# Patient Record
Sex: FEMALE | Race: WHITE | NOT HISPANIC OR LATINO | Employment: OTHER | URBAN - METROPOLITAN AREA
[De-identification: names, ages, dates, MRNs, and addresses within clinical notes are randomized per-mention and may not be internally consistent; named-entity substitution may affect disease eponyms.]

---

## 2017-02-16 ENCOUNTER — OFFICE VISIT (OUTPATIENT)
Dept: FAMILY MEDICINE CLINIC | Facility: CLINIC | Age: 82
End: 2017-02-16

## 2017-02-16 VITALS
DIASTOLIC BLOOD PRESSURE: 60 MMHG | OXYGEN SATURATION: 97 % | HEIGHT: 61 IN | HEART RATE: 108 BPM | WEIGHT: 152 LBS | BODY MASS INDEX: 28.7 KG/M2 | SYSTOLIC BLOOD PRESSURE: 132 MMHG | RESPIRATION RATE: 17 BRPM

## 2017-02-16 DIAGNOSIS — F41.9 ANXIETY: ICD-10-CM

## 2017-02-16 DIAGNOSIS — R30.0 DYSURIA: ICD-10-CM

## 2017-02-16 DIAGNOSIS — I10 BENIGN ESSENTIAL HYPERTENSION: ICD-10-CM

## 2017-02-16 DIAGNOSIS — M25.512 ACUTE PAIN OF LEFT SHOULDER: Primary | ICD-10-CM

## 2017-02-16 PROCEDURE — 99213 OFFICE O/P EST LOW 20 MIN: CPT | Performed by: FAMILY MEDICINE

## 2017-02-16 RX ORDER — MELOXICAM 15 MG/1
TABLET ORAL
Qty: 30 TABLET | Refills: 2 | Status: SHIPPED | OUTPATIENT
Start: 2017-02-16 | End: 2017-05-09 | Stop reason: SDUPTHER

## 2017-02-16 RX ORDER — SERTRALINE HYDROCHLORIDE 25 MG/1
25 TABLET, FILM COATED ORAL DAILY
Qty: 90 TABLET | Refills: 3 | Status: SHIPPED | OUTPATIENT
Start: 2017-02-16 | End: 2018-02-09 | Stop reason: SDUPTHER

## 2017-02-16 RX ORDER — NEBIVOLOL 10 MG/1
10 TABLET ORAL DAILY
Qty: 90 TABLET | Refills: 3 | Status: SHIPPED | OUTPATIENT
Start: 2017-02-16 | End: 2018-02-20 | Stop reason: SDUPTHER

## 2017-02-16 RX ORDER — LOSARTAN POTASSIUM 100 MG/1
100 TABLET ORAL DAILY
Qty: 90 TABLET | Refills: 3 | Status: SHIPPED | OUTPATIENT
Start: 2017-02-16 | End: 2018-01-08 | Stop reason: SDUPTHER

## 2017-02-16 NOTE — PROGRESS NOTES
"Subjective   Lanie Ochoa is a 81 y.o. female.     History of Present Illness L shoulder pain very sore 4 days ago. No new activities or falls.  Pain with heavy lifting or reaching up.  Does swim crawl or breast stroke .5 miles 5x a week. Earlier this week it was too painful, but today she was able to do it. The knot in L shoulder is new.    The following portions of the patient's history were reviewed and updated as appropriate: allergies, current medications, past social history and problem list.    Review of Systems   Constitutional: Negative for activity change, appetite change and unexpected weight change.   HENT: Negative for nosebleeds and trouble swallowing.    Eyes: Negative for pain and visual disturbance.   Respiratory: Negative for chest tightness, shortness of breath and wheezing.    Cardiovascular: Negative for chest pain and palpitations.   Gastrointestinal: Negative for abdominal pain and blood in stool.   Endocrine: Negative.    Genitourinary: Negative for difficulty urinating and hematuria.   Musculoskeletal: Positive for arthralgias and myalgias. Joint swelling: new bump in shoulder.   Skin: Negative for color change and rash.   Allergic/Immunologic: Negative.    Neurological: Negative for syncope and speech difficulty.   Hematological: Negative for adenopathy.   Psychiatric/Behavioral: Negative for agitation and confusion.   All other systems reviewed and are negative.      Objective   Visit Vitals   • /60   • Pulse 108   • Resp 17   • Ht 61\" (154.9 cm)   • Wt 152 lb (68.9 kg)   • SpO2 97%   • BMI 28.72 kg/m2     Physical Exam   Constitutional: She appears well-developed and well-nourished.   Cardiovascular: Normal rate and regular rhythm.    Pulmonary/Chest: Effort normal.   Musculoskeletal: Normal range of motion.   L shoulder AC joint tender firm 1 cm rounded protrusion. Poss a ganglion? Or bony cyst? This is new she states.   Nursing note and vitals reviewed.      Assessment/Plan "   Problem List Items Addressed This Visit     None      Visit Diagnoses     Acute pain of left shoulder    -  Primary    Relevant Medications    meloxicam (MOBIC) 15 MG tablet    Other Relevant Orders    Ambulatory Referral to Orthopedic Surgery           She certainly needs an xray. I am not sure if the lulmp is a ganglion or bony cyst, or other bony growth. Injection could help. Re her activbities, aat this time if any exercise is painful she should avoid it.

## 2017-03-30 DIAGNOSIS — I10 BENIGN ESSENTIAL HYPERTENSION: ICD-10-CM

## 2017-03-30 RX ORDER — DILTIAZEM HYDROCHLORIDE 240 MG/1
240 CAPSULE, EXTENDED RELEASE ORAL DAILY
Qty: 90 CAPSULE | Refills: 1 | Status: SHIPPED | OUTPATIENT
Start: 2017-03-30 | End: 2017-09-18 | Stop reason: SDUPTHER

## 2017-04-13 ENCOUNTER — OFFICE VISIT (OUTPATIENT)
Dept: ORTHOPEDIC SURGERY | Facility: CLINIC | Age: 82
End: 2017-04-13

## 2017-04-13 VITALS — HEIGHT: 61 IN | BODY MASS INDEX: 28.7 KG/M2 | TEMPERATURE: 97.5 F | WEIGHT: 152 LBS

## 2017-04-13 DIAGNOSIS — M25.512 ACUTE PAIN OF LEFT SHOULDER: ICD-10-CM

## 2017-04-13 DIAGNOSIS — M25.561 ACUTE PAIN OF RIGHT KNEE: Primary | ICD-10-CM

## 2017-04-13 PROCEDURE — 73030 X-RAY EXAM OF SHOULDER: CPT | Performed by: ORTHOPAEDIC SURGERY

## 2017-04-13 PROCEDURE — 20610 DRAIN/INJ JOINT/BURSA W/O US: CPT | Performed by: ORTHOPAEDIC SURGERY

## 2017-04-13 PROCEDURE — 99204 OFFICE O/P NEW MOD 45 MIN: CPT | Performed by: ORTHOPAEDIC SURGERY

## 2017-04-13 PROCEDURE — 73562 X-RAY EXAM OF KNEE 3: CPT | Performed by: ORTHOPAEDIC SURGERY

## 2017-04-13 RX ORDER — METHYLPREDNISOLONE ACETATE 80 MG/ML
80 INJECTION, SUSPENSION INTRA-ARTICULAR; INTRALESIONAL; INTRAMUSCULAR; SOFT TISSUE
Status: COMPLETED | OUTPATIENT
Start: 2017-04-13 | End: 2017-04-13

## 2017-04-13 RX ORDER — BUPIVACAINE HYDROCHLORIDE 5 MG/ML
4 INJECTION, SOLUTION EPIDURAL; INTRACAUDAL
Status: COMPLETED | OUTPATIENT
Start: 2017-04-13 | End: 2017-04-13

## 2017-04-13 RX ADMIN — METHYLPREDNISOLONE ACETATE 80 MG: 80 INJECTION, SUSPENSION INTRA-ARTICULAR; INTRALESIONAL; INTRAMUSCULAR; SOFT TISSUE at 15:04

## 2017-04-13 RX ADMIN — BUPIVACAINE HYDROCHLORIDE 4 ML: 5 INJECTION, SOLUTION EPIDURAL; INTRACAUDAL at 15:04

## 2017-04-13 NOTE — PROGRESS NOTES
"   New left Shoulder      Patient: Lanie Ocoha        YOB: 1935    Medical Record Number: [id number]        Chief Complaints: Left shoulder and right knee pain complaints of left shoulder pain really was more of a small mass that was noted on the superior aspect of her shoulder that she states is gone now she has iced it area no history of injury change in activity that she can recall her symptoms are gone his far as the shoulder goes.  She states her knee is really was bothering her now she had a left knee done by Dr. Castle this was a total knee she states she really doesn't want to have another one is that knee is \"never been right.\"  Her symptoms on her knee are intermittent and moderate past medical history marked for breast cancer and arthritis      History of Present Illness: This is a  81 y.o. female who presents with           Allergies:   Allergies   Allergen Reactions   • Sulfa Antibiotics Rash       Medications:   Home Medications:  Current Outpatient Prescriptions on File Prior to Visit   Medication Sig   • CALCIUM PO Take by mouth.   • Cholecalciferol (VITAMIN D3) 400 UNITS capsule Take by mouth.   • diltiazem XR (DILT-XR) 240 MG 24 hr capsule Take 1 capsule by mouth Daily.   • losartan (COZAAR) 100 MG tablet Take 1 tablet by mouth Daily.   • meloxicam (MOBIC) 15 MG tablet 1 a day with food   • nebivolol (BYSTOLIC) 10 MG tablet Take 1 tablet by mouth Daily.   • sertraline (ZOLOFT) 25 MG tablet Take 1 tablet by mouth Daily.     No current facility-administered medications on file prior to visit.      Current Medications:  Scheduled Meds:  Continuous Infusions:  No current facility-administered medications for this visit.   PRN Meds:.    Past Medical History:   Diagnosis Date   • Anxiety    • BMS (burning mouth syndrome) 11/5/2015   • Breast cancer    • Cancer     STAGE 1 INVASIVE DUCTAL CARCINOMA ER AND AR POSITIVE, HER-2/LOYDA NEGATIVE   • Cancer     SKIN LEFT ARM; SQUAMOUS CELL   • " "Constipation    • Degenerative disc disease    • Functional murmur     RESOLVED 03/05/2014   • Gastritis     RESOLVED 03/05/2014   • GERD (gastroesophageal reflux disease)    • Glossalgia 11/5/2015   • Headache 08/28/2013 03/05/2014   • Internal hemorrhoids    • Osteoarthritis 04/08/2014    HAND   • PNA (pneumonia) 11/5/2015   • Trigger ring finger 03/05/2014    RESOLVED 08/22/2014        Past Surgical History:   Procedure Laterality Date   • BREAST BIOPSY     • BREAST LUMPECTOMY     • BREAST SURGERY  07/15/2009    LUMPECTOMY   • DILATATION AND CURETTAGE      RESOLVED 06/15/2010   • EYE SURGERY  10/15/2012    LEFT CATARACT EXTRACTION   • HEMORRHOIDECTOMY          Social History     Occupational History   • Not on file.     Social History Main Topics   • Smoking status: Never Smoker   • Smokeless tobacco: Not on file   • Alcohol use Not on file   • Drug use: Not on file   • Sexual activity: Defer    Social History     Social History Narrative      History reviewed. No pertinent family history.          Review of Systems: 14 point review of systems are remarkable for the pertinent positives listed in chart by the patient the remainder are negative    Review of Systems      Physical Exam: 81 y.o. female  General Appearance:    Alert, cooperative, in no acute distress                 Vitals:    04/13/17 1407   Temp: 97.5 °F (36.4 °C)   Weight: 152 lb (68.9 kg)   Height: 61\" (154.9 cm)      Patient is alert and read ×3 no acute distress appears her above-listed at height weight and age.  Affect is normal respiratory rate is normal unlabored. Heart rate regular rate rhythm, sclera, dentition and hearing are normal for the purpose of this exam.    Ortho Exam Physical exam of the right  knee reveals no effusion no redness.  The patient does have tenderness about the medial l joint line.  No tenderness about the lateral l joint line.  A negative bounce home and a positive l medial Mary.    Patient has a stable " ligamentous exam.  The patient has a negative Lachman and negative anterior drawer and a negative pivot shift.  Quads are reasonable and symmetric bilaterally.  Calf is soft and nontender.  There is no overlying skin changes no lymphedema lymphadenopathy.  Patient has good hip range of motion full symmetric and asymptomatic and a normal ankle exam.  She has good distal pulses and sensation distally is intact    Physical exam of the left shoulder reveals no overlying skin changes no lymphedema no lymphadenopathy.  The patient can actively flex to 180, abduction is similar external rotation is to 50, internal rotation to the upper lumbar spine.  Rotator cuff strength is 4+ to 5 over 5 with isometric strength testing without symptoms.  The patient has good cervical range of motion full and asymptomatic no radicular symptoms and a normal elbow exam.  Patient has good distal pulses she does have a prominence in the area the acromioclavicular joint but no redness no masses felt     Large Joint Arthrocentesis  Date/Time: 4/13/2017 3:04 PM  Consent given by: patient  Site marked: site marked  Timeout: Immediately prior to procedure a time out was called to verify the correct patient, procedure, equipment, support staff and site/side marked as required   Supporting Documentation  Indications: pain   Procedure Details  Location: knee - R knee  Needle size: 25 G  Approach: anteromedial  Medications administered: 4 mL bupivacaine (PF) 0.5 %; 80 mg methylPREDNISolone acetate 80 MG/ML                  Radiology:   AP, Scapular Y and Axillary Lateral of the left shoulder were ordered/reviewed to evauate shoulder pain.  I have no comp films,  She has degenerative changes seen in her acromioclavicular joint otherwise no acute bony pathology seen.  AP lateral merchant view the right knee were taken to evaluate her knee complaints she has evidence of left total knee arthroplasty she has chondrocalcinosis seen medially and laterally  and some degenerative changes seen patellofemoral joint  Assessment/Plan:  Left shoulder massive suspect this was assisted her acromioclavicular joint it has resolved at this time.  I'm going to give her some Voltaren gel the mass should return I want her to ice and use the gel.  As far as her knees go I think it is more degenerative in origin I think she benefit from an injection which she was quite agreeable to do she understands her options of injection Synvisc thyroid platelets and ultimately a knee replacement future if she wants

## 2017-05-02 ENCOUNTER — TRANSCRIBE ORDERS (OUTPATIENT)
Dept: ADMINISTRATIVE | Facility: HOSPITAL | Age: 82
End: 2017-05-02

## 2017-05-02 DIAGNOSIS — Z12.31 VISIT FOR SCREENING MAMMOGRAM: Primary | ICD-10-CM

## 2017-05-09 DIAGNOSIS — M25.512 ACUTE PAIN OF LEFT SHOULDER: ICD-10-CM

## 2017-05-09 RX ORDER — MELOXICAM 15 MG/1
TABLET ORAL
Qty: 30 TABLET | Refills: 1 | Status: SHIPPED | OUTPATIENT
Start: 2017-05-09 | End: 2017-06-07 | Stop reason: SINTOL

## 2017-05-19 ENCOUNTER — OFFICE VISIT (OUTPATIENT)
Dept: ORTHOPEDIC SURGERY | Facility: CLINIC | Age: 82
End: 2017-05-19

## 2017-05-19 VITALS — TEMPERATURE: 97.2 F | WEIGHT: 145 LBS | BODY MASS INDEX: 26.68 KG/M2 | HEIGHT: 62 IN

## 2017-05-19 DIAGNOSIS — S83.241D TEAR OF MEDIAL MENISCUS OF RIGHT KNEE, CURRENT, UNSPECIFIED TEAR TYPE, SUBSEQUENT ENCOUNTER: Primary | ICD-10-CM

## 2017-05-19 PROCEDURE — 99213 OFFICE O/P EST LOW 20 MIN: CPT | Performed by: ORTHOPAEDIC SURGERY

## 2017-05-23 ENCOUNTER — OFFICE VISIT (OUTPATIENT)
Dept: FAMILY MEDICINE CLINIC | Facility: CLINIC | Age: 82
End: 2017-05-23

## 2017-05-23 VITALS
OXYGEN SATURATION: 97 % | BODY MASS INDEX: 27.6 KG/M2 | HEIGHT: 62 IN | DIASTOLIC BLOOD PRESSURE: 70 MMHG | RESPIRATION RATE: 18 BRPM | WEIGHT: 150 LBS | HEART RATE: 71 BPM | SYSTOLIC BLOOD PRESSURE: 130 MMHG

## 2017-05-23 DIAGNOSIS — Z23 ENCOUNTER FOR IMMUNIZATION: ICD-10-CM

## 2017-05-23 DIAGNOSIS — G89.29 CHRONIC PAIN OF RIGHT KNEE: Primary | ICD-10-CM

## 2017-05-23 DIAGNOSIS — I10 BENIGN ESSENTIAL HYPERTENSION: ICD-10-CM

## 2017-05-23 DIAGNOSIS — M25.561 CHRONIC PAIN OF RIGHT KNEE: Primary | ICD-10-CM

## 2017-05-23 PROBLEM — I34.0 NON-RHEUMATIC MITRAL REGURGITATION: Status: ACTIVE | Noted: 2017-05-23

## 2017-05-23 PROBLEM — I36.1 NON-RHEUMATIC TRICUSPID VALVE INSUFFICIENCY: Status: ACTIVE | Noted: 2017-05-23

## 2017-05-23 PROCEDURE — 99213 OFFICE O/P EST LOW 20 MIN: CPT | Performed by: FAMILY MEDICINE

## 2017-05-24 ENCOUNTER — OFFICE VISIT (OUTPATIENT)
Dept: ORTHOPEDIC SURGERY | Facility: CLINIC | Age: 82
End: 2017-05-24

## 2017-05-24 DIAGNOSIS — S83.241D TEAR OF MEDIAL MENISCUS OF RIGHT KNEE, CURRENT, UNSPECIFIED TEAR TYPE, SUBSEQUENT ENCOUNTER: ICD-10-CM

## 2017-05-24 PROCEDURE — 73721 MRI JNT OF LWR EXTRE W/O DYE: CPT | Performed by: ORTHOPAEDIC SURGERY

## 2017-05-30 ENCOUNTER — OFFICE VISIT (OUTPATIENT)
Dept: ORTHOPEDIC SURGERY | Facility: CLINIC | Age: 82
End: 2017-05-30

## 2017-05-30 VITALS — TEMPERATURE: 97.7 F | HEIGHT: 61 IN | BODY MASS INDEX: 28.32 KG/M2 | WEIGHT: 150 LBS

## 2017-05-30 DIAGNOSIS — S83.241D OTHER TEAR OF MEDIAL MENISCUS OF RIGHT KNEE AS CURRENT INJURY, SUBSEQUENT ENCOUNTER: Primary | ICD-10-CM

## 2017-05-30 PROCEDURE — 99213 OFFICE O/P EST LOW 20 MIN: CPT | Performed by: ORTHOPAEDIC SURGERY

## 2017-06-05 ENCOUNTER — HOSPITAL ENCOUNTER (OUTPATIENT)
Dept: MAMMOGRAPHY | Facility: HOSPITAL | Age: 82
Discharge: HOME | End: 2017-06-05
Admitting: FAMILY MEDICINE

## 2017-06-05 DIAGNOSIS — Z12.31 VISIT FOR SCREENING MAMMOGRAM: ICD-10-CM

## 2017-06-05 PROCEDURE — G0202 SCR MAMMO BI INCL CAD: HCPCS

## 2017-06-05 PROCEDURE — 77063 BREAST TOMOSYNTHESIS BI: CPT

## 2017-06-07 ENCOUNTER — APPOINTMENT (OUTPATIENT)
Dept: PREADMISSION TESTING | Facility: HOSPITAL | Age: 82
End: 2017-06-07

## 2017-06-07 VITALS
TEMPERATURE: 96.9 F | HEIGHT: 61 IN | WEIGHT: 149 LBS | HEART RATE: 56 BPM | SYSTOLIC BLOOD PRESSURE: 128 MMHG | OXYGEN SATURATION: 97 % | DIASTOLIC BLOOD PRESSURE: 67 MMHG | RESPIRATION RATE: 18 BRPM | BODY MASS INDEX: 28.13 KG/M2

## 2017-06-07 LAB
ANION GAP SERPL CALCULATED.3IONS-SCNC: 10.5 MMOL/L
BUN BLD-MCNC: 9 MG/DL (ref 8–23)
BUN/CREAT SERPL: 10.6 (ref 7–25)
CALCIUM SPEC-SCNC: 9.2 MG/DL (ref 8.6–10.5)
CHLORIDE SERPL-SCNC: 99 MMOL/L (ref 98–107)
CO2 SERPL-SCNC: 28.5 MMOL/L (ref 22–29)
CREAT BLD-MCNC: 0.85 MG/DL (ref 0.57–1)
DEPRECATED RDW RBC AUTO: 49.1 FL (ref 37–54)
ERYTHROCYTE [DISTWIDTH] IN BLOOD BY AUTOMATED COUNT: 14.3 % (ref 11.7–13)
GFR SERPL CREATININE-BSD FRML MDRD: 64 ML/MIN/1.73
GLUCOSE BLD-MCNC: 116 MG/DL (ref 65–99)
HCT VFR BLD AUTO: 39.8 % (ref 35.6–45.5)
HGB BLD-MCNC: 13.2 G/DL (ref 11.9–15.5)
MCH RBC QN AUTO: 31.4 PG (ref 26.9–32)
MCHC RBC AUTO-ENTMCNC: 33.2 G/DL (ref 32.4–36.3)
MCV RBC AUTO: 94.8 FL (ref 80.5–98.2)
PLATELET # BLD AUTO: 282 10*3/MM3 (ref 140–500)
PMV BLD AUTO: 10.4 FL (ref 6–12)
POTASSIUM BLD-SCNC: 4.2 MMOL/L (ref 3.5–5.2)
RBC # BLD AUTO: 4.2 10*6/MM3 (ref 3.9–5.2)
SODIUM BLD-SCNC: 138 MMOL/L (ref 136–145)
WBC NRBC COR # BLD: 5.69 10*3/MM3 (ref 4.5–10.7)

## 2017-06-07 PROCEDURE — 80048 BASIC METABOLIC PNL TOTAL CA: CPT | Performed by: ORTHOPAEDIC SURGERY

## 2017-06-07 PROCEDURE — 85027 COMPLETE CBC AUTOMATED: CPT | Performed by: ORTHOPAEDIC SURGERY

## 2017-06-07 PROCEDURE — 93010 ELECTROCARDIOGRAM REPORT: CPT | Performed by: INTERNAL MEDICINE

## 2017-06-07 PROCEDURE — 36415 COLL VENOUS BLD VENIPUNCTURE: CPT

## 2017-06-07 PROCEDURE — 93005 ELECTROCARDIOGRAM TRACING: CPT

## 2017-06-07 NOTE — DISCHARGE INSTRUCTIONS
Take the following medications the morning of surgery with a small sip of water:        General Instructions:  • Do not eat solid food after midnight the night before surgery.  • You may drink clear liquids day of surgery but must stop at least one hour before your hospital arrival time.  • It is beneficial for you to have a clear drink that contains carbohydrates the day of surgery.  We suggest a 20 ounce bottle of Gatorade or Powerade for non-diabetic patients or a 20 ounce bottle of G2 or Powerade Zero for diabetic patients. (Pediatric patients, are not advised to drink a 20 ounce carbohydrate drink)    Clear liquids are liquids you can see through.  Nothing red in color.     Plain water                               Sports drinks  Sodas                                   Gelatin (Jell-O)  Fruit juices without pulp such as white grape juice and apple juice  Popsicles that contain no fruit or yogurt  Tea or coffee (no cream or milk added)  Gatorade / Powerade  G2 / Powerade Zero    • Infants may have breast milk up to four hours before surgery.  • Infants drinking formula may drink formula up to six hours before surgery.   • Patients who avoid smoking, chewing tobacco and alcohol for 4 weeks prior to surgery have a reduced risk of post-operative complications.  Quit smoking as many days before surgery as you can.  • Do not smoke, use chewing tobacco or drink alcohol the day of surgery.   • If applicable bring your C-PAP/ BI-PAP machine.  • Bring any papers given to you in the doctor’s office.  • Wear clean comfortable clothes and socks.  • Do not wear contact lenses or make-up.  Bring a case for your glasses.   • Bring crutches or walker if applicable.  • Leave all other valuables and jewelry at home.  • The Pre-Admission Testing nurse will instruct you to bring medications if unable to obtain an accurate list in Pre-Admission Testing.        If you were given a blood bank ID arm band remember to bring it with you  the day of surgery.    Preventing a Surgical Site Infection:  • For 2 to 3 days before surgery, avoid shaving with a razor because the razor can irritate skin and make it easier to develop an infection.  • The night prior to surgery sleep in a clean bed with clean clothing.  Do not allow pets to sleep with you.  • Shower on the morning of surgery using a fresh bar of anti-bacterial soap (such as Dial) and clean washcloth.  Dry with a clean towel and dress in clean clothing.  • Ask your surgeon if you will be receiving antibiotics prior to surgery.  • Make sure you, your family, and all healthcare providers clean their hands with soap and water or an alcohol based hand  before caring for you or your wound.    Day of surgery:  Upon arrival, a Pre-op nurse and Anesthesiologist will review your health history, obtain vital signs, and answer questions you may have.  The only belongings needed at this time will be your home medications and if applicable your C-PAP/BI-PAP machine.  If you are staying overnight your family can leave the rest of your belongings in the car and bring them to your room later.  A Pre-op nurse will start an IV and you may receive medication in preparation for surgery, including something to help you relax.  Your family will be able to see you in the Pre-op area.  While you are in surgery your family should notify the waiting room  if they leave the waiting room area and provide a contact phone number.    Please be aware that surgery does come with discomfort.  We want to make every effort to control your discomfort so please discuss any uncontrolled symptoms with your nurse.   Your doctor will most likely have prescribed pain medications.      If you are going home after surgery you will receive individualized written care instructions before being discharged.  A responsible adult must drive you to and from the hospital on the day of your surgery and stay with you for 24  hours.    If you are staying overnight following surgery, you will be transported to your hospital room following the recovery period.  [facility] has all private rooms.    If you have any questions please call Pre-Admission Testing at [phone].  Deductibles and co-payments are collected on the day of service. Please be prepared to pay the required co-pay, deductible or deposit on the day of service as defined by your plan.

## 2017-06-13 ENCOUNTER — ANESTHESIA (OUTPATIENT)
Dept: PERIOP | Facility: HOSPITAL | Age: 82
End: 2017-06-13

## 2017-06-13 ENCOUNTER — HOSPITAL ENCOUNTER (OUTPATIENT)
Facility: HOSPITAL | Age: 82
Setting detail: HOSPITAL OUTPATIENT SURGERY
Discharge: HOME | End: 2017-06-13
Attending: ORTHOPAEDIC SURGERY | Admitting: ORTHOPAEDIC SURGERY

## 2017-06-13 ENCOUNTER — ANESTHESIA EVENT (OUTPATIENT)
Dept: PERIOP | Facility: HOSPITAL | Age: 82
End: 2017-06-13

## 2017-06-13 VITALS
RESPIRATION RATE: 16 BRPM | HEART RATE: 53 BPM | SYSTOLIC BLOOD PRESSURE: 148 MMHG | WEIGHT: 149 LBS | TEMPERATURE: 98 F | OXYGEN SATURATION: 97 % | DIASTOLIC BLOOD PRESSURE: 69 MMHG | BODY MASS INDEX: 28.15 KG/M2

## 2017-06-13 DIAGNOSIS — M25.561 CHRONIC PAIN OF RIGHT KNEE: Primary | ICD-10-CM

## 2017-06-13 DIAGNOSIS — G89.29 CHRONIC PAIN OF RIGHT KNEE: Primary | ICD-10-CM

## 2017-06-13 PROCEDURE — 25010000002 METHYLPREDNISOLONE PER 80 MG: Performed by: ORTHOPAEDIC SURGERY

## 2017-06-13 PROCEDURE — 25010000002 MIDAZOLAM PER 1 MG: Performed by: ANESTHESIOLOGY

## 2017-06-13 PROCEDURE — 29880 ARTHRS KNE SRG MNISECTMY M&L: CPT | Performed by: ORTHOPAEDIC SURGERY

## 2017-06-13 PROCEDURE — 25010000003 CEFAZOLIN IN DEXTROSE 2-4 GM/100ML-% SOLUTION: Performed by: ORTHOPAEDIC SURGERY

## 2017-06-13 PROCEDURE — 25010000002 FENTANYL CITRATE (PF) 100 MCG/2ML SOLUTION: Performed by: NURSE ANESTHETIST, CERTIFIED REGISTERED

## 2017-06-13 PROCEDURE — 25010000002 ONDANSETRON PER 1 MG: Performed by: NURSE ANESTHETIST, CERTIFIED REGISTERED

## 2017-06-13 PROCEDURE — 25010000002 PROPOFOL 10 MG/ML EMULSION: Performed by: NURSE ANESTHETIST, CERTIFIED REGISTERED

## 2017-06-13 PROCEDURE — 25010000002 KETOROLAC TROMETHAMINE PER 15 MG: Performed by: NURSE ANESTHETIST, CERTIFIED REGISTERED

## 2017-06-13 RX ORDER — KETOROLAC TROMETHAMINE 30 MG/ML
INJECTION, SOLUTION INTRAMUSCULAR; INTRAVENOUS AS NEEDED
Status: DISCONTINUED | OUTPATIENT
Start: 2017-06-13 | End: 2017-06-13 | Stop reason: SURG

## 2017-06-13 RX ORDER — DIPHENHYDRAMINE HYDROCHLORIDE 50 MG/ML
12.5 INJECTION INTRAMUSCULAR; INTRAVENOUS
Status: DISCONTINUED | OUTPATIENT
Start: 2017-06-13 | End: 2017-06-13 | Stop reason: HOSPADM

## 2017-06-13 RX ORDER — PROMETHAZINE HYDROCHLORIDE 25 MG/1
25 TABLET ORAL ONCE AS NEEDED
Status: DISCONTINUED | OUTPATIENT
Start: 2017-06-13 | End: 2017-06-13 | Stop reason: HOSPADM

## 2017-06-13 RX ORDER — MORPHINE SULFATE 1 MG/ML
INJECTION, SOLUTION EPIDURAL; INTRATHECAL; INTRAVENOUS
Status: DISCONTINUED
Start: 2017-06-13 | End: 2017-06-13 | Stop reason: WASHOUT

## 2017-06-13 RX ORDER — CEFAZOLIN SODIUM 2 G/100ML
2 INJECTION, SOLUTION INTRAVENOUS ONCE
Status: COMPLETED | OUTPATIENT
Start: 2017-06-13 | End: 2017-06-13

## 2017-06-13 RX ORDER — OXYCODONE HYDROCHLORIDE AND ACETAMINOPHEN 5; 325 MG/1; MG/1
1 TABLET ORAL ONCE AS NEEDED
Status: DISCONTINUED | OUTPATIENT
Start: 2017-06-13 | End: 2017-06-13 | Stop reason: HOSPADM

## 2017-06-13 RX ORDER — NALBUPHINE HCL 10 MG/ML
10 AMPUL (ML) INJECTION EVERY 4 HOURS PRN
Status: DISCONTINUED | OUTPATIENT
Start: 2017-06-13 | End: 2017-06-13 | Stop reason: HOSPADM

## 2017-06-13 RX ORDER — HYDROMORPHONE HYDROCHLORIDE 1 MG/ML
0.5 INJECTION, SOLUTION INTRAMUSCULAR; INTRAVENOUS; SUBCUTANEOUS
Status: DISCONTINUED | OUTPATIENT
Start: 2017-06-13 | End: 2017-06-13 | Stop reason: HOSPADM

## 2017-06-13 RX ORDER — PROPOFOL 10 MG/ML
VIAL (ML) INTRAVENOUS AS NEEDED
Status: DISCONTINUED | OUTPATIENT
Start: 2017-06-13 | End: 2017-06-13 | Stop reason: SURG

## 2017-06-13 RX ORDER — METHYLPREDNISOLONE ACETATE 80 MG/ML
INJECTION, SUSPENSION INTRA-ARTICULAR; INTRALESIONAL; INTRAMUSCULAR; SOFT TISSUE AS NEEDED
Status: DISCONTINUED | OUTPATIENT
Start: 2017-06-13 | End: 2017-06-13 | Stop reason: HOSPADM

## 2017-06-13 RX ORDER — PROMETHAZINE HYDROCHLORIDE 25 MG/ML
6.25 INJECTION, SOLUTION INTRAMUSCULAR; INTRAVENOUS ONCE AS NEEDED
Status: DISCONTINUED | OUTPATIENT
Start: 2017-06-13 | End: 2017-06-13 | Stop reason: HOSPADM

## 2017-06-13 RX ORDER — SODIUM CHLORIDE 0.9 % (FLUSH) 0.9 %
1-10 SYRINGE (ML) INJECTION AS NEEDED
Status: DISCONTINUED | OUTPATIENT
Start: 2017-06-13 | End: 2017-06-13 | Stop reason: HOSPADM

## 2017-06-13 RX ORDER — ACETAMINOPHEN 325 MG/1
650 TABLET ORAL ONCE
Status: COMPLETED | OUTPATIENT
Start: 2017-06-13 | End: 2017-06-13

## 2017-06-13 RX ORDER — HYDROCODONE BITARTRATE AND ACETAMINOPHEN 5; 325 MG/1; MG/1
1 TABLET ORAL EVERY 4 HOURS PRN
Qty: 50 TABLET | Refills: 0 | Status: SHIPPED | OUTPATIENT
Start: 2017-06-13 | End: 2017-07-21

## 2017-06-13 RX ORDER — FENTANYL CITRATE 50 UG/ML
50 INJECTION, SOLUTION INTRAMUSCULAR; INTRAVENOUS
Status: DISCONTINUED | OUTPATIENT
Start: 2017-06-13 | End: 2017-06-13 | Stop reason: HOSPADM

## 2017-06-13 RX ORDER — MIDAZOLAM HYDROCHLORIDE 1 MG/ML
2 INJECTION INTRAMUSCULAR; INTRAVENOUS
Status: DISCONTINUED | OUTPATIENT
Start: 2017-06-13 | End: 2017-06-13 | Stop reason: HOSPADM

## 2017-06-13 RX ORDER — FENTANYL CITRATE 50 UG/ML
INJECTION, SOLUTION INTRAMUSCULAR; INTRAVENOUS AS NEEDED
Status: DISCONTINUED | OUTPATIENT
Start: 2017-06-13 | End: 2017-06-13 | Stop reason: SURG

## 2017-06-13 RX ORDER — ONDANSETRON 2 MG/ML
INJECTION INTRAMUSCULAR; INTRAVENOUS AS NEEDED
Status: DISCONTINUED | OUTPATIENT
Start: 2017-06-13 | End: 2017-06-13 | Stop reason: SURG

## 2017-06-13 RX ORDER — NALOXONE HCL 0.4 MG/ML
0.4 VIAL (ML) INJECTION AS NEEDED
Status: DISCONTINUED | OUTPATIENT
Start: 2017-06-13 | End: 2017-06-13 | Stop reason: HOSPADM

## 2017-06-13 RX ORDER — NALBUPHINE HCL 10 MG/ML
2 AMPUL (ML) INJECTION EVERY 4 HOURS PRN
Status: DISCONTINUED | OUTPATIENT
Start: 2017-06-13 | End: 2017-06-13 | Stop reason: HOSPADM

## 2017-06-13 RX ORDER — FAMOTIDINE 10 MG/ML
20 INJECTION, SOLUTION INTRAVENOUS ONCE
Status: COMPLETED | OUTPATIENT
Start: 2017-06-13 | End: 2017-06-13

## 2017-06-13 RX ORDER — HYDRALAZINE HYDROCHLORIDE 20 MG/ML
5 INJECTION INTRAMUSCULAR; INTRAVENOUS
Status: DISCONTINUED | OUTPATIENT
Start: 2017-06-13 | End: 2017-06-13 | Stop reason: HOSPADM

## 2017-06-13 RX ORDER — SODIUM CHLORIDE, SODIUM LACTATE, POTASSIUM CHLORIDE, AND CALCIUM CHLORIDE .6; .31; .03; .02 G/100ML; G/100ML; G/100ML; G/100ML
IRRIGANT IRRIGATION AS NEEDED
Status: DISCONTINUED | OUTPATIENT
Start: 2017-06-13 | End: 2017-06-13 | Stop reason: HOSPADM

## 2017-06-13 RX ORDER — ACETAMINOPHEN 325 MG/1
650 TABLET ORAL ONCE AS NEEDED
Status: DISCONTINUED | OUTPATIENT
Start: 2017-06-13 | End: 2017-06-13 | Stop reason: HOSPADM

## 2017-06-13 RX ORDER — MIDAZOLAM HYDROCHLORIDE 1 MG/ML
1 INJECTION INTRAMUSCULAR; INTRAVENOUS
Status: DISCONTINUED | OUTPATIENT
Start: 2017-06-13 | End: 2017-06-13 | Stop reason: HOSPADM

## 2017-06-13 RX ORDER — LIDOCAINE HYDROCHLORIDE 20 MG/ML
INJECTION, SOLUTION INFILTRATION; PERINEURAL AS NEEDED
Status: DISCONTINUED | OUTPATIENT
Start: 2017-06-13 | End: 2017-06-13 | Stop reason: SURG

## 2017-06-13 RX ORDER — PROMETHAZINE HYDROCHLORIDE 25 MG/1
25 SUPPOSITORY RECTAL ONCE AS NEEDED
Status: DISCONTINUED | OUTPATIENT
Start: 2017-06-13 | End: 2017-06-13 | Stop reason: HOSPADM

## 2017-06-13 RX ORDER — ACETAMINOPHEN 650 MG/1
650 SUPPOSITORY RECTAL ONCE AS NEEDED
Status: DISCONTINUED | OUTPATIENT
Start: 2017-06-13 | End: 2017-06-13 | Stop reason: HOSPADM

## 2017-06-13 RX ORDER — SODIUM CHLORIDE, SODIUM LACTATE, POTASSIUM CHLORIDE, CALCIUM CHLORIDE 600; 310; 30; 20 MG/100ML; MG/100ML; MG/100ML; MG/100ML
9 INJECTION, SOLUTION INTRAVENOUS CONTINUOUS
Status: DISCONTINUED | OUTPATIENT
Start: 2017-06-13 | End: 2017-06-13 | Stop reason: HOSPADM

## 2017-06-13 RX ORDER — BUPIVACAINE HYDROCHLORIDE AND EPINEPHRINE 2.5; 5 MG/ML; UG/ML
INJECTION, SOLUTION INFILTRATION; PERINEURAL AS NEEDED
Status: DISCONTINUED | OUTPATIENT
Start: 2017-06-13 | End: 2017-06-13 | Stop reason: HOSPADM

## 2017-06-13 RX ORDER — EPHEDRINE SULFATE 50 MG/ML
INJECTION, SOLUTION INTRAVENOUS AS NEEDED
Status: DISCONTINUED | OUTPATIENT
Start: 2017-06-13 | End: 2017-06-13 | Stop reason: SURG

## 2017-06-13 RX ADMIN — PROPOFOL 130 MG: 10 INJECTION, EMULSION INTRAVENOUS at 07:14

## 2017-06-13 RX ADMIN — KETOROLAC TROMETHAMINE 30 MG: 30 INJECTION, SOLUTION INTRAMUSCULAR; INTRAVENOUS at 07:55

## 2017-06-13 RX ADMIN — ONDANSETRON 4 MG: 2 INJECTION INTRAMUSCULAR; INTRAVENOUS at 07:47

## 2017-06-13 RX ADMIN — SODIUM CHLORIDE, POTASSIUM CHLORIDE, SODIUM LACTATE AND CALCIUM CHLORIDE 9 ML/HR: 600; 310; 30; 20 INJECTION, SOLUTION INTRAVENOUS at 06:03

## 2017-06-13 RX ADMIN — FAMOTIDINE 20 MG: 10 INJECTION, SOLUTION INTRAVENOUS at 06:43

## 2017-06-13 RX ADMIN — CEFAZOLIN SODIUM 2 G: 2 INJECTION, SOLUTION INTRAVENOUS at 07:20

## 2017-06-13 RX ADMIN — ACETAMINOPHEN 650 MG: 325 TABLET ORAL at 06:43

## 2017-06-13 RX ADMIN — EPHEDRINE SULFATE 10 MG: 50 INJECTION INTRAMUSCULAR; INTRAVENOUS; SUBCUTANEOUS at 07:30

## 2017-06-13 RX ADMIN — FENTANYL CITRATE 50 MCG: 50 INJECTION INTRAMUSCULAR; INTRAVENOUS at 07:14

## 2017-06-13 RX ADMIN — EPHEDRINE SULFATE 10 MG: 50 INJECTION INTRAMUSCULAR; INTRAVENOUS; SUBCUTANEOUS at 07:42

## 2017-06-13 RX ADMIN — OXYCODONE HYDROCHLORIDE AND ACETAMINOPHEN 1 TABLET: 5; 325 TABLET ORAL at 08:48

## 2017-06-13 RX ADMIN — EPHEDRINE SULFATE 10 MG: 50 INJECTION INTRAMUSCULAR; INTRAVENOUS; SUBCUTANEOUS at 07:33

## 2017-06-13 RX ADMIN — FENTANYL CITRATE 50 MCG: 50 INJECTION INTRAMUSCULAR; INTRAVENOUS at 07:46

## 2017-06-13 RX ADMIN — MIDAZOLAM 2 MG: 1 INJECTION INTRAMUSCULAR; INTRAVENOUS at 06:43

## 2017-06-13 RX ADMIN — LIDOCAINE HYDROCHLORIDE 60 MG: 20 INJECTION, SOLUTION INFILTRATION; PERINEURAL at 07:14

## 2017-06-13 NOTE — ANESTHESIA PROCEDURE NOTES
Airway  Urgency: elective    Airway not difficult    General Information and Staff    Patient location during procedure: OR  CRNA: FIDELINA TSAI    Indications and Patient Condition  Indications for airway management: airway protection    Preoxygenated: yes  Mask difficulty assessment: 1 - vent by mask    Final Airway Details  Final airway type: supraglottic airway      Successful airway: classic  Size 4    Number of attempts at approach: 1    Additional Comments  LMA placed easily.  Cuff MOP.

## 2017-06-13 NOTE — H&P
History & Physical       Patient: Lanie Ochoa    Date of Admission: 6/13/2017  5:31 AM    YOB: 1935    Medical Record Number: [id number]    Attending Physician: Lauren Krishna MD        Chief Complaints: Other tear of medial meniscus of right knee as current injury, subsequent encounter [I43.700S]      History of Present Illness:This is a very active 83 yo with several month history of right knee pain.  She has an MRI that shows a medial meniscus tear and some arthritis.  She states she can't live with what she has and she presents for scope     Allergies:   Allergies   Allergen Reactions   • Sulfa Antibiotics Rash       Medications:   Home Medications:  No current facility-administered medications on file prior to encounter.      Current Outpatient Prescriptions on File Prior to Encounter   Medication Sig   • CALCIUM PO Take 1 tablet by mouth Daily.   • Cholecalciferol (VITAMIN D3) 400 UNITS capsule Take 1 tablet by mouth Daily.   • diltiazem XR (DILT-XR) 240 MG 24 hr capsule Take 1 capsule by mouth Daily. (Patient taking differently: Take 240 mg by mouth Every Evening.)   • losartan (COZAAR) 100 MG tablet Take 1 tablet by mouth Daily.   • nebivolol (BYSTOLIC) 10 MG tablet Take 1 tablet by mouth Daily. (Patient taking differently: Take 10 mg by mouth Every Morning.)   • sertraline (ZOLOFT) 25 MG tablet Take 1 tablet by mouth Daily.     Current Medications:  Scheduled Meds:  Continuous Infusions:  No current facility-administered medications for this encounter.   PRN Meds:.    Past Medical History:   Diagnosis Date   • Anxiety    • BMS (burning mouth syndrome) 11/5/2015   • Breast cancer    • Cancer     STAGE 1 INVASIVE DUCTAL CARCINOMA ER AND LA POSITIVE, HER-2/LOYDA NEGATIVE   • Cancer     SKIN LEFT ARM; SQUAMOUS CELL   • Constipation    • Degenerative disc disease    • Functional murmur     RESOLVED 03/05/2014   • Gastritis     RESOLVED 03/05/2014   • GERD (gastroesophageal reflux  disease)    • Glossalgia 11/5/2015   • Headache 08/28/2013 03/05/2014   • Hypertension    • Internal hemorrhoids    • Osteoarthritis 04/08/2014    HAND   • PNA (pneumonia) 11/5/2015   • Trigger ring finger 03/05/2014    RESOLVED 08/22/2014        Past Surgical History:   Procedure Laterality Date   • BREAST BIOPSY     • BREAST LUMPECTOMY     • BREAST SURGERY  07/15/2009    LUMPECTOMY   • DILATATION AND CURETTAGE      RESOLVED 06/15/2010   • EYE SURGERY  10/15/2012    LEFT CATARACT EXTRACTION   • HEMORRHOIDECTOMY     • JOINT REPLACEMENT Left 2010    KNEE        Social History     Occupational History   • Not on file.     Social History Main Topics   • Smoking status: Never Smoker   • Smokeless tobacco: Not on file   • Alcohol use No   • Drug use: No   • Sexual activity: Defer    Social History     Social History Narrative        Family History   Problem Relation Age of Onset   • Malig Hyperthermia Neg Hx        Review of Systems      Physical Exam: 82 y.o. female  General Appearance:    Alert, cooperative, in no acute distress                    Vitals:    06/13/17 0605   BP: 147/73   BP Location: Right arm   Patient Position: Lying   Pulse: 65   Resp: 16   Temp: 97.9 °F (36.6 °C)   TempSrc: Oral   SpO2: 96%   Weight: 149 lb (67.6 kg)        Head:    Normocephalic, without obvious abnormality, atraumatic   Eyes:            conjunctivae and sclerae normal, no pallor, corneas clear,    Ears:    Ears appear intact with no abnormalities noted   Throat:   No oral lesions, no thrush, oral mucosa moist   Neck:   No adenopathy, supple, trachea midline, no thyromegaly,    Back:     No kyphosis present, no scoliosis present, no skin lesions,      erythema or scars, no tenderness to percussion or                   palpation,   range of motion normal   Lungs:     Clear to auscultation,respirations regular, even and                  unlabored    Heart:    Regular rhythm and normal rate               Chest Wall:    No  abnormalities observed   Abdomen:     Normal bowel sounds, no masses, no organomegaly, soft        non-tender, non-distended, no guarding, no rebound                tenderness   Rectal:     Deferred   Extremities:    Moves all extremities well, no edema,   no cyanosis, no redness   Pulses:   Pulses palpable and equal bilaterally   Skin:   No bleeding, bruising or rash   Lymph nodes:   No palpable adenopathy   Neurologic:   Appears neurologic intact             Assessment:  Patient Active Problem List   Diagnosis   • Anxiety   • HLD (hyperlipidemia)   • Benign essential hypertension   • Abnormal fasting glucose   • Osteopenia   • Avitaminosis D   • Malignant neoplasm of left breast   • History of left knee replacement   • Titubation   • Health problem in family   • Death of    • Non-rheumatic mitral regurgitation   • Non-rheumatic tricuspid valve insufficiency   • Chronic pain of right knee   medial meniscus tear   arthritis        Plan: All risks, benefits and alternatives were discussed.  Risks including to but not exclusive to anesthetic complications, including death, MI, CVA, infection, bleeding DVT, PE,  fracture, residual pain and need for future surgery.  Patient understood all and agrees to proceed.

## 2017-06-13 NOTE — ANESTHESIA PREPROCEDURE EVALUATION
Anesthesia Evaluation       NPO Liquid Status: > 4 hours     Airway   Mallampati: III  TM distance: <3 FB  Neck ROM: full  possible difficult intubation  Dental - normal exam     Pulmonary - negative pulmonary ROS and normal exam   Cardiovascular   Exercise tolerance: good (4-7 METS)    ECG reviewed  Rhythm: regular    (+) valvular problems/murmurs, hyperlipidemia  (-) murmur    ROS comment: Mild MR/TR no AS by echo    Neuro/Psych  (+) headaches,    GI/Hepatic/Renal/Endo    (+)  GERD,     Musculoskeletal     Abdominal  - normal exam   Substance History      OB/GYN          Other   (+) arthritis                                     Anesthesia Plan    ASA 3     general   (  D/W R&B of GA including but not limited to: heart, lung, liver, kidney, neurologic problems, positioning injuries, dental damage, corneal abrasion and TMJ.  .)  intravenous induction   Anesthetic plan and risks discussed with patient.

## 2017-06-13 NOTE — OP NOTE
Operative Note      Facility: [facility]  Patient Name: Lanie Ochoa  YOB: 1935  Date: 6/13/2017  Medical Record Number: [id number]      Pre-op Diagnosis: right knee medial meniscus and arthritis      Post-op Diagnosis:   Same  w Gd III   MFC, lateral meniscus tear and grade 3 and 4 changes patellofemoral joint        Procedure(s):  KNEE ARTHROSCOPY WITH PARTIAL MEDIAL AND LATERAL MENISECTOMY AND DEBRIDEMENT OF ARTHRITIS/ chondroplasty of the mediofemoral condyle and patellofemoral joint.    Surgeon(s):  Lauren Krishna MD    Anesthesia: General  Anesthesiologist: Chris Myers MD; Anita Funk MD  CRNA: Emily Garces CRNA    Staff:   Circulator: Maxine Neely RN  Scrub Person: Feng Griggs  Assistants :       Estimated Blood Loss: Less than 200 cc     Drains: None    Findings: See Dictation    Complications: None              Indication for procedure: This patient has had a several month history of knee pain and has an exam and an MRI which are consistent with meniscal pathology. They understand all options and wished to proceed with arthroscopy.  She is 82 but extremely active and ask him looks much younger than her stated age      Description of procedure: The patient was taken to the operating room. They were placed supine on the operating room table. After induction of adequate LMA anesthesia, IV antibiotics the underwent exam under anesthesia was symmetric full range of motion. Nonsterile tourniquet was applied patient was placed in the thigh hand all prominent areas well padded and into the table dropped. The leg was prepped and draped in usual sterile fashion. Standard lateral incision was made with 11 blade. Blunt trocar penetrated into the joint scope followed in the evaluation began the patella appeared to sit centrally within the trochlear groove showed marked degenerative changes seen on both sides of the joint and a lot of synovitis throughout the  knee.  I then entered the medial compartment under spinal needle localization direct visualization a medial portal was established.  She had a complex tear of the medial meniscus that was resected with various angled biters baskets motorized steve and ultimately the Apollo device.  Also debrided grade 3 changes on mediofemoral condyle.  The notch was evaluated ACL PCL were intact.  Then entered the lateral arm and shows central tear of the lateral meniscus which was resected with a motorized shaver and the Apollo device.  Term attention back patellofemoral joint gently debrided the patella.             At this point everything was thoroughly irrigated it was suctioned all 3 compartments, the gutters the suprapatellar pouch were all evaluated there was no further acute pathology seen.  Everything was thoroughly irrigated it was injected with Marcaine and Depo-Medrol.  The portals were closed with 3-0 nylon interrupted fashion.  Sterile dressings and Ace wraps were applied.  The patient tolerated the procedure well and was taken to recovery room in good condition.  All sponge and needle count were correct                    Date: 6/13/2017  Time: 8:23 AM

## 2017-06-13 NOTE — PLAN OF CARE
Problem: Patient Care Overview (Adult)  Goal: Plan of Care Review  Outcome: Outcome(s) achieved Date Met:  06/13/17  Goal: Discharge Needs Assessment  Outcome: Outcome(s) achieved Date Met:  06/13/17    Problem: Perioperative Period (Adult)  Goal: Signs and Symptoms of Listed Potential Problems Will be Absent or Manageable (Perioperative Period)  Outcome: Outcome(s) achieved Date Met:  06/13/17

## 2017-06-13 NOTE — ANESTHESIA POSTPROCEDURE EVALUATION
Patient: Lanie Ochoa    Procedure Summary     Date Anesthesia Start Anesthesia Stop Room / Location    06/13/17 0708 0816  [facility]  /  [facility]       Procedure Diagnosis Surgeon Provider    KNEE ARTHROSCOPY WITH PARTIAL MEDIAL AND LATERAL MENISECTOMY AND DEBRIDEMENT OF ARTHRITIS. (Right Knee) Other tear of medial meniscus of right knee as current injury, subsequent encounter  (Other tear of medial meniscus of right knee as current injury, subsequent encounter [T81.869I]) MD Anita Muñoz MD          Anesthesia Type: general  Last vitals  /69 (06/13/17 1038)    Temp      Pulse 53 (06/13/17 1038)   Resp 16 (06/13/17 1038)    SpO2 97 % (06/13/17 1038)      Post Anesthesia Care and Evaluation    Patient location during evaluation: bedside  Patient participation: complete - patient participated  Level of consciousness: awake and alert  Pain management: adequate  Airway patency: patent  Anesthetic complications: No anesthetic complications    Cardiovascular status: acceptable  Respiratory status: acceptable  Hydration status: acceptable

## 2017-06-23 ENCOUNTER — OFFICE VISIT (OUTPATIENT)
Dept: ORTHOPEDIC SURGERY | Facility: CLINIC | Age: 82
End: 2017-06-23

## 2017-06-23 VITALS — WEIGHT: 146.6 LBS | HEIGHT: 61 IN | TEMPERATURE: 97.9 F | BODY MASS INDEX: 27.68 KG/M2

## 2017-06-23 DIAGNOSIS — Z98.890 S/P ARTHROSCOPY OF KNEE: Primary | ICD-10-CM

## 2017-06-23 PROCEDURE — 99024 POSTOP FOLLOW-UP VISIT: CPT | Performed by: ORTHOPAEDIC SURGERY

## 2017-06-23 NOTE — PROGRESS NOTES
"Knee Scope follow Up 1st Visit      Patient: Lanie Ochoa        YOB: 1935      Chief Complaints: Right knee pain  Chief Complaint   Patient presents with   • Right Knee - Post-op   • Suture / Staple Removal           History of Present Illness: Pt is here f/u knee arthroscopyShe's doing quite well still having a little tenderness about her medial portal but overall good        Allergies:   Allergies   Allergen Reactions   • Sulfa Antibiotics Rash       Medications:   Home Medications:  Current Outpatient Prescriptions on File Prior to Visit   Medication Sig   • CALCIUM PO Take 1 tablet by mouth Daily.   • Cholecalciferol (VITAMIN D3) 400 UNITS capsule Take 1 tablet by mouth Daily.   • diltiazem XR (DILT-XR) 240 MG 24 hr capsule Take 1 capsule by mouth Daily. (Patient taking differently: Take 240 mg by mouth Every Evening.)   • losartan (COZAAR) 100 MG tablet Take 1 tablet by mouth Daily.   • nebivolol (BYSTOLIC) 10 MG tablet Take 1 tablet by mouth Daily. (Patient taking differently: Take 10 mg by mouth Every Morning.)   • sertraline (ZOLOFT) 25 MG tablet Take 1 tablet by mouth Daily.   • HYDROcodone-acetaminophen (NORCO) 5-325 MG per tablet Take 1 tablet by mouth Every 4 (Four) Hours As Needed for Severe Pain (7-10). 1-2 oral Q4H PRN severe pain     No current facility-administered medications on file prior to visit.      Current Medications:  Scheduled Meds:  Continuous Infusions:  No current facility-administered medications for this visit.   PRN Meds:.          Physical Exam: 82 y.o. female  General Appearance:    Alert, cooperative, in no acute distress                 Vitals:    06/23/17 1009   Temp: 97.9 °F (36.6 °C)   TempSrc: Temporal Artery    Weight: 146 lb 9.6 oz (66.5 kg)   Height: 61\" (154.9 cm)      Patient is alert and oriented ×3 no acute distress normal mood physical exam.  Physical exam of the knee, incisions looked good there is no erythema, calf is soft and non-tender.  No " sign or sx of DVT      Assessment  S/P knee scope.  I did review intraoperative findings and arthroscopic pictures with the patient.          Plan: To remove sutures today place Steri-Strips and start into  physical therapy and I will have thrm follow up in 4 weeks.

## 2017-06-27 ENCOUNTER — TREATMENT (OUTPATIENT)
Dept: PHYSICAL THERAPY | Facility: CLINIC | Age: 82
End: 2017-06-27

## 2017-06-27 DIAGNOSIS — M25.561 ACUTE PAIN OF RIGHT KNEE: ICD-10-CM

## 2017-06-27 DIAGNOSIS — Z98.890 S/P RIGHT KNEE ARTHROSCOPY: Primary | ICD-10-CM

## 2017-06-27 PROCEDURE — 97032 APPL MODALITY 1+ESTIM EA 15: CPT | Performed by: PHYSICAL THERAPIST

## 2017-06-27 PROCEDURE — G8978 MOBILITY CURRENT STATUS: HCPCS | Performed by: PHYSICAL THERAPIST

## 2017-06-27 PROCEDURE — 97161 PT EVAL LOW COMPLEX 20 MIN: CPT | Performed by: PHYSICAL THERAPIST

## 2017-06-27 PROCEDURE — 97110 THERAPEUTIC EXERCISES: CPT | Performed by: PHYSICAL THERAPIST

## 2017-06-27 PROCEDURE — G8979 MOBILITY GOAL STATUS: HCPCS | Performed by: PHYSICAL THERAPIST

## 2017-06-27 NOTE — PROGRESS NOTES
Physical Therapy Initial Evaluation and Plan of Care    Patient: Lanie Ochoa   : 1935  Diagnosis/ICD-10 Code:  S/P right knee arthroscopy [Z98.890]  Referring practitioner: Lauren Krishna MD    Subjective Evaluation    History of Present Illness  Date of surgery: 2017  Mechanism of injury: S/p (R) knee arthroscopy due to advanced OA and meniscus tear.   I still am taking Tylenol and icing for pain relief. I am doing fine getting out of bed and transferring and am walking without a cane. I still am having some trouble with stair climbing.   I am having trouble with walking long distances.   Likes to swim and ride bike and would like to get back to both of those activities.   I am sleeping good.    (L) TKA in .     Pain  Current pain ratin  At worst pain ratin  Location: (R) knee  Relieving factors: ice and medications  Progression: improved    Social Support  Lives in: multiple-level home (does not have to climb stairs if she doesn't want to)  Lives with: alone    Patient Goals  Patient goal:   SHORT TERM GOALS:  2 weeks  1. Pt will be compliant with HEP.  2. Pt will improve AROM of R knee 0-130 degrees or greater in order to improve stair climbing and gait mechanics.  3. Pt will be able perform excellent quad set and SLR on R with no extensor lag.  4. Pt will be able to ambulate for 10 min or greater with good heel strike, push off and equal WBing.       LONG TERM GOALS:   4 weeks  1. Pt will score 15% or less on WOMAC knee index.  2. Pt will be able to ambulate in community for 30 minutes or greater without use of AD in order to return to PLOF.  3. Pt will be able to climb steps reciprocally with use of 1 rail in order to safely climb steps in community and home environment.  4. Pt will report 3/10 or less pain in R knee to provide comfort with mobility.             Objective     Tenderness     Right Knee   Tenderness in the medial joint line.     Active Range of Motion   Left Knee    Flexion: 121 degrees   Extension: 0 degrees     Right Knee   Flexion: 124 degrees   Extension: 3 degrees     Strength/Myotome Testing     Left Hip   Planes of Motion   Flexion: 5  Abduction: 4+    Right Hip   Planes of Motion   Flexion: 5  Abduction: 4-    Left Knee   Flexion: 5  Extension: 5  Quadriceps contraction: good    Right Knee   Quadriceps contraction: fair    Left Ankle/Foot   Dorsiflexion: 5    Right Ankle/Foot   Dorsiflexion: 5    Additional Strength Details  Noted extensor lag with SLR on (R)    Ambulation     Ambulation: Stairs     Additional Stairs Ambulation Details  Non-reciprocal    Observational Gait   Gait: antalgic   Decreased walking speed.   Left arm swing: decreased  Right arm swing: decreased  Base of support: increased    Quality of Movement During Gait   Trunk    Trunk (Right): Positive lateral lean over stance limb.     General Comments     Knee Comments  Swelling in medial aspect of knee         Assessment & Plan     Assessment  Impairments: abnormal gait, abnormal or restricted ROM, activity intolerance, impaired balance, impaired physical strength, pain with function and weight-bearing intolerance  Assessment details: Pt s/p knee arthroscopy resulting in decreased ability to ambulate and climb stairs safely due to pain and weakness in (R) LE  Prognosis: good    Goals    SHORT TERM GOALS:  2 weeks  1. Pt will be compliant with HEP.  2. Pt will improve AROM of R knee 0-130 degrees or greater in order to improve stair climbing and gait mechanics.  3. Pt will be able perform excellent quad set and SLR on R with no extensor lag.  4. Pt will be able to ambulate for 10 min or greater with good heel strike, push off and equal WBing.       LONG TERM GOALS:   4 weeks  1. Pt will score 15% or less on WOMAC knee index.  2. Pt will be able to ambulate in community for 30 minutes or greater without use of AD in order to return to PLOF.  3. Pt will be able to climb steps reciprocally with use of 1  rail in order to safely climb steps in community and home environment.  4. Pt will report 3/10 or less pain in R knee to provide comfort with mobility.      Plan  Therapy options: will be seen for skilled physical therapy services  Planned modality interventions: cryotherapy, electrical stimulation/Russian stimulation, iontophoresis, TENS, traction, thermotherapy (hydrocollator packs) and ultrasound  Planned therapy interventions: abdominal trunk stabilization, ADL retraining, balance/weight-bearing training, bed mobility training, flexibility, functional ROM exercises, gait training, home exercise program, joint mobilization, manual therapy, neuromuscular re-education, postural training, soft tissue mobilization, strengthening, stretching and therapeutic activities  Frequency: 3x week  Duration in weeks: 4  Treatment plan discussed with: patient        Manual Therapy:    -     mins  98846;  Therapeutic Exercise:    20     mins  38791;     Neuromuscular Sandra:    -    mins  09109;    Therapeutic Activity:     -     mins  17816;     Gait Training:      -     mins  94970;     Ultrasound:     -     mins  06397;    Electrical Stimulation:    15     mins  46006 ( );  Dry Needling     -     mins self-pay    Timed Treatment:   20   mins   Total Treatment:     60   mins    PT SIGNATURE: Nhung Salas, JOSSELYN   DATE TREATMENT INITIATED: 6/27/2017    Medicare Initial Certification  Certification Period: 9/25/2017  I certify that the therapy services are furnished while this patient is under my care.  The services outlined above are required by this patient, and will be reviewed every 90 days.     PHYSICIAN: Lauren Krishna MD      DATE:     Please sign and return via fax to [phone].. Thank you, [facility].

## 2017-06-30 ENCOUNTER — TREATMENT (OUTPATIENT)
Dept: PHYSICAL THERAPY | Facility: CLINIC | Age: 82
End: 2017-06-30

## 2017-06-30 DIAGNOSIS — Z98.890 S/P RIGHT KNEE ARTHROSCOPY: Primary | ICD-10-CM

## 2017-06-30 DIAGNOSIS — M25.561 ACUTE PAIN OF RIGHT KNEE: ICD-10-CM

## 2017-06-30 PROCEDURE — 97110 THERAPEUTIC EXERCISES: CPT | Performed by: PHYSICAL THERAPIST

## 2017-06-30 PROCEDURE — 97032 APPL MODALITY 1+ESTIM EA 15: CPT | Performed by: PHYSICAL THERAPIST

## 2017-06-30 PROCEDURE — 97140 MANUAL THERAPY 1/> REGIONS: CPT | Performed by: PHYSICAL THERAPIST

## 2017-06-30 NOTE — PROGRESS NOTES
Physical Therapy Daily Progress Note  Visit: 2    Lanie Ocoha reports: Knee is feeling alright    Subjective     Objective   See Exercise, Manual, and Modality Logs for complete treatment.       Assessment & Plan     Assessment  Assessment details: Pt tolerated treatment well. Added bridging and hip ABD to HEP    Plan  Plan details: Progress per POC        Manual Therapy:    8     mins  50935;  Therapeutic Exercise:    31     mins  37937;     Neuromuscular Sandra:    -    mins  10696;    Therapeutic Activity:     -     mins  76883;     Gait Training:      -     mins  61860;     Ultrasound:     -     mins  80183;    Electrical Stimulation:    15     mins  89196 ( );  Dry Needling     -     mins self-pay    Timed Treatment:   39   mins   Total Treatment:     58   mins    Nhung Salas PT  [place] License #: [id number]    Physical Therapist

## 2017-07-05 ENCOUNTER — TREATMENT (OUTPATIENT)
Dept: PHYSICAL THERAPY | Facility: CLINIC | Age: 82
End: 2017-07-05

## 2017-07-05 DIAGNOSIS — M25.561 ACUTE PAIN OF RIGHT KNEE: ICD-10-CM

## 2017-07-05 DIAGNOSIS — Z98.890 S/P RIGHT KNEE ARTHROSCOPY: Primary | ICD-10-CM

## 2017-07-05 PROCEDURE — 97110 THERAPEUTIC EXERCISES: CPT | Performed by: PHYSICAL THERAPIST

## 2017-07-05 PROCEDURE — 97032 APPL MODALITY 1+ESTIM EA 15: CPT | Performed by: PHYSICAL THERAPIST

## 2017-07-05 NOTE — PROGRESS NOTES
Physical Therapy Daily Progress Note  Visit: 3    Lanie Floydtalon reports: Knee is feeling ok. I did ok after last session    Subjective     Objective   See Exercise, Manual, and Modality Logs for complete treatment.       Assessment & Plan     Assessment  Assessment details: Pt tolerated treatment well. Begin more step ups next session    Plan  Plan details: Progress per POC        Manual Therapy:    4     mins  92472;  Therapeutic Exercise:    26     mins  80427;     Neuromuscular Sandra:    3    mins  62541;    Therapeutic Activity:     -     mins  45586;     Gait Training:      -     mins  91201;     Ultrasound:     -     mins  87351;    Electrical Stimulation:    15     mins  92939 ( );  Dry Needling     -     mins self-pay    Timed Treatment:   33   mins   Total Treatment:     65   mins    Nhung Salas PT  [place] License #: [id number]    Physical Therapist

## 2017-07-07 ENCOUNTER — TREATMENT (OUTPATIENT)
Dept: PHYSICAL THERAPY | Facility: CLINIC | Age: 82
End: 2017-07-07

## 2017-07-07 DIAGNOSIS — M25.561 ACUTE PAIN OF RIGHT KNEE: ICD-10-CM

## 2017-07-07 DIAGNOSIS — Z98.890 S/P RIGHT KNEE ARTHROSCOPY: Primary | ICD-10-CM

## 2017-07-07 PROCEDURE — 97110 THERAPEUTIC EXERCISES: CPT | Performed by: PHYSICAL THERAPIST

## 2017-07-07 PROCEDURE — 97112 NEUROMUSCULAR REEDUCATION: CPT | Performed by: PHYSICAL THERAPIST

## 2017-07-07 PROCEDURE — 97032 APPL MODALITY 1+ESTIM EA 15: CPT | Performed by: PHYSICAL THERAPIST

## 2017-07-07 NOTE — PROGRESS NOTES
Physical Therapy Daily Progress Note  Visit: 4    Lnaie Ochoa reports: Knee is doing ok. Still having some trouble and pain going down the steps    Subjective     Objective   See Exercise, Manual, and Modality Logs for complete treatment.       Assessment & Plan     Assessment  Assessment details: Pt tolerated treatment well. Had some difficulty with steps, required UE assist x 2. Will continue to work on quad strengthening    Plan  Plan details: Progress per POC        Manual Therapy:    -     mins  97372;  Therapeutic Exercise:    40     mins  11796;     Neuromuscular Sandra:    14    mins  15405;    Therapeutic Activity:     -     mins  21378;     Gait Training:      -     mins  83358;     Ultrasound:     -     mins  62459;    Electrical Stimulation:    15     mins  24668 ( );  Dry Needling     -     mins self-pay    Timed Treatment:   54   mins   Total Treatment:     75   mins    Nhung Salas PT  [place] License #: [id number]    Physical Therapist

## 2017-07-10 ENCOUNTER — TREATMENT (OUTPATIENT)
Dept: PHYSICAL THERAPY | Facility: CLINIC | Age: 82
End: 2017-07-10

## 2017-07-10 DIAGNOSIS — Z98.890 S/P RIGHT KNEE ARTHROSCOPY: Primary | ICD-10-CM

## 2017-07-10 DIAGNOSIS — M25.561 ACUTE PAIN OF RIGHT KNEE: ICD-10-CM

## 2017-07-10 PROCEDURE — 97112 NEUROMUSCULAR REEDUCATION: CPT | Performed by: PHYSICAL THERAPIST

## 2017-07-10 PROCEDURE — 97032 APPL MODALITY 1+ESTIM EA 15: CPT | Performed by: PHYSICAL THERAPIST

## 2017-07-10 PROCEDURE — 97110 THERAPEUTIC EXERCISES: CPT | Performed by: PHYSICAL THERAPIST

## 2017-07-10 NOTE — PROGRESS NOTES
Physical Therapy Daily Progress Note  Visit: 5    Lanie Floydtalon reports: Still having some discomfort on the inside of my knee    Subjective     Objective   See Exercise, Manual, and Modality Logs for complete treatment.       Assessment & Plan     Assessment  Assessment details: Pt doing well. Ministerio continued work on quad strengthening and balance. Education for massage over medial knee incision to reduce pain over scar    Plan  Plan details: Progress per POC        Manual Therapy:    4     mins  31489;  Therapeutic Exercise:    25     mins  61305;     Neuromuscular Sandra:    16    mins  07370;    Therapeutic Activity:     -     mins  33526;     Gait Training:      -     mins  39220;     Ultrasound:     -     mins  48735;    Electrical Stimulation:    15     mins  00591 ( );  Dry Needling     -     mins self-pay    Timed Treatment:   41   mins   Total Treatment:     65   mins    Nhung Salas PT  [place] License #: [id number]    Physical Therapist

## 2017-07-12 ENCOUNTER — TREATMENT (OUTPATIENT)
Dept: PHYSICAL THERAPY | Facility: CLINIC | Age: 82
End: 2017-07-12

## 2017-07-12 DIAGNOSIS — M25.561 ACUTE PAIN OF RIGHT KNEE: ICD-10-CM

## 2017-07-12 DIAGNOSIS — Z98.890 S/P RIGHT KNEE ARTHROSCOPY: Primary | ICD-10-CM

## 2017-07-12 PROCEDURE — 97110 THERAPEUTIC EXERCISES: CPT | Performed by: PHYSICAL THERAPIST

## 2017-07-12 PROCEDURE — 97032 APPL MODALITY 1+ESTIM EA 15: CPT | Performed by: PHYSICAL THERAPIST

## 2017-07-12 PROCEDURE — 97112 NEUROMUSCULAR REEDUCATION: CPT | Performed by: PHYSICAL THERAPIST

## 2017-07-12 PROCEDURE — 97140 MANUAL THERAPY 1/> REGIONS: CPT | Performed by: PHYSICAL THERAPIST

## 2017-07-12 NOTE — PROGRESS NOTES
Physical Therapy Daily Progress Note  Visit: 6    Lanie Floydtalon reports: Knee is doing better. There is less pain    Subjective     Objective   See Exercise, Manual, and Modality Logs for complete treatment.       Assessment & Plan     Assessment  Assessment details: Pt doing well. Continues to progress with strength and stability of (R) knee    Plan  Plan details: Progress per POC        Manual Therapy:    8     mins  06752;  Therapeutic Exercise:    35     mins  66895;     Neuromuscular Sandra:    10    mins  57160;    Therapeutic Activity:     -     mins  57887;     Gait Training:      -     mins  16881;     Ultrasound:     -     mins  06593;    Electrical Stimulation:    15     mins  08624 ( );  Dry Needling     -     mins self-pay    Timed Treatment:   53   mins   Total Treatment:     72   mins    Nhung Salas, PT  [place] License #: [id number]    Physical Therapist

## 2017-07-14 ENCOUNTER — TREATMENT (OUTPATIENT)
Dept: PHYSICAL THERAPY | Facility: CLINIC | Age: 82
End: 2017-07-14

## 2017-07-14 DIAGNOSIS — M25.561 ACUTE PAIN OF RIGHT KNEE: ICD-10-CM

## 2017-07-14 DIAGNOSIS — Z98.890 S/P RIGHT KNEE ARTHROSCOPY: Primary | ICD-10-CM

## 2017-07-14 PROCEDURE — 97110 THERAPEUTIC EXERCISES: CPT | Performed by: PHYSICAL THERAPIST

## 2017-07-14 PROCEDURE — 97032 APPL MODALITY 1+ESTIM EA 15: CPT | Performed by: PHYSICAL THERAPIST

## 2017-07-14 PROCEDURE — 97112 NEUROMUSCULAR REEDUCATION: CPT | Performed by: PHYSICAL THERAPIST

## 2017-07-14 NOTE — PROGRESS NOTES
Physical Therapy Daily Progress Note  Visit: 7    Lanie Don reports: About the same. Not too sore at all except on the insisde of my knee occasionally    Subjective     Objective   See Exercise, Manual, and Modality Logs for complete treatment.       Assessment & Plan     Assessment  Assessment details: Pt tolerated treatment well. Required min assist for balance with resisted walking and had some difficulties with 8 inch step ups. Overall strength is improving though    Plan  Plan details: Progress per POC        Manual Therapy:    4     mins  26138;  Therapeutic Exercise:    33     mins  38004;     Neuromuscular Sandra:    13    mins  22245;    Therapeutic Activity:     -     mins  66082;     Gait Training:      -     mins  98478;     Ultrasound:     -     mins  40133;    Electrical Stimulation:    15     mins  60520 ( );  Dry Needling     -     mins self-pay    Timed Treatment:   50   mins   Total Treatment:     69   mins    Nhung Salas PT  [place] License #: [id number]    Physical Therapist

## 2017-07-17 ENCOUNTER — TREATMENT (OUTPATIENT)
Dept: PHYSICAL THERAPY | Facility: CLINIC | Age: 82
End: 2017-07-17

## 2017-07-17 DIAGNOSIS — M25.561 ACUTE PAIN OF RIGHT KNEE: ICD-10-CM

## 2017-07-17 DIAGNOSIS — Z98.890 S/P RIGHT KNEE ARTHROSCOPY: Primary | ICD-10-CM

## 2017-07-17 PROCEDURE — 97032 APPL MODALITY 1+ESTIM EA 15: CPT | Performed by: PHYSICAL THERAPIST

## 2017-07-17 PROCEDURE — 97110 THERAPEUTIC EXERCISES: CPT | Performed by: PHYSICAL THERAPIST

## 2017-07-17 PROCEDURE — 97112 NEUROMUSCULAR REEDUCATION: CPT | Performed by: PHYSICAL THERAPIST

## 2017-07-17 NOTE — PROGRESS NOTES
Physical Therapy Daily Progress Note  Visit: 8    Lanie Ochoa reports: Knee is doing all right. Pain still gets me occasionally    Subjective     Objective   See Exercise, Manual, and Modality Logs for complete treatment.       Assessment & Plan     Assessment  Assessment details: Pt doing very well. Pt reports no pain, just minimal discomfort at night around a 1/10. Pt has good ROM and improving strength    Plan  Plan details: Anticipate Dc next session        Manual Therapy:    -     mins  27880;  Therapeutic Exercise:    30     mins  72099;     Neuromuscular Sandra:    14    mins  36481;    Therapeutic Activity:     -     mins  31968;     Gait Training:      -     mins  34457;     Ultrasound:     -     mins  67653;    Electrical Stimulation:    15     mins  28044 ( );  Dry Needling     -     mins self-pay    Timed Treatment:   45   mins   Total Treatment:     65   mins    Nhung Salas PT  [place] License #: [id number]    Physical Therapist

## 2017-07-19 ENCOUNTER — TREATMENT (OUTPATIENT)
Dept: PHYSICAL THERAPY | Facility: CLINIC | Age: 82
End: 2017-07-19

## 2017-07-19 DIAGNOSIS — M25.561 ACUTE PAIN OF RIGHT KNEE: ICD-10-CM

## 2017-07-19 DIAGNOSIS — Z98.890 S/P RIGHT KNEE ARTHROSCOPY: Primary | ICD-10-CM

## 2017-07-19 PROCEDURE — 97110 THERAPEUTIC EXERCISES: CPT | Performed by: PHYSICAL THERAPIST

## 2017-07-19 PROCEDURE — 97112 NEUROMUSCULAR REEDUCATION: CPT | Performed by: PHYSICAL THERAPIST

## 2017-07-19 PROCEDURE — 97032 APPL MODALITY 1+ESTIM EA 15: CPT | Performed by: PHYSICAL THERAPIST

## 2017-07-19 NOTE — PROGRESS NOTES
Physical Therapy Daily Progress Note  Visit: 9    Lanie Ochoa reports: My back bothers me more than anything at this point. I go to [facility] on Friday. Pain reported as 0/10 during the day, but at the end of the day 3/10    Subjective     Objective     Tenderness     Right Knee   Tenderness in the medial joint line.     Active Range of Motion   Left Knee   Flexion: 124 degrees   Extension: 0 degrees     Strength/Myotome Testing     Left Hip   Planes of Motion   Left hip abductors strength: 5-/5.     See Exercise, Manual, and Modality Logs for complete treatment.       Assessment & Plan     Assessment  Assessment details: Pt did well with PT. Pt has good ROM and improving strength demonstrated by improved gait mechanics and ability to climb stairs. Pt continues to report some discomfort at night after a long day on her feet. Pt has met most goals at this time and will be DC'd unless MD suggests otherwise.     Plan  Plan details: DC at this time         1. Pt will be compliant with HEP. (MET)  2. Pt will improve AROM of R knee 0-130 degrees or greater in order to improve stair climbing and gait mechanics. (partially met)  3. Pt will be able perform excellent quad set and SLR on R with no extensor lag. (MET)  4. Pt will be able to ambulate for 10 min or greater with good heel strike, push off and equal WBing.      (MET)                                LONG TERM GOALS:   4 weeks  1. Pt will score 15% or less on WOMAC knee index. (NOT MET)  2. Pt will be able to ambulate in community for 30 minutes or greater without use of AD in order to return to PLOF. (MET)  3. Pt will be able to climb steps reciprocally with use of 1 rail in order to safely climb steps in community and home environment. (MET)  4. Pt will report 3/10 or less pain in R knee to provide comfort with mobility. (met)    Manual Therapy:    -     mins  09634;  Therapeutic Exercise:    32     mins  26666;     Neuromuscular Sandra:    15    mins  30868;     Therapeutic Activity:     -     mins  33032;     Gait Training:      -     mins  44865;     Ultrasound:     -     mins  95329;    Electrical Stimulation:    15     mins  68425 ( );  Dry Needling     -     mins self-pay    Timed Treatment:   47   mins   Total Treatment:     70   mins    Nhung Salas PT  [place] License #: [id number]    Physical Therapist

## 2017-07-21 ENCOUNTER — OFFICE VISIT (OUTPATIENT)
Dept: ORTHOPEDIC SURGERY | Facility: CLINIC | Age: 82
End: 2017-07-21

## 2017-07-21 VITALS — WEIGHT: 149 LBS | TEMPERATURE: 97.4 F | HEIGHT: 61 IN | BODY MASS INDEX: 28.13 KG/M2

## 2017-07-21 DIAGNOSIS — Z98.890 S/P ARTHROSCOPY OF KNEE: Primary | ICD-10-CM

## 2017-07-21 PROCEDURE — 99024 POSTOP FOLLOW-UP VISIT: CPT | Performed by: ORTHOPAEDIC SURGERY

## 2017-07-21 NOTE — PROGRESS NOTES
Right Knee Scope follow Up       Patient: Lanie Ochoa        YOB: 1935      Chief Complaints: knee pain      History of Present Illness: Pt is here f/u knee arthroscopy she is doing great has no complaint has getting back to her activities        Allergies:   Allergies   Allergen Reactions   • Sulfa Antibiotics Rash       Medications:   Home Medications:  Current Outpatient Prescriptions on File Prior to Visit   Medication Sig   • CALCIUM PO Take 1 tablet by mouth Daily.   • Cholecalciferol (VITAMIN D3) 400 UNITS capsule Take 1 tablet by mouth Daily.   • diltiazem XR (DILT-XR) 240 MG 24 hr capsule Take 1 capsule by mouth Daily. (Patient taking differently: Take 240 mg by mouth Every Evening.)   • HYDROcodone-acetaminophen (NORCO) 5-325 MG per tablet Take 1 tablet by mouth Every 4 (Four) Hours As Needed for Severe Pain (7-10). 1-2 oral Q4H PRN severe pain   • losartan (COZAAR) 100 MG tablet Take 1 tablet by mouth Daily.   • nebivolol (BYSTOLIC) 10 MG tablet Take 1 tablet by mouth Daily. (Patient taking differently: Take 10 mg by mouth Every Morning.)   • sertraline (ZOLOFT) 25 MG tablet Take 1 tablet by mouth Daily.     No current facility-administered medications on file prior to visit.      Current Medications:  Scheduled Meds:  Continuous Infusions:  No current facility-administered medications for this visit.   PRN Meds:.          Physical Exam: 82 y.o. female  General Appearance:    Alert, cooperative, in no acute distress                 There were no vitals filed for this visit.   Patient is alert and oriented ×3 no acute distress normal mood physical exam.  Physical exam of the knee, incisions looked good there is no erythema, calf is soft and non-tender.  No sign or sx of DVT      Assessment  S/P knee scope.  Overall doing well.         Plan: Continue with strengthening, progression of activitiesShe can follow-up as needed

## 2017-08-06 ENCOUNTER — APPOINTMENT (OUTPATIENT)
Dept: CARDIOLOGY | Facility: HOSPITAL | Age: 82
End: 2017-08-06
Attending: EMERGENCY MEDICINE

## 2017-08-06 ENCOUNTER — HOSPITAL ENCOUNTER (EMERGENCY)
Facility: HOSPITAL | Age: 82
Discharge: HOME | End: 2017-08-06
Attending: EMERGENCY MEDICINE | Admitting: EMERGENCY MEDICINE

## 2017-08-06 VITALS
DIASTOLIC BLOOD PRESSURE: 82 MMHG | BODY MASS INDEX: 27.38 KG/M2 | TEMPERATURE: 98.5 F | WEIGHT: 145 LBS | OXYGEN SATURATION: 97 % | RESPIRATION RATE: 16 BRPM | SYSTOLIC BLOOD PRESSURE: 161 MMHG | HEART RATE: 74 BPM | HEIGHT: 61 IN

## 2017-08-06 DIAGNOSIS — M79.662 PAIN IN LEFT LOWER LEG: Primary | ICD-10-CM

## 2017-08-06 LAB
ANION GAP SERPL CALCULATED.3IONS-SCNC: 11.4 MMOL/L
BASOPHILS # BLD AUTO: 0.04 10*3/MM3 (ref 0–0.2)
BASOPHILS NFR BLD AUTO: 0.6 % (ref 0–1.5)
BUN BLD-MCNC: 13 MG/DL (ref 8–23)
BUN/CREAT SERPL: 21.7 (ref 7–25)
CALCIUM SPEC-SCNC: 9.5 MG/DL (ref 8.6–10.5)
CHLORIDE SERPL-SCNC: 102 MMOL/L (ref 98–107)
CO2 SERPL-SCNC: 27.6 MMOL/L (ref 22–29)
CREAT BLD-MCNC: 0.6 MG/DL (ref 0.57–1)
DEPRECATED RDW RBC AUTO: 50.7 FL (ref 37–54)
EOSINOPHIL # BLD AUTO: 0.36 10*3/MM3 (ref 0–0.7)
EOSINOPHIL NFR BLD AUTO: 5.7 % (ref 0.3–6.2)
ERYTHROCYTE [DISTWIDTH] IN BLOOD BY AUTOMATED COUNT: 14 % (ref 11.7–13)
GFR SERPL CREATININE-BSD FRML MDRD: 96 ML/MIN/1.73
GLUCOSE BLD-MCNC: 112 MG/DL (ref 65–99)
HCT VFR BLD AUTO: 39.4 % (ref 35.6–45.5)
HGB BLD-MCNC: 12.5 G/DL (ref 11.9–15.5)
IMM GRANULOCYTES # BLD: 0.03 10*3/MM3 (ref 0–0.03)
IMM GRANULOCYTES NFR BLD: 0.5 % (ref 0–0.5)
LYMPHOCYTES # BLD AUTO: 1.19 10*3/MM3 (ref 0.9–4.8)
LYMPHOCYTES NFR BLD AUTO: 18.9 % (ref 19.6–45.3)
MCH RBC QN AUTO: 31.3 PG (ref 26.9–32)
MCHC RBC AUTO-ENTMCNC: 31.7 G/DL (ref 32.4–36.3)
MCV RBC AUTO: 98.5 FL (ref 80.5–98.2)
MONOCYTES # BLD AUTO: 0.29 10*3/MM3 (ref 0.2–1.2)
MONOCYTES NFR BLD AUTO: 4.6 % (ref 5–12)
NEUTROPHILS # BLD AUTO: 4.37 10*3/MM3 (ref 1.9–8.1)
NEUTROPHILS NFR BLD AUTO: 69.7 % (ref 42.7–76)
PLATELET # BLD AUTO: 311 10*3/MM3 (ref 140–500)
PMV BLD AUTO: 10 FL (ref 6–12)
POTASSIUM BLD-SCNC: 4.2 MMOL/L (ref 3.5–5.2)
RBC # BLD AUTO: 4 10*6/MM3 (ref 3.9–5.2)
SODIUM BLD-SCNC: 141 MMOL/L (ref 136–145)
WBC NRBC COR # BLD: 6.28 10*3/MM3 (ref 4.5–10.7)

## 2017-08-06 PROCEDURE — 99283 EMERGENCY DEPT VISIT LOW MDM: CPT

## 2017-08-06 PROCEDURE — 85025 COMPLETE CBC W/AUTO DIFF WBC: CPT | Performed by: EMERGENCY MEDICINE

## 2017-08-06 PROCEDURE — 80048 BASIC METABOLIC PNL TOTAL CA: CPT | Performed by: EMERGENCY MEDICINE

## 2017-08-06 PROCEDURE — 93971 EXTREMITY STUDY: CPT

## 2017-08-06 NOTE — ED PROVIDER NOTES
EMERGENCY DEPARTMENT ENCOUNTER    CHIEF COMPLAINT  Chief Complaint: L leg swelling  History given by: pt   History limited by: none   Room Number: HALA/A  PMD: Idalmis Springer MD      HPI:  Pt is a 82 y.o. female who presents on advisement from the [facility] complaining of L leg swelling that she states started at her ankle and moved up starting earlier today. Pt also c/o L leg pain, warmth, and redness. Pt denies fever, CP, or SOA. Pt states that she is not on blood thinners. Pt denies recent injury.     Duration:  Since earlier today   Onset: gradual   Timing: constant   Location: L lower leg   Quality: swelling   Intensity/Severity: moderate   Progression: unchanged   Associated Symptoms: L leg redness, warmth   Aggravating Factors: none stated   Alleviating Factors: none stated   Previous Episodes: none  Treatment before arrival: none     PAST MEDICAL HISTORY  Active Ambulatory Problems     Diagnosis Date Noted   • Anxiety 11/05/2015   • HLD (hyperlipidemia) 11/05/2015   • Benign essential hypertension 11/05/2015   • Abnormal fasting glucose 11/05/2015   • Osteopenia 11/05/2015   • Avitaminosis D 11/05/2015   • Malignant neoplasm of left breast 11/29/2016   • History of left knee replacement 11/29/2016   • Titubation 11/29/2016   • Health problem in family 11/29/2016   • Death of  11/29/2016   • Non-rheumatic mitral regurgitation 05/23/2017   • Non-rheumatic tricuspid valve insufficiency 05/23/2017   • Chronic pain of right knee 05/23/2017     Resolved Ambulatory Problems     Diagnosis Date Noted   • BMS (burning mouth syndrome) 11/05/2015   • Chronic kidney disease (CKD), stage III (moderate) 11/05/2015   • Glossalgia 11/05/2015   • PNA (pneumonia) 11/05/2015     Past Medical History:   Diagnosis Date   • Anxiety    • BMS (burning mouth syndrome) 11/5/2015   • Breast cancer    • Cancer    • Cancer    • Constipation    • Degenerative disc disease    • Functional murmur    • Gastritis    • GERD  (gastroesophageal reflux disease)    • Glossalgia 11/5/2015   • Headache 08/28/2013   • Hypertension    • Internal hemorrhoids    • Osteoarthritis 04/08/2014   • PNA (pneumonia) 11/5/2015   • Trigger ring finger 03/05/2014       PAST SURGICAL HISTORY  Past Surgical History:   Procedure Laterality Date   • BREAST BIOPSY     • BREAST LUMPECTOMY     • BREAST SURGERY  07/15/2009    LUMPECTOMY   • DILATATION AND CURETTAGE      RESOLVED 06/15/2010   • EYE SURGERY  10/15/2012    LEFT CATARACT EXTRACTION   • HEMORRHOIDECTOMY     • JOINT REPLACEMENT Left 2010    KNEE   • KNEE ARTHROSCOPY Right 6/13/2017    Procedure: KNEE ARTHROSCOPY WITH PARTIAL MEDIAL AND LATERAL MENISECTOMY AND DEBRIDEMENT OF ARTHRITIS.;  Surgeon: Lauren Krishna MD;  [place] [facility] [place] [facility] [place]:        FAMILY HISTORY  Family History   Problem Relation Age of Onset   • Malig Hyperthermia Neg Hx        SOCIAL HISTORY  Social History     Social History   • Marital status:      Spouse name: N/A   • Number of children: N/A   • Years of education: N/A     Occupational History   • Not on file.     Social History Main Topics   • Smoking status: Never Smoker   • Smokeless tobacco: Not on file   • Alcohol use No   • Drug use: No   • Sexual activity: Defer     Other Topics Concern   • Not on file     Social History Narrative   • No narrative on file       ALLERGIES  Sulfa antibiotics    REVIEW OF SYSTEMS  Review of Systems   Constitutional: Negative for fever.   Respiratory: Negative for shortness of breath.    Cardiovascular: Positive for leg swelling (L leg). Negative for chest pain.   Musculoskeletal:        Pain and warmth, L lower leg   Skin: Positive for color change (redness, L lower leg).       PHYSICAL EXAM  ED Triage Vitals   Temp Heart Rate Resp BP SpO2   08/06/17 1446 08/06/17 1446 08/06/17 1446 08/06/17 1447 08/06/17 1446   98.5 °F (36.9 °C) 74 16 161/82 97 %      Temp src Heart Rate Source Patient Position BP Location FiO2 (%)   -- -- --  -- --              Physical Exam   Constitutional: She is oriented to person, place, and time and well-developed, well-nourished, and in no distress.   Eyes: EOM are normal.   Neck: Normal range of motion.   Cardiovascular: Normal rate, regular rhythm, intact distal pulses and normal pulses.    Murmur heard.   Systolic murmur is present with a grade of 2/6   Pulmonary/Chest: Effort normal and breath sounds normal. No respiratory distress.   Musculoskeletal: She exhibits edema (mild, L lower leg) and tenderness (mild, L lower leg).   Neurological: She is alert and oriented to person, place, and time. She has normal sensation and normal strength.   Skin: Skin is warm and dry. There is erythema (L lower leg).   Mild warmth, L lower leg   Psychiatric: Affect normal.   Nursing note and vitals reviewed.      LAB RESULTS  Lab Results (last 24 hours)     Procedure Component Value Units Date/Time    CBC & Differential [[id number]] Collected:  08/06/17 1606    Specimen:  Blood Updated:  08/06/17 1615    Narrative:       The following orders were created for panel order CBC & Differential.  Procedure                               Abnormality         Status                     ---------                               -----------         ------                     CBC Auto Differential[[id number]]        Abnormal            Final result                 Please view results for these tests on the individual orders.    Basic Metabolic Panel [[id number]]  (Abnormal) Collected:  08/06/17 1606    Specimen:  Blood from Arm, Right Updated:  08/06/17 1635     Glucose 112 (H) mg/dL      BUN 13 mg/dL      Creatinine 0.60 mg/dL      Sodium 141 mmol/L      Potassium 4.2 mmol/L      Chloride 102 mmol/L      CO2 27.6 mmol/L      Calcium 9.5 mg/dL      eGFR Non African Amer 96 mL/min/1.73      BUN/Creatinine Ratio 21.7     Anion Gap 11.4 mmol/L     Narrative:       The MDRD GFR formula is only valid for adults with stable renal function between  ages 18 and 70.    CBC Auto Differential [[id number]]  (Abnormal) Collected:  08/06/17 1606    Specimen:  Blood from Arm, Right Updated:  08/06/17 1615     WBC 6.28 10*3/mm3      RBC 4.00 10*6/mm3      Hemoglobin 12.5 g/dL      Hematocrit 39.4 %      MCV 98.5 (H) fL      MCH 31.3 pg      MCHC 31.7 (L) g/dL      RDW 14.0 (H) %      RDW-SD 50.7 fl      MPV 10.0 fL      Platelets 311 10*3/mm3      Neutrophil % 69.7 %      Lymphocyte % 18.9 (L) %      Monocyte % 4.6 (L) %      Eosinophil % 5.7 %      Basophil % 0.6 %      Immature Grans % 0.5 %      Neutrophils, Absolute 4.37 10*3/mm3      Lymphocytes, Absolute 1.19 10*3/mm3      Monocytes, Absolute 0.29 10*3/mm3      Eosinophils, Absolute 0.36 10*3/mm3      Basophils, Absolute 0.04 10*3/mm3      Immature Grans, Absolute 0.03 10*3/mm3           I ordered the above labs and reviewed the results    RADIOLOGY  No orders to display   Duplex venous R lower extremity: negative for DVT     I ordered the above noted radiological studies. Interpreted by radiologist. Reviewed by me in PACS.       PROCEDURES  Procedures      PROGRESS AND CONSULTS  ED Course   1550  Ordered labs and duplex venous L lower extremity for further evaluation.   1630  Received a call from [employer] Alia and discussed pt's case. Alia described the pt's US with results of no DVT. .  1647  Rechecked pt, who is resting comfortably. Discussed the pt's US results, with no findings of DVT. Plan to d/c the pt with a f/u with her PCP and advisement to elevate the her legs. Pt understands and agrees with the plan, and all questions were answered.       MEDICAL DECISION MAKING  Results were reviewed/discussed with the patient and they were also made aware of online access. Pt also made aware that some labs, such as cultures, will not be resulted during ER visit and follow up with PMD is necessary.     MDM  Number of Diagnoses or Management Options  Pain in left lower leg:      Amount and/or Complexity of  Data Reviewed  Clinical lab tests: reviewed and ordered (, MCV 98.5, MCHC 31.7, RDW 14.0)  Tests in the radiology section of CPT®: ordered and reviewed (Duplex venous L lower extremity: negative for DVT)  Decide to obtain previous medical records or to obtain history from someone other than the patient: yes (June 13, pt had R knee arthroscopy by Dr. Krishna)  Review and summarize past medical records: yes    Patient Progress  Patient progress: stable         DIAGNOSIS  Final diagnoses:   Pain in left lower leg       DISPOSITION  DISCHARGE    Patient discharged in stable condition.    Reviewed implications of results, diagnosis, meds, responsibility to follow up, warning signs and symptoms of possible worsening, potential complications and reasons to return to ER.    Patient/Family voiced understanding of above instructions.    Discussed plan for discharge, as there is no emergent indication for admission.  Pt/family is agreeable and understands need for follow up and repeat testing.  Pt is aware that discharge does not mean that nothing is wrong but it indicates no emergency is present that requires admission and they must continue care with follow-up as given below or physician of their choice.     FOLLOW-UP  Idalmis Springer MD  [address] [address]  [phone]    In 3 days           Medication List      Changed          diltiazem  MG 24 hr capsule   Commonly known as:  DILT-XR   Take 1 capsule by mouth Daily.   What changed:  when to take this       nebivolol 10 MG tablet   Commonly known as:  BYSTOLIC   Take 1 tablet by mouth Daily.   What changed:  when to take this               Latest Documented Vital Signs:  As of 4:50 PM  BP- 161/82 HR- 74 Temp- 98.5 °F (36.9 °C) O2 sat- 97%    --  Documentation assistance provided by rosa Evans for Dr. Roman.  Information recorded by the rosa was done at my direction and has been verified and validated by me.      Kobe Evans  08/06/17 [id number]       Kyle Roman MD  08/07/17 [id number]

## 2017-08-07 LAB
BH CV LOWER VASCULAR LEFT COMMON FEMORAL AUGMENT: NORMAL
BH CV LOWER VASCULAR LEFT COMMON FEMORAL COMPETENT: NORMAL
BH CV LOWER VASCULAR LEFT COMMON FEMORAL COMPRESS: NORMAL
BH CV LOWER VASCULAR LEFT COMMON FEMORAL PHASIC: NORMAL
BH CV LOWER VASCULAR LEFT COMMON FEMORAL SPONT: NORMAL
BH CV LOWER VASCULAR LEFT DISTAL FEMORAL COMPRESS: NORMAL
BH CV LOWER VASCULAR LEFT GASTRONEMIUS COMPRESS: NORMAL
BH CV LOWER VASCULAR LEFT GREATER SAPH AK COMPRESS: NORMAL
BH CV LOWER VASCULAR LEFT GREATER SAPH BK COMPRESS: NORMAL
BH CV LOWER VASCULAR LEFT MID FEMORAL AUGMENT: NORMAL
BH CV LOWER VASCULAR LEFT MID FEMORAL COMPETENT: NORMAL
BH CV LOWER VASCULAR LEFT MID FEMORAL COMPRESS: NORMAL
BH CV LOWER VASCULAR LEFT MID FEMORAL PHASIC: NORMAL
BH CV LOWER VASCULAR LEFT MID FEMORAL SPONT: NORMAL
BH CV LOWER VASCULAR LEFT PERONEAL COMPRESS: NORMAL
BH CV LOWER VASCULAR LEFT POPLITEAL AUGMENT: NORMAL
BH CV LOWER VASCULAR LEFT POPLITEAL COMPETENT: NORMAL
BH CV LOWER VASCULAR LEFT POPLITEAL COMPRESS: NORMAL
BH CV LOWER VASCULAR LEFT POPLITEAL PHASIC: NORMAL
BH CV LOWER VASCULAR LEFT POPLITEAL SPONT: NORMAL
BH CV LOWER VASCULAR LEFT POSTERIOR TIBIAL COMPRESS: NORMAL
BH CV LOWER VASCULAR LEFT PROXIMAL FEMORAL COMPRESS: NORMAL
BH CV LOWER VASCULAR LEFT SAPHENOFEMORAL JUNCTION AUGMENT: NORMAL
BH CV LOWER VASCULAR LEFT SAPHENOFEMORAL JUNCTION COMPETENT: NORMAL
BH CV LOWER VASCULAR LEFT SAPHENOFEMORAL JUNCTION COMPRESS: NORMAL
BH CV LOWER VASCULAR LEFT SAPHENOFEMORAL JUNCTION PHASIC: NORMAL
BH CV LOWER VASCULAR LEFT SAPHENOFEMORAL JUNCTION SPONT: NORMAL
BH CV LOWER VASCULAR RIGHT COMMON FEMORAL AUGMENT: NORMAL
BH CV LOWER VASCULAR RIGHT COMMON FEMORAL COMPETENT: NORMAL
BH CV LOWER VASCULAR RIGHT COMMON FEMORAL COMPRESS: NORMAL
BH CV LOWER VASCULAR RIGHT COMMON FEMORAL PHASIC: NORMAL
BH CV LOWER VASCULAR RIGHT COMMON FEMORAL SPONT: NORMAL

## 2017-08-10 ENCOUNTER — TELEPHONE (OUTPATIENT)
Dept: SOCIAL WORK | Facility: HOSPITAL | Age: 82
End: 2017-08-10

## 2017-08-14 ENCOUNTER — OFFICE VISIT (OUTPATIENT)
Dept: FAMILY MEDICINE CLINIC | Facility: CLINIC | Age: 82
End: 2017-08-14

## 2017-08-14 VITALS
SYSTOLIC BLOOD PRESSURE: 140 MMHG | WEIGHT: 147 LBS | BODY MASS INDEX: 27.75 KG/M2 | HEART RATE: 75 BPM | DIASTOLIC BLOOD PRESSURE: 90 MMHG | HEIGHT: 61 IN | OXYGEN SATURATION: 97 % | RESPIRATION RATE: 17 BRPM

## 2017-08-14 DIAGNOSIS — I87.2 VENOUS INCOMPETENCE: Primary | ICD-10-CM

## 2017-08-14 DIAGNOSIS — N89.8 VAGINAL DISCHARGE: ICD-10-CM

## 2017-08-14 PROCEDURE — 99213 OFFICE O/P EST LOW 20 MIN: CPT | Performed by: FAMILY MEDICINE

## 2017-08-14 NOTE — PROGRESS NOTES
"Subjective   Lanie Ochoa is a 82 y.o. female.     History of Present Illness Last week L leg was blotchy and pink and warm to touch. Much better now. Always elevates legs when she sits down. Had between to ER and DVT RO. No antibiotics given. Thinks she is completley better now    Slight yellow vaginal drainage. NO  Pruritis burning or pain. Not a bad odor..No intercourse since   4 years ago.    The following portions of the patient's history were reviewed and updated as appropriate: allergies, current medications, past social history and problem list.    Review of Systems   Constitutional: Negative for activity change, appetite change and unexpected weight change.   HENT: Negative for nosebleeds and trouble swallowing.    Eyes: Negative for pain and visual disturbance.   Respiratory: Negative for chest tightness, shortness of breath and wheezing.    Cardiovascular: Negative for chest pain and palpitations.   Gastrointestinal: Negative for abdominal pain and blood in stool.   Endocrine: Negative.    Genitourinary: Positive for enuresis (slight for years) and vaginal discharge (saw it twice in the last week. Small amount of yellow discharge.). Negative for decreased urine volume, difficulty urinating, dysuria, frequency, hematuria, urgency, vaginal bleeding and vaginal pain.   Musculoskeletal: Negative for joint swelling.   Skin: Negative for color change and rash.   Allergic/Immunologic: Negative.    Neurological: Negative for syncope and speech difficulty.   Hematological: Negative for adenopathy.   Psychiatric/Behavioral: Negative for agitation and confusion.   All other systems reviewed and are negative.      Objective   /90  Pulse 75  Resp 17  Ht 61\" (154.9 cm)  Wt 147 lb (66.7 kg)  SpO2 97%  BMI 27.78 kg/m2  Physical Exam   Constitutional: She appears well-developed and well-nourished.   Cardiovascular: Normal rate and regular rhythm.    Pulmonary/Chest: Effort normal. "   Genitourinary: No vaginal discharge found.   Genitourinary Comments: Vag no discharge, no eryth. ONe drop of urine produced when she first lay down.   Musculoskeletal: She exhibits no edema.   Skin:   bilat lower legs skin is chronically darkened, hyperpig. NO eryth or warmth or edema   Nursing note and vitals reviewed.      Assessment/Plan   Problem List Items Addressed This Visit        Cardiovascular and Mediastinum    Venous incompetence - Primary      Other Visit Diagnoses     Vaginal discharge               Most likely cause of small yellow discharge from vagina is contamination from stool. Needs to be careful to use TP front to back direction.  Wear support hose or travel socks.

## 2017-09-18 DIAGNOSIS — I10 BENIGN ESSENTIAL HYPERTENSION: ICD-10-CM

## 2017-09-18 RX ORDER — DILTIAZEM HYDROCHLORIDE 240 MG/1
CAPSULE, EXTENDED RELEASE ORAL
Qty: 90 CAPSULE | Refills: 4 | Status: SHIPPED | OUTPATIENT
Start: 2017-09-18 | End: 2018-07-11

## 2017-10-16 ENCOUNTER — OFFICE VISIT (OUTPATIENT)
Dept: FAMILY MEDICINE CLINIC | Facility: CLINIC | Age: 82
End: 2017-10-16

## 2017-10-16 VITALS
SYSTOLIC BLOOD PRESSURE: 124 MMHG | HEART RATE: 97 BPM | BODY MASS INDEX: 28.13 KG/M2 | DIASTOLIC BLOOD PRESSURE: 82 MMHG | WEIGHT: 149 LBS | RESPIRATION RATE: 18 BRPM | OXYGEN SATURATION: 98 % | HEIGHT: 61 IN

## 2017-10-16 DIAGNOSIS — J30.2 ACUTE SEASONAL ALLERGIC RHINITIS, UNSPECIFIED TRIGGER: ICD-10-CM

## 2017-10-16 DIAGNOSIS — G44.209 ACUTE NON INTRACTABLE TENSION-TYPE HEADACHE: ICD-10-CM

## 2017-10-16 DIAGNOSIS — H69.83 DYSFUNCTION OF BOTH EUSTACHIAN TUBES: Primary | ICD-10-CM

## 2017-10-16 DIAGNOSIS — J00 ACUTE RHINITIS, UNSPECIFIED TYPE: ICD-10-CM

## 2017-10-16 PROBLEM — H69.93 DYSFUNCTION OF BOTH EUSTACHIAN TUBES: Status: ACTIVE | Noted: 2017-10-16

## 2017-10-16 LAB — ERYTHROCYTE [SEDIMENTATION RATE] IN BLOOD BY WESTERGREN METHOD: 17 MM/HR (ref 0–30)

## 2017-10-16 PROCEDURE — 99213 OFFICE O/P EST LOW 20 MIN: CPT | Performed by: FAMILY MEDICINE

## 2017-10-16 PROCEDURE — 92567 TYMPANOMETRY: CPT | Performed by: FAMILY MEDICINE

## 2017-10-16 RX ORDER — INFLUENZA A VIRUS A/MICHIGAN/45/2015 X-275 (H1N1) ANTIGEN (FORMALDEHYDE INACTIVATED), INFLUENZA A VIRUS A/SINGAPORE/INFIMH-16-0019/2016 IVR-186 (H3N2) ANTIGEN (FORMALDEHYDE INACTIVATED), AND INFLUENZA B VIRUS B/MARYLAND/15/2016 BX-69A (A B/COLORADO/6/2017-LIKE VIRUS) ANTIGEN (FORMALDEHYDE INACTIVATED) 60; 60; 60 UG/.5ML; UG/.5ML; UG/.5ML
INJECTION, SUSPENSION INTRAMUSCULAR
Refills: 0 | COMMUNITY
Start: 2017-09-29 | End: 2017-10-16

## 2017-10-16 RX ORDER — FLUTICASONE PROPIONATE 50 MCG
2 SPRAY, SUSPENSION (ML) NASAL DAILY
Qty: 1 BOTTLE | Refills: 11 | Status: SHIPPED | OUTPATIENT
Start: 2017-10-16 | End: 2018-07-11

## 2017-10-16 RX ORDER — MONTELUKAST SODIUM 10 MG/1
10 TABLET ORAL NIGHTLY
Qty: 30 TABLET | Refills: 11 | Status: SHIPPED | OUTPATIENT
Start: 2017-10-16 | End: 2018-10-15 | Stop reason: SDUPTHER

## 2017-10-16 NOTE — PROGRESS NOTES
"Subjective   Lanie Ochoa is a 82 y.o. female.     History of Present Illness Here bc of ringing in ears.  Slight HA in temples for a few weeks.  Does sneeze. Al;ways bad allergies in the Fall.    The following portions of the patient's history were reviewed and updated as appropriate: allergies, current medications, past social history and problem list.    Review of Systems   HENT: Positive for rhinorrhea and sneezing. Negative for ear pain (ringing), postnasal drip, sinus pressure and sore throat.    Eyes: Negative for pain, redness and itching.   Respiratory: Negative for cough, shortness of breath and wheezing.         Sneezing, blowing nose clear   Cardiovascular: Negative for chest pain and palpitations.   Psychiatric/Behavioral: Negative for dysphoric mood. The patient is not nervous/anxious.        Objective   /82  Pulse 97  Resp 18  Ht 61\" (154.9 cm)  Wt 149 lb (67.6 kg)  SpO2 98%  BMI 28.15 kg/m2  Physical Exam   Constitutional: She appears well-nourished.   HENT:   Head: Normocephalic and atraumatic.   Right Ear: External ear normal.   Left Ear: External ear normal.   Temples nontender   Neck: No thyromegaly present.   Cardiovascular: Normal rate, regular rhythm and normal heart sounds.    Pulmonary/Chest: Effort normal.   Lymphadenopathy:     She has no cervical adenopathy.   Psychiatric: She has a normal mood and affect. Her behavior is normal. Judgment and thought content normal.   Nursing note and vitals reviewed.  NOTE- bilat flat line tympanograms    Assessment/Plan   Problem List Items Addressed This Visit        Nervous and Auditory    Dysfunction of both eustachian tubes - Primary      Other Visit Diagnoses     Acute seasonal allergic rhinitis, unspecified trigger        Relevant Medications    fluticasone (FLONASE) 50 MCG/ACT nasal spray    montelukast (SINGULAIR) 10 MG tablet    Acute rhinitis, unspecified type        Relevant Medications    montelukast (SINGULAIR) 10 MG " tablet    Acute non intractable tension-type headache        Relevant Orders    Sedimentation Rate           OK to take tylenol prn HA  Also Allegra

## 2017-11-03 ENCOUNTER — OFFICE VISIT (OUTPATIENT)
Dept: ORTHOPEDIC SURGERY | Facility: CLINIC | Age: 82
End: 2017-11-03

## 2017-11-03 VITALS — TEMPERATURE: 98.5 F | HEIGHT: 61 IN | BODY MASS INDEX: 28.7 KG/M2 | WEIGHT: 152 LBS

## 2017-11-03 DIAGNOSIS — M19.90 ARTHRITIS: Primary | ICD-10-CM

## 2017-11-03 PROCEDURE — 20610 DRAIN/INJ JOINT/BURSA W/O US: CPT | Performed by: ORTHOPAEDIC SURGERY

## 2017-11-03 RX ADMIN — METHYLPREDNISOLONE ACETATE 80 MG: 80 INJECTION, SUSPENSION INTRA-ARTICULAR; INTRALESIONAL; INTRAMUSCULAR; SOFT TISSUE at 11:56

## 2017-11-03 RX ADMIN — BUPIVACAINE HYDROCHLORIDE 4 ML: 5 INJECTION, SOLUTION EPIDURAL; INTRACAUDAL at 11:56

## 2017-11-03 NOTE — PROGRESS NOTES
"Knee Scope follow Up       Patient: Lanie Ochoa        YOB: 1935      Chief Complaints: Right knee pain  Chief Complaint   Patient presents with   • Right Knee - Follow-up, Pain           History of Present Illness: Pt is here f/u knee arthroscopyShe was doing great now is having a little more pain and swelling with increase in activity.  She had no degenerative changes at the time of surgery I told her she might need intermittent injections she would like to do that today        Allergies:   Allergies   Allergen Reactions   • Sulfa Antibiotics Rash       Medications:   Home Medications:  Current Outpatient Prescriptions on File Prior to Visit   Medication Sig   • CALCIUM PO Take 1 tablet by mouth Daily.   • Cholecalciferol (VITAMIN D3) 400 UNITS capsule Take 1 tablet by mouth Daily.   • diltiaZEM (TIAZAC) 240 MG 24 hr capsule TAKE 1 CAPSULE BY MOUTH ONE TIME A DAY    • losartan (COZAAR) 100 MG tablet Take 1 tablet by mouth Daily.   • montelukast (SINGULAIR) 10 MG tablet Take 1 tablet by mouth Every Night.   • nebivolol (BYSTOLIC) 10 MG tablet Take 1 tablet by mouth Daily. (Patient taking differently: Take 10 mg by mouth Every Morning.)   • sertraline (ZOLOFT) 25 MG tablet Take 1 tablet by mouth Daily.   • fluticasone (FLONASE) 50 MCG/ACT nasal spray 2 sprays into each nostril Daily.     No current facility-administered medications on file prior to visit.      Current Medications:  Scheduled Meds:  Continuous Infusions:  No current facility-administered medications for this visit.   PRN Meds:.          Physical Exam: 82 y.o. female  General Appearance:    Alert, cooperative, in no acute distress                 Vitals:    11/03/17 1124   Temp: 98.5 °F (36.9 °C)   TempSrc: Temporal Artery    Weight: 152 lb (68.9 kg)   Height: 61\" (154.9 cm)      Patient is alert and oriented ×3 no acute distress normal mood physical exam.  Physical exam of the knee, incisions looked good there is no erythema, " calf is soft and non-tender.  No sign or sx of DVT      Assessment  S/P knee scope.  Overall doing well She has some pain and swelling from activity most likely from her arthritis        Plan: Continue with strengthening, progression of activities I think an injection will calm this down as well          Large Joint Arthrocentesis  Date/Time: 11/3/2017 11:56 AM  Consent given by: patient  Site marked: site marked  Timeout: Immediately prior to procedure a time out was called to verify the correct patient, procedure, equipment, support staff and site/side marked as required   Supporting Documentation  Indications: pain   Procedure Details  Location: knee - R knee  Preparation: Patient was prepped and draped in the usual sterile fashion  Needle size: 25 G  Approach: anteromedial  Medications administered: 4 mL bupivacaine (PF) 0.5 %; 80 mg methylPREDNISolone acetate 80 MG/ML

## 2017-11-05 RX ORDER — BUPIVACAINE HYDROCHLORIDE 5 MG/ML
4 INJECTION, SOLUTION EPIDURAL; INTRACAUDAL
Status: COMPLETED | OUTPATIENT
Start: 2017-11-03 | End: 2017-11-03

## 2017-11-05 RX ORDER — METHYLPREDNISOLONE ACETATE 80 MG/ML
80 INJECTION, SUSPENSION INTRA-ARTICULAR; INTRALESIONAL; INTRAMUSCULAR; SOFT TISSUE
Status: COMPLETED | OUTPATIENT
Start: 2017-11-03 | End: 2017-11-03

## 2017-11-21 ENCOUNTER — OFFICE VISIT (OUTPATIENT)
Dept: FAMILY MEDICINE CLINIC | Facility: CLINIC | Age: 82
End: 2017-11-21

## 2017-11-21 VITALS
OXYGEN SATURATION: 95 % | DIASTOLIC BLOOD PRESSURE: 60 MMHG | SYSTOLIC BLOOD PRESSURE: 120 MMHG | BODY MASS INDEX: 28.04 KG/M2 | HEART RATE: 60 BPM | TEMPERATURE: 97.7 F | HEIGHT: 61 IN | WEIGHT: 148.5 LBS

## 2017-11-21 DIAGNOSIS — J30.9 CHRONIC ALLERGIC RHINITIS, UNSPECIFIED SEASONALITY, UNSPECIFIED TRIGGER: ICD-10-CM

## 2017-11-21 DIAGNOSIS — G89.29 CHRONIC NONINTRACTABLE HEADACHE, UNSPECIFIED HEADACHE TYPE: ICD-10-CM

## 2017-11-21 DIAGNOSIS — R51.9 CHRONIC NONINTRACTABLE HEADACHE, UNSPECIFIED HEADACHE TYPE: ICD-10-CM

## 2017-11-21 DIAGNOSIS — H93.13 TINNITUS AURIUM, BILATERAL: Primary | ICD-10-CM

## 2017-11-21 PROCEDURE — 99214 OFFICE O/P EST MOD 30 MIN: CPT | Performed by: FAMILY MEDICINE

## 2017-11-21 RX ORDER — CETIRIZINE HYDROCHLORIDE 10 MG/1
10 TABLET ORAL DAILY
COMMUNITY
End: 2021-02-01 | Stop reason: SDUPTHER

## 2017-11-21 NOTE — PROGRESS NOTES
Subjective   Lanie Ochoa is a 82 y.o. female.  Patient has transfer from Dr. Springer.  She presents today for follow-up of her hypertension and symptoms of headache and tendinitis.    Chief Complaint   Patient presents with   • Hypertension   • Headache     started a month ago   • Tinnitus        History of Present Illness  Hypertension  This is a chronic problem for the patient.  She has been on the 3 agents diltiazem 240 mg extended release, losartan 100 mg and nebivolol 10 mg all daily dosing for years.  Her blood pressure has been well controlled on these reagents.  She denies chest pain, shortness of breath, changes in vision.    Headache  Headache is usually present in the morning.  It is located on both sides of her head in the temporal area.  It is not tender to palpation.  Headache usually resolves on its own.  She will also get a headache later in the day especially if she is up and active which she tends to be.  It was more severe on 11/18/17 and she took Tylenol with good relief.  She feels as though the headache is related to her allergies.  When she presented to for this problem to Dr. Springer and ESR was drawn, was 17 which is normal.  She thinks overall since she saw Dr. Springer and started on montelukast and Flonase was added it has improved.  She has taken the montelukast daily since the 10/16 appointment.  She does one spray to each nostril most days reporting she really forgets infrequently.  She denies any change in her activity.  She is still able to go swimming every morning for 30 minutes.    Tinnitus  This was bilateral on 10/16 when she saw Dr. Springer.  She denies any hearing loss, not hearing people well, turning up the TV really loud to hear it.  This too since starting the medication has overall improved but she still has intermittent symptoms.  When she has symptoms it's more so in the right ear than the left.  She is not currently having any tinnitus.      The following  "portions of the patient's history were reviewed and updated as appropriate: allergies, current medications, past family history, past medical history, past social history, past surgical history and problem list.    Review of Systems   Constitutional: Negative for activity change and appetite change.   HENT: Positive for rhinorrhea and sneezing. Negative for ear discharge, ear pain, hearing loss, sinus pain, sinus pressure and sore throat.    Respiratory: Negative for cough.    Gastrointestinal: Negative for nausea and vomiting.   Musculoskeletal: Negative for gait problem.   Neurological: Positive for headaches. Negative for dizziness and light-headedness.     Objective   Blood pressure 120/60, pulse 60, temperature 97.7 °F (36.5 °C), height 61\" (154.9 cm), weight 148 lb 8 oz (67.4 kg), SpO2 95 %.  Physical Exam   Constitutional: She appears well-nourished. No distress.   HENT:   Right Ear: External ear normal.   Left Ear: External ear normal.   Bilateral tympanic membranes are retracted, greater on the right than the left.  Ear canals are slightly erythematous.  Bilateral nares with edematous and pale turbinates.  Clear nasal discharge. No tenderness over the temples with palpation. Partially visualized oropharynx without erythema or exudate.   Eyes: Conjunctivae and EOM are normal. Right eye exhibits no discharge. Left eye exhibits no discharge.   Abdominal: Soft. She exhibits no distension.   Vitals reviewed.      Assessment/Plan   Lanie was seen today for hypertension, headache and tinnitus.    Diagnoses and all orders for this visit:    Tinnitus aurium, bilateral  -     Ambulatory Referral to ENT (Otolaryngology)    Chronic nonintractable headache, unspecified headache type  -     Ambulatory Referral to ENT (Otolaryngology)    Chronic allergic rhinitis, unspecified seasonality, unspecified trigger  -     Ambulatory Referral to ENT (Otolaryngology)      Overall symptoms improving. Continue montelukast and " fluticasone. Increase fluticasone to 2 sprays per nostril daily. Add back zyrtec daily.  Discussed treating this was for another month or so and re-evaluate vs being evaluated by ENT and audiology for hearing testing. Pt prefers to go ahead and be seen by ENT.

## 2018-01-08 ENCOUNTER — OFFICE VISIT (OUTPATIENT)
Dept: FAMILY MEDICINE CLINIC | Facility: CLINIC | Age: 83
End: 2018-01-08

## 2018-01-08 VITALS
SYSTOLIC BLOOD PRESSURE: 132 MMHG | BODY MASS INDEX: 28.51 KG/M2 | OXYGEN SATURATION: 99 % | DIASTOLIC BLOOD PRESSURE: 68 MMHG | TEMPERATURE: 98.2 F | HEART RATE: 62 BPM | WEIGHT: 151 LBS | HEIGHT: 61 IN

## 2018-01-08 DIAGNOSIS — J01.00 ACUTE NON-RECURRENT MAXILLARY SINUSITIS: Primary | ICD-10-CM

## 2018-01-08 DIAGNOSIS — I10 BENIGN ESSENTIAL HYPERTENSION: ICD-10-CM

## 2018-01-08 PROCEDURE — 99213 OFFICE O/P EST LOW 20 MIN: CPT | Performed by: FAMILY MEDICINE

## 2018-01-08 RX ORDER — LOSARTAN POTASSIUM 100 MG/1
100 TABLET ORAL DAILY
Qty: 90 TABLET | Refills: 3 | Status: SHIPPED | OUTPATIENT
Start: 2018-01-08 | End: 2018-12-31 | Stop reason: SDUPTHER

## 2018-01-08 RX ORDER — AMOXICILLIN 875 MG/1
875 TABLET, COATED ORAL EVERY 12 HOURS SCHEDULED
Qty: 14 TABLET | Refills: 0 | Status: SHIPPED | OUTPATIENT
Start: 2018-01-08 | End: 2018-04-09

## 2018-01-08 NOTE — PROGRESS NOTES
"Subjective   Lanie Ochoa is a 82 y.o. female.     Chief Complaint   Patient presents with   • URI   • Sore Throat   • Med Refill     losartan        History of Present Illness  Upper respiratory infection  Started around wade. Congestion in head is a little better. Is coughing, having sore throat which is more recent. Taking her zyrtec which seems to be drying it up. Cough is not productive. Has had some bloody nasal congestion. Mostly in the morning. Does not usually happen with her allergies. Felt feverish at times, chills but chills all the time. No GI symptoms. Appetite has been good.     Tinnitus and headaches  Tinnitus is not a significant problem at this time, does not effect her sleep or day to day activity. Headache has also been improving. Seen by ENT and thought tinnitus was likely related to her hearing loss. Should see PCP about headache.    HTN  Well controlled on losartan 100 mg and diltiazem 240 mg ER. Tolerated medications well. No CP.    The following portions of the patient's history were reviewed and updated as appropriate: allergies, current medications and problem list.    Review of Systems   Constitutional: Positive for activity change and fatigue. Negative for appetite change, chills, fever and unexpected weight change.   HENT: Positive for congestion, nosebleeds, rhinorrhea, sinus pain, sinus pressure, sore throat, tinnitus and voice change. Negative for ear pain.    Respiratory: Positive for cough. Negative for shortness of breath.    Cardiovascular: Negative for chest pain.   Gastrointestinal: Negative for abdominal pain, diarrhea and nausea.   Neurological: Negative for headaches.       Objective   Blood pressure 132/68, pulse 62, temperature 98.2 °F (36.8 °C), temperature source Oral, height 154.9 cm (61\"), weight 68.5 kg (151 lb), SpO2 99 %.  Physical Exam   Constitutional: She is oriented to person, place, and time. She appears well-nourished. No distress.   HENT:   Right " Ear: External ear normal.   Left Ear: External ear normal.   Mouth/Throat: Oropharynx is clear and moist.   Nares with narrowing and bilateral discharge present. Tenderness over maxillary sinuses, left greater than right.   Neck: Neck supple.   Cardiovascular: Normal rate, regular rhythm and normal heart sounds.  Exam reveals no friction rub.    No murmur heard.  Pulmonary/Chest: Effort normal and breath sounds normal. No respiratory distress. She has no wheezes. She has no rales.   Lymphadenopathy:     She has no cervical adenopathy.   Neurological: She is alert and oriented to person, place, and time.   Psychiatric: She has a normal mood and affect. Her behavior is normal.   Vitals reviewed.      Assessment/Plan   Lanie was seen today for uri, sore throat and med refill.    Diagnoses and all orders for this visit:    Acute non-recurrent maxillary sinusitis    Benign essential hypertension  -     losartan (COZAAR) 100 MG tablet; Take 1 tablet by mouth Daily.    Other orders  -     amoxicillin (AMOXIL) 875 MG tablet; Take 1 tablet by mouth Every 12 (Twelve) Hours.

## 2018-02-01 DIAGNOSIS — F41.9 ANXIETY: ICD-10-CM

## 2018-02-09 DIAGNOSIS — F41.9 ANXIETY: ICD-10-CM

## 2018-02-09 RX ORDER — SERTRALINE HYDROCHLORIDE 25 MG/1
25 TABLET, FILM COATED ORAL DAILY
Qty: 90 TABLET | Refills: 1 | Status: SHIPPED | OUTPATIENT
Start: 2018-02-09 | End: 2018-10-22 | Stop reason: SDUPTHER

## 2018-02-20 RX ORDER — NEBIVOLOL 10 MG/1
10 TABLET ORAL EVERY MORNING
Qty: 90 TABLET | Refills: 1 | Status: SHIPPED | OUTPATIENT
Start: 2018-02-20 | End: 2018-08-23 | Stop reason: SDUPTHER

## 2018-02-27 ENCOUNTER — OFFICE VISIT (OUTPATIENT)
Dept: ORTHOPEDIC SURGERY | Facility: CLINIC | Age: 83
End: 2018-02-27

## 2018-02-27 VITALS — WEIGHT: 149 LBS | HEIGHT: 61 IN | TEMPERATURE: 99 F | BODY MASS INDEX: 28.13 KG/M2

## 2018-02-27 DIAGNOSIS — M25.561 CHRONIC PAIN OF RIGHT KNEE: Primary | ICD-10-CM

## 2018-02-27 DIAGNOSIS — M17.11 PRIMARY LOCALIZED OSTEOARTHROSIS OF RIGHT LOWER LEG: ICD-10-CM

## 2018-02-27 DIAGNOSIS — G89.29 CHRONIC PAIN OF RIGHT KNEE: Primary | ICD-10-CM

## 2018-02-27 PROCEDURE — 20610 DRAIN/INJ JOINT/BURSA W/O US: CPT | Performed by: ORTHOPAEDIC SURGERY

## 2018-02-27 PROCEDURE — 73562 X-RAY EXAM OF KNEE 3: CPT | Performed by: ORTHOPAEDIC SURGERY

## 2018-02-27 RX ADMIN — METHYLPREDNISOLONE ACETATE 80 MG: 80 INJECTION, SUSPENSION INTRA-ARTICULAR; INTRALESIONAL; INTRAMUSCULAR; SOFT TISSUE at 13:45

## 2018-02-27 RX ADMIN — LIDOCAINE HYDROCHLORIDE 4 ML: 10 INJECTION, SOLUTION EPIDURAL; INFILTRATION; INTRACAUDAL; PERINEURAL at 13:45

## 2018-03-01 PROBLEM — M17.11 PRIMARY LOCALIZED OSTEOARTHROSIS OF RIGHT LOWER LEG: Status: ACTIVE | Noted: 2018-03-01

## 2018-03-01 RX ORDER — LIDOCAINE HYDROCHLORIDE 10 MG/ML
4 INJECTION, SOLUTION EPIDURAL; INFILTRATION; INTRACAUDAL; PERINEURAL
Status: COMPLETED | OUTPATIENT
Start: 2018-02-27 | End: 2018-02-27

## 2018-03-01 RX ORDER — METHYLPREDNISOLONE ACETATE 80 MG/ML
80 INJECTION, SUSPENSION INTRA-ARTICULAR; INTRALESIONAL; INTRAMUSCULAR; SOFT TISSUE
Status: COMPLETED | OUTPATIENT
Start: 2018-02-27 | End: 2018-02-27

## 2018-03-29 ENCOUNTER — CONSULT (OUTPATIENT)
Dept: ORTHOPEDIC SURGERY | Facility: CLINIC | Age: 83
End: 2018-03-29

## 2018-03-29 VITALS — WEIGHT: 153.2 LBS | HEIGHT: 61 IN | BODY MASS INDEX: 28.92 KG/M2 | TEMPERATURE: 97.5 F

## 2018-03-29 DIAGNOSIS — M17.11 PRIMARY OSTEOARTHRITIS OF RIGHT KNEE: Primary | ICD-10-CM

## 2018-03-29 PROCEDURE — 99214 OFFICE O/P EST MOD 30 MIN: CPT | Performed by: ORTHOPAEDIC SURGERY

## 2018-03-29 NOTE — PROGRESS NOTES
Patient: Lanie Ochoa  YOB: 1935 82 y.o. female  Medical Record Number: [id number]    Chief Complaints:   Chief Complaint   Patient presents with   • Right Knee - Pain, Establish Care, Consult       History of Present Illness:Lanie Ochoa is a 82 y.o. female who presents with Right knee pain which is moderate to severe.  She describes intermittent aching and burning on the medial aspect of the knee.  It's worse with standing and weightbearing or any activities.  She saw Dr. Vane Krishna for initial treatment.  She had cortisone injection multiple times.  The last injection only helped for a day.  She exercises her leg on a regular basis.  Her doctors recommended against her taking nonsteroidal anti-inflammatory medications.  The pain is limiting her in her basic activities of daily living.  Unfortunately she recently lost her  and lives at home by herself.  She has a number of children.  She has a son who is disabled and is in a group home.  Her daughter lui lives in [address] [place] [address].    Allergies:   Allergies   Allergen Reactions   • Sulfa Antibiotics Rash       Medications:   Current Outpatient Prescriptions   Medication Sig Dispense Refill   • amoxicillin (AMOXIL) 875 MG tablet Take 1 tablet by mouth Every 12 (Twelve) Hours. 14 tablet 0   • CALCIUM PO Take 1 tablet by mouth Daily.     • cetirizine (zyrTEC) 10 MG tablet Take 10 mg by mouth Daily.     • Cholecalciferol (VITAMIN D3) 400 UNITS capsule Take 1 tablet by mouth Daily.     • diltiaZEM (TIAZAC) 240 MG 24 hr capsule TAKE 1 CAPSULE BY MOUTH ONE TIME A DAY  90 capsule 4   • fluticasone (FLONASE) 50 MCG/ACT nasal spray 2 sprays into each nostril Daily. 1 bottle 11   • losartan (COZAAR) 100 MG tablet Take 1 tablet by mouth Daily. 90 tablet 3   • montelukast (SINGULAIR) 10 MG tablet Take 1 tablet by mouth Every Night. 30 tablet 11   • nebivolol (BYSTOLIC) 10 MG tablet Take 1 tablet by mouth Every Morning. 90 tablet 1   •  "sertraline (ZOLOFT) 25 MG tablet Take 1 tablet by mouth Daily. 90 tablet 1     No current facility-administered medications for this visit.          The following portions of the patient's history were reviewed and updated as appropriate: allergies, current medications, past family history, past medical history, past social history, past surgical history and problem list.    Review of Systems:   A 14 point review of systems was performed. All systems negative except pertinent positives/negative listed in HPI above    Physical Exam:   Vitals:    03/29/18 1436   Temp: 97.5 °F (36.4 °C)   Weight: 69.5 kg (153 lb 3.2 oz)   Height: 154.9 cm (61\")       General: A and O x 3, ASA, NAD    SCLERA:    Normal    DENTITION:   Normal  Knee:  right    ALIGNMENT:     Varus  ,   Patella  tracks  midline    GAIT:    Antalgic    SKIN:    No abnormality    RANGE OF MOTION:   5  -  111   DEG    STRENGTH:   4  / 5    LIGAMENTS:    No varus / valgus instability.   Negative  Lachman.    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  Injections are no longer helpful and anti-inflammatories aren't an option.  She will continue her therapy exercises and I'm going to send her back to a physical therapist for a pre-rehabilitation program.  She is going to talk with her children about a postoperative plan.  When she is ready to proceed with surgery she'll call and we'll get her set up.  I have asked that she see Dr. Mejía for preop clearance for surgery.  When she is ready to proceed she'll call we'll get " her set up.  Continuation of conservative management vs. TKA discussed.  The patient wishes to proceed with total knee replacement.  At this point the patient has failed the full compliment of conservative treatment and stating complete understanding of the risks/benefits/ anternatives wishes to proceed with surgical treatment.    Risk and benefits of surgery were reviewed.  Including, but not limited to, blood clots or pulmonary embolism, anesthesia risk, infection, fracture, skin/leg numbness, persistent pain/crepitance/popping/catching, failure of the implant, need for future surgeries, hematoma, possible nerve or blood vessel injury, need for transfusion, and potential risk of stroke,heart attack or death, among others.  The patient understands and wishes to proceed.     It was explained that if tissue has been repaired or reconstructed, there is also an increased chance of failure which may require further management.  Following the completion of the discussion, the patient expressed understanding of this planned course of care, all their questions were answered and consent will be obtained preoperatively.    Operative Plan: right [employer] Total Knee Replacement an overnight staywith home health rehab - spinal with [facility] - will call to schedule        Kiko Solano MD  3/29/2018

## 2018-04-03 ENCOUNTER — HOSPITAL ENCOUNTER (OUTPATIENT)
Dept: PHYSICAL THERAPY | Facility: HOSPITAL | Age: 83
Setting detail: THERAPIES SERIES
Discharge: HOME | End: 2018-04-03
Attending: ORTHOPAEDIC SURGERY

## 2018-04-03 DIAGNOSIS — M17.11 PRIMARY OSTEOARTHRITIS OF RIGHT KNEE: Primary | ICD-10-CM

## 2018-04-03 PROCEDURE — 97161 PT EVAL LOW COMPLEX 20 MIN: CPT | Performed by: PHYSICAL THERAPIST

## 2018-04-03 PROCEDURE — 97110 THERAPEUTIC EXERCISES: CPT | Performed by: PHYSICAL THERAPIST

## 2018-04-03 PROCEDURE — G8979 MOBILITY GOAL STATUS: HCPCS | Performed by: PHYSICAL THERAPIST

## 2018-04-03 PROCEDURE — 97140 MANUAL THERAPY 1/> REGIONS: CPT | Performed by: PHYSICAL THERAPIST

## 2018-04-03 PROCEDURE — G8978 MOBILITY CURRENT STATUS: HCPCS | Performed by: PHYSICAL THERAPIST

## 2018-04-03 NOTE — THERAPY EVALUATION
Outpatient Physical Therapy Ortho Initial Evaluation  [facility]     Patient Name: Lanie Ochoa  : 1935  MRN: [id number]  Today's Date: 4/3/2018      Visit Date: 2018    Patient Active Problem List   Diagnosis   • Anxiety   • HLD (hyperlipidemia)   • Benign essential hypertension   • Osteopenia   • Avitaminosis D   • History of left knee replacement   • Titubation   • Non-rheumatic mitral regurgitation   • Non-rheumatic tricuspid valve insufficiency   • Chronic pain of right knee   • Venous incompetence   • Dysfunction of both eustachian tubes   • Primary localized osteoarthrosis of right lower leg        Past Medical History:   Diagnosis Date   • Anxiety    • BMS (burning mouth syndrome) 2015   • Breast cancer    • Cancer     STAGE 1 INVASIVE DUCTAL CARCINOMA ER AND MS POSITIVE, HER-2/LOYDA NEGATIVE   • Cancer     SKIN LEFT ARM; SQUAMOUS CELL   • Constipation    • Degenerative disc disease    • Functional murmur     RESOLVED 2014   • Gastritis     RESOLVED 2014   • GERD (gastroesophageal reflux disease)    • Glossalgia 2015   • Headache 2013   • Hypertension    • Internal hemorrhoids    • Osteoarthritis 2014    HAND   • PNA (pneumonia) 2015   • Trigger ring finger 2014    RESOLVED 2014        Past Surgical History:   Procedure Laterality Date   • BREAST BIOPSY     • BREAST LUMPECTOMY     • BREAST SURGERY  07/15/2009    LUMPECTOMY   • DILATATION AND CURETTAGE      RESOLVED 06/15/2010   • EYE SURGERY  10/15/2012    LEFT CATARACT EXTRACTION   • HEMORRHOIDECTOMY     • JOINT REPLACEMENT Left     KNEE   • KNEE ARTHROSCOPY Right 2017    Procedure: KNEE ARTHROSCOPY WITH PARTIAL MEDIAL AND LATERAL MENISECTOMY AND DEBRIDEMENT OF ARTHRITIS.;  Surgeon: Lauren Krishna MD;  [facility] [facility] [place] [facility]  Service:        Visit Dx:     ICD-10-CM ICD-9-CM   1. Primary osteoarthritis of right knee M17.11 715.16             Patient History      Row Name 04/03/18 1100             History    Chief Complaint Difficulty Walking;Difficulty with daily activities;Joint stiffness;Pain;Muscle weakness  -      Type of Pain Knee pain   RIGHT  -LC      Date Current Problem(s) Began 01/01/18   chronic  -LC      Brief Description of Current Complaint Pt is an 82 y.o. female who presents to PT with chronic RIGHT knee pain that has worsened since start of year. She has met with MD and is attending PT for instruction on prehab for RIGHT TKR to be scheduled upon completing therapy. Pt reports she has arthroscopic surgery on R knee in 2017 where OA was removed and knee cleaned up but pain has since returned. Pt reports LEFT TKR in 2009.  -LC      Patient/Caregiver Goals Relieve pain;Return to prior level of function;Improve mobility;Improve strength;Know what to do to help the symptoms  -LC      Patient's Rating of General Health Good  -LC      Occupation/sports/leisure activities walking  -      What clinical tests have you had for this problem? X-ray  -      Results of Clinical Tests right knee primary OA  -LC         Pain     Pain Location Knee   RIGHT  -LC      Pain at Present 3  -LC      Pain at Best 3  -LC      Pain at Worst 8  -LC      Pain Frequency Constant/continuous  -LC      Pain Description Aching;Sore  -LC      What Performance Factors Make the Current Problem(s) WORSE? prolonged standing, prolonged walking, ascending and descending stairs  -      Is your sleep disturbed? No  -LC      Difficulties with ADL's? ascending and descending stairs, prolonged standing, sit to stand  -         Fall Risk Assessment    Any falls in the past year: No  -LC         Daily Activities    Primary Language English  -      Pt Participated in POC and Goals Yes  -LC         Safety    Are you being hurt, hit, or frightened by anyone at home or in your life? No  -LC      Are you being neglected by a caregiver No  -LC        User Key  (r) = Recorded By, (t) = Taken By,  (c) = Cosigned By    Initials Name Provider Type    SANDY Cancino, PT DPT Physical Therapist                PT Ortho     Row Name 04/03/18 1000       General ROM    RT Lower Ext Rt Knee Extension/Flexion  -LC    LT Lower Ext Lt Knee Extension/Flexion  -LC       Right Lower Ext    Rt Knee Extension/Flexion AROM 5 to 120, supine on bed  -LC       Left Lower Ext    Lt Knee Extension/Flexion AROM 0 to 120  -       Left Hip (Manual Muscle Testing)    MMT, Left Hip: Manual Muscle Testing (MMT) 4/5  -LC       Right Hip (Manual Muscle Testing)    MMT, Right Hip: Manual Muscle Testing (MMT) 3+/5  -LC       Left Knee (Manual Muscle Testing)    Comment, Left Knee: Manual Muscle Testing (MMT) 4/5  -LC       Right Knee (Manual Muscle Testing)    Comment, Right Knee: Manual Muscle Testing (MMT) 3+/5  -LC       Flexibility    Flexibility Tested? Lower Extremity  -      User Key  (r) = Recorded By, (t) = Taken By, (c) = Cosigned By    Initials Name Provider Type    SANDY Cancino, PT DPT Physical Therapist                      Therapy Education  Given: HEP, Symptoms/condition management, Pain management  Program: New  How Provided: Verbal, Demonstration, Written  Provided to: Patient  Level of Understanding: Teach back education performed, Verbalized, Demonstrated           PT OP Goals     Row Name 04/03/18 1100          PT Short Term Goals    STG 1 Pt will be independent with HEP to improve R Knee strength and ROM to allow for improved functional mobility with decreased pain in preparation for RIGHT TKR.  -     STG 1 Progress New  -     STG 2 Pt will have R knee AROM 0 to 120.  -     STG 2 Progress New  -     STG 3 Pt will have RLE MMT grossly 4/5  -     STG 3 Progress New  -     STG 4 Pt will report 10% improvement on KOS.  -     STG 4 Progress New  -        Time Calculation    PT Goal Re-Cert Due Date 05/03/18  -       User Key  (r) = Recorded By, (t) = Taken By, (c) = Cosigned By    Initials Name  Provider Type     Darren Cancino, PT DPT Physical Therapist                PT Assessment/Plan     Row Name 04/03/18 1145          PT Assessment    Functional Limitations Impaired gait;Performance in leisure activities;Limitations in functional capacity and performance  -     Impairments Gait;Muscle strength;Pain;Range of motion  -     Assessment Comments Pt is an 82 y.o. female who presents to PT with chronic RIGHT knee pain that has worsened since start of year. She has met with MD and is attending PT for instruction on prehab for RIGHT TKR to be scheduled upon completing therapy. Pt reports she has arthroscopic surgery on R knee in 2017 where OA was removed and knee cleaned up but pain has since returned. Pt reports LEFT TKR in 2009. Pt has RIGHT knee AROM 5 to 120. She has RLE MMT grossly 3+/5. Pt was educated on HEP to strengthen R knee and improve AROM to allow for increased functional mobility prior to R TKR. Pt issued written copy of HEP.  -     Please refer to paper survey for additional self-reported information Yes  -LC     Rehab Potential Excellent  -     Patient/caregiver participated in establishment of treatment plan and goals Yes  -     Patient would benefit from skilled therapy intervention Yes  -LC        PT Plan    PT Frequency 2x/week  -     Predicted Duration of Therapy Intervention (OT Eval) 2 weeks  -     Planned CPT's? PT EVAL LOW COMPLEXITY: 02796;PT RE-EVAL: 49980;PT NEUROMUSC RE-EDUCATION EA 15 MIN: 66571;PT MANUAL THERAPY EA 15 MIN: 70276;PT THER ACT EA 15 MIN: 29804;PT THER PROC EA 15 MIN: 37263  -     Physical Therapy Interventions (Optional Details) home exercise program;manual therapy techniques;ROM (Range of Motion);strengthening;stretching;modalities;patient/family education  -     PT Plan Comments Pt requires skilled therapy to improve R knee strength and ROM in preparation for R TKR.  -       User Key  (r) = Recorded By, (t) = Taken By, (c) = Cosigned By     Initials Name Provider Type    LC Darren ORTEGA Barak, PT DPT Physical Therapist                  Exercises     Row Name 04/03/18 1100             Exercise 1    Exercise Name 1 SLR  -LC      Sets 1 3  -LC      Reps 1 7  -LC         Exercise 2    Exercise Name 2 sidelying hip ABD  -LC      Sets 2 3  -LC      Reps 2 7  -LC         Exercise 3    Exercise Name 3 bridge  -LC      Sets 3 3  -LC      Reps 3 7  -LC         Exercise 4    Exercise Name 4 supine hip ADD  -LC      Sets 4 3  -LC      Reps 4 7  -LC         Exercise 5    Exercise Name 5 HS stretch seatedq  -LC      Sets 5 2  -LC      Reps 5 5  -LC      Time 5 10  -LC        User Key  (r) = Recorded By, (t) = Taken By, (c) = Cosigned By    Initials Name Provider Type    SANDY Darren ORTEGA Barak, PT DPT Physical Therapist           Manual Rx (last 36 hours)      Manual Treatments     Row Name 04/03/18 1100             Manual Rx 1    Manual Rx 1 Location RIGHT knee  -LC      Manual Rx 1 Type HS stretch in supine  -LC      Manual Rx 1 Duration 15 min  -LC        User Key  (r) = Recorded By, (t) = Taken By, (c) = Cosigned By    Initials Name Provider Type    SANDY Darren ORTEGA Barak, PT DPT Physical Therapist                      Outcome Measure Options: Knee Outcome Score- ADL  Knee Outcome Score  Knee Outcome Score Comments: 53%      Time Calculation:   Start Time: 1105  Stop Time: 1145  Time Calculation (min): 40 min  Total Timed Code Minutes- PT: 40 minute(s)     Therapy Charges for Today     Code Description Service Date Service Provider Modifiers Qty    35657757767 HC PT MOBILITY CURRENT 4/3/2018 Darren SHANNON Barak, PT DPT GP, CJ 1    77892653230 HC PT MOBILITY PROJECTED 4/3/2018 Darren Cancino, PT DPT GP, CI 1    92860932767 HC PT EVAL LOW COMPLEXITY 1 4/3/2018 Darren Cancino PT DPT GP 1    06474194470 HC PT THER PROC EA 15 MIN 4/3/2018 Darren Cancino, PT DPT GP 1    65381758528 HC PT MANUAL THERAPY EA 15 MIN 4/3/2018 Darren Cancino, PT DPT GP 1          PT G-Codes  PT Professional  Judgement Used?: Yes  Outcome Measure Options: Knee Outcome Score- ADL  Score: 53%  Functional Limitation: Mobility: Walking and moving around  Mobility: Walking and Moving Around Current Status (): At least 20 percent but less than 40 percent impaired, limited or restricted  Mobility: Walking and Moving Around Goal Status (): At least 1 percent but less than 20 percent impaired, limited or restricted         Darren SHANNON Cancino, PT DPT  4/3/2018

## 2018-04-05 ENCOUNTER — OFFICE VISIT (OUTPATIENT)
Dept: FAMILY MEDICINE CLINIC | Facility: CLINIC | Age: 83
End: 2018-04-05

## 2018-04-05 VITALS
DIASTOLIC BLOOD PRESSURE: 64 MMHG | SYSTOLIC BLOOD PRESSURE: 138 MMHG | WEIGHT: 153.1 LBS | OXYGEN SATURATION: 96 % | HEIGHT: 61 IN | HEART RATE: 68 BPM | BODY MASS INDEX: 28.9 KG/M2 | TEMPERATURE: 97.7 F

## 2018-04-05 DIAGNOSIS — M25.561 CHRONIC PAIN OF RIGHT KNEE: Primary | ICD-10-CM

## 2018-04-05 DIAGNOSIS — G89.29 CHRONIC PAIN OF RIGHT KNEE: Primary | ICD-10-CM

## 2018-04-05 DIAGNOSIS — Z01.818 PREOPERATIVE CLEARANCE: ICD-10-CM

## 2018-04-05 PROCEDURE — 99214 OFFICE O/P EST MOD 30 MIN: CPT | Performed by: NURSE PRACTITIONER

## 2018-04-05 NOTE — PROGRESS NOTES
Subjective   Lanie Ochoa is a 82 y.o. female presents for surgical clearance for right total knee replacement in the future. Denies any previous problems with surgeries. No difficulty with anesthesiology. Has a history of hypertension, well controlled with losartan, diltiazem and bystolic with good relief. No previous cardiac work up.     Knee Pain    The incident occurred more than 1 week ago. There was no injury mechanism. The pain is present in the right knee. The quality of the pain is described as aching. The pain is at a severity of 2/10 (worse with ambulation). The pain is mild. The pain has been fluctuating since onset. Associated symptoms include an inability to bear weight and muscle weakness (occasionally). Pertinent negatives include no loss of motion, loss of sensation, numbness or tingling. She reports no foreign bodies present. The symptoms are aggravated by weight bearing. Treatments tried: arthroscopy with good relief. The treatment provided mild relief.        The following portions of the patient's history were reviewed and updated as appropriate: allergies, current medications, past family history, past medical history, past social history, past surgical history and problem list.    Review of Systems   Constitutional: Negative.    HENT: Negative.    Eyes: Negative.    Respiratory: Negative.    Cardiovascular: Negative.  Negative for chest pain.   Gastrointestinal: Negative.    Endocrine: Negative.    Genitourinary: Negative.    Musculoskeletal: Positive for arthralgias (right knee pain) and gait problem (due to knee pain, right).   Skin: Negative.    Allergic/Immunologic: Negative.    Neurological: Negative for tingling and numbness.   Hematological: Negative.    Psychiatric/Behavioral: Negative.        Objective   Physical Exam   Constitutional: She is oriented to person, place, and time. She appears well-developed and well-nourished.   HENT:   Head: Normocephalic and atraumatic.   Eyes:  Pupils are equal, round, and reactive to light.   Neck: Neck supple.   Cardiovascular: Normal rate, regular rhythm and normal heart sounds.  Exam reveals no gallop and no friction rub.    No murmur heard.  Pulmonary/Chest: Effort normal and breath sounds normal. No respiratory distress. She has no wheezes. She has no rales.   Neurological: She is alert and oriented to person, place, and time.   Skin: Skin is warm and dry.   Psychiatric: She has a normal mood and affect.   Vitals reviewed.      Assessment/Plan   Lanie was seen today for knee pain.    Diagnoses and all orders for this visit:    Chronic pain of right knee    Preoperative clearance      Ok to proceed with surgery from a medical standpoint.   Await preop testing/ekg, may need cardiac clearance  Has a daughter that will help with post operative care.   Discussed short term rehab and she will consider.

## 2018-04-06 ENCOUNTER — HOSPITAL ENCOUNTER (OUTPATIENT)
Dept: PHYSICAL THERAPY | Facility: HOSPITAL | Age: 83
Setting detail: THERAPIES SERIES
Discharge: HOME | End: 2018-04-06
Attending: ORTHOPAEDIC SURGERY

## 2018-04-06 DIAGNOSIS — Z98.890 S/P RIGHT KNEE ARTHROSCOPY: ICD-10-CM

## 2018-04-06 DIAGNOSIS — M17.11 PRIMARY OSTEOARTHRITIS OF RIGHT KNEE: Primary | ICD-10-CM

## 2018-04-06 DIAGNOSIS — M25.561 ACUTE PAIN OF RIGHT KNEE: ICD-10-CM

## 2018-04-06 PROCEDURE — 97110 THERAPEUTIC EXERCISES: CPT

## 2018-04-06 NOTE — THERAPY TREATMENT NOTE
Outpatient Physical Therapy Ortho Treatment Note  [facility]     Patient Name: Lanie Ochoa  : 1935  MRN: [id number]  Today's Date: 2018      Visit Date: 2018    Visit Dx:    ICD-10-CM ICD-9-CM   1. Primary osteoarthritis of right knee M17.11 715.16   2. S/P right knee arthroscopy Z98.890 V45.89   3. Acute pain of right knee M25.561 719.46       Patient Active Problem List   Diagnosis   • Anxiety   • HLD (hyperlipidemia)   • Benign essential hypertension   • Osteopenia   • Avitaminosis D   • History of left knee replacement   • Titubation   • Non-rheumatic mitral regurgitation   • Non-rheumatic tricuspid valve insufficiency   • Chronic pain of right knee   • Venous incompetence   • Dysfunction of both eustachian tubes   • Primary localized osteoarthrosis of right lower leg        Past Medical History:   Diagnosis Date   • Anxiety    • BMS (burning mouth syndrome) 2015   • Breast cancer    • Cancer     STAGE 1 INVASIVE DUCTAL CARCINOMA ER AND NV POSITIVE, HER-2/LOYDA NEGATIVE   • Cancer     SKIN LEFT ARM; SQUAMOUS CELL   • Constipation    • Degenerative disc disease    • Functional murmur     RESOLVED 2014   • Gastritis     RESOLVED 2014   • GERD (gastroesophageal reflux disease)    • Glossalgia 2015   • Headache 2013   • Hypertension    • Internal hemorrhoids    • Osteoarthritis 2014    HAND   • PNA (pneumonia) 2015   • Trigger ring finger 2014    RESOLVED 2014        Past Surgical History:   Procedure Laterality Date   • BREAST BIOPSY     • BREAST LUMPECTOMY     • BREAST SURGERY  07/15/2009    LUMPECTOMY   • DILATATION AND CURETTAGE      RESOLVED 06/15/2010   • EYE SURGERY  10/15/2012    LEFT CATARACT EXTRACTION   • HEMORRHOIDECTOMY     • JOINT REPLACEMENT Left     KNEE   • KNEE ARTHROSCOPY Right 2017    Procedure: KNEE ARTHROSCOPY WITH PARTIAL MEDIAL AND LATERAL MENISECTOMY AND DEBRIDEMENT OF ARTHRITIS.;   Surgeon: Lauren Krishna MD;  Location: [facility] OR [facility] [place];  Service:              PT Ortho     Row Name 04/06/18 1300       Subjective Comments    Subjective Comments Pt reports daily right knee pain.  States she swims and does water ex regularly.  TKA surgery date not set yet.  -SI       Subjective Pain    Able to rate subjective pain? yes  -SI       Gait/Stairs Assessment/Training    Comment (Gait/Stairs) antalgic  -SI      User Key  (r) = Recorded By, (t) = Taken By, (c) = Cosigned By    Initials Name Provider Type    KARLA Banda PTA Physical Therapy Assistant                            PT Assessment/Plan     Row Name 04/06/18 1325          PT Assessment    Assessment Comments Very active 82 y o female.  Has had TKA on left in 2009.  Very receptive to teaching and information.  Plans home after hospital with family assist.  Need to review gait with RWX and cane next session  -SI       User Key  (r) = Recorded By, (t) = Taken By, (c) = Cosigned By    Initials Name Provider Type    KARLA Banda PTA Physical Therapy Assistant                Modalities     Row Name 04/06/18 1300             Ice    Patient reports will apply ice at home to involved area Yes   discussed use of ice PRN as needed  -SI        User Key  (r) = Recorded By, (t) = Taken By, (c) = Cosigned By    Initials Name Provider Type    KARLA Banda PTA Physical Therapy Assistant                Exercises     Row Name 04/06/18 1300             Subjective Comments    Subjective Comments Pt reports daily right knee pain.  States she swims and does water ex regularly.  TKA surgery date not set yet.  -SI         Subjective Pain    Able to rate subjective pain? yes  -SI         Exercise 1    Exercise Name 1 SLR  -SI      Sets 1 3  -SI      Reps 1 7  -SI         Exercise 2    Exercise Name 2 sidelying hip ABD  -SI      Sets 2 3  -SI      Reps 2 7  -SI         Exercise 3    Exercise Name 3 bridge  -SI      Sets 3 3  -SI      Reps 3 7  -SI          Exercise 4    Exercise Name 4 supine hip ADD  -SI      Sets 4 3  -SI      Reps 4 7  -SI         Exercise 5    Exercise Name 5 HS stretch seatedq  -SI      Sets 5 2  -SI      Reps 5 5  -SI      Time 5 10  -SI        User Key  (r) = Recorded By, (t) = Taken By, (c) = Cosigned By    Initials Name Provider Type    KARLA Banda PTA Physical Therapy Assistant                             Therapy Education  Given: HEP, Symptoms/condition management, Fall prevention and home safety (Discussed TKA knee surgery for Hospital , home, to out-pt rehab)  Program: Reinforced  How Provided: Verbal, Demonstration, Written  Provided to: Patient  Level of Understanding: Teach back education performed (demonstrates ex given intitial visit correctly.)              Time Calculation:   Start Time: 1145  Stop Time: 1230  Time Calculation (min): 45 min    Therapy Charges for Today     Code Description Service Date Service Provider Modifiers Qty    22361673697 HC PT THER PROC EA 15 MIN 4/6/2018 Brit Banda PTA GP 3                    Brit Banda PTA  4/6/2018

## 2018-04-10 ENCOUNTER — HOSPITAL ENCOUNTER (OUTPATIENT)
Dept: PHYSICAL THERAPY | Facility: HOSPITAL | Age: 83
Setting detail: THERAPIES SERIES
End: 2018-04-10
Attending: ORTHOPAEDIC SURGERY

## 2018-04-13 ENCOUNTER — OFFICE VISIT (OUTPATIENT)
Dept: FAMILY MEDICINE CLINIC | Facility: CLINIC | Age: 83
End: 2018-04-13

## 2018-04-13 VITALS
HEIGHT: 61 IN | SYSTOLIC BLOOD PRESSURE: 140 MMHG | BODY MASS INDEX: 28.25 KG/M2 | HEART RATE: 62 BPM | OXYGEN SATURATION: 97 % | TEMPERATURE: 98.1 F | WEIGHT: 149.6 LBS | DIASTOLIC BLOOD PRESSURE: 60 MMHG

## 2018-04-13 DIAGNOSIS — M53.3 SACROILIAC JOINT PAIN: Primary | ICD-10-CM

## 2018-04-13 PROCEDURE — 99213 OFFICE O/P EST LOW 20 MIN: CPT | Performed by: NURSE PRACTITIONER

## 2018-04-13 RX ORDER — PREDNISONE 20 MG/1
20 TABLET ORAL 2 TIMES DAILY
Qty: 10 TABLET | Refills: 0 | Status: SHIPPED | OUTPATIENT
Start: 2018-04-13 | End: 2018-04-23

## 2018-04-13 RX ORDER — NABUMETONE 500 MG/1
500 TABLET, FILM COATED ORAL 2 TIMES DAILY PRN
Qty: 60 TABLET | Refills: 0 | Status: SHIPPED | OUTPATIENT
Start: 2018-04-13 | End: 2018-05-08 | Stop reason: SDUPTHER

## 2018-04-13 NOTE — PROGRESS NOTES
Subjective   Lanie Ochoa is a 82 y.o. female presents with one week of left lower back pain. Went to  on Monday, given a steroid injection with no relief. Pain started on Saturday, pain 6/10, will worsen at times. Taking tylenol with no relief.      Back Pain   This is a new problem. The current episode started in the past 7 days. The problem occurs intermittently. The problem has been waxing and waning since onset. The pain is present in the lumbar spine. The quality of the pain is described as aching. The pain radiates to the left thigh. The pain is at a severity of 6/10. The pain is moderate. The pain is worse during the day. The symptoms are aggravated by sitting and standing. Associated symptoms include leg pain and numbness (thigh). Pertinent negatives include no abdominal pain, bladder incontinence, bowel incontinence, chest pain, dysuria, fever, headaches, paresis, paresthesias, pelvic pain, perianal numbness, tingling, weakness or weight loss.        The following portions of the patient's history were reviewed and updated as appropriate: allergies, current medications, past family history, past medical history, past social history, past surgical history and problem list.    Review of Systems   Constitutional: Negative for fever and weight loss.   Cardiovascular: Negative for chest pain.   Gastrointestinal: Negative for abdominal pain and bowel incontinence.   Genitourinary: Negative for bladder incontinence, dysuria and pelvic pain.   Musculoskeletal: Positive for back pain.   Neurological: Positive for numbness (thigh). Negative for tingling, weakness, headaches and paresthesias.       Objective   Physical Exam   Constitutional: She is oriented to person, place, and time. She appears well-developed and well-nourished.   HENT:   Head: Normocephalic and atraumatic.   Eyes: Pupils are equal, round, and reactive to light.   Neck: Neck supple.   Cardiovascular: Normal rate, regular rhythm and normal  heart sounds.  Exam reveals no gallop and no friction rub.    No murmur heard.  Musculoskeletal:        Lumbar back: She exhibits tenderness. She exhibits normal range of motion.   Neurological: She is alert and oriented to person, place, and time.   Skin: Skin is warm and dry.   Psychiatric: She has a normal mood and affect.       Assessment/Plan   Lanie was seen today for back pain.    Diagnoses and all orders for this visit:    Sacroiliac joint pain    Other orders  -     predniSONE (DELTASONE) 20 MG tablet; Take 1 tablet by mouth 2 (Two) Times a Day.  -     nabumetone (RELAFEN) 500 MG tablet; Take 1 tablet by mouth 2 (Two) Times a Day As Needed for Mild Pain .

## 2018-04-16 ENCOUNTER — TELEPHONE (OUTPATIENT)
Dept: FAMILY MEDICINE CLINIC | Facility: CLINIC | Age: 83
End: 2018-04-16

## 2018-04-16 ENCOUNTER — HOSPITAL ENCOUNTER (OUTPATIENT)
Dept: GENERAL RADIOLOGY | Facility: HOSPITAL | Age: 83
Discharge: HOME | End: 2018-04-16
Admitting: NURSE PRACTITIONER

## 2018-04-16 ENCOUNTER — HOSPITAL ENCOUNTER (OUTPATIENT)
Dept: GENERAL RADIOLOGY | Facility: HOSPITAL | Age: 83
Discharge: HOME | End: 2018-04-16

## 2018-04-16 DIAGNOSIS — M25.552 LEFT HIP PAIN: Primary | ICD-10-CM

## 2018-04-16 DIAGNOSIS — M54.5 ACUTE BILATERAL LOW BACK PAIN, WITH SCIATICA PRESENCE UNSPECIFIED: ICD-10-CM

## 2018-04-16 PROCEDURE — 73502 X-RAY EXAM HIP UNI 2-3 VIEWS: CPT

## 2018-04-16 PROCEDURE — 72100 X-RAY EXAM L-S SPINE 2/3 VWS: CPT

## 2018-04-16 NOTE — TELEPHONE ENCOUNTER
Pt was seen Friday for back pain. You wanted her to call back today if she is not any better to do a xray. She was wondering if we can set her up for that?

## 2018-04-18 ENCOUNTER — HOSPITAL ENCOUNTER (OUTPATIENT)
Dept: PHYSICAL THERAPY | Facility: HOSPITAL | Age: 83
Setting detail: THERAPIES SERIES
Discharge: HOME | End: 2018-04-18
Attending: ORTHOPAEDIC SURGERY

## 2018-04-18 DIAGNOSIS — Z98.890 S/P RIGHT KNEE ARTHROSCOPY: ICD-10-CM

## 2018-04-18 DIAGNOSIS — M17.11 PRIMARY OSTEOARTHRITIS OF RIGHT KNEE: Primary | ICD-10-CM

## 2018-04-18 DIAGNOSIS — M25.561 ACUTE PAIN OF RIGHT KNEE: ICD-10-CM

## 2018-04-18 PROCEDURE — 97110 THERAPEUTIC EXERCISES: CPT

## 2018-04-18 NOTE — THERAPY DISCHARGE NOTE
Outpatient Physical Therapy Ortho Treatment Note/Discharge Summary  [facility]     Patient Name: Lanie Ochoa  : 1935  MRN: [id number]  Today's Date: 2018      Visit Date: 2018    Visit Dx:    ICD-10-CM ICD-9-CM   1. Primary osteoarthritis of right knee M17.11 715.16   2. S/P right knee arthroscopy Z98.890 V45.89   3. Acute pain of right knee M25.561 719.46       Patient Active Problem List   Diagnosis   • Anxiety   • HLD (hyperlipidemia)   • Benign essential hypertension   • Osteopenia   • Avitaminosis D   • History of left knee replacement   • Titubation   • Non-rheumatic mitral regurgitation   • Non-rheumatic tricuspid valve insufficiency   • Chronic pain of right knee   • Venous incompetence   • Dysfunction of both eustachian tubes   • Primary localized osteoarthrosis of right lower leg        Past Medical History:   Diagnosis Date   • Anxiety    • BMS (burning mouth syndrome) 2015   • Breast cancer    • Cancer     STAGE 1 INVASIVE DUCTAL CARCINOMA ER AND NM POSITIVE, HER-2/LOYDA NEGATIVE   • Cancer     SKIN LEFT ARM; SQUAMOUS CELL   • Constipation    • Degenerative disc disease    • Functional murmur     RESOLVED 2014   • Gastritis     RESOLVED 2014   • GERD (gastroesophageal reflux disease)    • Glossalgia 2015   • Headache 2013   • Hypertension    • Internal hemorrhoids    • Osteoarthritis 2014    HAND   • PNA (pneumonia) 2015   • Trigger ring finger 2014    RESOLVED 2014        Past Surgical History:   Procedure Laterality Date   • BREAST BIOPSY     • BREAST LUMPECTOMY     • BREAST SURGERY  07/15/2009    LUMPECTOMY   • DILATATION AND CURETTAGE      RESOLVED 06/15/2010   • EYE SURGERY  10/15/2012    LEFT CATARACT EXTRACTION   • HEMORRHOIDECTOMY     • JOINT REPLACEMENT Left     KNEE   • KNEE ARTHROSCOPY Right 2017    Procedure: KNEE ARTHROSCOPY WITH PARTIAL MEDIAL AND LATERAL MENISECTOMY AND DEBRIDEMENT OF  ARTHRITIS.;  Surgeon: Lauren Krishna MD;  Location: [facility] OR [facility] – [place];  Service:              PT Ortho     Row Name 04/18/18 1600       Subjective Comments    Subjective Comments Pt states had a 1.5 week flare up of LBP.  Xray say arthritis, Prednesone for 5 days and much better..  -SI       Subjective Pain    Able to rate subjective pain? yes  -SI    Pre-Treatment Pain Level 1  -SI    Post-Treatment Pain Level 1  -SI    Subjective Pain Comment Currently having minimal right knee pain  -SI       Right Lower Ext    Rt Knee Extension/Flexion AROM -2 to 120  -SI       Left Hip (Manual Muscle Testing)    MMT, Left Hip: Manual Muscle Testing (MMT) 4+  -SI       Right Hip (Manual Muscle Testing)    MMT, Right Hip: Manual Muscle Testing (MMT) 4/5  -SI       Right Knee (Manual Muscle Testing)    Comment, Right Knee: Manual Muscle Testing (MMT) 4/5  -SI       Lower Extremity Flexibility    Hamstrings Bilateral:;Mildly limited  -SI       Gait/Stairs Assessment/Training    Gait/Stairs Assessment/Training gait/ambulation independence   pre-op gait instruction with RWX and a cane  -SI    Number of Steps (Stairs) --   curb step instruction  -SI    Comment (Gait/Stairs) pre-op instruction for use of RWX and or cane complete  -SI      User Key  (r) = Recorded By, (t) = Taken By, (c) = Cosigned By    Initials Name Provider Type    KARLA Banda PTA Physical Therapy Assistant                            PT Assessment/Plan     Row Name 04/18/18 1609          PT Assessment    Assessment Comments Pt no longer having LBP.  She is not sure why she started with it and did not feel it was any ex given in PT.  She has resummed her water ex and PT ex.  JFall prevention and home safety discussed.  Pre op instruction complete  -SI       User Key  (r) = Recorded By, (t) = Taken By, (c) = Cosigned By    Initials Name Provider Type    KARLA Banda PTA Physical Therapy Assistant                    Exercises     Row Name 04/18/18 5285              Subjective Comments    Subjective Comments Pt states had a 1.5 week flare up of LBP.  Xray say arthritis, Prednesone for 5 days and much better..  -SI         Subjective Pain    Able to rate subjective pain? yes  -SI      Pre-Treatment Pain Level 1  -SI      Post-Treatment Pain Level 1  -SI      Subjective Pain Comment Currently having minimal right knee pain  -SI         Exercise 1    Exercise Name 1 SLR  -SI      Sets 1 3  -SI      Reps 1 7  -SI         Exercise 2    Exercise Name 2 sidelying hip ABD  -SI      Sets 2 3  -SI      Reps 2 7  -SI         Exercise 3    Exercise Name 3 HS curls with red TB  -SI      Sets 3 2  -SI      Reps 3 10  -SI         Exercise 4    Exercise Name 4 supine hip ADD  -SI      Sets 4 3  -SI      Reps 4 7  -SI         Exercise 5    Exercise Name 5 HS stretch seatedq  -SI      Sets 5 2  -SI      Reps 5 5  -SI      Time 5 10  -SI        User Key  (r) = Recorded By, (t) = Taken By, (c) = Cosigned By    Initials Name Provider Type    KARLA Banda PTA Physical Therapy Assistant                               PT OP Goals     Row Name 04/18/18 1600          PT Short Term Goals    STG 1 Pt will be independent with HEP to improve R Knee strength and ROM to allow for improved functional mobility with decreased pain in preparation for RIGHT TKR.  -SI     STG 1 Progress Met  -SI     STG 2 Pt will have R knee AROM 0 to 120.  -SI     STG 2 Progress Met  -SI     STG 3 Pt will have RLE MMT grossly 4/5  -SI     STG 3 Progress Met  -SI     STG 4 Pt will report 10% improvement on KOS.  -SI       User Key  (r) = Recorded By, (t) = Taken By, (c) = Cosigned By    Initials Name Provider Type    KARLA Banda PTA Physical Therapy Assistant          Therapy Education  Given: HEP, Symptoms/condition management, Fall prevention and home safety (Discussed all aspects of having totoal  knee with emphasis on help at home after surgery for 2 weeks, and help with transpotation as no drive for 4-6  weeks.)  Program: Reinforced  How Provided: Verbal, Demonstration, Written  Provided to: Patient  Level of Understanding: Teach back education performed              Time Calculation:   Start Time: 1415  Stop Time: 1500  Time Calculation (min): 45 min    Therapy Charges for Today     Code Description Service Date Service Provider Modifiers Qty    37829483048 HC PT THER PROC EA 15 MIN 4/18/2018 Brit Banda, PTA GP 3                OP PT Discharge Summary  Date of Discharge: 04/18/18  Reason for Discharge: All goals achieved  Outcomes Achieved: Able to achieve all goals within established timeline  Discharge Destination: Home with home program  Discharge Instructions/Additional Comments: PT HEP every other day until surgery.  Continue water ex.        Brit Banda, ELBA  4/18/2018

## 2018-04-20 ENCOUNTER — TELEPHONE (OUTPATIENT)
Dept: FAMILY MEDICINE CLINIC | Facility: CLINIC | Age: 83
End: 2018-04-20

## 2018-04-20 NOTE — TELEPHONE ENCOUNTER
Pt called and said the meds helped her back but now that she is finished it is starting to hurt. She hasn't heard about the ortho appt (i don't even see where there is a referral placed for April)    Please advise thanks

## 2018-04-20 NOTE — TELEPHONE ENCOUNTER
Please call patient I believe Mayra gave him nabumetone 500 mg  Take twice daily for pain  He should take this with food and large glass of water  She takes this only for short time  1 or 2 weeks  He needs to take it with an antiacid such as Zantac OTC  150 twice daily  Or Prilosec OTC    He should follow-up here with Mayra or Dr. Mejía  In a couple weeks  If is not improved may need a MRI    If weakness fever chills abdominal pain  Emergency room    I'll send this to Mayra  Not sure about orthopedic appointment??

## 2018-04-23 ENCOUNTER — OFFICE VISIT (OUTPATIENT)
Dept: FAMILY MEDICINE CLINIC | Facility: CLINIC | Age: 83
End: 2018-04-23

## 2018-04-23 VITALS
DIASTOLIC BLOOD PRESSURE: 80 MMHG | OXYGEN SATURATION: 97 % | BODY MASS INDEX: 28.36 KG/M2 | HEART RATE: 67 BPM | HEIGHT: 61 IN | SYSTOLIC BLOOD PRESSURE: 160 MMHG | WEIGHT: 150.2 LBS | TEMPERATURE: 98 F

## 2018-04-23 DIAGNOSIS — M54.50 ACUTE LEFT-SIDED LOW BACK PAIN WITHOUT SCIATICA: Primary | ICD-10-CM

## 2018-04-23 PROCEDURE — 99213 OFFICE O/P EST LOW 20 MIN: CPT | Performed by: NURSE PRACTITIONER

## 2018-04-23 NOTE — PATIENT INSTRUCTIONS
Follow up with ortho.  For continued symptoms, may need physical therapy.  Recommend tylenol arthritis and heat for flare.

## 2018-04-23 NOTE — PROGRESS NOTES
Subjective   Lanie Ochoa is a 82 y.o. female presents for low back pain, left hip pain that has finally resolved. States she made the appointment last week but woke this morning to significant improvement. Was prescribed oral steroids that completely resolved symptoms, but returned mildly upon completing medication. Denies numbness or tingling into her left leg. Still with significant right knee pain, shifted gait related to knee pain.     Back Pain   This is a new problem. The current episode started 1 to 4 weeks ago. The problem occurs daily. The problem has been resolved since onset. The pain is present in the lumbar spine. The quality of the pain is described as aching. The pain does not radiate. The pain is at a severity of 0/10. The patient is experiencing no pain. Risk factors: right knee pain, shifted gait. She has tried analgesics (prednisone) for the symptoms. The treatment provided moderate relief.        The following portions of the patient's history were reviewed and updated as appropriate: allergies, current medications, past family history, past medical history, past social history, past surgical history and problem list.    Review of Systems   Constitutional: Positive for activity change (decreased due to back pain and knee pain).   HENT: Negative.    Eyes: Negative.    Respiratory: Negative.    Cardiovascular: Negative.    Gastrointestinal: Negative.    Endocrine: Negative.    Genitourinary: Negative.    Musculoskeletal: Positive for back pain (resolved today) and gait problem (due to right knee pain). Negative for joint swelling and neck pain.   Skin: Negative.    Allergic/Immunologic: Negative.    Hematological: Negative.    Psychiatric/Behavioral: Negative.        Objective   Physical Exam   Constitutional: She is oriented to person, place, and time. She appears well-developed and well-nourished.   Cardiovascular: Normal rate, regular rhythm and normal heart sounds.  Exam reveals no  gallop and no friction rub.    No murmur heard.  Pulmonary/Chest: Effort normal and breath sounds normal. No respiratory distress. She has no wheezes. She has no rales.   Musculoskeletal:        Lumbar back: She exhibits normal range of motion, no tenderness, no bony tenderness, no swelling, no edema and no pain.   Neurological: She is alert and oriented to person, place, and time.   Vitals reviewed.      Assessment/Plan   Lanie was seen today for back pain.    Diagnoses and all orders for this visit:    Acute left-sided low back pain without sciatica        Patient to follow up with ortho for knee, likely pain is related inflammation and altered weight on left side. Patient agreeable to follow up with ortho.  For continued symptoms, tylenol arthritis and heat is recommend, may need PT for exercises to do at home.

## 2018-04-25 ENCOUNTER — TELEPHONE (OUTPATIENT)
Dept: ORTHOPEDIC SURGERY | Facility: CLINIC | Age: 83
End: 2018-04-25

## 2018-05-01 DIAGNOSIS — M17.11 PRIMARY OSTEOARTHRITIS OF RIGHT KNEE: Primary | ICD-10-CM

## 2018-05-01 RX ORDER — MELOXICAM 7.5 MG/1
15 TABLET ORAL ONCE
Status: CANCELLED | OUTPATIENT
Start: 2018-05-21 | End: 2018-05-21

## 2018-05-01 RX ORDER — CEFAZOLIN SODIUM 2 G/100ML
2 INJECTION, SOLUTION INTRAVENOUS ONCE
Status: CANCELLED | OUTPATIENT
Start: 2018-05-21 | End: 2018-05-21

## 2018-05-01 RX ORDER — VANCOMYCIN HYDROCHLORIDE 1 G/200ML
15 INJECTION, SOLUTION INTRAVENOUS ONCE
Status: CANCELLED | OUTPATIENT
Start: 2018-05-21 | End: 2018-05-21

## 2018-05-01 RX ORDER — PREGABALIN 25 MG/1
150 CAPSULE ORAL ONCE
Status: CANCELLED | OUTPATIENT
Start: 2018-05-21 | End: 2018-05-21

## 2018-05-03 ENCOUNTER — TRANSCRIBE ORDERS (OUTPATIENT)
Dept: ADMINISTRATIVE | Facility: HOSPITAL | Age: 83
End: 2018-05-03

## 2018-05-03 DIAGNOSIS — Z12.31 VISIT FOR SCREENING MAMMOGRAM: Primary | ICD-10-CM

## 2018-05-04 PROBLEM — M17.11 PRIMARY OSTEOARTHRITIS OF RIGHT KNEE: Status: ACTIVE | Noted: 2018-05-04

## 2018-05-08 RX ORDER — NABUMETONE 500 MG/1
TABLET, FILM COATED ORAL
Qty: 60 TABLET | Refills: 0 | Status: SHIPPED | OUTPATIENT
Start: 2018-05-08 | End: 2018-05-15

## 2018-05-11 ENCOUNTER — PREP FOR SURGERY (OUTPATIENT)
Dept: OTHER | Facility: HOSPITAL | Age: 83
End: 2018-05-11

## 2018-05-11 DIAGNOSIS — M17.11 PRIMARY OSTEOARTHRITIS OF RIGHT KNEE: Primary | ICD-10-CM

## 2018-05-15 ENCOUNTER — APPOINTMENT (OUTPATIENT)
Dept: PREADMISSION TESTING | Facility: HOSPITAL | Age: 83
End: 2018-05-15

## 2018-05-15 VITALS
OXYGEN SATURATION: 97 % | WEIGHT: 151.13 LBS | RESPIRATION RATE: 16 BRPM | DIASTOLIC BLOOD PRESSURE: 64 MMHG | TEMPERATURE: 97.7 F | HEART RATE: 66 BPM | BODY MASS INDEX: 28.53 KG/M2 | HEIGHT: 61 IN | SYSTOLIC BLOOD PRESSURE: 110 MMHG

## 2018-05-15 DIAGNOSIS — M17.11 PRIMARY OSTEOARTHRITIS OF RIGHT KNEE: ICD-10-CM

## 2018-05-15 LAB
ANION GAP SERPL CALCULATED.3IONS-SCNC: 11.1 MMOL/L
BACTERIA UR QL AUTO: ABNORMAL /HPF
BILIRUB UR QL STRIP: NEGATIVE
BUN BLD-MCNC: 10 MG/DL (ref 8–23)
BUN/CREAT SERPL: 10.9 (ref 7–25)
CALCIUM SPEC-SCNC: 9.6 MG/DL (ref 8.6–10.5)
CHLORIDE SERPL-SCNC: 99 MMOL/L (ref 98–107)
CLARITY UR: CLEAR
CO2 SERPL-SCNC: 29.9 MMOL/L (ref 22–29)
COLOR UR: YELLOW
CREAT BLD-MCNC: 0.92 MG/DL (ref 0.57–1)
DEPRECATED RDW RBC AUTO: 51.1 FL (ref 37–54)
ERYTHROCYTE [DISTWIDTH] IN BLOOD BY AUTOMATED COUNT: 14.6 % (ref 11.7–13)
GFR SERPL CREATININE-BSD FRML MDRD: 58 ML/MIN/1.73
GLUCOSE BLD-MCNC: 88 MG/DL (ref 65–99)
GLUCOSE UR STRIP-MCNC: NEGATIVE MG/DL
HCT VFR BLD AUTO: 43.4 % (ref 35.6–45.5)
HGB BLD-MCNC: 13.9 G/DL (ref 11.9–15.5)
HGB UR QL STRIP.AUTO: NEGATIVE
HYALINE CASTS UR QL AUTO: ABNORMAL /LPF
KETONES UR QL STRIP: NEGATIVE
LEUKOCYTE ESTERASE UR QL STRIP.AUTO: ABNORMAL
MCH RBC QN AUTO: 30.6 PG (ref 26.9–32)
MCHC RBC AUTO-ENTMCNC: 32 G/DL (ref 32.4–36.3)
MCV RBC AUTO: 95.6 FL (ref 80.5–98.2)
NITRITE UR QL STRIP: NEGATIVE
PH UR STRIP.AUTO: 5.5 [PH] (ref 5–8)
PLATELET # BLD AUTO: 365 10*3/MM3 (ref 140–500)
PMV BLD AUTO: 10.4 FL (ref 6–12)
POTASSIUM BLD-SCNC: 4.2 MMOL/L (ref 3.5–5.2)
PROT UR QL STRIP: NEGATIVE
RBC # BLD AUTO: 4.54 10*6/MM3 (ref 3.9–5.2)
RBC # UR: ABNORMAL /HPF
REF LAB TEST METHOD: ABNORMAL
SODIUM BLD-SCNC: 140 MMOL/L (ref 136–145)
SP GR UR STRIP: 1.01 (ref 1–1.03)
SQUAMOUS #/AREA URNS HPF: ABNORMAL /HPF
UROBILINOGEN UR QL STRIP: ABNORMAL
WBC NRBC COR # BLD: 6.66 10*3/MM3 (ref 4.5–10.7)
WBC UR QL AUTO: ABNORMAL /HPF

## 2018-05-15 PROCEDURE — 36415 COLL VENOUS BLD VENIPUNCTURE: CPT

## 2018-05-15 PROCEDURE — 80048 BASIC METABOLIC PNL TOTAL CA: CPT | Performed by: ORTHOPAEDIC SURGERY

## 2018-05-15 PROCEDURE — 93010 ELECTROCARDIOGRAM REPORT: CPT | Performed by: INTERNAL MEDICINE

## 2018-05-15 PROCEDURE — 81001 URINALYSIS AUTO W/SCOPE: CPT | Performed by: ORTHOPAEDIC SURGERY

## 2018-05-15 PROCEDURE — 93005 ELECTROCARDIOGRAM TRACING: CPT

## 2018-05-15 PROCEDURE — 85027 COMPLETE CBC AUTOMATED: CPT | Performed by: ORTHOPAEDIC SURGERY

## 2018-05-15 RX ORDER — CHLORHEXIDINE GLUCONATE 500 MG/1
CLOTH TOPICAL
COMMUNITY
End: 2018-05-22 | Stop reason: HOSPADM

## 2018-05-15 RX ORDER — DILTIAZEM HYDROCHLORIDE 240 MG/1
240 CAPSULE, EXTENDED RELEASE ORAL EVERY EVENING
COMMUNITY
End: 2018-07-11 | Stop reason: SDUPTHER

## 2018-05-15 ASSESSMENT — KOOS JR
KOOS JR SCORE: 54.84
KOOS JR SCORE: 13

## 2018-05-15 NOTE — DISCHARGE INSTRUCTIONS
Take the following medications the morning of surgery with a small sip of water:    LOSARTAN  BYSTOLIC    General Instructions:  • Do not eat solid food after midnight the night before surgery.  • You may drink clear liquids day of surgery but must stop at least one hour before your hospital arrival time.  ( 0800 AM )  • It is beneficial for you to have a clear drink that contains carbohydrates the day of surgery.  We suggest a 12 to 20 ounce bottle of Gatorade or Powerade for non-diabetic patients     Clear liquids are liquids you can see through.  Nothing red in color.     Plain water                               Sports drinks  Sodas                                   Gelatin (Jell-O)  Fruit juices without pulp such as white grape juice and apple juice  Popsicles that contain no fruit or yogurt  Tea or coffee (no cream or milk added)  Gatorade / Powerade  G2 / Powerade Zero  .     • Bring any papers given to you in the doctor’s office.  • Wear clean comfortable clothes and socks.  • Do not wear contact lenses or make-up.  Bring a case for your glasses.   • Remove all piercings.  Leave jewelry and any other valuables at home.  • Hair extensions with metal clips must be removed prior to surgery.  • The Pre-Admission Testing nurse will instruct you to bring medications if unable to obtain an accurate list in Pre-Admission Testing.   • REPORT TO MAIN SURGERY ON 5-21-18 AT 0900 AM           Preventing a Surgical Site Infection:  • For 2 to 3 days before surgery, avoid shaving with a razor because the razor can irritate skin and make it easier to develop an infection.  • The night prior to surgery sleep in a clean bed with clean clothing.  Do not allow pets to sleep with you.  • Shower on the morning of surgery using a fresh bar of anti-bacterial soap (such as Dial) and clean washcloth.  Dry with a clean towel and dress in clean clothing.  • Ask your surgeon if you will be receiving antibiotics prior to surgery.  • Make  sure you, your family, and all healthcare providers clean their hands with soap and water or an alcohol based hand  before caring for you or your wound.    Day of surgery:  Upon arrival, a Pre-op nurse and Anesthesiologist will review your health history, obtain vital signs, and answer questions you may have.  The only belongings needed at this time will be your home medications and if applicable your C-PAP/BI-PAP machine.  If you are staying overnight your family can leave the rest of your belongings in the car and bring them to your room later.  A Pre-op nurse will start an IV and you may receive medication in preparation for surgery, including something to help you relax.  Your family will be able to see you in the Pre-op area.  While you are in surgery your family should notify the waiting room  if they leave the waiting room area and provide a contact phone number.    Please be aware that surgery does come with discomfort.  We want to make every effort to control your discomfort so please discuss any uncontrolled symptoms with your nurse.   Your doctor will most likely have prescribed pain medications.          2% CHLORAHEXIDINE GLUCONATE* CLOTH   X2  Preparing or “prepping” skin before surgery can reduce the risk of infection at the surgical site. To make the process easier, [facility] has chosen disposable cloths moistened with a rinse-free, 2% Chlorhexidine Gluconate (CHG) antiseptic solution. The steps below outline the prepping process and should be carefully followed.        Use the prep cloth on the area that is circled in the diagram             Directions Night before Surgery  1) Shower using a fresh bar of anti-bacterial soap (such as Dial) and clean washcloth.  Use a clean towel to completely dry your skin.  2) Do not use any lotions, oils or creams on your skin.  3) Open the package and remove 1 cloth, wipe your skin for 30 seconds in a circular motion.  Allow to  dry for 3 minutes.  4) Repeat #3 with second cloth.  5) Do not touch your eyes, ears, or mouth with the prep cloth.  6) Allow the wet prep solution to air dry.  7) Discard the prep cloth and wash your hands with soap and water.   8) Dress in clean bed clothes and sleep on fresh clean bed sheets.   9) You may experience some temporary itching after the prep.    Directions Day of Surgery  1) Repeat steps 1,2,3,4,5,6,7, and 9.   2) Dress in clean clothes before coming to the hospital.    BACTROBAN NASAL OINTMENT  There are many germs normally in your nose. Bactroban is an ointment that will help reduce these germs. Please follow these instructions for Bactroban use:    1  ____The day before surgery in the morning  [id number]_______  2  ____The day before surgery in the evening              [id number]______    3____The day of surgery in the morning    Date__ 2-78-18______    **Squirt ½ package of Bactroban Ointment onto a cotton applicator and apply to inside of 1st nostril.  Squirt the remaining Bactroban and apply to the inside of the other nostril.        If you are staying overnight following surgery, you will be transported to your hospital room following the recovery period.  [facility] has all private rooms.    If you have any questions please call Pre-Admission Testing at [phone].  Deductibles and co-payments are collected on the day of service. Please be prepared to pay the required co-pay, deductible or deposit on the day of service as defined by your plan.

## 2018-05-17 ENCOUNTER — OFFICE VISIT (OUTPATIENT)
Dept: ORTHOPEDIC SURGERY | Facility: CLINIC | Age: 83
End: 2018-05-17

## 2018-05-17 VITALS
WEIGHT: 150.6 LBS | TEMPERATURE: 98 F | DIASTOLIC BLOOD PRESSURE: 80 MMHG | HEIGHT: 61 IN | SYSTOLIC BLOOD PRESSURE: 120 MMHG | BODY MASS INDEX: 28.43 KG/M2

## 2018-05-17 DIAGNOSIS — M17.11 PRIMARY OSTEOARTHRITIS OF RIGHT KNEE: Primary | ICD-10-CM

## 2018-05-17 PROCEDURE — S0260 H&P FOR SURGERY: HCPCS | Performed by: NURSE PRACTITIONER

## 2018-05-17 NOTE — H&P
Patient: Lanie Ochoa    Date of Admission: 5/21/18    YOB: 1935    Medical Record Number: [id number]    Admitting Physician: Dr. Kiko Solano    Reason for Admission: End Stage Right Knee OA    History of Present Illness: 83 y.o. female presents with severe end stage knee osteoarthritis which has not been responsive to the full compliment of conservative measures. Despite conservative attempts, there is still severe, constant activity limiting pain. Given the severity of the pain, the patient has elected to proceed with knee replacement.    Allergies:   Allergies   Allergen Reactions   • Sulfa Antibiotics Rash         Current Medications:  Scheduled Meds:  PRN Meds:.    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/2015   • Breast cancer    • Cancer     STAGE 1 INVASIVE DUCTAL CARCINOMA [place] AND [place] POSITIVE, HER-2/[place] NEGATIVE   • Cancer     SKIN LEFT ARM; SQUAMOUS CELL   • Constipation    • Degenerative disc disease    • Functional murmur     RESOLVED 03/05/2014   • Gastritis     RESOLVED 03/05/2014   • GERD (gastroesophageal reflux disease)    • Glossalgia 11/5/2015   • Hypertension    • Internal hemorrhoids    • Osteoarthritis 04/08/2014    HAND   • PNA (pneumonia) 11/5/2015   • Trigger ring finger 03/05/2014    RESOLVED 08/22/2014       PF/Surg/Soc Hx:     Past Surgical History:   Procedure Laterality Date   • BREAST BIOPSY  2009   • BREAST LUMPECTOMY  07/15/2009    LUMPECTOMY   • COLONOSCOPY  2005   • DILATATION AND CURETTAGE      RESOLVED 06/15/2010   • EYE SURGERY Bilateral 10/15/2012    CATARACT EXTRACTION   • HEMORRHOIDECTOMY     • JOINT REPLACEMENT Left 2010    KNEE   • KNEE ARTHROSCOPY Right 6/13/2017    Procedure: KNEE ARTHROSCOPY WITH PARTIAL MEDIAL AND LATERAL MENISECTOMY AND DEBRIDEMENT OF ARTHRITIS.;  Surgeon: Lauren Krishna MD;  [place] [facility] [place] [facility] [place]  Service:         Social History     Occupational History   • retired,  "retail      Social History Main Topics   • Smoking status: Never Smoker   • Smokeless tobacco: Never Used   • Alcohol use No   • Drug use: No   • Sexual activity: Defer      Social History     Social History Narrative   • No narrative on file        Family History   Problem Relation Age of Onset   • COPD Mother    • COPD Father    • Malig Hyperthermia Neg Hx          Review of Systems:   A 14 point review of systems was performed, pertinent positives discussed above, all other systems are negative    Physical Exam: 83 y.o. female  Vital Signs :   Vitals:    05/17/18 1357   BP: 120/80   BP Location: Left arm   Patient Position: Sitting   Cuff Size: Adult   Temp: 98 °F (36.7 °C)   TempSrc: Temporal Artery    Weight: 68.3 kg (150 lb 9.6 oz)   Height: 153.7 cm (60.5\")     General: Alert and Oriented x 3, No acute distress.  Psych: mood and affect appropriate; recent and remote memory intact  Eyes: conjunctiva clear; pupils equally round and reactive, sclera nonicteric  CV: no peripheral edema  Resp: normal respiratory effort  Skin: no rashes or wounds; normal turgor  Musculosketetal; pain and crepitance with knee range of motion  Vascular: palpable distal pulses    Xrays:  -3 views (AP, lateral, and sunrise) were reviewed demonstrating end-stage OA with bone on bone articulation.    Assessment:  End-stage Right knee osteoarthritis. Conservative measures have failed.      Plan:  The plan is to proceed with Right Total Knee Replacement. The patient voiced understanding of the risks, benefits, and alternative forms of treatment that were discussed with Dr Solano at the time of scheduling. Patient is planning on going home with home health next day    Sada Sinclair, APRN  5/17/2018        "

## 2018-05-21 ENCOUNTER — ANESTHESIA (OUTPATIENT)
Dept: PERIOP | Facility: HOSPITAL | Age: 83
End: 2018-05-21

## 2018-05-21 ENCOUNTER — APPOINTMENT (OUTPATIENT)
Dept: GENERAL RADIOLOGY | Facility: HOSPITAL | Age: 83
End: 2018-05-21

## 2018-05-21 ENCOUNTER — ANESTHESIA EVENT (OUTPATIENT)
Dept: PERIOP | Facility: HOSPITAL | Age: 83
End: 2018-05-21

## 2018-05-21 ENCOUNTER — HOSPITAL ENCOUNTER (OUTPATIENT)
Facility: HOSPITAL | Age: 83
Discharge: HOME HEALTH | End: 2018-05-22
Attending: ORTHOPAEDIC SURGERY | Admitting: ORTHOPAEDIC SURGERY

## 2018-05-21 DIAGNOSIS — M17.11 PRIMARY OSTEOARTHRITIS OF RIGHT KNEE: ICD-10-CM

## 2018-05-21 DIAGNOSIS — R26.2 DIFFICULTY WALKING: Primary | ICD-10-CM

## 2018-05-21 PROBLEM — M17.9 OA (OSTEOARTHRITIS) OF KNEE: Status: ACTIVE | Noted: 2018-05-21

## 2018-05-21 PROCEDURE — 63710000001 MONTELUKAST 10 MG TABLET: Performed by: NURSE PRACTITIONER

## 2018-05-21 PROCEDURE — 25010000002 MIDAZOLAM PER 1 MG: Performed by: ANESTHESIOLOGY

## 2018-05-21 PROCEDURE — A9270 NON-COVERED ITEM OR SERVICE: HCPCS | Performed by: ORTHOPAEDIC SURGERY

## 2018-05-21 PROCEDURE — 63710000001 POVIDONE-IODINE 10 % SOLUTION 30 ML BOTTLE: Performed by: ORTHOPAEDIC SURGERY

## 2018-05-21 PROCEDURE — C1713 ANCHOR/SCREW BN/BN,TIS/BN: HCPCS | Performed by: ORTHOPAEDIC SURGERY

## 2018-05-21 PROCEDURE — 63710000001 PREGABALIN 75 MG CAPSULE: Performed by: ORTHOPAEDIC SURGERY

## 2018-05-21 PROCEDURE — 25010000002 VANCOMYCIN PER 500 MG: Performed by: ORTHOPAEDIC SURGERY

## 2018-05-21 PROCEDURE — 63710000001 DILTIAZEM CD 240 MG CAPSULE SUSTAINED-RELEASE 24 HR: Performed by: NURSE PRACTITIONER

## 2018-05-21 PROCEDURE — 63710000001 MELOXICAM 15 MG TABLET: Performed by: ORTHOPAEDIC SURGERY

## 2018-05-21 PROCEDURE — G8978 MOBILITY CURRENT STATUS: HCPCS

## 2018-05-21 PROCEDURE — 25010000002 PROPOFOL 10 MG/ML EMULSION: Performed by: NURSE ANESTHETIST, CERTIFIED REGISTERED

## 2018-05-21 PROCEDURE — 25010000002 FENTANYL CITRATE (PF) 100 MCG/2ML SOLUTION: Performed by: ANESTHESIOLOGY

## 2018-05-21 PROCEDURE — 94799 UNLISTED PULMONARY SVC/PX: CPT

## 2018-05-21 PROCEDURE — A9270 NON-COVERED ITEM OR SERVICE: HCPCS | Performed by: NURSE PRACTITIONER

## 2018-05-21 PROCEDURE — 27447 TOTAL KNEE ARTHROPLASTY: CPT | Performed by: ORTHOPAEDIC SURGERY

## 2018-05-21 PROCEDURE — 63710000001 SERTRALINE 25 MG TABLET: Performed by: NURSE PRACTITIONER

## 2018-05-21 PROCEDURE — 97161 PT EVAL LOW COMPLEX 20 MIN: CPT

## 2018-05-21 PROCEDURE — 25010000002 ROPIVACAINE PER 1 MG: Performed by: ANESTHESIOLOGY

## 2018-05-21 PROCEDURE — 25010000003 CEFAZOLIN IN DEXTROSE 2-4 GM/100ML-% SOLUTION: Performed by: ORTHOPAEDIC SURGERY

## 2018-05-21 PROCEDURE — 63710000001 DOCUSATE SODIUM 100 MG CAPSULE: Performed by: NURSE PRACTITIONER

## 2018-05-21 PROCEDURE — 25010000003 CEFAZOLIN IN DEXTROSE 2-4 GM/100ML-% SOLUTION: Performed by: NURSE PRACTITIONER

## 2018-05-21 PROCEDURE — C1776 JOINT DEVICE (IMPLANTABLE): HCPCS | Performed by: ORTHOPAEDIC SURGERY

## 2018-05-21 PROCEDURE — G8979 MOBILITY GOAL STATUS: HCPCS

## 2018-05-21 PROCEDURE — 63710000001 MUPIROCIN 2 % OINTMENT 1 G TUBE: Performed by: NURSE PRACTITIONER

## 2018-05-21 PROCEDURE — 73560 X-RAY EXAM OF KNEE 1 OR 2: CPT

## 2018-05-21 PROCEDURE — 63710000001 MASTISOL LIQUID: Performed by: ORTHOPAEDIC SURGERY

## 2018-05-21 PROCEDURE — 25010000002 ONDANSETRON PER 1 MG: Performed by: NURSE ANESTHETIST, CERTIFIED REGISTERED

## 2018-05-21 PROCEDURE — G0378 HOSPITAL OBSERVATION PER HR: HCPCS

## 2018-05-21 PROCEDURE — 63710000001 POLYETHYLENE GLYCOL PACK: Performed by: NURSE PRACTITIONER

## 2018-05-21 PROCEDURE — 97110 THERAPEUTIC EXERCISES: CPT

## 2018-05-21 PROCEDURE — 63710000001 HYDROCODONE-ACETAMINOPHEN 7.5-325 MG TABLET: Performed by: NURSE PRACTITIONER

## 2018-05-21 PROCEDURE — 25010000003 MEPIVACAINE PER 10 ML: Performed by: ANESTHESIOLOGY

## 2018-05-21 PROCEDURE — 25010000002 DEXAMETHASONE PER 1 MG: Performed by: NURSE ANESTHETIST, CERTIFIED REGISTERED

## 2018-05-21 PROCEDURE — 25010000002 FENTANYL CITRATE (PF) 100 MCG/2ML SOLUTION: Performed by: NURSE ANESTHETIST, CERTIFIED REGISTERED

## 2018-05-21 PROCEDURE — 27447 TOTAL KNEE ARTHROPLASTY: CPT | Performed by: NURSE PRACTITIONER

## 2018-05-21 DEVICE — GENESIS II NON-POROUS TIBIAL                                    BASEPLATE SIZE 2 RIGHT
Type: IMPLANTABLE DEVICE | Site: KNEE | Status: FUNCTIONAL
Brand: GENESIS II

## 2018-05-21 DEVICE — GII PS HIGH FLEXION INSERT SZ 1-2 11MM
Type: IMPLANTABLE DEVICE | Site: KNEE | Status: FUNCTIONAL
Brand: GENESIS II

## 2018-05-21 DEVICE — GENESIS II BICONVEX PATELLA 26MM
Type: IMPLANTABLE DEVICE | Site: KNEE | Status: FUNCTIONAL
Brand: GENESIS II

## 2018-05-21 DEVICE — LEGION POSTERIOR STABILIZED                                    OXINIUM FEMORAL SIZE 3 RIGHT
Type: IMPLANTABLE DEVICE | Site: KNEE | Status: FUNCTIONAL
Brand: LEGION

## 2018-05-21 DEVICE — IMPLANTABLE DEVICE: Type: IMPLANTABLE DEVICE | Site: KNEE | Status: FUNCTIONAL

## 2018-05-21 RX ORDER — HYDROCODONE BITARTRATE AND ACETAMINOPHEN 7.5; 325 MG/1; MG/1
1 TABLET ORAL ONCE AS NEEDED
Status: DISCONTINUED | OUTPATIENT
Start: 2018-05-21 | End: 2018-05-21 | Stop reason: HOSPADM

## 2018-05-21 RX ORDER — ONDANSETRON 2 MG/ML
4 INJECTION INTRAMUSCULAR; INTRAVENOUS ONCE AS NEEDED
Status: DISCONTINUED | OUTPATIENT
Start: 2018-05-21 | End: 2018-05-21 | Stop reason: HOSPADM

## 2018-05-21 RX ORDER — DOCUSATE SODIUM 100 MG/1
100 CAPSULE, LIQUID FILLED ORAL 2 TIMES DAILY
Status: DISCONTINUED | OUTPATIENT
Start: 2018-05-21 | End: 2018-05-22 | Stop reason: HOSPADM

## 2018-05-21 RX ORDER — CEFAZOLIN SODIUM 2 G/100ML
2 INJECTION, SOLUTION INTRAVENOUS EVERY 8 HOURS
Status: COMPLETED | OUTPATIENT
Start: 2018-05-21 | End: 2018-05-22

## 2018-05-21 RX ORDER — HYDRALAZINE HYDROCHLORIDE 20 MG/ML
5 INJECTION INTRAMUSCULAR; INTRAVENOUS
Status: DISCONTINUED | OUTPATIENT
Start: 2018-05-21 | End: 2018-05-21 | Stop reason: HOSPADM

## 2018-05-21 RX ORDER — LIDOCAINE HYDROCHLORIDE 20 MG/ML
INJECTION, SOLUTION INFILTRATION; PERINEURAL AS NEEDED
Status: DISCONTINUED | OUTPATIENT
Start: 2018-05-21 | End: 2018-05-21 | Stop reason: SURG

## 2018-05-21 RX ORDER — ASPIRIN 325 MG
325 TABLET, DELAYED RELEASE (ENTERIC COATED) ORAL EVERY 12 HOURS SCHEDULED
Status: DISCONTINUED | OUTPATIENT
Start: 2018-05-22 | End: 2018-05-22 | Stop reason: HOSPADM

## 2018-05-21 RX ORDER — OXYCODONE AND ACETAMINOPHEN 7.5; 325 MG/1; MG/1
1 TABLET ORAL ONCE AS NEEDED
Status: DISCONTINUED | OUTPATIENT
Start: 2018-05-21 | End: 2018-05-21 | Stop reason: HOSPADM

## 2018-05-21 RX ORDER — WOUND DRESSING ADHESIVE - LIQUID
LIQUID MISCELLANEOUS AS NEEDED
Status: DISCONTINUED | OUTPATIENT
Start: 2018-05-21 | End: 2018-05-21 | Stop reason: HOSPADM

## 2018-05-21 RX ORDER — FENTANYL CITRATE 50 UG/ML
50 INJECTION, SOLUTION INTRAMUSCULAR; INTRAVENOUS
Status: DISCONTINUED | OUTPATIENT
Start: 2018-05-21 | End: 2018-05-21 | Stop reason: HOSPADM

## 2018-05-21 RX ORDER — HYDROMORPHONE HYDROCHLORIDE 1 MG/ML
0.5 INJECTION, SOLUTION INTRAMUSCULAR; INTRAVENOUS; SUBCUTANEOUS
Status: DISCONTINUED | OUTPATIENT
Start: 2018-05-21 | End: 2018-05-21 | Stop reason: HOSPADM

## 2018-05-21 RX ORDER — EPHEDRINE SULFATE 50 MG/ML
INJECTION, SOLUTION INTRAVENOUS AS NEEDED
Status: DISCONTINUED | OUTPATIENT
Start: 2018-05-21 | End: 2018-05-21 | Stop reason: SURG

## 2018-05-21 RX ORDER — ACETAMINOPHEN 10 MG/ML
1000 INJECTION, SOLUTION INTRAVENOUS ONCE
Status: COMPLETED | OUTPATIENT
Start: 2018-05-21 | End: 2018-05-21

## 2018-05-21 RX ORDER — SODIUM CHLORIDE, SODIUM LACTATE, POTASSIUM CHLORIDE, CALCIUM CHLORIDE 600; 310; 30; 20 MG/100ML; MG/100ML; MG/100ML; MG/100ML
9 INJECTION, SOLUTION INTRAVENOUS CONTINUOUS
Status: DISCONTINUED | OUTPATIENT
Start: 2018-05-21 | End: 2018-05-21 | Stop reason: HOSPADM

## 2018-05-21 RX ORDER — MIDAZOLAM HYDROCHLORIDE 1 MG/ML
2 INJECTION INTRAMUSCULAR; INTRAVENOUS
Status: DISCONTINUED | OUTPATIENT
Start: 2018-05-21 | End: 2018-05-21 | Stop reason: HOSPADM

## 2018-05-21 RX ORDER — EPHEDRINE SULFATE 50 MG/ML
5 INJECTION, SOLUTION INTRAVENOUS ONCE AS NEEDED
Status: DISCONTINUED | OUTPATIENT
Start: 2018-05-21 | End: 2018-05-21 | Stop reason: HOSPADM

## 2018-05-21 RX ORDER — ONDANSETRON 4 MG/1
4 TABLET, FILM COATED ORAL EVERY 6 HOURS PRN
Status: DISCONTINUED | OUTPATIENT
Start: 2018-05-21 | End: 2018-05-22 | Stop reason: HOSPADM

## 2018-05-21 RX ORDER — ROCURONIUM BROMIDE 10 MG/ML
INJECTION, SOLUTION INTRAVENOUS AS NEEDED
Status: DISCONTINUED | OUTPATIENT
Start: 2018-05-21 | End: 2018-05-21 | Stop reason: SURG

## 2018-05-21 RX ORDER — ONDANSETRON 2 MG/ML
4 INJECTION INTRAMUSCULAR; INTRAVENOUS EVERY 6 HOURS PRN
Status: DISCONTINUED | OUTPATIENT
Start: 2018-05-21 | End: 2018-05-22 | Stop reason: HOSPADM

## 2018-05-21 RX ORDER — LABETALOL HYDROCHLORIDE 5 MG/ML
5 INJECTION, SOLUTION INTRAVENOUS
Status: DISCONTINUED | OUTPATIENT
Start: 2018-05-21 | End: 2018-05-21 | Stop reason: HOSPADM

## 2018-05-21 RX ORDER — PREGABALIN 25 MG/1
150 CAPSULE ORAL ONCE
Status: COMPLETED | OUTPATIENT
Start: 2018-05-21 | End: 2018-05-21

## 2018-05-21 RX ORDER — DIPHENHYDRAMINE HYDROCHLORIDE 50 MG/ML
12.5 INJECTION INTRAMUSCULAR; INTRAVENOUS
Status: DISCONTINUED | OUTPATIENT
Start: 2018-05-21 | End: 2018-05-21 | Stop reason: HOSPADM

## 2018-05-21 RX ORDER — PROMETHAZINE HYDROCHLORIDE 25 MG/ML
12.5 INJECTION, SOLUTION INTRAMUSCULAR; INTRAVENOUS ONCE AS NEEDED
Status: DISCONTINUED | OUTPATIENT
Start: 2018-05-21 | End: 2018-05-21 | Stop reason: HOSPADM

## 2018-05-21 RX ORDER — HYDROCODONE BITARTRATE AND ACETAMINOPHEN 7.5; 325 MG/1; MG/1
2 TABLET ORAL EVERY 4 HOURS PRN
Status: DISCONTINUED | OUTPATIENT
Start: 2018-05-21 | End: 2018-05-22 | Stop reason: HOSPADM

## 2018-05-21 RX ORDER — NALOXONE HCL 0.4 MG/ML
0.2 VIAL (ML) INJECTION AS NEEDED
Status: DISCONTINUED | OUTPATIENT
Start: 2018-05-21 | End: 2018-05-21 | Stop reason: HOSPADM

## 2018-05-21 RX ORDER — DEXAMETHASONE SODIUM PHOSPHATE 10 MG/ML
INJECTION INTRAMUSCULAR; INTRAVENOUS AS NEEDED
Status: DISCONTINUED | OUTPATIENT
Start: 2018-05-21 | End: 2018-05-21 | Stop reason: SURG

## 2018-05-21 RX ORDER — ROPIVACAINE HYDROCHLORIDE 2 MG/ML
INJECTION, SOLUTION EPIDURAL; INFILTRATION; PERINEURAL AS NEEDED
Status: DISCONTINUED | OUTPATIENT
Start: 2018-05-21 | End: 2018-05-21 | Stop reason: SURG

## 2018-05-21 RX ORDER — ONDANSETRON 4 MG/1
4 TABLET, ORALLY DISINTEGRATING ORAL EVERY 6 HOURS PRN
Status: DISCONTINUED | OUTPATIENT
Start: 2018-05-21 | End: 2018-05-22 | Stop reason: HOSPADM

## 2018-05-21 RX ORDER — PANTOPRAZOLE SODIUM 40 MG/1
40 TABLET, DELAYED RELEASE ORAL EVERY MORNING
Status: DISCONTINUED | OUTPATIENT
Start: 2018-05-22 | End: 2018-05-22 | Stop reason: HOSPADM

## 2018-05-21 RX ORDER — PROPOFOL 10 MG/ML
VIAL (ML) INTRAVENOUS AS NEEDED
Status: DISCONTINUED | OUTPATIENT
Start: 2018-05-21 | End: 2018-05-21 | Stop reason: SURG

## 2018-05-21 RX ORDER — CEFAZOLIN SODIUM 2 G/100ML
2 INJECTION, SOLUTION INTRAVENOUS ONCE
Status: COMPLETED | OUTPATIENT
Start: 2018-05-21 | End: 2018-05-21

## 2018-05-21 RX ORDER — FLUMAZENIL 0.1 MG/ML
0.2 INJECTION INTRAVENOUS AS NEEDED
Status: DISCONTINUED | OUTPATIENT
Start: 2018-05-21 | End: 2018-05-21 | Stop reason: HOSPADM

## 2018-05-21 RX ORDER — SODIUM CHLORIDE 0.9 % (FLUSH) 0.9 %
1-10 SYRINGE (ML) INJECTION AS NEEDED
Status: DISCONTINUED | OUTPATIENT
Start: 2018-05-21 | End: 2018-05-21 | Stop reason: HOSPADM

## 2018-05-21 RX ORDER — PROMETHAZINE HYDROCHLORIDE 25 MG/1
25 SUPPOSITORY RECTAL ONCE AS NEEDED
Status: DISCONTINUED | OUTPATIENT
Start: 2018-05-21 | End: 2018-05-21 | Stop reason: HOSPADM

## 2018-05-21 RX ORDER — NEBIVOLOL 10 MG/1
10 TABLET ORAL EVERY MORNING
Status: DISCONTINUED | OUTPATIENT
Start: 2018-05-22 | End: 2018-05-22 | Stop reason: HOSPADM

## 2018-05-21 RX ORDER — MELOXICAM 7.5 MG/1
15 TABLET ORAL ONCE
Status: COMPLETED | OUTPATIENT
Start: 2018-05-21 | End: 2018-05-21

## 2018-05-21 RX ORDER — LIDOCAINE HYDROCHLORIDE 10 MG/ML
0.5 INJECTION, SOLUTION EPIDURAL; INFILTRATION; INTRACAUDAL; PERINEURAL ONCE AS NEEDED
Status: DISCONTINUED | OUTPATIENT
Start: 2018-05-21 | End: 2018-05-21 | Stop reason: HOSPADM

## 2018-05-21 RX ORDER — ONDANSETRON 2 MG/ML
INJECTION INTRAMUSCULAR; INTRAVENOUS AS NEEDED
Status: DISCONTINUED | OUTPATIENT
Start: 2018-05-21 | End: 2018-05-21 | Stop reason: SURG

## 2018-05-21 RX ORDER — FENTANYL CITRATE 50 UG/ML
INJECTION, SOLUTION INTRAMUSCULAR; INTRAVENOUS AS NEEDED
Status: DISCONTINUED | OUTPATIENT
Start: 2018-05-21 | End: 2018-05-21 | Stop reason: SURG

## 2018-05-21 RX ORDER — MONTELUKAST SODIUM 10 MG/1
10 TABLET ORAL NIGHTLY
Status: DISCONTINUED | OUTPATIENT
Start: 2018-05-21 | End: 2018-05-22 | Stop reason: HOSPADM

## 2018-05-21 RX ORDER — DILTIAZEM HYDROCHLORIDE 240 MG/1
240 CAPSULE, COATED, EXTENDED RELEASE ORAL
Status: DISCONTINUED | OUTPATIENT
Start: 2018-05-21 | End: 2018-05-22 | Stop reason: HOSPADM

## 2018-05-21 RX ORDER — DILTIAZEM HYDROCHLORIDE 240 MG/1
240 CAPSULE, COATED, EXTENDED RELEASE ORAL
Status: DISCONTINUED | OUTPATIENT
Start: 2018-05-21 | End: 2018-05-21 | Stop reason: SDUPTHER

## 2018-05-21 RX ORDER — LOSARTAN POTASSIUM 100 MG/1
100 TABLET ORAL DAILY
Status: DISCONTINUED | OUTPATIENT
Start: 2018-05-21 | End: 2018-05-22 | Stop reason: HOSPADM

## 2018-05-21 RX ORDER — SERTRALINE HYDROCHLORIDE 25 MG/1
25 TABLET, FILM COATED ORAL EVERY EVENING
Status: DISCONTINUED | OUTPATIENT
Start: 2018-05-21 | End: 2018-05-22 | Stop reason: HOSPADM

## 2018-05-21 RX ORDER — HYDROCODONE BITARTRATE AND ACETAMINOPHEN 7.5; 325 MG/1; MG/1
1 TABLET ORAL EVERY 4 HOURS PRN
Status: DISCONTINUED | OUTPATIENT
Start: 2018-05-21 | End: 2018-05-22 | Stop reason: HOSPADM

## 2018-05-21 RX ORDER — MIDAZOLAM HYDROCHLORIDE 1 MG/ML
1 INJECTION INTRAMUSCULAR; INTRAVENOUS
Status: DISCONTINUED | OUTPATIENT
Start: 2018-05-21 | End: 2018-05-21 | Stop reason: HOSPADM

## 2018-05-21 RX ORDER — PROMETHAZINE HYDROCHLORIDE 25 MG/1
25 TABLET ORAL ONCE AS NEEDED
Status: DISCONTINUED | OUTPATIENT
Start: 2018-05-21 | End: 2018-05-21 | Stop reason: HOSPADM

## 2018-05-21 RX ORDER — TRANEXAMIC ACID 100 MG/ML
INJECTION, SOLUTION INTRAVENOUS AS NEEDED
Status: DISCONTINUED | OUTPATIENT
Start: 2018-05-21 | End: 2018-05-21 | Stop reason: SURG

## 2018-05-21 RX ORDER — BISACODYL 10 MG
10 SUPPOSITORY, RECTAL RECTAL DAILY PRN
Status: DISCONTINUED | OUTPATIENT
Start: 2018-05-21 | End: 2018-05-22 | Stop reason: HOSPADM

## 2018-05-21 RX ORDER — MAGNESIUM HYDROXIDE 1200 MG/15ML
LIQUID ORAL AS NEEDED
Status: DISCONTINUED | OUTPATIENT
Start: 2018-05-21 | End: 2018-05-21 | Stop reason: HOSPADM

## 2018-05-21 RX ORDER — FAMOTIDINE 10 MG/ML
20 INJECTION, SOLUTION INTRAVENOUS ONCE
Status: COMPLETED | OUTPATIENT
Start: 2018-05-21 | End: 2018-05-21

## 2018-05-21 RX ORDER — UREA 10 %
3 LOTION (ML) TOPICAL NIGHTLY PRN
Status: DISCONTINUED | OUTPATIENT
Start: 2018-05-21 | End: 2018-05-22 | Stop reason: HOSPADM

## 2018-05-21 RX ORDER — PROMETHAZINE HYDROCHLORIDE 25 MG/1
12.5 TABLET ORAL ONCE AS NEEDED
Status: DISCONTINUED | OUTPATIENT
Start: 2018-05-21 | End: 2018-05-21 | Stop reason: HOSPADM

## 2018-05-21 RX ORDER — LIDOCAINE HYDROCHLORIDE 40 MG/ML
SOLUTION TOPICAL AS NEEDED
Status: DISCONTINUED | OUTPATIENT
Start: 2018-05-21 | End: 2018-05-21 | Stop reason: SURG

## 2018-05-21 RX ORDER — VANCOMYCIN HYDROCHLORIDE 1 G/200ML
15 INJECTION, SOLUTION INTRAVENOUS ONCE
Status: COMPLETED | OUTPATIENT
Start: 2018-05-21 | End: 2018-05-21

## 2018-05-21 RX ADMIN — LIDOCAINE HYDROCHLORIDE 1 EACH: 40 SOLUTION TOPICAL at 09:57

## 2018-05-21 RX ADMIN — PREGABALIN 150 MG: 75 CAPSULE ORAL at 07:55

## 2018-05-21 RX ADMIN — MIDAZOLAM 1 MG: 1 INJECTION INTRAMUSCULAR; INTRAVENOUS at 09:06

## 2018-05-21 RX ADMIN — MIDAZOLAM 1 MG: 1 INJECTION INTRAMUSCULAR; INTRAVENOUS at 09:10

## 2018-05-21 RX ADMIN — FAMOTIDINE 20 MG: 10 INJECTION, SOLUTION INTRAVENOUS at 09:06

## 2018-05-21 RX ADMIN — SODIUM CHLORIDE, POTASSIUM CHLORIDE, SODIUM LACTATE AND CALCIUM CHLORIDE: 600; 310; 30; 20 INJECTION, SOLUTION INTRAVENOUS at 10:13

## 2018-05-21 RX ADMIN — SERTRALINE 25 MG: 25 TABLET, FILM COATED ORAL at 17:38

## 2018-05-21 RX ADMIN — FENTANYL CITRATE 50 MCG: 50 INJECTION INTRAMUSCULAR; INTRAVENOUS at 09:48

## 2018-05-21 RX ADMIN — ROCURONIUM BROMIDE 40 MG: 10 INJECTION INTRAVENOUS at 09:52

## 2018-05-21 RX ADMIN — ONDANSETRON 4 MG: 2 INJECTION INTRAMUSCULAR; INTRAVENOUS at 10:49

## 2018-05-21 RX ADMIN — CEFAZOLIN SODIUM 2 G: 2 INJECTION, SOLUTION INTRAVENOUS at 17:38

## 2018-05-21 RX ADMIN — FENTANYL CITRATE 50 MCG: 50 INJECTION INTRAMUSCULAR; INTRAVENOUS at 12:47

## 2018-05-21 RX ADMIN — EPHEDRINE SULFATE 10 MG: 50 INJECTION INTRAMUSCULAR; INTRAVENOUS; SUBCUTANEOUS at 10:06

## 2018-05-21 RX ADMIN — TRANEXAMIC ACID 1000 MG: 100 INJECTION, SOLUTION INTRAVENOUS at 10:16

## 2018-05-21 RX ADMIN — TRANEXAMIC ACID 1000 MG: 100 INJECTION, SOLUTION INTRAVENOUS at 11:07

## 2018-05-21 RX ADMIN — SUGAMMADEX 140 MG: 100 INJECTION, SOLUTION INTRAVENOUS at 11:13

## 2018-05-21 RX ADMIN — VANCOMYCIN HYDROCHLORIDE 1000 MG: 1 INJECTION, SOLUTION INTRAVENOUS at 07:55

## 2018-05-21 RX ADMIN — MELOXICAM 15 MG: 15 TABLET ORAL at 07:55

## 2018-05-21 RX ADMIN — FENTANYL CITRATE 50 MCG: 50 INJECTION INTRAMUSCULAR; INTRAVENOUS at 10:23

## 2018-05-21 RX ADMIN — DILTIAZEM HYDROCHLORIDE 240 MG: 240 CAPSULE, COATED, EXTENDED RELEASE ORAL at 21:26

## 2018-05-21 RX ADMIN — FENTANYL CITRATE 50 MCG: 50 INJECTION INTRAMUSCULAR; INTRAVENOUS at 10:36

## 2018-05-21 RX ADMIN — MONTELUKAST 10 MG: 10 TABLET, FILM COATED ORAL at 21:27

## 2018-05-21 RX ADMIN — CEFAZOLIN SODIUM 2 G: 2 INJECTION, SOLUTION INTRAVENOUS at 09:59

## 2018-05-21 RX ADMIN — DOCUSATE SODIUM 100 MG: 100 CAPSULE, LIQUID FILLED ORAL at 21:27

## 2018-05-21 RX ADMIN — SODIUM CHLORIDE, POTASSIUM CHLORIDE, SODIUM LACTATE AND CALCIUM CHLORIDE 9 ML/HR: 600; 310; 30; 20 INJECTION, SOLUTION INTRAVENOUS at 09:06

## 2018-05-21 RX ADMIN — HYDROCODONE BITARTRATE AND ACETAMINOPHEN 1 TABLET: 7.5; 325 TABLET ORAL at 21:36

## 2018-05-21 RX ADMIN — MUPIROCIN: 20 OINTMENT TOPICAL at 21:36

## 2018-05-21 RX ADMIN — SODIUM CHLORIDE, POTASSIUM CHLORIDE, SODIUM LACTATE AND CALCIUM CHLORIDE 500 ML: 600; 310; 30; 20 INJECTION, SOLUTION INTRAVENOUS at 07:55

## 2018-05-21 RX ADMIN — LIDOCAINE HYDROCHLORIDE 60 MG: 20 INJECTION, SOLUTION INFILTRATION; PERINEURAL at 09:52

## 2018-05-21 RX ADMIN — DEXAMETHASONE SODIUM PHOSPHATE 10 MG: 10 INJECTION INTRAMUSCULAR; INTRAVENOUS at 09:59

## 2018-05-21 RX ADMIN — PROPOFOL 150 MG: 10 INJECTION, EMULSION INTRAVENOUS at 09:52

## 2018-05-21 RX ADMIN — HYDROCODONE BITARTRATE AND ACETAMINOPHEN 1 TABLET: 7.5; 325 TABLET ORAL at 13:42

## 2018-05-21 RX ADMIN — FENTANYL CITRATE 50 MCG: 50 INJECTION INTRAMUSCULAR; INTRAVENOUS at 12:14

## 2018-05-21 RX ADMIN — ROPIVACAINE HYDROCHLORIDE 20 ML: 2 INJECTION, SOLUTION EPIDURAL; INFILTRATION at 08:36

## 2018-05-21 RX ADMIN — FENTANYL CITRATE 50 MCG: 50 INJECTION INTRAMUSCULAR; INTRAVENOUS at 09:06

## 2018-05-21 RX ADMIN — POLYETHYLENE GLYCOL 3350 17 G: 17 POWDER, FOR SOLUTION ORAL at 21:27

## 2018-05-21 RX ADMIN — MEPIVACAINE HYDROCHLORIDE 10 ML: 15 INJECTION, SOLUTION EPIDURAL; INFILTRATION at 08:36

## 2018-05-21 RX ADMIN — ACETAMINOPHEN 1000 MG: 10 INJECTION, SOLUTION INTRAVENOUS at 09:59

## 2018-05-21 NOTE — PROGRESS NOTES
Discharge Planning Assessment   [facility]     Patient Name: Lanie Ohcoa  MRN: [id number]  Today's Date: 5/21/2018    Admit Date: 5/21/2018          Discharge Needs Assessment     Row Name 05/21/18 1705       Living Environment    Lives With alone    Current Living Arrangements home/apartment/condo    Family Caregiver if Needed child(huey), adult       Resource/Environmental Concerns    Resource/Environmental Concerns none       Transition Planning    Patient/Family Anticipates Transition to home with family    Patient/Family Anticipated Services at Transition home health care    Transportation Anticipated family or friend will provide       Discharge Needs Assessment    Readmission Within the Last 30 Days no previous admission in last 30 days    Concerns to be Addressed no discharge needs identified;denies needs/concerns at this time    Equipment Currently Used at Home none    Offered/Gave Vendor List yes            Discharge Plan     Row Name 05/21/18 1706       Plan    Plan Home w/ daughter and VNA      Patient/Family in Agreement with Plan yes    Plan Comments Met w/ pt and pt's daughter at the bedside, verified facesheet and discussed d/c plan. Pt lives alone, but her daughter will stay w/ her while she recovers. She would like VNA for Freda MARIE notified.         Destination     No service coordination in this encounter.      Durable Medical Equipment     No service coordination in this encounter.      Dialysis/Infusion     No service coordination in this encounter.      Home Medical Care - Selection Complete     Service Request Status Selected Specialties Address Phone Number Fax Number    [facility] Services [address] [place], [place] [id number] [phone]        Shira Rm RN 5/21/2018 1705    5/21/2018  Freda notified.                  Social Care     No service coordination in this encounter.                Demographic Summary     No documentation.           Functional Status     Row Name 05/21/18 1705       Functional Status    Usual Activity Tolerance good    Current Activity Tolerance fair       Functional Status, IADL    Medications independent    Meal Preparation independent    Housekeeping independent    Laundry independent    Shopping independent       Mental Status    General Appearance WDL WDL       Mental Status Summary    Recent Changes in Mental Status/Cognitive Functioning no changes            Psychosocial    No documentation.           Abuse/Neglect    No documentation.           Legal    No documentation.           Substance Abuse    No documentation.           Patient Forms     Row Name 05/21/18 0826       Patient Forms    Provider Choice List Delivered    Delivered to Patient    Method of delivery In person          Shira Rm RN

## 2018-05-21 NOTE — OP NOTE
Name: Lanie Ochoa  YOB: 1935    DATE OF SURGERY: 5/21/2018    PREOPERATIVE DIAGNOSIS: Right knee end-stage osteoarthritis    POSTOPERATIVE DIAGNOSIS: Right knee end-stage osteoarthritis    PROCEDURE PERFORMED: Right total knee replacement    SURGEON: Kiko Solano M.D.    ASSISTANT: JENNIFER KEARNS    IMPLANTS: [employer]:     Implant Name Type Inv. Item Serial No.  Lot No. LRB No. Used   CMT BONE PALACOS R HI/VISC 1X40 - [id number] Implant CMT BONE PALACOS R HI/VISC 1X40  [facility] [id number] Right 1   BASE TIB GEN2 NONPOR TI SZ2 RT - [id number] Implant BASE TIB GEN2 NONPOR TI SZ2 RT  [employer] [id number] Right 1   COMP FEM LEGION OXINIUM PS SZ3 RT - [id number] Implant COMP FEM LEGION OXINIUM PS SZ3 RT  [employer] [id number] Right 1   PAT GEN2 BICONVEX [id number] - [id number] Implant PAT GEN2 BICON[employer]EX [id number]  [employer] [id number] Right 1   INSRT TIB FLX HI GEN2 PS SZ1TO2 11MM - [id number] Implant INSRT TIB FLX HI GEN2 PS SZ1TO2 11MM   [employer] [id number] Right 1       Estimated Blood Loss: 200cc  Specimens : none  Complications: none    DESCRIPTION OF PROCEDURE: The patient was taken to the operating room and placed in the supine position. A sequential compression device was carefully placed on the non-operative leg. Preoperative antibiotics were administered. Surgical time out was performed. After adequate induction of anesthesia, the leg was prepped and draped in the usual sterile fashion, exsanguinated with an Esmarch bandage and the tourniquet inflated to 250 mmHg. A midline incision was performed followed by a medial parapatellar arthrotomy. The patella was subluxed laterally.  A portion of the fat pad, ACL, and anterior horns of the meniscus were excised. The drill hole was placed in the distal femur and the canal was the irrigated and suctioned. The IM elfego was placed and a 5 degree distal valgus cut was performed on the femur. The  femur was then sized with a sizing guide. The femoral cutting block was placed and all femoral cuts were performed. The proximal tibia was exposed. We used the extramedullary tibial cutting guide set for removal of 9mm of bone off the high side. The tibial cut was performed. The posterior horns of the menisci were excised. The posterior osteophytes were removed. Flexion extension blocks were then used to balance the knee. The tibial cut surface was then sized with the sizing templates and the tibial and femoral trial were then placed. The knee was placed in full extension and then the tibial tray rotation was then matched to the femoral rotation and marked.    Attention was then placed to the patella. The patella was noted to track centrally through range of motion. The patella was then sized with the trials. The thickness of the patella was then measured. The patella was resurfaced and the surrounding osteophytes were removed. The preoperative thickness was reproduced. The patella tracked centrally through range of motion.   At this point all trial components were removed, the knee was copiously irrigated with pulsed lavage, and the knee was injected with anesthetic cocktail solution. The cut surfaces were then dried with clean lap sponges, and the components were cemented tibia, followed by femur, then patella. The knee was held in full extension and all excess cement was removed. The knee was held still until the cement had completely hardened. We then placed the trial polyethylene spacer which resulted in full extension and excellent flexion-extension balance. We placed the final polyethylene spacer.   The knee was then copiously irrigated. The tourniquet was then released. There was excellent hemostasis. We placed a one-eighth inch Hemovac drain. We closed the knee in multiple layers in standard fashion. Sterile dressing were applied. At the end of the case, the sponge and needle counts were reported as being  correct. There were no known complications. The patient was then transported to the recovery room.      Kiko Solano M.D.

## 2018-05-21 NOTE — THERAPY EVALUATION
Acute Care - Physical Therapy Initial Evaluation   [facility]     Patient Name: Lanie Ochoa  : 1935  MRN: [id number]  Today's Date: 2018   Onset of Illness/Injury or Date of Surgery: 18  Date of Referral to PT: 18  Referring Physician: Kiko Dexter      Admit Date: 2018    Visit Dx:     ICD-10-CM ICD-9-CM   1. Difficulty walking R26.2 719.7   2. Primary osteoarthritis of right knee M17.11 715.16     Patient Active Problem List   Diagnosis   • Anxiety   • HLD (hyperlipidemia)   • Benign essential hypertension   • Osteopenia   • Avitaminosis D   • History of left knee replacement   • Titubation   • Non-rheumatic mitral regurgitation   • Non-rheumatic tricuspid valve insufficiency   • Chronic pain of right knee   • Venous incompetence   • Dysfunction of both eustachian tubes   • Primary localized osteoarthrosis of right lower leg   • Primary osteoarthritis of right knee   • OA (osteoarthritis) of knee     Past Medical History:   Diagnosis Date   • Anxiety    • BMS (burning mouth syndrome) 2015   • Breast cancer    • Cancer     STAGE 1 INVASIVE DUCTAL CARCINOMA [place] AND [place] POSITIVE, HER-2/[place] NEGATIVE   • Cancer     SKIN LEFT ARM; SQUAMOUS CELL   • Constipation    • Degenerative disc disease    • Functional murmur     RESOLVED 2014   • Gastritis     RESOLVED 2014   • GERD (gastroesophageal reflux disease)    • Glossalgia 2015   • Hypertension    • Internal hemorrhoids    • Osteoarthritis 2014    HAND   • PNA (pneumonia) 2015   • Trigger ring finger 2014    RESOLVED 2014     Past Surgical History:   Procedure Laterality Date   • BREAST BIOPSY     • BREAST LUMPECTOMY  07/15/2009    LUMPECTOMY   • COLONOSCOPY     • DILATATION AND CURETTAGE      RESOLVED 06/15/2010   • EYE SURGERY Bilateral 10/15/2012    CATARACT EXTRACTION   • HEMORRHOIDECTOMY     • JOINT REPLACEMENT Left     KNEE   • KNEE ARTHROSCOPY Right 2017     Procedure: KNEE ARTHROSCOPY WITH PARTIAL MEDIAL AND LATERAL MENISECTOMY AND DEBRIDEMENT OF ARTHRITIS.;  Surgeon: Lauren Krishna MD;  [place] [facility] [place] [facility] [place]  Service:         PT ASSESSMENT (last 12 hours)      Physical Therapy Evaluation     Row Name 05/21/18 1518          PT Evaluation Time/Intention    Subjective Information complains of;fatigue;pain  -MS     Document Type evaluation  -MS     Mode of Treatment individual therapy  -MS     Patient Effort good  -MS     Comment Pt. reports overall fatigue as well as pain/soreness in her Right knee this PM.  -MS     Row Name 05/21/18 1518          General Information    Onset of Illness/Injury or Date of Surgery 05/21/18  -MS     Referring Physician Kiko Dexter  -MS     Patient Observations agree to therapy  -MS     Prior Level of Function independent:  -MS     Equipment Currently Used at Home none  -MS     Pertinent History of Current Functional Problem Pt. is s/p Right TKR  -MS     Existing Precautions/Restrictions fall  -MS     Equipment Ordered for Patient --   Pt. already owns a Rwx for home use.  -MS     Risks Reviewed patient and family:  -MS     Benefits Reviewed patient and family:  -MS     Barriers to Rehab none identified  -MS     Row Name 05/21/18 1518          Cognitive Assessment/Intervention- PT/OT    Orientation Status (Cognition) oriented x 3  -MS     Follows Commands (Cognition) WNL  -MS     Personal Safety Interventions fall prevention program maintained;gait belt;nonskid shoes/slippers when out of bed;supervised activity  -MS     Row Name 05/21/18 1518          Mobility Assessment/Treatment    Extremity Weight-bearing Status right lower extremity  -MS     Right Lower Extremity (Weight-bearing Status) weight-bearing as tolerated (WBAT)  -MS     Row Name 05/21/18 1518          Bed Mobility Assessment/Treatment    Bed Mobility Assessment/Treatment supine-sit;sit-supine  -MS     Supine-Sit [place] (Bed Mobility) independent  -MS     Row Name  05/21/18 1518          Transfer Assessment/Treatment    Transfer Assessment/Treatment sit-stand transfer;stand-sit transfer;toilet transfer  -MS     Sit-Stand [place] (Transfers) contact guard;2 person assist;1 person to manage equipment  -MS     Stand-Sit [place] (Transfers) contact guard;2 person assist;1 person to manage equipment  -MS     [place] Level (Toilet Transfer) minimum assist (75% patient effort);1 person to manage equipment  -MS     Assistive Device (Toilet Transfer) walker, front-wheeled  -MS     Row Name 05/21/18 1518          Sit-Stand Transfer    Assistive Device (Sit-Stand Transfers) walker, front-wheeled  -MS     Row Name 05/21/18 1518          Stand-Sit Transfer    Assistive Device (Stand-Sit Transfers) walker, front-wheeled  -MS     Row Name 05/21/18 1518          Toilet Transfer    Type (Toilet Transfer) sit-stand;stand-sit  -MS     Row Name 05/21/18 1518          Gait/Stairs Assessment/Training    [place] Level (Gait) minimum assist (75% patient effort);1 person to manage equipment  -MS     Assistive Device (Gait) walker, front-wheeled  -MS     Distance in Feet (Gait) 25 feet  -MS     Pattern (Gait) step-to  -MS     Deviations/Abnormal Patterns (Gait) antalgic  -MS     Bilateral Gait Deviations forward flexed posture  -MS     Comment (Gait/Stairs) Unsteady with gait and pt. requires verbal/tactile cues for posture correction, Rwx guidance, and for proper gait sequencing with use of the Rwx.  -MS     Row Name 05/21/18 1518          General ROM    GENERAL ROM COMMENTS BUE/LLE (WFL's)  -MS     Row Name 05/21/18 1518          General Assessment (Manual Muscle Testing)    Comment, General Manual Muscle Testing (MMT) Assessment BUE/LLE (>/= 3/5)  -MS     Row Name 05/21/18 1518          Therapeutic Exercise    Comment (Therapeutic Exercise) Right TKR protocol x 10 reps completed  -MS     Row Name 05/21/18 1518          Pain Assessment    Additional Documentation Pain Scale:  Numbers Pre/Post-Treatment (Group)  -MS     Row Name 05/21/18 1518          Pain Scale: Numbers Pre/Post-Treatment    Pain Scale: Numbers, Pretreatment 3/10  -MS     Pain Scale: Numbers, Post-Treatment 3/10  -MS     Pain Location - Side Right  -MS     Pain Location knee  -MS     Row Name             Wound 05/21/18 1028 Right knee incision    Wound - Properties Group Date first assessed: 05/21/18  -EG Time first assessed: 1028  -EG Side: Right  -EG Location: knee  -EG Type: incision  -EG    Row Name 05/21/18 1518          Physical Therapy Clinical Impression    Date of Referral to PT 05/21/18  -MS     Criteria for Skilled Interventions Met (PT Clinical Impression) treatment indicated  -MS     Rehab Potential (PT Clinical Summary) good, to achieve stated therapy goals  -MS     Row Name 05/21/18 1518          Physical Therapy Goals    Transfer Goal Selection (PT) transfer, PT goal 1  -MS     Gait Training Goal Selection (PT) gait training, PT goal 1  -MS     ROM Goal Selection (PT) ROM, PT goal 1  -MS     Stairs Goal Selection (PT) stairs, PT goal 1  -MS     Additional Documentation ROM Goal Selection (PT) (Row);Stairs Goal Selection (PT) (Row)  -MS     Row Name 05/21/18 1518          Transfer Goal 1 (PT)    Activity/Assistive Device (Transfer Goal 1, PT) transfers, all;walker, rolling  -MS     [place] Level/Cues Needed (Transfer Goal 1, PT) independent  -MS     Time Frame (Transfer Goal 1, PT) long term goal (LTG);2 - 3 days  -MS     Row Name 05/21/18 1518          Gait Training Goal 1 (PT)    Activity/Assistive Device (Gait Training Goal 1, PT) gait (walking locomotion);walker, rolling  -MS     [place] Level (Gait Training Goal 1, PT) independent  -MS     Distance (Gait Goal 1, PT) 100 feet  -MS     Time Frame (Gait Training Goal 1, PT) long term goal (LTG);2 - 3 days  -MS     Row Name 05/21/18 1518          ROM Goal 1 (PT)    ROM Goal 1 (PT) Right knee AROM (5, 85)  -MS     Time Frame (ROM Goal 1, PT) long  term goal (LTG);2 - 3 days  -MS     Row Name 05/21/18 1518          Stairs Goal 1 (PT)    Activity/Assistive Device (Stairs Goal 1, PT) stairs, all skills  -MS     [place] Level/Cues Needed (Stairs Goal 1, PT) contact guard assist  -MS     Number of Stairs (Stairs Goal 1, PT) 1   No Handrails  -MS     Time Frame (Stairs Goal 1, PT) long term goal (LTG);2 - 3 days  -MS     Row Name 05/21/18 1518          Positioning and Restraints    Pre-Treatment Position in bed  -MS     Post Treatment Position chair  -MS     In Chair notified nsg;reclined;sitting;call light within reach;encouraged to call for assist;with family/caregiver   All lines intact. Ice pack to Right knee.  -MS       User Key  (r) = Recorded By, (t) = Taken By, (c) = Cosigned By    Initials Name Provider Type    MS Catrachito Nunes, JOSSELYN Physical Therapist    SHAD August RN Registered Nurse          Physical Therapy Education     Title: PT OT SLP Therapies (Done)     Topic: Physical Therapy (Done)     Point: Mobility training (Done)    Learning Progress Summary     Learner Status Readiness Method Response Comment Documented by    Patient Done Acceptance MANJIT TRUONG NR  MS 05/21/18 1524          Point: Home exercise program (Done)    Learning Progress Summary     Learner Status Readiness Method Response Comment Documented by    Patient Done Acceptance MANJIT TRUONG NR  MS 05/21/18 1524          Point: Body mechanics (Done)    Learning Progress Summary     Learner Status Readiness Method Response Comment Documented by    Patient Done Acceptance MANJIT TRUONG NR  MS 05/21/18 1524          Point: Precautions (Done)    Learning Progress Summary     Learner Status Readiness Method Response Comment Documented by    Patient Done Acceptance MANJIT TRUONG NR  MS 05/21/18 1524                      User Key     Initials Effective Dates Name Provider Type Discipline    MS 04/03/18 -  Catrachito Nunes, JOSSELYN Physical Therapist PT                PT Recommendation and Plan  Anticipated  Discharge Disposition (PT): home with home health care  Planned Therapy Interventions (PT Eval): balance training, bed mobility training, home exercise program, gait training, patient/family education, postural re-education, ROM (range of motion), stair training, strengthening, transfer training  Therapy Frequency (PT Clinical Impression): 2 times/day  Outcome Summary/Treatment Plan (PT)  Anticipated Discharge Disposition (PT): home with home health care  Plan of Care Reviewed With: patient, family  Outcome Summary: Pt. will benefit from skilled inpt. P.T. to address her functional deficits and to assist pt. in regaining her independence with functional mobility.          Outcome Measures     Row Name 05/21/18 1500             How much help from another person do you currently need...    Turning from your back to your side while in flat bed without using bedrails? 4  -MS      Moving from lying on back to sitting on the side of a flat bed without bedrails? 4  -MS      Moving to and from a bed to a chair (including a wheelchair)? 3  -MS      Standing up from a chair using your arms (e.g., wheelchair, bedside chair)? 3  -MS      Climbing 3-5 steps with a railing? 2  -MS      To walk in hospital room? 3  -MS      AM-PAC 6 Clicks Score 19  -MS         Functional Assessment    Outcome Measure Options AM-PAC 6 Clicks Basic Mobility (PT)  -MS        User Key  (r) = Recorded By, (t) = Taken By, (c) = Cosigned By    Initials Name Provider Type    MS Augustew PRISCA Nunes, PT Physical Therapist           Time Calculation:         PT Charges     Row Name 05/21/18 1525             Time Calculation    Start Time 1446  -MS      Stop Time 1508  -MS      Time Calculation (min) 22 min  -MS      PT Received On 05/21/18  -MS      PT - Next Appointment 05/22/18  -MS      PT Goal Re-Cert Due Date 05/24/18  -MS        User Key  (r) = Recorded By, (t) = Taken By, (c) = Cosigned By    Initials Name Provider Type    MS Catrachito Nunes, PT  Physical Therapist          Therapy Charges for Today     Code Description Service Date Service Provider Modifiers Qty    68362993623 HC PT MOBILITY CURRENT 5/21/2018 Catrachito Nunes, PT GP, CI 1    20999586509 HC PT MOBILITY PROJECTED 5/21/2018 Catrachito Nunes, PT GP, CH 1    42633673247 HC PT EVAL LOW COMPLEXITY 1 5/21/2018 Catrachito Nunes, PT GP 1    48677872279 HC PT THER PROC EA 15 MIN 5/21/2018 Catrachito Nunes, PT GP 1    90556220227 HC PT THER SUPP EA 15 MIN 5/21/2018 Catrachito Nunes, PT GP 1          PT G-Codes  Outcome Measure Options: AM-PAC 6 Clicks Basic Mobility (PT)  Functional Limitation: Mobility: Walking and moving around  Mobility: Walking and Moving Around Current Status (): At least 1 percent but less than 20 percent impaired, limited or restricted  Mobility: Walking and Moving Around Goal Status (): 0 percent impaired, limited or restricted      Catrachito Nunes, PT  5/21/2018

## 2018-05-21 NOTE — DISCHARGE PLACEMENT REQUEST
"Eva Clark (83 y.o. Female)     Date of Birth Social Security Number Address Home Phone MRN    [id number]  [id number] [id number] [phone] [id number]    Mormonism Marital Status          Religion        Admission Date Admission Type Admitting Provider Attending Provider Department, Room/Bed    5/21/18 Elective Kiko Solano MD Brown, Reid B, MD [address], P877/1    Discharge Date Discharge Disposition Discharge Destination                       Attending Provider:  Kiko Solano MD    Allergies:  Sulfa Antibiotics    Isolation:  None   Infection:  None   Code Status:  FULL    Ht:  157.5 cm (62\")   Wt:  66.7 kg (147 lb)    Admission Cmt:  None   Principal Problem:  Primary osteoarthritis of right knee [M17.11] More...                 Active Insurance as of 5/21/2018     Primary Coverage     Payor Plan Insurance Group Employer/Plan Group    MEDICARE MEDICARE A & B      Payor Plan Address Payor Plan Phone Number Effective From Effective To    [address] [phone] 5/1/2000     [facility] [id number]       Subscriber Name Subscriber Birth Date Member ID       EVA CLARK 1935 [id number]           Secondary Coverage     Payor Plan Insurance Group Employer/Plan Group    [facility] SUPP [id number]     Payor Plan Address Payor Plan Phone Number Effective From Effective To    [address]  12/1/2016     [facility] [address]       Subscriber Name Subscriber Birth Date Member ID       EVA CLARK 1935 [id number]                 Emergency Contacts      (Rel.) Home Phone Work Phone Mobile Phone    DonAbilio (Son) [phone] -- --    Estelle Recinos (Daughter) [phone] -- --            "

## 2018-05-21 NOTE — H&P (VIEW-ONLY)
Patient: Lanie Ochoa    Date of Admission: 5/21/18    YOB: 1935    Medical Record Number: [id number]    Admitting Physician: Dr. Kiko Solano    Reason for Admission: End Stage Right Knee OA    History of Present Illness: 83 y.o. female presents with severe end stage knee osteoarthritis which has not been responsive to the full compliment of conservative measures. Despite conservative attempts, there is still severe, constant activity limiting pain. Given the severity of the pain, the patient has elected to proceed with knee replacement.    Allergies:   Allergies   Allergen Reactions   • Sulfa Antibiotics Rash         Current Medications:  Scheduled Meds:  PRN Meds:.    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/2015   • Breast cancer    • Cancer     STAGE 1 INVASIVE DUCTAL CARCINOMA ER AND NH POSITIVE, HER-2/LOYDA NEGATIVE   • Cancer     SKIN LEFT ARM; SQUAMOUS CELL   • Constipation    • Degenerative disc disease    • Functional murmur     RESOLVED 03/05/2014   • Gastritis     RESOLVED 03/05/2014   • GERD (gastroesophageal reflux disease)    • Glossalgia 11/5/2015   • Hypertension    • Internal hemorrhoids    • Osteoarthritis 04/08/2014    HAND   • PNA (pneumonia) 11/5/2015   • Trigger ring finger 03/05/2014    RESOLVED 08/22/2014       PF/Surg/Soc Hx:     Past Surgical History:   Procedure Laterality Date   • BREAST BIOPSY  2009   • BREAST LUMPECTOMY  07/15/2009    LUMPECTOMY   • COLONOSCOPY  2005   • DILATATION AND CURETTAGE      RESOLVED 06/15/2010   • EYE SURGERY Bilateral 10/15/2012    CATARACT EXTRACTION   • HEMORRHOIDECTOMY     • JOINT REPLACEMENT Left 2010    KNEE   • KNEE ARTHROSCOPY Right 6/13/2017    Procedure: KNEE ARTHROSCOPY WITH PARTIAL MEDIAL AND LATERAL MENISECTOMY AND DEBRIDEMENT OF ARTHRITIS.;  Surgeon: Lauren Krishna MD;  [facility];  Service:         Social History     Occupational History   • retired,  "retail      Social History Main Topics   • Smoking status: Never Smoker   • Smokeless tobacco: Never Used   • Alcohol use No   • Drug use: No   • Sexual activity: Defer      Social History     Social History Narrative   • No narrative on file        Family History   Problem Relation Age of Onset   • COPD Mother    • COPD Father    • Malig Hyperthermia Neg Hx          Review of Systems:   A 14 point review of systems was performed, pertinent positives discussed above, all other systems are negative    Physical Exam: 83 y.o. female  Vital Signs :   Vitals:    05/17/18 1357   BP: 120/80   BP Location: Left arm   Patient Position: Sitting   Cuff Size: Adult   Temp: 98 °F (36.7 °C)   TempSrc: Temporal Artery    Weight: 68.3 kg (150 lb 9.6 oz)   Height: 153.7 cm (60.5\")     General: Alert and Oriented x 3, No acute distress.  Psych: mood and affect appropriate; recent and remote memory intact  Eyes: conjunctiva clear; pupils equally round and reactive, sclera nonicteric  CV: no peripheral edema  Resp: normal respiratory effort  Skin: no rashes or wounds; normal turgor  Musculosketetal; pain and crepitance with knee range of motion  Vascular: palpable distal pulses    Xrays:  -3 views (AP, lateral, and sunrise) were reviewed demonstrating end-stage OA with bone on bone articulation.    Assessment:  End-stage Right knee osteoarthritis. Conservative measures have failed.      Plan:  The plan is to proceed with Right Total Knee Replacement. The patient voiced understanding of the risks, benefits, and alternative forms of treatment that were discussed with Dr Solano at the time of scheduling. Patient is planning on going home with home health next day    Sada Sinclair, APRN  5/17/2018        "

## 2018-05-21 NOTE — ANESTHESIA PROCEDURE NOTES
Peripheral Block    Patient location during procedure: holding area  Start time: 5/21/2018 8:35 AM  Stop time: 5/21/2018 8:45 AM  Reason for block: at surgeon's request and post-op pain management  Performed by  Anesthesiologist: CAYETANO HOUGH  Preanesthetic Checklist  Completed: patient identified, site marked, surgical consent, pre-op evaluation, timeout performed, IV checked, risks and benefits discussed and monitors and equipment checked  Prep:  Sterile barriers:cap, gloves, gown, mask and sterile barriers  Prep: ChloraPrep  Patient monitoring: blood pressure monitoring, continuous pulse oximetry and EKG  Procedure  Sedation:yes    Guidance:ultrasound guided  ULTRASOUND INTERPRETATION. Using ultrasound guidance a 21 G gauge needle was placed in close proximity to the nerve, at which point, under ultrasound guidance anesthetic was injected in the area of the nerve and spread of the anesthesia was seen on ultrasound in close proximity thereto.  There were no abnormalities seen on ultrasound; a digital image was taken; and the patient tolerated the procedure with no complications. Images:still images obtained    Laterality:right  Block Type:adductor canal block  Injection Technique:single-shot  Needle Type:echogenic  Needle Gauge:21 G    Medications  Local Injected:ropivacaine 0.2% and 2% Mepivacaine  Post Assessment  Injection Assessment: negative aspiration for heme, no paresthesia on injection and incremental injection  Patient Tolerance:comfortable throughout block  Complications:no

## 2018-05-21 NOTE — ANESTHESIA PREPROCEDURE EVALUATION
Anesthesia Evaluation                  Airway   Mallampati: II  Dental    (+) upper dentures    Pulmonary    (+) pneumonia ,   Cardiovascular     ECG reviewed  Rhythm: regular  Rate: normal    (+) hypertension, valvular problems/murmurs, hyperlipidemia,       Neuro/Psych  (+) psychiatric history Anxiety,     GI/Hepatic/Renal/Endo    (+)  GERD,      Musculoskeletal     Abdominal    Substance History      OB/GYN          Other   (+) arthritis   history of cancer                    Anesthesia Plan    ASA 3     general and regional   (Right ACBx  Full upper dentures)  intravenous induction   Anesthetic plan and risks discussed with patient.

## 2018-05-21 NOTE — ANESTHESIA PROCEDURE NOTES
Airway  Urgency: elective    Date/Time: 5/21/2018 9:57 AM  Airway not difficult    General Information and Staff    Patient location during procedure: OR  Anesthesiologist: CAYETANO HOUGH  CRNA: NILDA ALVES    Indications and Patient Condition  Indications for airway management: airway protection    Preoxygenated: yes  Mask difficulty assessment: 2 - vent by mask + OA or adjuvant +/- NMBA    Final Airway Details  Final airway type: endotracheal airway      Successful airway: ETT  Cuffed: yes   Successful intubation technique: direct laryngoscopy  Facilitating devices/methods: intubating stylet and cricoid pressure  Endotracheal tube insertion site: oral  Blade: Hinton  Blade size: #2  ETT size: 7.0 mm  Cormack-Lehane Classification: grade IIb - view of arytenoids or posterior of glottis only  Placement verified by: chest auscultation and capnometry   Measured from: gums  ETT to gums (cm): 22  Number of attempts at approach: 1    Additional Comments  Atraumatic. No dental damage noted.

## 2018-05-21 NOTE — ANESTHESIA POSTPROCEDURE EVALUATION
Patient: Lanie Ochoa    Procedure Summary     Date:  05/21/18 Room / Location:  [facility] OR  / [facility] MAIN OR    Anesthesia Start:  0945 Anesthesia Stop:  1136    Procedure:  RIGHT TOTAL KNEE ARTHROPLASTY (Right Knee) Diagnosis:       Primary osteoarthritis of right knee      (Primary osteoarthritis of right knee [M17.11])    Surgeon:  Kiko Solano MD Provider:  Jose Daniel May MD    Anesthesia Type:  general, regional ASA Status:  3          Anesthesia Type: general, regional  Last vitals  BP   160/74 (05/21/18 1250)   Temp   36.9 °C (98.4 °F) (05/21/18 1134)   Pulse   63 (05/21/18 1250)   Resp   16 (05/21/18 1250)     SpO2   99 % (05/21/18 1250)     Post Anesthesia Care and Evaluation    Patient location during evaluation: bedside  Patient participation: complete - patient participated  Level of consciousness: awake and alert  Pain management: adequate  Airway patency: patent  Anesthetic complications: No anesthetic complications  PONV Status: controlled  Cardiovascular status: acceptable  Respiratory status: acceptable  Hydration status: acceptable

## 2018-05-21 NOTE — PLAN OF CARE
Problem: Patient Care Overview  Goal: Plan of Care Review   05/21/18 1800   OTHER   Outcome Summary Ambulated with PT, Up in chair for majority of shift, Voiding without difficulty. Lortab given with good pain control. No other complaints, will continue to monitor CCRN 5/21/18 @ 1800

## 2018-05-21 NOTE — PLAN OF CARE
Problem: Patient Care Overview  Goal: Plan of Care Review   05/21/18 1524   Coping/Psychosocial   Plan of Care Reviewed With patient;family   OTHER   Outcome Summary Pt. will benefit from skilled inpt. P.T. to address her functional deficits and to assist pt. in regaining her independence with functional mobility.

## 2018-05-22 VITALS
TEMPERATURE: 97.8 F | WEIGHT: 147 LBS | BODY MASS INDEX: 27.05 KG/M2 | RESPIRATION RATE: 16 BRPM | HEART RATE: 67 BPM | OXYGEN SATURATION: 98 % | HEIGHT: 62 IN | SYSTOLIC BLOOD PRESSURE: 124 MMHG | DIASTOLIC BLOOD PRESSURE: 60 MMHG

## 2018-05-22 LAB
HCT VFR BLD AUTO: 39.9 % (ref 35.6–45.5)
HGB BLD-MCNC: 12.6 G/DL (ref 11.9–15.5)

## 2018-05-22 PROCEDURE — 63710000001 DOCUSATE SODIUM 100 MG CAPSULE: Performed by: NURSE PRACTITIONER

## 2018-05-22 PROCEDURE — A9270 NON-COVERED ITEM OR SERVICE: HCPCS | Performed by: NURSE PRACTITIONER

## 2018-05-22 PROCEDURE — 25010000003 CEFAZOLIN IN DEXTROSE 2-4 GM/100ML-% SOLUTION: Performed by: NURSE PRACTITIONER

## 2018-05-22 PROCEDURE — 97150 GROUP THERAPEUTIC PROCEDURES: CPT

## 2018-05-22 PROCEDURE — 85018 HEMOGLOBIN: CPT | Performed by: NURSE PRACTITIONER

## 2018-05-22 PROCEDURE — 63710000001 HYDROCODONE-ACETAMINOPHEN 7.5-325 MG TABLET: Performed by: NURSE PRACTITIONER

## 2018-05-22 PROCEDURE — 63710000001 LOSARTAN 100 MG TABLET: Performed by: NURSE PRACTITIONER

## 2018-05-22 PROCEDURE — 63710000001 NEBIVOLOL 10 MG TABLET: Performed by: NURSE PRACTITIONER

## 2018-05-22 PROCEDURE — 97110 THERAPEUTIC EXERCISES: CPT

## 2018-05-22 PROCEDURE — G0378 HOSPITAL OBSERVATION PER HR: HCPCS

## 2018-05-22 PROCEDURE — 63710000001 POLYETHYLENE GLYCOL PACK: Performed by: NURSE PRACTITIONER

## 2018-05-22 PROCEDURE — 63710000001 MUPIROCIN 2 % OINTMENT 1 G TUBE: Performed by: NURSE PRACTITIONER

## 2018-05-22 PROCEDURE — 63710000001 ASPIRIN EC 325 MG TABLET DELAYED-RELEASE: Performed by: NURSE PRACTITIONER

## 2018-05-22 PROCEDURE — 63710000001 PANTOPRAZOLE 40 MG TABLET DELAYED-RELEASE: Performed by: NURSE PRACTITIONER

## 2018-05-22 PROCEDURE — 85014 HEMATOCRIT: CPT | Performed by: NURSE PRACTITIONER

## 2018-05-22 RX ORDER — PSEUDOEPHEDRINE HCL 30 MG
100 TABLET ORAL 2 TIMES DAILY
Qty: 27 CAPSULE | Refills: 0 | Status: SHIPPED | OUTPATIENT
Start: 2018-05-22 | End: 2018-06-05

## 2018-05-22 RX ORDER — ONDANSETRON 4 MG/1
4 TABLET, FILM COATED ORAL EVERY 6 HOURS PRN
Qty: 10 TABLET | Refills: 0 | Status: SHIPPED | OUTPATIENT
Start: 2018-05-22 | End: 2018-05-23 | Stop reason: SDUPTHER

## 2018-05-22 RX ORDER — PANTOPRAZOLE SODIUM 40 MG/1
40 TABLET, DELAYED RELEASE ORAL EVERY MORNING
Qty: 14 TABLET | Refills: 0 | Status: SHIPPED | OUTPATIENT
Start: 2018-05-23 | End: 2018-06-06

## 2018-05-22 RX ORDER — HYDROCODONE BITARTRATE AND ACETAMINOPHEN 7.5; 325 MG/1; MG/1
TABLET ORAL
Qty: 84 TABLET | Refills: 0 | Status: SHIPPED | OUTPATIENT
Start: 2018-05-22 | End: 2018-07-11

## 2018-05-22 RX ADMIN — LOSARTAN POTASSIUM 100 MG: 100 TABLET, FILM COATED ORAL at 08:04

## 2018-05-22 RX ADMIN — DOCUSATE SODIUM 100 MG: 100 CAPSULE, LIQUID FILLED ORAL at 08:04

## 2018-05-22 RX ADMIN — PANTOPRAZOLE SODIUM 40 MG: 40 TABLET, DELAYED RELEASE ORAL at 06:30

## 2018-05-22 RX ADMIN — ASPIRIN 325 MG: 325 TABLET, DELAYED RELEASE ORAL at 08:03

## 2018-05-22 RX ADMIN — CEFAZOLIN SODIUM 2 G: 2 INJECTION, SOLUTION INTRAVENOUS at 01:05

## 2018-05-22 RX ADMIN — HYDROCODONE BITARTRATE AND ACETAMINOPHEN 1 TABLET: 7.5; 325 TABLET ORAL at 06:30

## 2018-05-22 RX ADMIN — MUPIROCIN: 20 OINTMENT TOPICAL at 08:04

## 2018-05-22 RX ADMIN — HYDROCODONE BITARTRATE AND ACETAMINOPHEN 1 TABLET: 7.5; 325 TABLET ORAL at 11:34

## 2018-05-22 RX ADMIN — NEBIVOLOL HYDROCHLORIDE 10 MG: 10 TABLET ORAL at 08:03

## 2018-05-22 RX ADMIN — POLYETHYLENE GLYCOL 3350 17 G: 17 POWDER, FOR SOLUTION ORAL at 08:04

## 2018-05-22 NOTE — PLAN OF CARE
Problem: Patient Care Overview  Goal: Plan of Care Review  Outcome: Ongoing (interventions implemented as appropriate)   05/22/18 0256   Coping/Psychosocial   Plan of Care Reviewed With patient   OTHER   Outcome Summary pain under control with pain meds. vitals stable. ambulates with assistx1. dressing dry and intact. voiding without difficulty. Educated patient on importance of monitoring blood pressure given h/o hypertension, verbalises understanding. Encouraged use of incentive spirometer. Will continue to monitor the patient.   Plan of Care Review   Progress improving     Goal: Individualization and Mutuality  Outcome: Ongoing (interventions implemented as appropriate)    Goal: Discharge Needs Assessment  Outcome: Ongoing (interventions implemented as appropriate)    Goal: Interprofessional Rounds/Family Conf  Outcome: Ongoing (interventions implemented as appropriate)      Problem: Pain, Acute (Adult)  Goal: Identify Related Risk Factors and Signs and Symptoms  Outcome: Ongoing (interventions implemented as appropriate)    Goal: Acceptable Pain Control/Comfort Level  Outcome: Ongoing (interventions implemented as appropriate)      Problem: Fall Risk (Adult)  Goal: Identify Related Risk Factors and Signs and Symptoms  Outcome: Outcome(s) achieved Date Met: 05/22/18    Goal: Absence of Fall  Outcome: Ongoing (interventions implemented as appropriate)      Problem: Knee Arthroplasty (Total, Partial) (Adult)  Goal: Signs and Symptoms of Listed Potential Problems Will be Absent, Minimized or Managed (Knee Arthroplasty)  Outcome: Ongoing (interventions implemented as appropriate)

## 2018-05-22 NOTE — DISCHARGE SUMMARY
Patient Name: Lanie Ochoa  Patient YOB: 1935    Date of Admission:  5/21/2018  Date of Discharge:  5/22/2018  Discharge Diagnosis: RIGHT TOTAL KNEE ARTHROPLASTY  Presenting Problem/History of Present Illness: Primary osteoarthritis of right knee [M17.11]  OA (osteoarthritis) of knee [M17.10]  Admitting Physician: Dr Kiko Solano  Consults:   Consults     No orders found for last 30 day(s).          DETAILS OF HOSPITAL STAY:  Patient is a 83 y.o. female was admitted to the floor following the above procedure and underwent an uncomplicated hospital stay.  Patient did well with physical therapy and was ambulating well without problems.  On the day of discharge the wound was clean, dry and intact and calf was soft and non tender and Homans sign was negative.  Patient was tolerating  without problems.  Patient will be discharged home.    Condition on Discharge:  Stable    Vital Signs  Temp:  [96.5 °F (35.8 °C)-98.4 °F (36.9 °C)] 96.8 °F (36 °C)  Heart Rate:  [52-88] 68  Resp:  [14-24] 18  BP: (106-169)/(56-81) 106/56    LABS:      Admission on 05/21/2018   Component Date Value Ref Range Status   • Hemoglobin 05/22/2018 12.6  11.9 - 15.5 g/dL Final   • Hematocrit 05/22/2018 39.9  35.6 - 45.5 % Final       No results found.    Discharge Medications   Lanie Ochoa   Home Medication Instructions ROMULO:[id number]    Printed on:05/22/18 0702   Medication Information                      aspirin  MG EC tablet  Take 1 tablet by mouth Every 12 (Twelve) Hours for 60 doses.             CALCIUM PO  Take 1 tablet by mouth Daily.             cetirizine (zyrTEC) 10 MG tablet  Take 10 mg by mouth Daily.             Cholecalciferol (VITAMIN D3) 400 UNITS capsule  Take 1 tablet by mouth Daily.             diltiaZEM (TIAZAC) 240 MG 24 hr capsule  TAKE 1 CAPSULE BY MOUTH ONE TIME A DAY              diltiaZEM (TIAZAC) 240 MG 24 hr capsule  Take 240 mg by mouth Every Evening.             docusate sodium 100 MG  capsule  Take 100 mg by mouth 2 (Two) Times a Day for 27 doses.             fluticasone (FLONASE) 50 MCG/ACT nasal spray  2 sprays into each nostril Daily.             HYDROcodone-acetaminophen (NORCO) 7.5-325 MG per tablet  1-2 po q4-6 hr prn pain             losartan (COZAAR) 100 MG tablet  Take 1 tablet by mouth Daily.             montelukast (SINGULAIR) 10 MG tablet  Take 1 tablet by mouth Every Night.             nebivolol (BYSTOLIC) 10 MG tablet  Take 1 tablet by mouth Every Morning.             ondansetron (ZOFRAN) 4 MG tablet  Take 1 tablet by mouth Every 6 (Six) Hours As Needed for Nausea or Vomiting for up to 10 doses.             pantoprazole (PROTONIX) 40 MG EC tablet  Take 1 tablet by mouth Every Morning for 14 days.             polyethylene glycol (MIRALAX) pack packet  Take 17 g by mouth 2 (Two) Times a Day for 13 doses.             sertraline (ZOLOFT) 25 MG tablet  Take 1 tablet by mouth Daily.                 Activity at Discharge:     Discharge Instructions: Patient is to continue with physical therapy exercises daily and continue working with the physical therapist as ordered. Patient may weight bear as tolerated. Apply ice regularly. Patient may ice for long periods of time as long as ice is not directly on the skin. Patient instructed on frequent calf pumping exercises.  Patient also instructed on incentive spirometer during hospitalization and encouraged to continue to use at home regularly.    *The dressing should be left in place until the suction unit stops functioning (typically 7 days.  If waterproof dressing is intact the patient may shower immediately following discharge. If the dressing becomes disloged or saturated it should be changed. Please refer to the RAJI information sheet if you have any questions about the dressing.  If you have a home health nurse or therapist they can be contacted to assist with dressing change or repair. You may also call the [facility] dressing hotline for  questions related to the dressing ([phone]). If there still other problems or questions related to the dressing despite these measures then you can contact Madeline at our office [phone].  After 1 week when the RAJI dressing is removed, the wound should be gently cleaned with antibacterial soap then allowed to dry and covered with a dry sterile dressing. The wound should be covered at all times except while showering.  Patient may change dressings daily and prn using sterile 4x4 and paper tape, and should call if any unusual drainage, redness or swelling.*  Follow up appointment in 2 weeks - patient to call the office at [phone] to schedule.  Patient will be discharged on aspirin 325mg BID x 2weeks, then daily x 4weeks    Discharge Diagnosis:    Patient Active Problem List   Diagnosis   • Anxiety   • HLD (hyperlipidemia)   • Benign essential hypertension   • Osteopenia   • Avitaminosis D   • History of left knee replacement   • Titubation   • Non-rheumatic mitral regurgitation   • Non-rheumatic tricuspid valve insufficiency   • Chronic pain of right knee   • Venous incompetence   • Dysfunction of both eustachian tubes   • Primary localized osteoarthrosis of right lower leg   • Primary osteoarthritis of right knee   • OA (osteoarthritis) of knee       Follow-up Appointments  Future Appointments  Date Time Provider Department Center   6/7/2018 3:00 PM MD CARL Pino [facility] None   6/12/2018 11:15 AM [facility]   7/11/2018 2:00 PM MD CARL Rodrig[facility] None            Kiko Solano MD  05/22/18  7:02 AM

## 2018-05-22 NOTE — PROGRESS NOTES
Case Management Discharge Note    Final Note: D/C home w/ NUBIA MARIE, Freda/NUBIA notified.     Destination     No service coordination in this encounter.      Durable Medical Equipment     No service coordination in this encounter.      Dialysis/Infusion     No service coordination in this encounter.      Home Medical Care - Selection Complete     Service Request Status Selected Specialties Address Phone Number Fax Number    [facility] Selected Home Health Services [address] [place] [address] [phone]        Shiar Rm RN 5/21/2018 1705    5/21/2018  Freda notified.                  Social Care     No service coordination in this encounter.             Final Discharge Disposition Code: 06 - home with home health care

## 2018-05-22 NOTE — DISCHARGE SUMMARY
Patient Name: Lanie Ochoa  Patient YOB: 1935    Date of Admission:  5/21/2018  Date of Discharge:  5/22/2018  Discharge Diagnosis: RIGHT TOTAL KNEE ARTHROPLASTY  Presenting Problem/History of Present Illness: Primary osteoarthritis of right knee [M17.11]  OA (osteoarthritis) of knee [M17.10]  Admitting Physician: Dr Kiko Solano  Consults:   Consults     No orders found for last 30 day(s).          DETAILS OF HOSPITAL STAY:  Patient is a 83 y.o. female was admitted to the floor following the above procedure and underwent an uncomplicated hospital stay.  Patient did well with physical therapy and was ambulating well without problems.  On the day of discharge the wound was clean, dry and intact and calf was soft and non tender and Homans sign was negative.  Patient was tolerating  without problems.  Patient will be discharged home.    Condition on Discharge:  Stable    Vital Signs  Temp:  [96.5 °F (35.8 °C)-98.4 °F (36.9 °C)] 96.8 °F (36 °C)  Heart Rate:  [52-88] 68  Resp:  [14-24] 18  BP: (106-169)/(56-81) 106/56    LABS:      Admission on 05/21/2018   Component Date Value Ref Range Status   • Hemoglobin 05/22/2018 12.6  11.9 - 15.5 g/dL Final   • Hematocrit 05/22/2018 39.9  35.6 - 45.5 % Final       No results found.    Discharge Medications   Lanie Ochoa   Home Medication Instructions ROMULO:[id number]    Printed on:05/22/18 0702   Medication Information                      aspirin  MG EC tablet  Take 1 tablet by mouth Every 12 (Twelve) Hours for 60 doses.             CALCIUM PO  Take 1 tablet by mouth Daily.             cetirizine (zyrTEC) 10 MG tablet  Take 10 mg by mouth Daily.             Cholecalciferol (VITAMIN D3) 400 UNITS capsule  Take 1 tablet by mouth Daily.             diltiaZEM (TIAZAC) 240 MG 24 hr capsule  TAKE 1 CAPSULE BY MOUTH ONE TIME A DAY              diltiaZEM (TIAZAC) 240 MG 24 hr capsule  Take 240 mg by mouth Every Evening.             docusate sodium 100 MG  capsule  Take 100 mg by mouth 2 (Two) Times a Day for 27 doses.             fluticasone (FLONASE) 50 MCG/ACT nasal spray  2 sprays into each nostril Daily.             HYDROcodone-acetaminophen (NORCO) 7.5-325 MG per tablet  1-2 po q4-6 hr prn pain             losartan (COZAAR) 100 MG tablet  Take 1 tablet by mouth Daily.             montelukast (SINGULAIR) 10 MG tablet  Take 1 tablet by mouth Every Night.             nebivolol (BYSTOLIC) 10 MG tablet  Take 1 tablet by mouth Every Morning.             ondansetron (ZOFRAN) 4 MG tablet  Take 1 tablet by mouth Every 6 (Six) Hours As Needed for Nausea or Vomiting for up to 10 doses.             pantoprazole (PROTONIX) 40 MG EC tablet  Take 1 tablet by mouth Every Morning for 14 days.             polyethylene glycol (MIRALAX) pack packet  Take 17 g by mouth 2 (Two) Times a Day for 13 doses.             sertraline (ZOLOFT) 25 MG tablet  Take 1 tablet by mouth Daily.                 Activity at Discharge:     Discharge Instructions: Patient is to continue with physical therapy exercises daily and continue working with the physical therapist as ordered. Patient may weight bear as tolerated. Apply ice regularly. Patient may ice for long periods of time as long as ice is not directly on the skin. Patient instructed on frequent calf pumping exercises.  Patient also instructed on incentive spirometer during hospitalization and encouraged to continue to use at home regularly.    *The dressing should be left in place until the suction unit stops functioning (typically 7 days.  If waterproof dressing is intact the patient may shower immediately following discharge. If the dressing becomes disloged or saturated it should be changed. Please refer to the RAJI information sheet if you have any questions about the dressing.  If you have a home health nurse or therapist they can be contacted to assist with dressing change or repair. You may also call the [facility] dressing hotline for  questions related to the dressing ([phone]). If there still other problems or questions related to the dressing despite these measures then you can contact Madeline at our office [phone].  After 1 week when the RAJI dressing is removed, the wound should be gently cleaned with antibacterial soap then allowed to dry and covered with a dry sterile dressing. The wound should be covered at all times except while showering.  Patient may change dressings daily and prn using sterile 4x4 and paper tape, and should call if any unusual drainage, redness or swelling.*  Follow up appointment in 2 weeks - patient to call the office at [phone] to schedule.  Patient will be discharged on aspirin 325mg BID x 2weeks, then daily x 4weeks    Discharge Diagnosis:    Patient Active Problem List   Diagnosis   • Anxiety   • HLD (hyperlipidemia)   • Benign essential hypertension   • Osteopenia   • Avitaminosis D   • History of left knee replacement   • Titubation   • Non-rheumatic mitral regurgitation   • Non-rheumatic tricuspid valve insufficiency   • Chronic pain of right knee   • Venous incompetence   • Dysfunction of both eustachian tubes   • Primary localized osteoarthrosis of right lower leg   • Primary osteoarthritis of right knee   • OA (osteoarthritis) of knee       Follow-up Appointments  Future Appointments  Date Time Provider Department Center   6/7/2018 3:00 PM MD CARL Pino [facility] None   6/12/2018 11:15 AM [facility]   7/11/2018 2:00 PM MD [facility]L Rodrig[facility] None            Kiko Solano MD  05/22/18  7:02 AM

## 2018-05-24 RX ORDER — ONDANSETRON 4 MG/1
TABLET, FILM COATED ORAL
Qty: 10 TABLET | Refills: 0 | Status: SHIPPED | OUTPATIENT
Start: 2018-05-24 | End: 2018-05-27 | Stop reason: SDUPTHER

## 2018-05-24 NOTE — THERAPY TREATMENT NOTE
Acute Care - Physical Therapy Treatment Note   [facility]     Patient Name: Lanie Ochoa  : 1935  MRN: [id number]  Today's Date: 2018  Onset of Illness/Injury or Date of Surgery: 18  Date of Referral to PT: 18  Referring Physician: Kiko Dexter    Admit Date: 2018    Visit Dx:    ICD-10-CM ICD-9-CM   1. Difficulty walking R26.2 719.7   2. Primary osteoarthritis of right knee M17.11 715.16     Patient Active Problem List   Diagnosis   • Anxiety   • HLD (hyperlipidemia)   • Benign essential hypertension   • Osteopenia   • Avitaminosis D   • History of left knee replacement   • Titubation   • Non-rheumatic mitral regurgitation   • Non-rheumatic tricuspid valve insufficiency   • Chronic pain of right knee   • Venous incompetence   • Dysfunction of both eustachian tubes   • Primary localized osteoarthrosis of right lower leg   • Primary osteoarthritis of right knee   • OA (osteoarthritis) of knee       Therapy Treatment          Rehabilitation Treatment Summary     Row Name                Wound 18 1028 Right knee incision    Wound - Properties Group Date first assessed: 18 [EG] Time first assessed: 1028 [EG] Side: Right [EG] Location: knee [EG] Type: incision [EG] Recorded by:  [EG] Jackeline August RN 18 1028      User Key  (r) = Recorded By, (t) = Taken By, (c) = Cosigned By    Initials Name Effective Dates Discipline    EG Jackeline August RN 07/10/17 -  Nurse                     Physical Therapy Education     Title: PT OT SLP Therapies (Resolved)     Topic: Physical Therapy (Resolved)     Point: Mobility training (Resolved)    Learning Progress Summary     Learner Status Readiness Method Response Comment Documented by    Patient Done Acceptance MANJIT TRUONG NR  MS 18 1524          Point: Home exercise program (Resolved)    Learning Progress Summary     Learner Status Readiness Method Response Comment Documented by    Patient Done Acceptance MANJIT TRUONG NR  MS  05/21/18 1524          Point: Body mechanics (Resolved)    Learning Progress Summary     Learner Status Readiness Method Response Comment Documented by    Patient Done Acceptance MANJIT TRUONG NR  MS 05/21/18 1524          Point: Precautions (Resolved)    Learning Progress Summary     Learner Status Readiness Method Response Comment Documented by    Patient Done Acceptance MANJIT TRUONG NR  MS 05/21/18 1524                      User Key     Initials Effective Dates Name Provider Type Discipline    MS 04/03/18 -  Catrachito Nunes PT Physical Therapist PT                    PT Recommendation and Plan                Outcome Measures     Row Name 05/21/18 1500             How much help from another person do you currently need...    Turning from your back to your side while in flat bed without using bedrails? 4  -MS      Moving from lying on back to sitting on the side of a flat bed without bedrails? 4  -MS      Moving to and from a bed to a chair (including a wheelchair)? 3  -MS      Standing up from a chair using your arms (e.g., wheelchair, bedside chair)? 3  -MS      Climbing 3-5 steps with a railing? 2  -MS      To walk in hospital room? 3  -MS      AM-PAC 6 Clicks Score 19  -MS         Functional Assessment    Outcome Measure Options AM-PAC 6 Clicks Basic Mobility (PT)  -MS        User Key  (r) = Recorded By, (t) = Taken By, (c) = Cosigned By    Initials Name Provider Type    MS Catrachito COPE JOSSELYN Nunes Physical Therapist           Time Calculation:       Therapy Charges for Today     Code Description Service Date Service Provider Modifiers Qty    00329276890 HC PT THER PROC EA 15 MIN 5/22/2018 Love Hinton PTA GP 1    19385609594 HC PT THER PROC GROUP 5/22/2018 Love Hinton PTA GP 1          PT G-Codes  Outcome Measure Options: AM-PAC 6 Clicks Basic Mobility (PT)  Functional Limitation: Mobility: Walking and moving around  Mobility: Walking and Moving Around Current Status (): At least 1 percent but less than 20  percent impaired, limited or restricted  Mobility: Walking and Moving Around Goal Status (): 0 percent impaired, limited or restricted    Love Hinton, PTA  5/23/2018

## 2018-05-29 RX ORDER — ONDANSETRON 4 MG/1
TABLET, FILM COATED ORAL
Qty: 10 TABLET | Refills: 0 | Status: SHIPPED | OUTPATIENT
Start: 2018-05-29 | End: 2018-07-11

## 2018-06-01 ENCOUNTER — TELEPHONE (OUTPATIENT)
Dept: ORTHOPEDIC SURGERY | Facility: CLINIC | Age: 83
End: 2018-06-01

## 2018-06-07 ENCOUNTER — OFFICE VISIT (OUTPATIENT)
Dept: ORTHOPEDIC SURGERY | Facility: CLINIC | Age: 83
End: 2018-06-07

## 2018-06-07 VITALS — HEIGHT: 62 IN | WEIGHT: 148.1 LBS | TEMPERATURE: 98.8 F | BODY MASS INDEX: 27.25 KG/M2

## 2018-06-07 DIAGNOSIS — Z96.651 HISTORY OF TOTAL RIGHT KNEE REPLACEMENT: Primary | ICD-10-CM

## 2018-06-07 PROCEDURE — 99024 POSTOP FOLLOW-UP VISIT: CPT | Performed by: ORTHOPAEDIC SURGERY

## 2018-06-07 NOTE — PROGRESS NOTES
Lanie Ochoa : 1935 MRN: [id number] DATE: 2018    DIAGNOSIS: 2 week follow up right total knee      SUBJECTIVE:Patient returns today for 2 week follow up of right total knee replacement. Patient reports doing well with no unusual complaints. Appears to be progressing appropriately.    OBJECTIVE:   Exam:. The incision is healing appropriately. No sign of infection. Range of motion is progressing as expected. The calf is soft and nontender with a negative Homans sign.    ASSESSMENT: 2 week status post right knee replacement.    PLAN: 1) Staples removed and steri strips applied   2) Order given for PT   3) Discontinue FRED hose   4) Continue ice PRN   5) aspirin 325 mg orally every day for 1 month   6) Follow up in 6 weeks with repeat Xrays of right knee (3views)    Kiko Solano MD  2018

## 2018-06-11 ENCOUNTER — HOSPITAL ENCOUNTER (OUTPATIENT)
Dept: PHYSICAL THERAPY | Facility: HOSPITAL | Age: 83
Setting detail: THERAPIES SERIES
Discharge: HOME | End: 2018-06-11
Attending: ORTHOPAEDIC SURGERY

## 2018-06-11 DIAGNOSIS — Z96.651 S/P TKR (TOTAL KNEE REPLACEMENT), RIGHT: ICD-10-CM

## 2018-06-11 DIAGNOSIS — M25.561 ACUTE PAIN OF RIGHT KNEE: Primary | ICD-10-CM

## 2018-06-11 PROCEDURE — G8979 MOBILITY GOAL STATUS: HCPCS | Performed by: PHYSICAL THERAPIST

## 2018-06-11 PROCEDURE — 97161 PT EVAL LOW COMPLEX 20 MIN: CPT | Performed by: PHYSICAL THERAPIST

## 2018-06-11 PROCEDURE — 97110 THERAPEUTIC EXERCISES: CPT | Performed by: PHYSICAL THERAPIST

## 2018-06-11 PROCEDURE — G8978 MOBILITY CURRENT STATUS: HCPCS | Performed by: PHYSICAL THERAPIST

## 2018-06-11 NOTE — THERAPY EVALUATION
Outpatient Physical Therapy Ortho Initial Evaluation  [facility]     Patient Name: Lanie Ochoa  : 1935  MRN: [id number]  Today's Date: 2018      Visit Date: 2018    Patient Active Problem List   Diagnosis   • Anxiety   • HLD (hyperlipidemia)   • Benign essential hypertension   • Osteopenia   • Avitaminosis D   • History of left knee replacement   • Titubation   • Non-rheumatic mitral regurgitation   • Non-rheumatic tricuspid valve insufficiency   • Chronic pain of right knee   • Venous incompetence   • Dysfunction of both eustachian tubes   • Primary localized osteoarthrosis of right lower leg   • Primary osteoarthritis of right knee   • OA (osteoarthritis) of knee        Past Medical History:   Diagnosis Date   • Anxiety    • BMS (burning mouth syndrome) 2015   • Breast cancer    • Cancer     STAGE 1 INVASIVE DUCTAL CARCINOMA ER AND AL POSITIVE, HER-2/LOYDA NEGATIVE   • Cancer     SKIN LEFT ARM; SQUAMOUS CELL   • Constipation    • Degenerative disc disease    • Functional murmur     RESOLVED 2014   • Gastritis     RESOLVED 2014   • GERD (gastroesophageal reflux disease)    • Glossalgia 2015   • Hypertension    • Internal hemorrhoids    • Osteoarthritis 2014    HAND   • PNA (pneumonia) 2015   • Trigger ring finger 2014    RESOLVED 2014        Past Surgical History:   Procedure Laterality Date   • BREAST BIOPSY     • BREAST LUMPECTOMY  07/15/2009    LUMPECTOMY   • COLONOSCOPY     • DILATATION AND CURETTAGE      RESOLVED 06/15/2010   • EYE SURGERY Bilateral 10/15/2012    CATARACT EXTRACTION   • HEMORRHOIDECTOMY     • JOINT REPLACEMENT Left     KNEE   • KNEE ARTHROSCOPY Right 2017    Procedure: KNEE ARTHROSCOPY WITH PARTIAL MEDIAL AND LATERAL MENISECTOMY AND DEBRIDEMENT OF ARTHRITIS.;  Surgeon: Lauren Krishna MD;  [place] [facility] [place] [facility] [place]  Service:    • TOTAL KNEE ARTHROPLASTY Right 2018    Procedure: RIGHT TOTAL KNEE  ARTHROPLASTY;  Surgeon: Kiko Solano MD;  Location: [facility];  Service: Orthopedics       Visit Dx:     ICD-10-CM ICD-9-CM   1. S/P right knee arthroscopy Z98.890 V45.89   2. Acute pain of right knee M25.561 719.46             Patient History     Row Name 06/11/18 1000             History    Chief Complaint Difficulty Walking;Difficulty with daily activities;Joint stiffness;Pain;Muscle weakness  -      Type of Pain Knee pain  -      Date Current Problem(s) Began 05/21/18  -      Brief Description of Current Complaint Patient is an 84 y/o female s/p R TKA on 5/21/18. Patient is now 3 weeks post op and ambulating using a SC. She continues to use the RW for longer distances and complains of swelling if she's on her feet too much. Patient also reports she wakes up 1-2x/night due to pain and discomfort. She is currently negotiating steps relying on her L LE. Patient would like to be able to walk for leisure again.  -      Previous treatment for THIS PROBLEM Surgery  -      Surgery Date: 05/21/18  -      Patient/Caregiver Goals Relieve pain;Return to prior level of function;Improve mobility;Improve strength;Know what to do to help the symptoms  -      Patient's Rating of General Health Good  -      Occupation/sports/leisure activities walking  -      What clinical tests have you had for this problem? X-ray  -      Results of Clinical Tests R knee TKA well placed  -         Pain     Pain Location Knee  -      Pain at Present 0  -      Pain at Best 0  -KH      Pain at Worst 4  -KH      Pain Frequency Intermittent  -      Pain Description Sore  -      What Performance Factors Make the Current Problem(s) WORSE? walking, sitting too long  -      Tolerance Time- Walking using RW for long distances-10 min  -      Is your sleep disturbed? Yes  -      Total hours of sleep per night 1-2 times wakes up from pain/discomfort  -      Difficulties with ADL's? difficulty with stairs, no fully  cleaning house or doing daily chores,   -KH      Difficulties with recreational activities? not able to go on leisurely walks yet  -KH         Fall Risk Assessment    Any falls in the past year: No  -KH         Services    Prior Rehab/Home Health Experiences Yes  -KH      When was the prior experience with Rehab/Home Health last week was discharged Friday  -KH      Where was the prior experience with Rehab/Home Health [place]  -KH      Are you currently receiving Home Health services No  -KH         Daily Activities    Primary Language English  -KH      Are you able to read Yes  -KH      Are you able to write Yes  -KH      Patient is concerned about/has problems with Climbing Stairs;Walking;Sitting;Performing home management (household chores, shopping, care of dependents);Standing;Transfers (getting out of a chair, bed)  -KH      Pt Participated in POC and Goals Yes  -KH         Safety    Are you being hurt, hit, or frightened by anyone at home or in your life? No  -KH      Are you being neglected by a caregiver No  -KH        User Key  (r) = Recorded By, (t) = Taken By, (c) = Cosigned By    Initials Name Provider Type    KAMI Goel, PT Physical Therapist                PT Ortho     Row Name 06/11/18 1000       Subjective Comments    Subjective Comments No pain right now, just occassional soreness and swelling if I'm on my feet too much  -KH       Subjective Pain    Able to rate subjective pain? yes  -KH    Pre-Treatment Pain Level 0  -KH    Post-Treatment Pain Level 0  -KH       Right Lower Ext    Rt Knee Extension/Flexion AROM -4-100  -KH    Rt Knee Extension/Flexion PROM -3-110  -KH       Left Lower Ext    Lt Knee Extension/Flexion AROM 0-120  -KH       Left Hip (Manual Muscle Testing)    MMT, Left Hip: Manual Muscle Testing (MMT) 4+  -KH       Right Hip (Manual Muscle Testing)    MMT, Right Hip: Manual Muscle Testing (MMT) 4/5  -KH       Left Knee (Manual Muscle Testing)    Comment, Left Knee: Manual  Muscle Testing (MMT) 4+/5  -KH       Right Knee (Manual Muscle Testing)    Comment, Right Knee: Manual Muscle Testing (MMT) 4/5  -KH       Sensation    Sensation WNL? WFL  -    Additional Comments some numbness along the lateral portion of the knee  -       Lower Extremity Flexibility    Hamstrings Bilateral:;Mildly limited  -KH       RLE Quick Girth (cm)    Mid patella 42 cm  -KH       LLE Quick Girth (cm)    Mid patella 38.5 cm  -KH       Gait/Stairs Assessment/Training    Gait/Stairs Assessment/Training gait/ambulation independence  -KH    Deviations/Abnormal Patterns (Gait) right sided deviations;antalgic;stride length decreased  -    Bilateral Gait Deviations weight shift ability decreased;heel strike decreased  -      User Key  (r) = Recorded By, (t) = Taken By, (c) = Cosigned By    Initials Name Provider Type    KAMI Goel, PT Physical Therapist        Therapy Education  Given: HEP  Program: New  How Provided: Verbal, Demonstration  Provided to: Patient  Level of Understanding: Teach back education performed           PT OP Goals     Row Name 06/11/18 1000          PT Short Term Goals    STG 1 Pt will be independent with HEP to improve R Knee strength and ROM to allow for improved functional mobility with decreased pain   -     STG 1 Progress New  -     STG 2 Pt will have R knee AROM 0 to 120.  -     STG 2 Progress New  -     STG 3 Pt will have RLE MMT grossly 4+/5  -KH     STG 3 Progress New  -     STG 4 Pt will report 10% improvement on KOS.  -     STG 4 Progress New  -        Long Term Goals    LTG 1 Patient will report being able to bend knee more comfortably in order to garden and plant flowers  -     LTG 1 Progress New  -     LTG 2 Patient will demonstrate independence with advanced HEP in order to promote self management of condition  -     LTG 2 Progress New  -       User Key  (r) = Recorded By, (t) = Taken By, (c) = Cosigned By    Initials Name Provider Type    KAMI  Winsome Goel, PT Physical Therapist                PT Assessment/Plan     Row Name 06/11/18 1047          PT Assessment    Functional Limitations Impaired gait;Performance in leisure activities;Limitations in functional capacity and performance  -     Impairments Gait;Muscle strength;Pain;Range of motion  -     Assessment Comments Patient is an 82 y/o female s/p R TKA on 5/21/18. Upon physical examination she presents with edema in her right knee, decreased R knee AROM, slightly decreased R LE strength, and R LE antalgia during gait. Patient would benefit from skilled PT to address these deficits in order to increase ease with mobility and allow patient to walk for leisure.  -     Please refer to paper survey for additional self-reported information Yes  -KH     Rehab Potential Good  -KH     Patient/caregiver participated in establishment of treatment plan and goals Yes  -KH     Patient would benefit from skilled therapy intervention Yes  -KH        PT Plan    PT Frequency 2x/week  -     Predicted Duration of Therapy Intervention (OT Eval) 6 weeks  -     Planned CPT's? PT EVAL LOW COMPLEXITY: 00772;PT THER PROC EA 15 MIN: 69598;PT GAIT TRAINING EA 15 MIN: 81227;PT ELECTRICAL STIM UNATTEND: ;PT THER ACT EA 15 MIN: 10113;PT MANUAL THERAPY EA 15 MIN: 99198;PT AQUATIC THERAPY EA 15 MIN: 03289;PT ELECTRICAL STIM ATTD EA 15 MIN: 25233;PT ULTRASOUND EA 15 MIN: 49404;PT SELF CARE/HOME MGMT/TRAIN EA 15: 08622;PT NEUROMUSC RE-EDUCATION EA 15 MIN: 59899;PT RE-EVAL: 11085;PT HOT OR COLD PACK TREAT MCARE  -     Physical Therapy Interventions (Optional Details) ROM (Range of Motion);stair training;strengthening;stretching;taping;patient/family education;postural re-education;joint mobilization;gross motor skills;gait training;modalities;balance training  -     PT Plan Comments Rehab s/p R TKA  -       User Key  (r) = Recorded By, (t) = Taken By, (c) = Cosigned By    Initials Name Provider Type    KAMI Schumacher  Amando, JOSSELYN Physical Therapist                  Exercises     Row Name 06/11/18 1000             Subjective Comments    Subjective Comments No pain right now, just occassional soreness and swelling if I'm on my feet too much  -KH         Subjective Pain    Able to rate subjective pain? yes  -KH      Pre-Treatment Pain Level 0  -KH      Post-Treatment Pain Level 0  -KH         Exercise 1    Exercise Name 1 SLR  -KH      Reps 1 10  -KH         Exercise 2    Exercise Name 2 standing hip ABD  -KH      Reps 2 10  -KH         Exercise 3    Exercise Name 3 quad sets  -KH      Reps 3 10  -KH         Exercise 4    Exercise Name 4 standing hip ext  -KH      Reps 4 10  -KH         Exercise 5    Exercise Name 5 HS stretch standing at step  -KH      Reps 5 2  -KH      Time 5 20 sec  -KH         Exercise 6    Exercise Name 6 heel raises  -KH      Reps 6 10  -KH         Exercise 7    Exercise Name 7 squats  -KH      Reps 7 10  -KH         Exercise 8    Exercise Name 8 NuStep  -KH      Time 8 10 min  -KH        User Key  (r) = Recorded By, (t) = Taken By, (c) = Cosigned By    Initials Name Provider Type     Winsome Goel, JOSSELYN Physical Therapist                Outcome Measure Options: Knee Outcome Score- ADL  Knee Outcome Score  Knee Outcome Score Comments: 36%      Time Calculation:   Start Time: 1025  Stop Time: 1105  Time Calculation (min): 40 min  Total Timed Code Minutes- PT: 40 minute(s)     Therapy Charges for Today     Code Description Service Date Service Provider Modifiers Qty    33107500552 HC PT MOBILITY CURRENT 6/11/2018 Winsome Goel, PT GP, CJ 1    20707389718 HC PT MOBILITY PROJECTED 6/11/2018 Winsome Goel, PT GP, CI 1    19834379967 HC PT EVAL LOW COMPLEXITY 1 6/11/2018 Winsome Goel, PT GP 1    72142281018 HC PT THER PROC EA 15 MIN 6/11/2018 Winsome Goel PT GP 2          PT G-Codes  PT Professional Judgement Used?: Yes  Outcome Measure Options: Knee Outcome Score- ADL  Score: 36% disability  Functional Limitation:  Mobility: Walking and moving around  Mobility: Walking and Moving Around Current Status (): At least 20 percent but less than 40 percent impaired, limited or restricted  Mobility: Walking and Moving Around Goal Status (): At least 1 percent but less than 20 percent impaired, limited or restricted         Winsome Goel, PT  6/11/2018

## 2018-06-14 ENCOUNTER — HOSPITAL ENCOUNTER (OUTPATIENT)
Dept: PHYSICAL THERAPY | Facility: HOSPITAL | Age: 83
Setting detail: THERAPIES SERIES
Discharge: HOME | End: 2018-06-14
Attending: ORTHOPAEDIC SURGERY

## 2018-06-14 DIAGNOSIS — Z96.651 S/P TKR (TOTAL KNEE REPLACEMENT), RIGHT: ICD-10-CM

## 2018-06-14 DIAGNOSIS — M25.561 ACUTE PAIN OF RIGHT KNEE: Primary | ICD-10-CM

## 2018-06-14 PROCEDURE — 97110 THERAPEUTIC EXERCISES: CPT

## 2018-06-14 NOTE — THERAPY TREATMENT NOTE
Outpatient Physical Therapy Ortho Treatment Note  [facility]     Patient Name: Lanie Ochoa  : 1935  MRN: [id number]  Today's Date: 2018      Visit Date: 2018    Visit Dx:    ICD-10-CM ICD-9-CM   1. Acute pain of right knee M25.561 719.46   2. S/P TKR (total knee replacement), right Z96.651 V43.65       Patient Active Problem List   Diagnosis   • Anxiety   • HLD (hyperlipidemia)   • Benign essential hypertension   • Osteopenia   • Avitaminosis D   • History of left knee replacement   • Titubation   • Non-rheumatic mitral regurgitation   • Non-rheumatic tricuspid valve insufficiency   • Chronic pain of right knee   • Venous incompetence   • Dysfunction of both eustachian tubes   • Primary localized osteoarthrosis of right lower leg   • Primary osteoarthritis of right knee   • OA (osteoarthritis) of knee        Past Medical History:   Diagnosis Date   • Anxiety    • BMS (burning mouth syndrome) 2015   • Breast cancer    • Cancer     STAGE 1 INVASIVE DUCTAL CARCINOMA ER AND [place] POSITIVE, HER-2/LOYDA NEGATIVE   • Cancer     SKIN LEFT ARM; SQUAMOUS CELL   • Constipation    • Degenerative disc disease    • Functional murmur     RESOLVED 2014   • Gastritis     RESOLVED 2014   • GERD (gastroesophageal reflux disease)    • Glossalgia 2015   • Hypertension    • Internal hemorrhoids    • Osteoarthritis 2014    HAND   • PNA (pneumonia) 2015   • Trigger ring finger 2014    RESOLVED 2014        Past Surgical History:   Procedure Laterality Date   • BREAST BIOPSY     • BREAST LUMPECTOMY  07/15/2009    LUMPECTOMY   • COLONOSCOPY     • DILATATION AND CURETTAGE      RESOLVED 06/15/2010   • EYE SURGERY Bilateral 10/15/2012    CATARACT EXTRACTION   • HEMORRHOIDECTOMY     • JOINT REPLACEMENT Left     KNEE   • KNEE ARTHROSCOPY Right 2017    Procedure: KNEE ARTHROSCOPY WITH PARTIAL MEDIAL AND LATERAL MENISECTOMY AND DEBRIDEMENT OF ARTHRITIS.;   Surgeon: Lauren Krishna MD;  Location: [facility];  Service:    • TOTAL KNEE ARTHROPLASTY Right 5/21/2018    Procedure: RIGHT TOTAL KNEE ARTHROPLASTY;  Surgeon: Kiko Solano MD;  Location: [facility];  Service: Orthopedics             PT Ortho     Row Name 06/14/18 1800       Subjective Comments    Subjective Comments pt states MD told her to ace wrap her lower leg due to c/o swelling when she saw him last week?.  -SI       Subjective Pain    Able to rate subjective pain? yes  -SI    Pre-Treatment Pain Level 0  -SI    Post-Treatment Pain Level 0  -SI       Posture/Observations    Observations Edema   minimal edema, vascular skin changes BLE's noted  -SI       Right Lower Ext    Rt Knee Extension/Flexion AROM -4 to 115  -SI       Gait/Stairs Assessment/Training    Comment (Gait/Stairs) mildly altered gait with or without cane in PT  -SI      User Key  (r) = Recorded By, (t) = Taken By, (c) = Cosigned By    Initials Name Provider Type    KARLA Banda PTA Physical Therapy Assistant                            PT Assessment/Plan     Row Name 06/14/18 1900          PT Assessment    Assessment Comments Pt making excellent progress at 3 1/2 weeks post-op.  Edema seems to be improved.  Has thin skin and chronic vascular skin color changes BLE's.  -SI       User Key  (r) = Recorded By, (t) = Taken By, (c) = Cosigned By    Initials Name Provider Type    KARLA Banda PTA Physical Therapy Assistant                Modalities     Row Name 06/14/18 1800             Ice    Patient reports will apply ice at home to involved area Yes  -SI        User Key  (r) = Recorded By, (t) = Taken By, (c) = Cosigned By    Initials Name Provider Type    KARLA Banda PTA Physical Therapy Assistant                Exercises     Row Name 06/14/18 1800             Subjective Comments    Subjective Comments pt states MD told her to ace wrap her lower leg due to c/o swelling when she saw him last week?.  -SI         Subjective  Pain    Able to rate subjective pain? yes  -SI      Pre-Treatment Pain Level 0  -SI      Post-Treatment Pain Level 0  -SI         Exercise 1    Exercise Name 1 Nu-step seat #&  -SI      Time 1 7  -SI         Exercise 2    Exercise Name 2 QS  -SI      Reps 2 20  -SI         Exercise 3    Exercise Name 3 gastroc stretch with towell  -SI      Reps 3 3  -SI      Time 3 20  -SI         Exercise 4    Exercise Name 4 SAQ and LAQwith 2 lbs  -SI      Sets 4 2  -SI      Reps 4 10  -SI         Exercise 5    Exercise Name 5 SLR  -SI      Sets 5 2  -SI      Reps 5 10  -SI         Exercise 6    Exercise Name 6 hip abd on side  -SI      Sets 6 2  -SI      Reps 6 10  -SI        User Key  (r) = Recorded By, (t) = Taken By, (c) = Cosigned By    Initials Name Provider Type    KARLA Banda PTA Physical Therapy Assistant                             Therapy Education  Given: HEP, Symptoms/condition management, Edema management, Fall prevention and home safety  Program: Modified  How Provided: Verbal, Demonstration, Written  Provided to: Patient  Level of Understanding: Teach back education performed              Time Calculation:   Start Time: 0930  Stop Time: 1015  Time Calculation (min): 45 min  Total Timed Code Minutes- PT: 45 minute(s)  Therapy Suggested Charges     Code   Minutes Charges    None           Therapy Charges for Today     Code Description Service Date Service Provider Modifiers Qty    89388923008 HC PT THER PROC EA 15 MIN 6/14/2018 Brit Banda PTA GP 3                    Brit Banda PTA  6/14/2018

## 2018-06-19 ENCOUNTER — HOSPITAL ENCOUNTER (OUTPATIENT)
Dept: PHYSICAL THERAPY | Facility: HOSPITAL | Age: 83
Setting detail: THERAPIES SERIES
Discharge: HOME | End: 2018-06-19
Attending: ORTHOPAEDIC SURGERY

## 2018-06-19 DIAGNOSIS — Z96.651 S/P TKR (TOTAL KNEE REPLACEMENT), RIGHT: ICD-10-CM

## 2018-06-19 DIAGNOSIS — M25.561 ACUTE PAIN OF RIGHT KNEE: Primary | ICD-10-CM

## 2018-06-19 PROCEDURE — 97110 THERAPEUTIC EXERCISES: CPT

## 2018-06-19 NOTE — THERAPY TREATMENT NOTE
Outpatient Physical Therapy Ortho Treatment Note  [facility]     Patient Name: Lanie Ochoa  : 1935  MRN: [id number]  Today's Date: 2018      Visit Date: 2018    Visit Dx:    ICD-10-CM ICD-9-CM   1. Acute pain of right knee M25.561 719.46   2. S/P TKR (total knee replacement), right Z96.651 V43.65       Patient Active Problem List   Diagnosis   • Anxiety   • HLD (hyperlipidemia)   • Benign essential hypertension   • Osteopenia   • Avitaminosis D   • History of left knee replacement   • Titubation   • Non-rheumatic mitral regurgitation   • Non-rheumatic tricuspid valve insufficiency   • Chronic pain of right knee   • Venous incompetence   • Dysfunction of both eustachian tubes   • Primary localized osteoarthrosis of right lower leg   • Primary osteoarthritis of right knee   • OA (osteoarthritis) of knee        Past Medical History:   Diagnosis Date   • Anxiety    • BMS (burning mouth syndrome) 2015   • Breast cancer    • Cancer     STAGE 1 INVASIVE DUCTAL CARCINOMA [place] AND [place] POSITIVE, HER-2/LOYDA NEGATIVE   • Cancer     SKIN LEFT ARM; SQUAMOUS CELL   • Constipation    • Degenerative disc disease    • Functional murmur     RESOLVED 2014   • Gastritis     RESOLVED 2014   • GERD (gastroesophageal reflux disease)    • Glossalgia 2015   • Hypertension    • Internal hemorrhoids    • Osteoarthritis 2014    HAND   • PNA (pneumonia) 2015   • Trigger ring finger 2014    RESOLVED 2014        Past Surgical History:   Procedure Laterality Date   • BREAST BIOPSY     • BREAST LUMPECTOMY  07/15/2009    LUMPECTOMY   • COLONOSCOPY     • DILATATION AND CURETTAGE      RESOLVED 06/15/2010   • EYE SURGERY Bilateral 10/15/2012    CATARACT EXTRACTION   • HEMORRHOIDECTOMY     • JOINT REPLACEMENT Left     KNEE   • KNEE ARTHROSCOPY Right 2017    Procedure: KNEE ARTHROSCOPY WITH PARTIAL MEDIAL AND LATERAL MENISECTOMY AND DEBRIDEMENT OF ARTHRITIS.;   Surgeon: Lauren Krishna MD;  Location: [facility];  Service:    • TOTAL KNEE ARTHROPLASTY Right 5/21/2018    Procedure: RIGHT TOTAL KNEE ARTHROPLASTY;  Surgeon: Kiko Solano MD;  Location: [facility];  Service: Orthopedics             PT Ortho     Row Name 06/19/18 1000       Subjective Comments    Subjective Comments Min c/o knee pain.  Anxious to drive...  -SI       Subjective Pain    Able to rate subjective pain? yes  -SI    Pre-Treatment Pain Level 0  -SI    Post-Treatment Pain Level 0  -SI    Subjective Pain Comment right knee pain 2-3 at times  -SI       Balance Skills Training    SLS 5 seconds on either LE  -SI       Gait/Stairs Assessment/Training    Handrail Location (Stairs) left side (ascending)  -SI    Number of Steps (Stairs) 8  -SI    Ascending Technique (Stairs) step-to-step  -SI    Descending Technique (Stairs) step-to-step  -SI    Comment (Gait/Stairs) Able to lead with either LE on stairs but not allowing her to go fast reciprocally  -SI      User Key  (r) = Recorded By, (t) = Taken By, (c) = Cosigned By    Initials Name Provider Type    KARLA Banda PTA Physical Therapy Assistant                            PT Assessment/Plan     Row Name 06/19/18 1012          PT Assessment    Assessment Comments Not using ex weights at home and to get that started.  Doing great.  -SI       User Key  (r) = Recorded By, (t) = Taken By, (c) = Cosigned By    Initials Name Provider Type    KARLA Banda PTA Physical Therapy Assistant                    Exercises     Row Name 06/19/18 1000             Subjective Comments    Subjective Comments Min c/o knee pain.  Anxious to drive...  -SI         Subjective Pain    Able to rate subjective pain? yes  -SI      Pre-Treatment Pain Level 0  -SI      Post-Treatment Pain Level 0  -SI      Subjective Pain Comment right knee pain 2-3 at times  -SI         Exercise 1    Exercise Name 1 Nu-step seat #&  -SI      Time 1 7  -SI         Exercise 2    Exercise  Name 2 QS  -SI      Reps 2 20  -SI         Exercise 3    Exercise Name 3 gastroc stretch with towell  -SI      Reps 3 3  -SI      Time 3 20  -SI         Exercise 4    Exercise Name 4 SAQ and LAQwith 2 lbs  -SI      Sets 4 2  -SI      Reps 4 10  -SI         Exercise 5    Exercise Name 5 SLR  -SI      Sets 5 2  -SI      Reps 5 10  -SI         Exercise 6    Exercise Name 6 hip abd on side  -SI      Sets 6 2  -SI      Reps 6 10  -SI         Exercise 7    Exercise Name 7 4 inch step ups  -SI      Reps 7 20  -SI         Exercise 8    Exercise Name 8 Chair glide/TM for flexion stretch  -SI      Reps 8 4  -SI      Time 8 20  -SI        User Key  (r) = Recorded By, (t) = Taken By, (c) = Cosigned By    Initials Name Provider Type    KARLA Banda PTA Physical Therapy Assistant                             Therapy Education  Given: HEP, Symptoms/condition management, Fall prevention and home safety  Program: Progressed  How Provided: Verbal, Demonstration, Written  Provided to: Patient  Level of Understanding: Teach back education performed              Time Calculation:   Start Time: 0930  Stop Time: 1012  Time Calculation (min): 42 min  Total Timed Code Minutes- PT: 42 minute(s)  Therapy Suggested Charges     Code   Minutes Charges    None           Therapy Charges for Today     Code Description Service Date Service Provider Modifiers Qty    44246995003 HC PT THER PROC EA 15 MIN 6/19/2018 Brit Banda PTA GP 3                    Brit Banda PTA  6/19/2018

## 2018-06-21 ENCOUNTER — HOSPITAL ENCOUNTER (OUTPATIENT)
Dept: PHYSICAL THERAPY | Facility: HOSPITAL | Age: 83
Discharge: HOME | End: 2018-06-21
Attending: ORTHOPAEDIC SURGERY | Admitting: ORTHOPAEDIC SURGERY

## 2018-06-21 DIAGNOSIS — Z96.651 HISTORY OF TOTAL RIGHT KNEE REPLACEMENT: ICD-10-CM

## 2018-06-21 PROCEDURE — 97110 THERAPEUTIC EXERCISES: CPT

## 2018-06-21 NOTE — THERAPY TREATMENT NOTE
Outpatient Physical Therapy Ortho Treatment Note  [facility]     Patient Name: Lanie Ochoa  : 1935  MRN: [id number]  Today's Date: 2018      Visit Date: 2018    Visit Dx:    ICD-10-CM ICD-9-CM   1. History of total right knee replacement Z96.651 V43.65       Patient Active Problem List   Diagnosis   • Anxiety   • HLD (hyperlipidemia)   • Benign essential hypertension   • Osteopenia   • Avitaminosis D   • History of left knee replacement   • Titubation   • Non-rheumatic mitral regurgitation   • Non-rheumatic tricuspid valve insufficiency   • Chronic pain of right knee   • Venous incompetence   • Dysfunction of both eustachian tubes   • Primary localized osteoarthrosis of right lower leg   • Primary osteoarthritis of right knee   • OA (osteoarthritis) of knee        Past Medical History:   Diagnosis Date   • Anxiety    • BMS (burning mouth syndrome) 2015   • Breast cancer    • Cancer     STAGE 1 INVASIVE DUCTAL CARCINOMA [place] AND [place] POSITIVE, HER-2/LOYDA NEGATIVE   • Cancer     SKIN LEFT ARM; SQUAMOUS CELL   • Constipation    • Degenerative disc disease    • Functional murmur     RESOLVED 2014   • Gastritis     RESOLVED 2014   • GERD (gastroesophageal reflux disease)    • Glossalgia 2015   • Hypertension    • Internal hemorrhoids    • Osteoarthritis 2014    HAND   • PNA (pneumonia) 2015   • Trigger ring finger 2014    RESOLVED 2014        Past Surgical History:   Procedure Laterality Date   • BREAST BIOPSY     • BREAST LUMPECTOMY  07/15/2009    LUMPECTOMY   • COLONOSCOPY     • DILATATION AND CURETTAGE      RESOLVED 06/15/2010   • EYE SURGERY Bilateral 10/15/2012    CATARACT EXTRACTION   • HEMORRHOIDECTOMY     • JOINT REPLACEMENT Left     KNEE   • KNEE ARTHROSCOPY Right 2017    Procedure: KNEE ARTHROSCOPY WITH PARTIAL MEDIAL AND LATERAL MENISECTOMY AND DEBRIDEMENT OF ARTHRITIS.;  Surgeon: Lauren Krishna MD;  Location: [facility] OR  "[facility];  Service:    • TOTAL KNEE ARTHROPLASTY Right 5/21/2018    Procedure: RIGHT TOTAL KNEE ARTHROPLASTY;  Surgeon: Kiko Solano MD;  Location: [facility] OR;  Service: Orthopedics             PT Ortho     Row Name 06/21/18 1200       Subjective Comments    Subjective Comments \"Bored\", anxious to drive.  have a 3 wheel bike I ride in side streets of [place] and may start riding.......  -SI       Subjective Pain    Able to rate subjective pain? yes  -SI    Pre-Treatment Pain Level 0  -SI    Post-Treatment Pain Level 0  -SI       Posture/Observations    Observations Edema  -SI       Right Lower Ext    Rt Knee Extension/Flexion AROM -4 to 116  -SI       Gait/Stairs Assessment/Training    Comment (Gait/Stairs) has cane with her but carries it .  Gait nearly normal  -SI    Row Name 06/19/18 1000       Subjective Comments    Subjective Comments Min c/o knee pain.  Anxious to drive...  -SI       Subjective Pain    Able to rate subjective pain? yes  -SI    Pre-Treatment Pain Level 0  -SI    Post-Treatment Pain Level 0  -SI    Subjective Pain Comment right knee pain 2-3 at times  -SI       Balance Skills Training    SLS 5 seconds on either LE  -SI       Gait/Stairs Assessment/Training    Handrail Location (Stairs) left side (ascending)  -SI    Number of Steps (Stairs) 8  -SI    Ascending Technique (Stairs) step-to-step  -SI    Descending Technique (Stairs) step-to-step  -SI    Comment (Gait/Stairs) Able to lead with either LE on stairs but not allowing her to go fast reciprocally  -SI      User Key  (r) = Recorded By, (t) = Taken By, (c) = Cosigned By    Initials Name Provider Type    SI Brit Banda PTA Physical Therapy Assistant                            PT Assessment/Plan     Row Name 06/21/18 1203          PT Assessment    Assessment Comments Normally very active 84 yo lady.  Doing great with right TKA at 4 1/2 weeks post-op.  -SI       User Key  (r) = Recorded By, (t) = Taken By, (c) = Cosigned By    Initials " "Name Provider Type    KARLA Brit ORTEGA ELBA Banda Physical Therapy Assistant                    Exercises     Row Name 06/21/18 1200             Subjective Comments    Subjective Comments \"Bored\", anxious to drive.  have a 3 wheel bike I ride in side streets of [place] and may start riding.......  -SI         Subjective Pain    Able to rate subjective pain? yes  -SI      Pre-Treatment Pain Level 0  -SI      Post-Treatment Pain Level 0  -SI         Exercise 1    Exercise Name 1 Nu-step seat #&  -SI      Time 1 7  -SI         Exercise 2    Exercise Name 2 QS  -SI      Reps 2 20  -SI         Exercise 3    Exercise Name 3 gastroc stretch with towell  -SI      Reps 3 3  -SI      Time 3 20  -SI         Exercise 4    Exercise Name 4 SAQ and LAQwith 2 lbs  -SI      Sets 4 2  -SI      Reps 4 10  -SI         Exercise 5    Exercise Name 5 SLR  -SI      Sets 5 2  -SI      Reps 5 10  -SI         Exercise 6    Exercise Name 6 hip abd on side  -SI      Sets 6 2  -SI      Reps 6 10  -SI         Exercise 7    Exercise Name 7 4 inch step ups  -SI      Reps 7 20  -SI         Exercise 8    Exercise Name 8 Chair glide/TM for flexion stretch  -SI      Reps 8 4  -SI      Time 8 20  -SI         Exercise 9    Exercise Name 9 HS curls sitting with red TB  -SI      Sets 9 2  -SI      Reps 9 10  -SI         Exercise 10    Exercise Name 10 HS stretch long sitting  -SI      Sets 10 1  -SI      Reps 10 3  -SI      Additional Comments Both and go easy  -SI        User Key  (r) = Recorded By, (t) = Taken By, (c) = Cosigned By    Initials Name Provider Type    KARLA ORTEGA ELBA Banda Physical Therapy Assistant                             Therapy Education  Given: HEP, Symptoms/condition management, Posture/body mechanics  Program: Progressed  How Provided: Verbal, Demonstration, Written  Provided to: Patient  Level of Understanding: Teach back education performed              Time Calculation:   Start Time: 1015  Stop Time: 1100  Time Calculation " (min): 45 min  Total Timed Code Minutes- PT: 45 minute(s)  Therapy Suggested Charges     Code   Minutes Charges    None           Therapy Charges for Today     Code Description Service Date Service Provider Modifiers Qty    05850634481 HC PT THER PROC EA 15 MIN 6/21/2018 Brit Banda, PTA GP 3                    Brit Banda, ELBA  6/21/2018

## 2018-06-26 ENCOUNTER — HOSPITAL ENCOUNTER (OUTPATIENT)
Dept: PHYSICAL THERAPY | Facility: HOSPITAL | Age: 83
Setting detail: THERAPIES SERIES
Discharge: HOME | End: 2018-06-26
Attending: ORTHOPAEDIC SURGERY

## 2018-06-26 DIAGNOSIS — Z96.651 S/P TKR (TOTAL KNEE REPLACEMENT), RIGHT: ICD-10-CM

## 2018-06-26 DIAGNOSIS — Z96.651 HISTORY OF TOTAL RIGHT KNEE REPLACEMENT: Primary | ICD-10-CM

## 2018-06-26 DIAGNOSIS — M25.561 ACUTE PAIN OF RIGHT KNEE: ICD-10-CM

## 2018-06-26 PROCEDURE — 97110 THERAPEUTIC EXERCISES: CPT

## 2018-06-26 NOTE — THERAPY TREATMENT NOTE
Outpatient Physical Therapy Ortho Treatment Note  [facility]     Patient Name: Lanie Ochoa  : 1935  MRN: [id number]  Today's Date: 2018      Visit Date: 2018    Visit Dx:    ICD-10-CM ICD-9-CM   1. History of total right knee replacement Z96.651 V43.65   2. Acute pain of right knee M25.561 719.46   3. S/P TKR (total knee replacement), right Z96.651 V43.65       Patient Active Problem List   Diagnosis   • Anxiety   • HLD (hyperlipidemia)   • Benign essential hypertension   • Osteopenia   • Avitaminosis D   • History of left knee replacement   • Titubation   • Non-rheumatic mitral regurgitation   • Non-rheumatic tricuspid valve insufficiency   • Chronic pain of right knee   • Venous incompetence   • Dysfunction of both eustachian tubes   • Primary localized osteoarthrosis of right lower leg   • Primary osteoarthritis of right knee   • OA (osteoarthritis) of knee        Past Medical History:   Diagnosis Date   • Anxiety    • BMS (burning mouth syndrome) 2015   • Breast cancer    • Cancer     STAGE 1 INVASIVE DUCTAL CARCINOMA ER AND [place] POSITIVE, HER-2/LOYDA NEGATIVE   • Cancer     SKIN LEFT ARM; SQUAMOUS CELL   • Constipation    • Degenerative disc disease    • Functional murmur     RESOLVED 2014   • Gastritis     RESOLVED 2014   • GERD (gastroesophageal reflux disease)    • Glossalgia 2015   • Hypertension    • Internal hemorrhoids    • Osteoarthritis 2014    HAND   • PNA (pneumonia) 2015   • Trigger ring finger 2014    RESOLVED 2014        Past Surgical History:   Procedure Laterality Date   • BREAST BIOPSY     • BREAST LUMPECTOMY  07/15/2009    LUMPECTOMY   • COLONOSCOPY     • DILATATION AND CURETTAGE      RESOLVED 06/15/2010   • EYE SURGERY Bilateral 10/15/2012    CATARACT EXTRACTION   • HEMORRHOIDECTOMY     • JOINT REPLACEMENT Left     KNEE   • KNEE ARTHROSCOPY Right 2017    Procedure: KNEE ARTHROSCOPY WITH PARTIAL MEDIAL  AND LATERAL MENISECTOMY AND DEBRIDEMENT OF ARTHRITIS.;  Surgeon: Lauren Krishna MD;  Location: [facility];  Service:    • TOTAL KNEE ARTHROPLASTY Right 5/21/2018    Procedure: RIGHT TOTAL KNEE ARTHROPLASTY;  Surgeon: Kiko Solano MD;  Location: [facility] OR;  Service: Orthopedics             PT Ortho     Row Name 06/26/18 0900       Subjective Comments    Subjective Comments Pt states she walked a distance and she wouldn't have been able to do that before this knee surgery.  Plans to start dirving this weekend..  -SI       Subjective Pain    Able to rate subjective pain? yes  -SI    Pre-Treatment Pain Level 0  -SI    Post-Treatment Pain Level 0  -SI    Subjective Pain Comment 1-3 intermittently at most right knee  -SI       Posture/Observations    Observations Edema   mild  -SI       Right Lower Ext    Rt Knee Extension/Flexion AROM -4 to 116  -SI       Balance Skills Training    SLS 5 seconds on either LE  -SI       Gait/Stairs Assessment/Training    Comment (Gait/Stairs) Only uses cane for curbs as needed.  Gait normal except chronic FF trunk posture  -SI      User Key  (r) = Recorded By, (t) = Taken By, (c) = Cosigned By    Initials Name Provider Type    KARLA Banda PTA Physical Therapy Assistant                            PT Assessment/Plan     Row Name 06/26/18 0952          PT Assessment    Assessment Comments pt to increase resistance at home to 2.5 to 3 lbs.  Doing very well with post-op course of tx at just over 5 weeks post0p  -SI       User Key  (r) = Recorded By, (t) = Taken By, (c) = Cosigned By    Initials Name Provider Type    KARLA Banda PTA Physical Therapy Assistant                Modalities     Row Name 06/26/18 0900             Ice    Patient reports will apply ice at home to involved area Yes  -SI        User Key  (r) = Recorded By, (t) = Taken By, (c) = Cosigned By    Initials Name Provider Type    KARLA Banda PTA Physical Therapy Assistant                Exercises      Row Name 06/26/18 0900             Subjective Comments    Subjective Comments Pt states she walked a distance and she wouldn't have been able to do that before this knee surgery.  Plans to start dirving this weekend..  -SI         Subjective Pain    Able to rate subjective pain? yes  -SI      Pre-Treatment Pain Level 0  -SI      Post-Treatment Pain Level 0  -SI      Subjective Pain Comment 1-3 intermittently at most right knee  -SI         Total Minutes    73998 - PT Therapeutic Exercise Minutes 35  -SI         Exercise 1    Exercise Name 1 Nu-step seat #&  -SI      Time 1 7  -SI         Exercise 2    Exercise Name 2 QS  -SI      Reps 2 20  -SI         Exercise 3    Exercise Name 3 gastroc stretch with towell  -SI      Reps 3 3  -SI      Time 3 20  -SI         Exercise 4    Exercise Name 4 SAQ and LAQwith 3 lbs  -SI      Sets 4 2  -SI      Reps 4 10  -SI         Exercise 5    Exercise Name 5 SLR  -SI      Sets 5 2  -SI      Reps 5 10  -SI         Exercise 6    Exercise Name 6 hip abd on side  -SI      Sets 6 2  -SI      Reps 6 10  -SI         Exercise 7    Exercise Name 7 6 inch step ups  -SI      Reps 7 20  -SI         Exercise 8    Exercise Name 8 Chair glide/TM for flexion stretch  -SI      Reps 8 4  -SI      Time 8 20  -SI         Exercise 9    Exercise Name 9 HS curls sitting with green TB  -SI      Sets 9 2  -SI      Reps 9 10  -SI         Exercise 10    Exercise Name 10 HS stretch long sitting  -SI      Sets 10 1  -SI      Reps 10 3  -SI         Exercise 11    Exercise Name 11 sit, heel prop for ext stretch  -SI      Time 11 5 min  -SI        User Key  (r) = Recorded By, (t) = Taken By, (c) = Cosigned By    Initials Name Provider Type    KARLA Banda PTA Physical Therapy Assistant                               PT OP Goals     Row Name 06/26/18 0900          PT Short Term Goals    STG 1 Pt will be independent with HEP to improve R Knee strength and ROM to allow for improved functional mobility with  decreased pain   -SI     STG 1 Progress Progressing  -SI     STG 2 Pt will have R knee AROM 0 to 120.  -SI     STG 2 Progress Progressing  -SI     STG 3 Pt will have RLE MMT grossly 4+/5  -SI     STG 3 Progress Met  -SI       User Key  (r) = Recorded By, (t) = Taken By, (c) = Cosigned By    Initials Name Provider Type    KARLA Banda PTA Physical Therapy Assistant          Therapy Education  Given: HEP, Symptoms/condition management, Edema management, Pain management  Program: Progressed  How Provided: Verbal, Demonstration, Written  Provided to: Patient  Level of Understanding: Teach back education performed              Time Calculation:   Start Time: 0915  Stop Time: 0950  Time Calculation (min): 35 min  Total Timed Code Minutes- PT: 35 minute(s)  Therapy Suggested Charges     Code   Minutes Charges    05516 (CPT®) Hc Pt Neuromusc Re Education Ea 15 Min      53956 (CPT®) Hc Pt Ther Proc Ea 15 Min 35 2    20668 (CPT®) Hc Gait Training Ea 15 Min      62905 (CPT®) Hc Pt Therapeutic Act Ea 15 Min      94448 (CPT®) Hc Pt Manual Therapy Ea 15 Min      97251 (CPT®) Hc Pt Ther Massage- Per 15 Min      42498 (CPT®) Hc Pt Iontophoresis Ea 15 Min      39553 (CPT®) Hc Pt Elec Stim Ea-Per 15 Min      75921 (CPT®) Hc Pt Ultrasound Ea 15 Min      02654 (CPT®) Hc Pt Self Care/Mgmt/Train Ea 15 Min      Total  35 2        Therapy Charges for Today     Code Description Service Date Service Provider Modifiers Qty    90430679085 HC PT THER PROC EA 15 MIN 6/26/2018 Brit Banda PTA GP 2                    Brit Banda PTA  6/26/2018

## 2018-06-28 ENCOUNTER — HOSPITAL ENCOUNTER (OUTPATIENT)
Dept: PHYSICAL THERAPY | Facility: HOSPITAL | Age: 83
Setting detail: THERAPIES SERIES
Discharge: HOME | End: 2018-06-28
Attending: ORTHOPAEDIC SURGERY

## 2018-06-28 DIAGNOSIS — M25.561 ACUTE PAIN OF RIGHT KNEE: ICD-10-CM

## 2018-06-28 DIAGNOSIS — Z96.651 S/P TKR (TOTAL KNEE REPLACEMENT), RIGHT: ICD-10-CM

## 2018-06-28 DIAGNOSIS — Z96.651 HISTORY OF TOTAL RIGHT KNEE REPLACEMENT: Primary | ICD-10-CM

## 2018-06-28 PROCEDURE — 97110 THERAPEUTIC EXERCISES: CPT

## 2018-06-28 NOTE — THERAPY TREATMENT NOTE
Outpatient Physical Therapy Ortho Treatment Note  [facility]     Patient Name: Lanie Ochoa  : 1935  MRN: [id number]  Today's Date: 2018      Visit Date: 2018    Visit Dx:    ICD-10-CM ICD-9-CM   1. History of total right knee replacement Z96.651 V43.65   2. Acute pain of right knee M25.561 719.46   3. S/P TKR (total knee replacement), right Z96.651 V43.65       Patient Active Problem List   Diagnosis   • Anxiety   • HLD (hyperlipidemia)   • Benign essential hypertension   • Osteopenia   • Avitaminosis D   • History of left knee replacement   • Titubation   • Non-rheumatic mitral regurgitation   • Non-rheumatic tricuspid valve insufficiency   • Chronic pain of right knee   • Venous incompetence   • Dysfunction of both eustachian tubes   • Primary localized osteoarthrosis of right lower leg   • Primary osteoarthritis of right knee   • OA (osteoarthritis) of knee        Past Medical History:   Diagnosis Date   • Anxiety    • BMS (burning mouth syndrome) 2015   • Breast cancer    • Cancer     STAGE 1 INVASIVE DUCTAL CARCINOMA ER AND [place] POSITIVE, HER-2/LOYDA NEGATIVE   • Cancer     SKIN LEFT ARM; SQUAMOUS CELL   • Constipation    • Degenerative disc disease    • Functional murmur     RESOLVED 2014   • Gastritis     RESOLVED 2014   • GERD (gastroesophageal reflux disease)    • Glossalgia 2015   • Hypertension    • Internal hemorrhoids    • Osteoarthritis 2014    HAND   • PNA (pneumonia) 2015   • Trigger ring finger 2014    RESOLVED 2014        Past Surgical History:   Procedure Laterality Date   • BREAST BIOPSY     • BREAST LUMPECTOMY  07/15/2009    LUMPECTOMY   • COLONOSCOPY     • DILATATION AND CURETTAGE      RESOLVED 06/15/2010   • EYE SURGERY Bilateral 10/15/2012    CATARACT EXTRACTION   • HEMORRHOIDECTOMY     • JOINT REPLACEMENT Left     KNEE   • KNEE ARTHROSCOPY Right 2017    Procedure: KNEE ARTHROSCOPY WITH PARTIAL MEDIAL  AND LATERAL MENISECTOMY AND DEBRIDEMENT OF ARTHRITIS.;  Surgeon: Lauren Krishna MD;  Location: [facility];  Service:    • TOTAL KNEE ARTHROPLASTY Right 5/21/2018    Procedure: RIGHT TOTAL KNEE ARTHROPLASTY;  Surgeon: Kiko Solano MD;  Location: [facility] OR;  Service: Orthopedics             PT Ortho     Row Name 06/28/18 1000       Subjective Comments    Subjective Comments pt states she rode her 3 wheeled bike in [employer] yesterday without a problem.  going to start driving this weekend.  -SI       Subjective Pain    Able to rate subjective pain? no  -SI    Pre-Treatment Pain Level 0  -SI    Post-Treatment Pain Level 0  -SI    Subjective Pain Comment min c/o knee pain  -SI       Right Lower Ext    Rt Knee Extension/Flexion AROM -3 to 120  -SI       Balance Skills Training    SLS 5  -SI       Gait/Stairs Assessment/Training    Handrail Location (Stairs) left side (ascending)   with a rail and without a rail, no assist device  -SI    Number of Steps (Stairs) 8 x 3  -SI    Ascending Technique (Stairs) step-over-step  -SI    Descending Technique (Stairs) step-over-step  -SI    Comment (Gait/Stairs) Gait on stairsd without cane and without a railing.  Able to do curb and or few steps without railing  -SI    Row Name 06/26/18 0900       Subjective Comments    Subjective Comments Pt states she walked a distance and she wouldn't have been able to do that before this knee surgery.  Plans to start dirving this weekend..  -SI       Subjective Pain    Able to rate subjective pain? yes  -SI    Pre-Treatment Pain Level 0  -SI    Post-Treatment Pain Level 0  -SI    Subjective Pain Comment 1-3 intermittently at most right knee  -SI       Posture/Observations    Observations Edema   mild  -SI       Right Lower Ext    Rt Knee Extension/Flexion AROM -4 to 116  -SI       Balance Skills Training    SLS 5 seconds on either LE  -SI       Gait/Stairs Assessment/Training    Comment (Gait/Stairs) Only uses cane for curbs as  needed.  Gait normal except chronic FF trunk posture  -SI      User Key  (r) = Recorded By, (t) = Taken By, (c) = Cosigned By    Initials Name Provider Type    KARLA ORTEGA ELBA Banda Physical Therapy Assistant                            PT Assessment/Plan     Row Name 06/28/18 1100          PT Assessment    Assessment Comments AROM and strength sufficient for gait on stairs and all ADL's.  Plans to resume driving this weekend.  -SI       User Key  (r) = Recorded By, (t) = Taken By, (c) = Cosigned By    Initials Name Provider Type    KARLA ORTEGA ELBA Banda Physical Therapy Assistant                    Exercises     Row Name 06/28/18 1000             Subjective Comments    Subjective Comments pt states she rode her 3 wheeled bike in [employer] yesterday without a problem.  going to start driving this weekend.  -SI         Subjective Pain    Able to rate subjective pain? no  -SI      Pre-Treatment Pain Level 0  -SI      Post-Treatment Pain Level 0  -SI      Subjective Pain Comment min c/o knee pain  -SI         Total Minutes    53078 - PT Therapeutic Exercise Minutes 45  -SI         Exercise 1    Exercise Name 1 Nu-step seat #&  -SI      Time 1 7  -SI         Exercise 2    Exercise Name 2 QS  -SI      Reps 2 20  -SI         Exercise 3    Exercise Name 3 gastroc stretch with towell  -SI      Reps 3 3  -SI      Time 3 20  -SI         Exercise 4    Exercise Name 4 SAQ and LAQwith 3 lbs  -SI      Sets 4 2  -SI      Reps 4 10  -SI      Additional Comments 3lbs  -SI         Exercise 5    Exercise Name 5 SLR  -SI      Sets 5 2  -SI      Reps 5 10  -SI         Exercise 6    Exercise Name 6 hip abd on side  -SI      Sets 6 2  -SI      Reps 6 10  -SI         Exercise 7    Exercise Name 7 6 inch step ups  -SI      Reps 7 20  -SI      Additional Comments ----------------------  -SI         Exercise 8    Exercise Name 8 Chair glide/TM for flexion stretch  -SI      Reps 8 4  -SI      Time 8 20  -SI         Exercise 9    Exercise  Name 9 HS curls sitting with green TB  -SI      Sets 9 2  -SI      Reps 9 10  -SI         Exercise 10    Exercise Name 10 HS stretch long sitting  -SI      Sets 10 1  -SI      Reps 10 3  -SI         Exercise 11    Exercise Name 11 sit, heel prop for ext stretch  -SI      Time 11 5 min  -SI        User Key  (r) = Recorded By, (t) = Taken By, (c) = Cosigned By    Initials Name Provider Type    KARLA Banda PTA Physical Therapy Assistant                               PT OP Goals     Row Name 06/28/18 1100          PT Short Term Goals    STG 1 Pt will be independent with HEP to improve R Knee strength and ROM to allow for improved functional mobility with decreased pain   -SI     STG 1 Progress Met  -SI     STG 2 Pt will have R knee AROM 0 to 120.  -SI     STG 2 Progress Partially Met  -SI     STG 3 Pt will have RLE MMT grossly 4+/5  -SI     STG 3 Progress Met  -SI       User Key  (r) = Recorded By, (t) = Taken By, (c) = Cosigned By    Initials Name Provider Type    KARLA Banda PTA Physical Therapy Assistant          Therapy Education  Given: HEP, Symptoms/condition management  Program: Progressed  How Provided: Verbal, Demonstration, Written  Provided to: Patient  Level of Understanding: Teach back education performed              Time Calculation:   Start Time: 1015  Stop Time: 1100  Time Calculation (min): 45 min  Therapy Suggested Charges     Code   Minutes Charges    40211 (CPT®) Hc Pt Neuromusc Re Education Ea 15 Min      91751 (CPT®) Hc Pt Ther Proc Ea 15 Min 45 3    91610 (CPT®) Hc Gait Training Ea 15 Min      47958 (CPT®) Hc Pt Therapeutic Act Ea 15 Min      86657 (CPT®) Hc Pt Manual Therapy Ea 15 Min      23531 (CPT®) Hc Pt Ther Massage- Per 15 Min      93500 (CPT®) Hc Pt Iontophoresis Ea 15 Min      13367 (CPT®) Hc Pt Elec Stim Ea-Per 15 Min      53750 (CPT®) Hc Pt Ultrasound Ea 15 Min      86943 (CPT®) Hc Pt Self Care/Mgmt/Train Ea 15 Min      Total  45 3        Therapy Charges for Today      Code Description Service Date Service Provider Modifiers Qty    38256722949 HC PT THER PROC EA 15 MIN 6/28/2018 Brit Banda, PTA GP 3                    Brit Banda PTA  6/28/2018

## 2018-07-03 ENCOUNTER — TELEPHONE (OUTPATIENT)
Dept: ORTHOPEDIC SURGERY | Facility: CLINIC | Age: 83
End: 2018-07-03

## 2018-07-03 ENCOUNTER — HOSPITAL ENCOUNTER (OUTPATIENT)
Dept: PHYSICAL THERAPY | Facility: HOSPITAL | Age: 83
Setting detail: THERAPIES SERIES
Discharge: HOME | End: 2018-07-03
Attending: ORTHOPAEDIC SURGERY

## 2018-07-03 DIAGNOSIS — Z96.651 HISTORY OF TOTAL RIGHT KNEE REPLACEMENT: Primary | ICD-10-CM

## 2018-07-03 DIAGNOSIS — M25.561 ACUTE PAIN OF RIGHT KNEE: ICD-10-CM

## 2018-07-03 DIAGNOSIS — Z96.651 S/P TKR (TOTAL KNEE REPLACEMENT), RIGHT: ICD-10-CM

## 2018-07-03 PROCEDURE — 97110 THERAPEUTIC EXERCISES: CPT

## 2018-07-03 NOTE — THERAPY TREATMENT NOTE
Outpatient Physical Therapy Ortho Treatment Note  [facility]     Patient Name: Lanie Ochoa  : 1935  MRN: [id number]  Today's Date: 7/3/2018      Visit Date: 2018    Visit Dx:    ICD-10-CM ICD-9-CM   1. History of total right knee replacement Z96.651 V43.65   2. Acute pain of right knee M25.561 719.46   3. S/P TKR (total knee replacement), right Z96.651 V43.65       Patient Active Problem List   Diagnosis   • Anxiety   • HLD (hyperlipidemia)   • Benign essential hypertension   • Osteopenia   • Avitaminosis D   • History of left knee replacement   • Titubation   • Non-rheumatic mitral regurgitation   • Non-rheumatic tricuspid valve insufficiency   • Chronic pain of right knee   • Venous incompetence   • Dysfunction of both eustachian tubes   • Primary localized osteoarthrosis of right lower leg   • Primary osteoarthritis of right knee   • OA (osteoarthritis) of knee        Past Medical History:   Diagnosis Date   • Anxiety    • BMS (burning mouth syndrome) 2015   • Breast cancer (CMS/HCC)    • Cancer (CMS/HCC)     STAGE 1 INVASIVE DUCTAL CARCINOMA [place] AND [place] POSITIVE, HER-2/LOYDA NEGATIVE   • Cancer (CMS/HCC)     SKIN LEFT ARM; SQUAMOUS CELL   • Constipation    • Degenerative disc disease    • Functional murmur     RESOLVED 2014   • Gastritis     RESOLVED 2014   • GERD (gastroesophageal reflux disease)    • Glossalgia 2015   • Hypertension    • Internal hemorrhoids    • Osteoarthritis 2014    HAND   • PNA (pneumonia) 2015   • Trigger ring finger 2014    RESOLVED 2014        Past Surgical History:   Procedure Laterality Date   • BREAST BIOPSY     • BREAST LUMPECTOMY  07/15/2009    LUMPECTOMY   • COLONOSCOPY     • DILATATION AND CURETTAGE      RESOLVED 06/15/2010   • EYE SURGERY Bilateral 10/15/2012    CATARACT EXTRACTION   • HEMORRHOIDECTOMY     • JOINT REPLACEMENT Left     KNEE   • KNEE ARTHROSCOPY Right 2017    Procedure: KNEE  "ARTHROSCOPY WITH PARTIAL MEDIAL AND LATERAL MENISECTOMY AND DEBRIDEMENT OF ARTHRITIS.;  Surgeon: Lauren Krishna MD;  [place] [facility] [place];  Service:    • TOTAL KNEE ARTHROPLASTY Right 5/21/2018    Procedure: RIGHT TOTAL KNEE ARTHROPLASTY;  Surgeon: Kiko Solano MD;  Location: [facility];  Service: Orthopedics             PT Ortho     Row Name 07/03/18 1000       Subjective Comments    Subjective Comments Driving now.  Want to get back to my water ex...  -SI       Subjective Pain    Able to rate subjective pain? yes  -SI    Pre-Treatment Pain Level 0  -SI    Post-Treatment Pain Level 0  -SI       Posture/Observations    Observations --   incision healed, thin skin and vascular  chges  -SI       Right Lower Ext    Rt Knee Extension/Flexion AROM -3 to 120  -SI       Transfers    Comment (Transfers) instruction and demonstrated for her how to get upa nd down from floor.  She did not want to attempt...  -SI       Gait/Stairs Assessment/Training    Comment (Gait/Stairs) Pt has DC'ed the cane  -SI      User Key  (r) = Recorded By, (t) = Taken By, (c) = Cosigned By    Initials Name Provider Type    KARLA Banda PTA Physical Therapy Assistant                            PT Assessment/Plan     Row Name 07/03/18 1055          PT Assessment    Assessment Comments Pt doing great for 6 weeks post-op.  She has chronic vascular changes in lower legs and \"thin skin\".  Told her MD has to give her the OK to resume water ex...  -SI       User Key  (r) = Recorded By, (t) = Taken By, (c) = Cosigned By    Initials Name Provider Type    KARLA Banda PTA Physical Therapy Assistant                Modalities     Row Name 07/03/18 1000             Ice    Patient reports will apply ice at home to involved area Yes  -SI        User Key  (r) = Recorded By, (t) = Taken By, (c) = Cosigned By    Initials Name Provider Type    KARLA Banda PTA Physical Therapy Assistant                Exercises     Row Name 07/03/18 1000    "          Subjective Comments    Subjective Comments Driving now.  Want to get back to my water ex...  -SI         Subjective Pain    Able to rate subjective pain? yes  -SI      Pre-Treatment Pain Level 0  -SI      Post-Treatment Pain Level 0  -SI         Total Minutes    03343 - PT Therapeutic Exercise Minutes 45  -SI         Exercise 1    Exercise Name 1 Nu-step seat #&  -SI      Time 1 7  -SI         Exercise 2    Exercise Name 2 QS  -SI      Reps 2 20  -SI         Exercise 3    Exercise Name 3 gastroc stretch with towell  -SI      Reps 3 3  -SI      Time 3 20  -SI         Exercise 4    Exercise Name 4 SAQ and LAQwith 3 lbs  -SI      Sets 4 2  -SI      Reps 4 10  -SI      Additional Comments 3lbs  -SI         Exercise 5    Exercise Name 5 SLR  -SI      Sets 5 2  -SI      Reps 5 10  -SI      Additional Comments ----------------  -SI         Exercise 6    Exercise Name 6 hip abd on side  -SI      Sets 6 2  -SI      Reps 6 10  -SI      Additional Comments active  -SI         Exercise 7    Exercise Name 7 6 inch step ups  -SI      Reps 7 20  -SI         Exercise 8    Exercise Name 8 Chair glide/TM for flexion stretch  -SI      Reps 8 4  -SI      Time 8 20  -SI         Exercise 9    Exercise Name 9 HS curls sitting with green TB  -SI      Sets 9 2  -SI      Reps 9 10  -SI         Exercise 10    Exercise Name 10 HS stretch long sitting  -SI      Sets 10 1  -SI      Reps 10 3  -SI         Exercise 11    Exercise Name 11 sit, heel prop for ext stretch  -SI      Time 11 5 min  -SI        User Key  (r) = Recorded By, (t) = Taken By, (c) = Cosigned By    Initials Name Provider Type    KARLA Banda PTA Physical Therapy Assistant                               PT OP Goals     Row Name 07/03/18 1000          Long Term Goals    LTG 1 Patient will report being able to bend knee more comfortably in order to garden and plant flowers  -SI     LTG 1 Progress --   hasn't attempted per her report  -SI       User Key  (r) =  Recorded By, (t) = Taken By, (c) = Cosigned By    Initials Name Provider Type    SI Brit Banda PTA Physical Therapy Assistant          Therapy Education  Given: HEP, Symptoms/condition management (MD has to release her for water ex.....)  Program: Progressed  How Provided: Verbal, Demonstration, Written  Provided to: Patient  Level of Understanding: Teach back education performed              Time Calculation:   Start Time: 1015  Stop Time: 1100  Time Calculation (min): 45 min  Total Timed Code Minutes- PT: 45 minute(s)  Therapy Suggested Charges     Code   Minutes Charges    62506 (CPT®) Hc Pt Neuromusc Re Education Ea 15 Min      38023 (CPT®) Hc Pt Ther Proc Ea 15 Min 45 3    10145 (CPT®) Hc Gait Training Ea 15 Min      51998 (CPT®) Hc Pt Therapeutic Act Ea 15 Min      07352 (CPT®) Hc Pt Manual Therapy Ea 15 Min      36179 (CPT®) Hc Pt Ther Massage- Per 15 Min      70678 (CPT®) Hc Pt Iontophoresis Ea 15 Min      56026 (CPT®) Hc Pt Elec Stim Ea-Per 15 Min      50475 (CPT®) Hc Pt Ultrasound Ea 15 Min      86925 (CPT®) Hc Pt Self Care/Mgmt/Train Ea 15 Min      Total  45 3        Therapy Charges for Today     Code Description Service Date Service Provider Modifiers Qty    82611109548 HC PT THER PROC EA 15 MIN 7/3/2018 Brit Banda PTA GP 3                    Brit Banda PTA  7/3/2018

## 2018-07-03 NOTE — TELEPHONE ENCOUNTER
Please ask the patient to hold off on any swimming until we see her back for a week follow up to make sure everything is completely healed

## 2018-07-03 NOTE — TELEPHONE ENCOUNTER
Patient had a Rt TKA on 5/21/18, patient is asking how long until she can go swimming in a pool. Please advise. Thank you!

## 2018-07-05 ENCOUNTER — HOSPITAL ENCOUNTER (OUTPATIENT)
Dept: PHYSICAL THERAPY | Facility: HOSPITAL | Age: 83
Setting detail: THERAPIES SERIES
Discharge: HOME | End: 2018-07-05
Attending: ORTHOPAEDIC SURGERY

## 2018-07-05 DIAGNOSIS — M25.561 ACUTE PAIN OF RIGHT KNEE: ICD-10-CM

## 2018-07-05 DIAGNOSIS — Z96.651 S/P TKR (TOTAL KNEE REPLACEMENT), RIGHT: ICD-10-CM

## 2018-07-05 DIAGNOSIS — Z96.651 HISTORY OF TOTAL RIGHT KNEE REPLACEMENT: Primary | ICD-10-CM

## 2018-07-05 PROCEDURE — G8979 MOBILITY GOAL STATUS: HCPCS | Performed by: PHYSICAL THERAPIST

## 2018-07-05 PROCEDURE — G8980 MOBILITY D/C STATUS: HCPCS | Performed by: PHYSICAL THERAPIST

## 2018-07-05 PROCEDURE — 97110 THERAPEUTIC EXERCISES: CPT

## 2018-07-05 NOTE — THERAPY DISCHARGE NOTE
Outpatient Physical Therapy Ortho Treatment Note/Discharge Summary  [facility]     Patient Name: Lanie Ochoa  : 1935  MRN: [id number]  Today's Date: 2018      Visit Date: 2018    Visit Dx:    ICD-10-CM ICD-9-CM   1. History of total right knee replacement Z96.651 V43.65   2. Acute pain of right knee M25.561 719.46   3. S/P TKR (total knee replacement), right Z96.651 V43.65       Patient Active Problem List   Diagnosis   • Anxiety   • HLD (hyperlipidemia)   • Benign essential hypertension   • Osteopenia   • Avitaminosis D   • History of left knee replacement   • Titubation   • Non-rheumatic mitral regurgitation   • Non-rheumatic tricuspid valve insufficiency   • Chronic pain of right knee   • Venous incompetence   • Dysfunction of both eustachian tubes   • Primary localized osteoarthrosis of right lower leg   • Primary osteoarthritis of right knee   • OA (osteoarthritis) of knee        Past Medical History:   Diagnosis Date   • Anxiety    • BMS (burning mouth syndrome) 2015   • Breast cancer (CMS/HCC)    • Cancer (CMS/HCC)     STAGE 1 INVASIVE DUCTAL CARCINOMA ER AND NV POSITIVE, HER-2/LOYDA NEGATIVE   • Cancer (CMS/HCC)     SKIN LEFT ARM; SQUAMOUS CELL   • Constipation    • Degenerative disc disease    • Functional murmur     RESOLVED 2014   • Gastritis     RESOLVED 2014   • GERD (gastroesophageal reflux disease)    • Glossalgia 2015   • Hypertension    • Internal hemorrhoids    • Osteoarthritis 2014    HAND   • PNA (pneumonia) 2015   • Trigger ring finger 2014    RESOLVED 2014        Past Surgical History:   Procedure Laterality Date   • BREAST BIOPSY     • BREAST LUMPECTOMY  07/15/2009    LUMPECTOMY   • COLONOSCOPY     • DILATATION AND CURETTAGE      RESOLVED 06/15/2010   • EYE SURGERY Bilateral 10/15/2012    CATARACT EXTRACTION   • HEMORRHOIDECTOMY     • JOINT REPLACEMENT Left     KNEE   • KNEE ARTHROSCOPY Right 2017     Procedure: KNEE ARTHROSCOPY WITH PARTIAL MEDIAL AND LATERAL MENISECTOMY AND DEBRIDEMENT OF ARTHRITIS.;  Surgeon: Lauren Krishna MD;  Location: [facility];  Service:    • TOTAL KNEE ARTHROPLASTY Right 5/21/2018    Procedure: RIGHT TOTAL KNEE ARTHROPLASTY;  Surgeon: Kiko Solano MD;  Location: [facility] OR;  Service: Orthopedics             PT Ortho     Row Name 07/05/18 1100       Subjective Comments    Subjective Comments pt describes a very active lifestyle and admits she is starting to get tired easy in this hot weather.  Wants to get back to water ex but has to wait until RTMD  -SI       Subjective Pain    Able to rate subjective pain? yes  -SI    Pre-Treatment Pain Level 0  -SI    Post-Treatment Pain Level 0  -SI       Posture/Observations    Observations Edema   mild right knee and lower leg  -SI       Right Lower Ext    Rt Knee Extension/Flexion AROM -3 to 121  -SI       Left Knee (Manual Muscle Testing)    Comment, Left Knee: Manual Muscle Testing (MMT) 4+/5  -SI       Right Knee (Manual Muscle Testing)    Comment, Right Knee: Manual Muscle Testing (MMT) 4+/5  -SI       Balance Skills Training    SLS 5 seconds on either LE  -SI       Gait/Stairs Assessment/Training    Comment (Gait/Stairs) normal gait without assistive device  -SI    Row Name 07/03/18 1000       Subjective Comments    Subjective Comments Driving now.  Want to get back to my water ex...  -SI       Subjective Pain    Able to rate subjective pain? yes  -SI    Pre-Treatment Pain Level 0  -SI    Post-Treatment Pain Level 0  -SI       Posture/Observations    Observations --   incision healed, thin skin and vascular  chges  -SI       Right Lower Ext    Rt Knee Extension/Flexion AROM -3 to 120  -SI       Transfers    Comment (Transfers) instruction and demonstrated for her how to get upa nd down from floor.  She did not want to attempt...  -SI       Gait/Stairs Assessment/Training    Comment (Gait/Stairs) Pt has DC'ed the cane  -SI      User  Key  (r) = Recorded By, (t) = Taken By, (c) = Cosigned By    Initials Name Provider Type    KARLA ORTEGA ELBA Banda Physical Therapy Assistant                            PT Assessment/Plan     Row Name 07/05/18 1107          PT Assessment    Assessment Comments Pt is 6 .5 weeks post-op and has progressed very well.  She is patiently waiting until RTMD before gets into a pool for water ex.  Pt is ind with HEP and DC today, RTMD 7-20-18  -SI       User Key  (r) = Recorded By, (t) = Taken By, (c) = Cosigned By    Initials Name Provider Type    KARLA ORTEGA ELBA Banda Physical Therapy Assistant                Modalities     Row Name 07/05/18 1100             Ice    Patient reports will apply ice at home to involved area Yes  -SI        User Key  (r) = Recorded By, (t) = Taken By, (c) = Cosigned By    Initials Name Provider Type    KARLA ORTEGA ELBA Banda Physical Therapy Assistant                Exercises     Row Name 07/05/18 1100             Subjective Comments    Subjective Comments pt describes a very active lifestyle and admits she is starting to get tired easy in this hot weather.  Wants to get back to water ex but has to wait until RTMD  -SI         Subjective Pain    Able to rate subjective pain? yes  -SI      Pre-Treatment Pain Level 0  -SI      Post-Treatment Pain Level 0  -SI         Exercise 1    Exercise Name 1 Nu-step seat #&  -SI      Time 1 7  -SI         Exercise 2    Exercise Name 2 QS  -SI      Reps 2 20  -SI         Exercise 3    Exercise Name 3 gastroc stretch with towell  -SI      Reps 3 3  -SI      Time 3 20  -SI         Exercise 4    Exercise Name 4 SAQ and LAQwith 3 lbs  -SI      Sets 4 2  -SI      Reps 4 10  -SI         Exercise 5    Exercise Name 5 SLR  -SI      Sets 5 2  -SI      Reps 5 10  -SI         Exercise 6    Exercise Name 6 hip abd on side  -SI      Sets 6 2  -SI      Reps 6 10  -SI         Exercise 7    Exercise Name 7 6 inch step ups  -SI      Reps 7 20  -SI         Exercise 8     Exercise Name 8 Chair glide/TM for flexion stretch  -SI      Reps 8 4  -SI      Time 8 20  -SI         Exercise 9    Exercise Name 9 HS curls sitting with green TB  -SI      Sets 9 2  -SI      Reps 9 10  -SI         Exercise 10    Exercise Name 10 HS stretch long sitting  -SI      Sets 10 1  -SI      Reps 10 3  -SI         Exercise 11    Exercise Name 11 sit, heel prop for ext stretch  -SI      Time 11 5 min  -SI        User Key  (r) = Recorded By, (t) = Taken By, (c) = Cosigned By    Initials Name Provider Type    KARLA Banda PTA Physical Therapy Assistant                               PT OP Goals     Row Name 07/05/18 1100          PT Short Term Goals    STG 1 Pt will be independent with HEP to improve R Knee strength and ROM to allow for improved functional mobility with decreased pain   -SI     STG 1 Progress Met  -SI     STG 2 Pt will have R knee AROM 0 to 120.  -SI     STG 2 Progress Partially Met  -SI     STG 3 Pt will have RLE MMT grossly 4+/5  -SI     STG 3 Progress Met  -SI     STG 4 Pt will report 10% improvement on KOS.  -SI     STG 4 Progress Met  -SI        Long Term Goals    LTG 1 Patient will report being able to bend knee more comfortably in order to garden and plant flowers  -SI     LTG 1 Progress Met    Went to work in garden x 1 and no problems other than too h  -SI     LTG 2 Patient will demonstrate independence with advanced HEP in order to promote self management of condition  -SI     LTG 2 Progress Met  -SI       User Key  (r) = Recorded By, (t) = Taken By, (c) = Cosigned By    Initials Name Provider Type    KARLA Banda PTA Physical Therapy Assistant          Therapy Education  Given: HEP, Symptoms/condition management, Edema management  Program: Reinforced  How Provided: Verbal, Demonstration, Written  Provided to: Patient  Level of Understanding: Teach back education performed    Outcome Measure Options: Knee Outcome Score- ADL  Knee Outcome Score  Knee Outcome Score Comments:  86%      Time Calculation:   Start Time: 1015  Stop Time: 1100  Time Calculation (min): 45 min  Total Timed Code Minutes- PT: 45 minute(s)  Therapy Suggested Charges     Code   Minutes Charges    None           Therapy Charges for Today     Code Description Service Date Service Provider Modifiers Qty    73207532439 HC PT THER PROC EA 15 MIN 7/5/2018 Brit Banda PTA GP 3          PT G-Codes  Outcome Measure Options: Knee Outcome Score- ADL     OP PT Discharge Summary  Date of Discharge: 07/05/18  Reason for Discharge: All goals achieved  Outcomes Achieved: Able to achieve all goals within established timeline  Discharge Destination: Home with home program  Discharge Instructions/Additional Comments: bike, HEP and elevate regularly      Brit Banda PTA  7/5/2018

## 2018-07-06 ENCOUNTER — HOSPITAL ENCOUNTER (OUTPATIENT)
Dept: MAMMOGRAPHY | Facility: HOSPITAL | Age: 83
Discharge: HOME | End: 2018-07-06
Admitting: FAMILY MEDICINE

## 2018-07-06 DIAGNOSIS — Z12.31 VISIT FOR SCREENING MAMMOGRAM: ICD-10-CM

## 2018-07-06 PROCEDURE — 77067 SCR MAMMO BI INCL CAD: CPT

## 2018-07-06 PROCEDURE — 77063 BREAST TOMOSYNTHESIS BI: CPT

## 2018-07-11 ENCOUNTER — OFFICE VISIT (OUTPATIENT)
Dept: FAMILY MEDICINE CLINIC | Facility: CLINIC | Age: 83
End: 2018-07-11

## 2018-07-11 VITALS
DIASTOLIC BLOOD PRESSURE: 68 MMHG | BODY MASS INDEX: 27.42 KG/M2 | HEIGHT: 62 IN | OXYGEN SATURATION: 99 % | WEIGHT: 149 LBS | HEART RATE: 70 BPM | SYSTOLIC BLOOD PRESSURE: 122 MMHG

## 2018-07-11 DIAGNOSIS — E78.5 HYPERLIPIDEMIA, UNSPECIFIED HYPERLIPIDEMIA TYPE: ICD-10-CM

## 2018-07-11 DIAGNOSIS — I10 BENIGN ESSENTIAL HYPERTENSION: Primary | ICD-10-CM

## 2018-07-11 PROCEDURE — 99213 OFFICE O/P EST LOW 20 MIN: CPT | Performed by: FAMILY MEDICINE

## 2018-07-11 RX ORDER — DILTIAZEM HYDROCHLORIDE 240 MG/1
240 CAPSULE, EXTENDED RELEASE ORAL EVERY EVENING
Qty: 90 CAPSULE | Refills: 1 | Status: SHIPPED | OUTPATIENT
Start: 2018-07-11 | End: 2019-01-03 | Stop reason: SDUPTHER

## 2018-07-11 RX ORDER — HEPATITIS A VACCINE 1440 [IU]/ML
INJECTION, SUSPENSION INTRAMUSCULAR
Refills: 1 | COMMUNITY
Start: 2018-05-11 | End: 2018-09-21

## 2018-07-11 NOTE — PROGRESS NOTES
"Subjective   Lanie Ochoa is a 83 y.o. female.     Chief Complaint   Patient presents with   • Hyperlipidemia   • Med Refill     diltiazem        History of Present Illness    HTN  Well controlled. She is taking and tolerating all the same BP meds which were reviewed. She is getting more active since her knee replacement about 6 weeks ago. Went well, feels good. She is getting around well. Watches what she eats, has always watched what she eats.     HLD  Mildly elevated in 2016, has not been re-evaluated since that time. Never on a statin, not taking any fish oil. Not at all interested in starting a new medication at this point, does not want to take a statin.     The following portions of the patient's history were reviewed and updated as appropriate: allergies, current medications and problem list.    Review of Systems   Constitutional: Negative for activity change, appetite change and unexpected weight change.   Respiratory: Negative for shortness of breath.    Cardiovascular: Negative for chest pain and leg swelling.   Musculoskeletal: Positive for arthralgias. Negative for gait problem.       Objective   Blood pressure 122/68, pulse 70, height 157.5 cm (62\"), weight 67.6 kg (149 lb), SpO2 99 %.  Physical Exam   Constitutional: She is oriented to person, place, and time. She appears well-nourished. No distress.   Cardiovascular: Normal rate, regular rhythm and intact distal pulses.    Murmur heard.  Pulmonary/Chest: Effort normal and breath sounds normal. No respiratory distress.   Musculoskeletal: She exhibits edema.   1+ pitting edema of RLE   Neurological: She is alert and oriented to person, place, and time.   Skin:   Well healed incision   Psychiatric: She has a normal mood and affect. Her behavior is normal.   Vitals reviewed.      Assessment/Plan   Lanie was seen today for hyperlipidemia and med refill.    Diagnoses and all orders for this visit:    Benign essential hypertension  Well controlled " continue current regimen, encouraged her to stay active  Hyperlipidemia, unspecified hyperlipidemia type  Risk of 30% calculated from last check in 2016, pt is 84 yo and not interested in new med or screening, will hold at this time.   Other orders  -     diltiaZEM (TIAZAC) 240 MG 24 hr capsule; Take 1 capsule by mouth Every Evening.

## 2018-07-20 ENCOUNTER — OFFICE VISIT (OUTPATIENT)
Dept: ORTHOPEDIC SURGERY | Facility: CLINIC | Age: 83
End: 2018-07-20

## 2018-07-20 VITALS — TEMPERATURE: 98.4 F | BODY MASS INDEX: 27.57 KG/M2 | HEIGHT: 62 IN | WEIGHT: 149.8 LBS

## 2018-07-20 DIAGNOSIS — Z96.651 S/P TOTAL KNEE ARTHROPLASTY, RIGHT: Primary | ICD-10-CM

## 2018-07-20 PROCEDURE — 99024 POSTOP FOLLOW-UP VISIT: CPT | Performed by: NURSE PRACTITIONER

## 2018-07-20 PROCEDURE — 73562 X-RAY EXAM OF KNEE 3: CPT | Performed by: NURSE PRACTITIONER

## 2018-07-20 NOTE — PROGRESS NOTES
Lanie Ochoa : 1935 MRN: [id number] DATE: 2018    DIAGNOSIS: 8 week follow up right total knee      SUBJECTIVE:Patient returns today for 8 week follow up of right total knee replacement. Patient reports doing well with no unusual complaints. Appears to be progressing appropriately.    OBJECTIVE:   Exam:. The incision is well healed. No sign of infection. Range of motion is measured at 0 to 120. The calf is soft and nontender with a negative Homans sign. Strength is progressing and the patient is ambulating appropriately.    DIAGNOSTIC STUDIES  Xrays: 3 views of the right knee (AP, lateral, and sunrise) were ordered and reviewed for evaluation of recent knee replacement. They demonstrate a well positioned, well aligned knee replacement without complicating factors noted. In comparison with previous films there has been no change.    ASSESSMENT: 8 week status post right knee replacement.    PLAN: 1) Continue with PT exercises as prescribed   2) Follow up in 10 months    Sada Sinclair, APRN  2018

## 2018-08-23 RX ORDER — NEBIVOLOL 10 MG/1
10 TABLET ORAL EVERY MORNING
Qty: 90 TABLET | Refills: 1 | Status: SHIPPED | OUTPATIENT
Start: 2018-08-23 | End: 2019-01-23 | Stop reason: SDUPTHER

## 2018-09-21 ENCOUNTER — OFFICE VISIT (OUTPATIENT)
Dept: FAMILY MEDICINE CLINIC | Facility: CLINIC | Age: 83
End: 2018-09-21

## 2018-09-21 VITALS
WEIGHT: 148 LBS | SYSTOLIC BLOOD PRESSURE: 114 MMHG | HEIGHT: 62 IN | DIASTOLIC BLOOD PRESSURE: 60 MMHG | TEMPERATURE: 98.2 F | BODY MASS INDEX: 27.23 KG/M2 | OXYGEN SATURATION: 98 % | HEART RATE: 62 BPM

## 2018-09-21 DIAGNOSIS — M54.16 LUMBAR BACK PAIN WITH RADICULOPATHY AFFECTING LEFT LOWER EXTREMITY: Primary | ICD-10-CM

## 2018-09-21 PROCEDURE — 99213 OFFICE O/P EST LOW 20 MIN: CPT | Performed by: NURSE PRACTITIONER

## 2018-09-21 RX ORDER — PREDNISONE 20 MG/1
20 TABLET ORAL 2 TIMES DAILY
Qty: 10 TABLET | Refills: 0 | Status: SHIPPED | OUTPATIENT
Start: 2018-09-21 | End: 2018-10-17

## 2018-09-21 NOTE — PROGRESS NOTES
Subjective   Lanie Ochoa is a 83 y.o. female presents with intermittent left upper thigh numbness, denies pain or tingling, no pain shooting into lower extremity, lower back pain from time to time, not chronically hurting. Has had left side sciatica, typically responds to steroids.     Back Pain   This is a new problem. The current episode started more than 1 month ago. The problem occurs daily. The problem is unchanged. The pain is present in the lumbar spine. The quality of the pain is described as aching. The pain radiates to the left thigh. The pain is at a severity of 4/10. The pain is mild. The pain is worse during the day. The symptoms are aggravated by sitting. Stiffness is present in the morning. Associated symptoms include numbness (left thigh). Pertinent negatives include no abdominal pain, bladder incontinence, bowel incontinence, chest pain, dysuria, fever, headaches, leg pain, paresis, paresthesias, pelvic pain, perianal numbness, tingling, weakness or weight loss. She has tried NSAIDs for the symptoms. The treatment provided mild relief.        The following portions of the patient's history were reviewed and updated as appropriate: allergies, current medications, past family history, past medical history, past social history, past surgical history and problem list.    Review of Systems   Constitutional: Negative.  Negative for fever and weight loss.   Cardiovascular: Negative for chest pain.   Gastrointestinal: Negative for abdominal pain and bowel incontinence.   Genitourinary: Negative for bladder incontinence, dysuria and pelvic pain.   Musculoskeletal: Positive for arthralgias and back pain (with numbness in left thigh).   Skin: Negative.    Neurological: Positive for numbness (left thigh). Negative for tingling, weakness, headaches and paresthesias.       Objective   Physical Exam   Constitutional: She is oriented to person, place, and time. She appears well-developed and well-nourished.    Cardiovascular: Normal rate, regular rhythm and normal heart sounds.  Exam reveals no gallop and no friction rub.    No murmur heard.  Pulmonary/Chest: Effort normal and breath sounds normal. No respiratory distress. She has no wheezes. She has no rales.   Musculoskeletal:        Lumbar back: She exhibits normal range of motion, no tenderness, no bony tenderness, no edema and no pain.   Neurological: She is alert and oriented to person, place, and time.   Skin: Skin is warm and dry.   Psychiatric: She has a normal mood and affect.   Vitals reviewed.      Assessment/Plan   Lanie was seen today for numbness.    Diagnoses and all orders for this visit:    Lumbar back pain with radiculopathy affecting left lower extremity    Other orders  -     predniSONE (DELTASONE) 20 MG tablet; Take 1 tablet by mouth 2 (Two) Times a Day.    recommend steroids for no improvement in symptoms, recommend physical therapy.  May need further imaging, will hold for now.

## 2018-10-15 RX ORDER — MONTELUKAST SODIUM 10 MG/1
10 TABLET ORAL NIGHTLY
Qty: 30 TABLET | Refills: 11 | Status: SHIPPED | OUTPATIENT
Start: 2018-10-15 | End: 2019-07-23 | Stop reason: SDUPTHER

## 2018-10-17 ENCOUNTER — OFFICE VISIT (OUTPATIENT)
Dept: FAMILY MEDICINE CLINIC | Facility: CLINIC | Age: 83
End: 2018-10-17

## 2018-10-17 VITALS
HEIGHT: 62 IN | TEMPERATURE: 98 F | BODY MASS INDEX: 27.75 KG/M2 | HEART RATE: 68 BPM | WEIGHT: 150.8 LBS | OXYGEN SATURATION: 98 % | SYSTOLIC BLOOD PRESSURE: 120 MMHG | DIASTOLIC BLOOD PRESSURE: 60 MMHG

## 2018-10-17 DIAGNOSIS — M54.50 CHRONIC LEFT-SIDED LOW BACK PAIN WITHOUT SCIATICA: Primary | ICD-10-CM

## 2018-10-17 DIAGNOSIS — G89.29 CHRONIC LEFT-SIDED LOW BACK PAIN WITHOUT SCIATICA: Primary | ICD-10-CM

## 2018-10-17 DIAGNOSIS — M47.816 OSTEOARTHRITIS OF LUMBAR SPINE, UNSPECIFIED SPINAL OSTEOARTHRITIS COMPLICATION STATUS: ICD-10-CM

## 2018-10-17 PROCEDURE — 96372 THER/PROPH/DIAG INJ SC/IM: CPT | Performed by: NURSE PRACTITIONER

## 2018-10-17 PROCEDURE — 99213 OFFICE O/P EST LOW 20 MIN: CPT | Performed by: NURSE PRACTITIONER

## 2018-10-17 RX ORDER — METHYLPREDNISOLONE ACETATE 40 MG/ML
40 INJECTION, SUSPENSION INTRA-ARTICULAR; INTRALESIONAL; INTRAMUSCULAR; SOFT TISSUE ONCE
Status: COMPLETED | OUTPATIENT
Start: 2018-10-17 | End: 2018-10-17

## 2018-10-17 RX ORDER — DILTIAZEM HYDROCHLORIDE 240 MG/1
CAPSULE, COATED, EXTENDED RELEASE ORAL
Refills: 1 | COMMUNITY
Start: 2018-10-09 | End: 2019-01-23 | Stop reason: SDUPTHER

## 2018-10-17 RX ADMIN — METHYLPREDNISOLONE ACETATE 40 MG: 40 INJECTION, SUSPENSION INTRA-ARTICULAR; INTRALESIONAL; INTRAMUSCULAR; SOFT TISSUE at 12:12

## 2018-10-17 NOTE — PROGRESS NOTES
Subjective   Lanie Ochoa is a 83 y.o. female presents with intermittent low back pain, gradually worsening yesterday. Today it has improved. States she has no numbness/tingling into her leg. She was very active yesterday and may have irritated it. Has not taken any medication. States she does not like to take a lot of medication. It is currently 2/10. Pain is dull ache on left lower back.     Back Pain   This is a recurrent problem. The current episode started 1 to 4 weeks ago. The problem occurs daily. The problem has been waxing and waning since onset. The pain is present in the lumbar spine. The quality of the pain is described as aching. The pain does not radiate. The pain is at a severity of 2/10. The pain is mild. The pain is worse during the day. Pertinent negatives include no abdominal pain, bladder incontinence, bowel incontinence, chest pain, dysuria, fever, headaches, leg pain, numbness, paresis, paresthesias, pelvic pain, perianal numbness, tingling, weakness or weight loss. Risk factors include menopause. She has tried nothing for the symptoms. The treatment provided no relief.        The following portions of the patient's history were reviewed and updated as appropriate: allergies, current medications, past family history, past medical history, past social history, past surgical history and problem list.    Review of Systems   Constitutional: Negative for fever and weight loss.   Cardiovascular: Negative for chest pain.   Gastrointestinal: Negative for abdominal pain and bowel incontinence.   Genitourinary: Negative for bladder incontinence, dysuria and pelvic pain.   Musculoskeletal: Positive for back pain.   Neurological: Negative for tingling, weakness, numbness, headaches and paresthesias.       Objective   Physical Exam   Constitutional: She is oriented to person, place, and time. She appears well-developed and well-nourished.   Musculoskeletal:        Lumbar back: She exhibits normal  range of motion, no tenderness and no pain.   Neurological: She is alert and oriented to person, place, and time.   Skin: Skin is warm and dry.   Psychiatric: She has a normal mood and affect.   Vitals reviewed.      Assessment/Plan   Lanie was seen today for back pain.    Diagnoses and all orders for this visit:    Chronic left-sided low back pain without sciatica  -     methylPREDNISolone acetate (DEPO-medrol) injection 40 mg; Inject 1 mL into the appropriate muscle as directed by prescriber 1 (One) Time.  -     Ambulatory Referral to Physical Therapy Evaluate and treat    Osteoarthritis of lumbar spine, unspecified spinal osteoarthritis complication status

## 2018-10-22 DIAGNOSIS — F41.9 ANXIETY: ICD-10-CM

## 2018-10-22 RX ORDER — SERTRALINE HYDROCHLORIDE 25 MG/1
TABLET, FILM COATED ORAL
Qty: 90 TABLET | Refills: 0 | Status: SHIPPED | OUTPATIENT
Start: 2018-10-22 | End: 2019-03-14 | Stop reason: SDUPTHER

## 2018-11-01 ENCOUNTER — HOSPITAL ENCOUNTER (OUTPATIENT)
Dept: PHYSICAL THERAPY | Facility: HOSPITAL | Age: 83
Discharge: HOME | End: 2018-11-01
Admitting: NURSE PRACTITIONER

## 2018-11-01 DIAGNOSIS — M54.42 CHRONIC LEFT-SIDED LOW BACK PAIN WITH BILATERAL SCIATICA: Primary | ICD-10-CM

## 2018-11-01 DIAGNOSIS — G89.29 CHRONIC LEFT-SIDED LOW BACK PAIN WITH BILATERAL SCIATICA: Primary | ICD-10-CM

## 2018-11-01 DIAGNOSIS — M54.41 CHRONIC LEFT-SIDED LOW BACK PAIN WITH BILATERAL SCIATICA: Primary | ICD-10-CM

## 2018-11-01 PROCEDURE — G8978 MOBILITY CURRENT STATUS: HCPCS | Performed by: PHYSICAL THERAPIST

## 2018-11-01 PROCEDURE — 97110 THERAPEUTIC EXERCISES: CPT | Performed by: PHYSICAL THERAPIST

## 2018-11-01 PROCEDURE — 97162 PT EVAL MOD COMPLEX 30 MIN: CPT | Performed by: PHYSICAL THERAPIST

## 2018-11-01 PROCEDURE — G8979 MOBILITY GOAL STATUS: HCPCS | Performed by: PHYSICAL THERAPIST

## 2018-11-01 PROCEDURE — 97140 MANUAL THERAPY 1/> REGIONS: CPT | Performed by: PHYSICAL THERAPIST

## 2018-11-01 NOTE — THERAPY EVALUATION
Outpatient Physical Therapy Ortho Initial Evaluation  Psychiatric     Patient Name: Lanie Ochoa  : 1935  MRN: [id number]  Today's Date: 2018      Visit Date: 2018    Patient Active Problem List   Diagnosis   • Anxiety   • HLD (hyperlipidemia)   • Benign essential hypertension   • Osteopenia   • Avitaminosis D   • History of left knee replacement   • Titubation   • Non-rheumatic mitral regurgitation   • Non-rheumatic tricuspid valve insufficiency   • Chronic pain of right knee   • Venous incompetence   • Dysfunction of both eustachian tubes   • Primary localized osteoarthrosis of right lower leg   • Primary osteoarthritis of right knee   • OA (osteoarthritis) of knee        Past Medical History:   Diagnosis Date   • Anxiety    • BMS (burning mouth syndrome) 2015   • Breast cancer (CMS/HCC)    • Cancer (CMS/HCC)     STAGE 1 INVASIVE DUCTAL CARCINOMA [place] AND [place] POSITIVE, HER-2/LOYDA NEGATIVE   • Cancer (CMS/HCC)     SKIN LEFT ARM; SQUAMOUS CELL   • Constipation    • Degenerative disc disease    • Functional murmur     RESOLVED 2014   • Gastritis     RESOLVED 2014   • GERD (gastroesophageal reflux disease)    • Glossalgia 2015   • Hypertension    • Internal hemorrhoids    • Osteoarthritis 2014    HAND   • PNA (pneumonia) 2015   • Trigger ring finger 2014    RESOLVED 2014        Past Surgical History:   Procedure Laterality Date   • BREAST BIOPSY     • BREAST LUMPECTOMY  07/15/2009    LUMPECTOMY   • COLONOSCOPY     • DILATATION AND CURETTAGE      RESOLVED 06/15/2010   • EYE SURGERY Bilateral 10/15/2012    CATARACT EXTRACTION   • HEMORRHOIDECTOMY     • JOINT REPLACEMENT Left     KNEE   • KNEE ARTHROSCOPY Right 2017    Procedure: KNEE ARTHROSCOPY WITH PARTIAL MEDIAL AND LATERAL MENISECTOMY AND DEBRIDEMENT OF ARTHRITIS.;  Surgeon: Lauren Krishna MD;  Location[place] [facility] OR [facility] [place]  Service:    • TOTAL KNEE ARTHROPLASTY Right 2018     Procedure: RIGHT TOTAL KNEE ARTHROPLASTY;  Surgeon: Kiko Solano MD;  Location: [facility] OR;  Service: Orthopedics       Visit Dx:     ICD-10-CM ICD-9-CM   1. 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 M54.41 724.2    M54.42 724.3    G89.29 338.29             Patient History     Row Name 11/01/18 1300             History    Chief Complaint Pain  -LC      Type of Pain Hip pain;Back pain  -LC      Date Current Problem(s) Began 10/01/18  -      Brief Description of Current Complaint Pt reports insidious onset of left low back pain in area of L SI joint 1 month ago. She reports pain is intermittent in nature and she indicates it originates and stays in L SI joint with no radicular symptoms. She recently had R knee replaced in May of this year. She reports pain is low level and recent injection caused significant relief.  -LC      Previous treatment for THIS PROBLEM Injections  -LC      Patient/Caregiver Goals Relieve pain;Return to prior level of function;Improve mobility;Improve strength;Know what to do to help the symptoms  -LC      Patient's Rating of General Health Good  -LC      Occupation/sports/leisure activities walking  -LC         Pain     Pain Location Back;Hip   LEFT  -LC      Pain at Present 2  -LC      Pain at Best 0  -LC      Pain at Worst 5  -LC      Pain Frequency Intermittent  -LC      Pain Description Aching;Tightness  -LC         Fall Risk Assessment    Any falls in the past year: No  -LC         Daily Activities    Primary Language English  -      Pt Participated in POC and Goals Yes  -LC         Safety    Are you being hurt, hit, or frightened by anyone at home or in your life? No  -LC      Are you being neglected by a caregiver No  -LC        User Key  (r) = Recorded By, (t) = Taken By, (c) = Cosigned By    Initials Name Provider Type    LC Darren Cancino, PT DPT Physical Therapist                PT Ortho     Row Name 11/01/18 1300       Lumbar/SI Special Tests    Slump Test  (Neural Tension) Left:;Negative  -LC    SLR (Neural Tension) Left:;Negative  -LC    CLEMENTINA (hip vs. SI Dysfunction) Left:;Positive  -LC    FAIR Test (Piriformis Syndrome) Left:;Positive  -LC       MMT (Manual Muscle Testing)    Rt Lower Ext Rt Hip Flexion;Rt Hip Extension;Rt Hip ABduction;Rt Hip ADduction;Rt Knee Extension;Rt Knee Flexion  -LC    Lt Lower Ext Lt Hip Flexion;Lt Hip ABduction;Lt Hip Extension;Lt Hip ADduction;Lt Knee Extension;Lt Knee Flexion  -LC       MMT Right Lower Ext    Rt Hip Flexion MMT, Gross Movement (4/5) good  -LC    Rt Hip Extension MMT, Gross Movement (4/5) good  -LC    Rt Hip ABduction MMT, Gross Movement (4/5) good  -LC    Rt Hip ADduction MMT, Gross Movement (4/5) good  -LC    Rt Knee Extension MMT, Gross Movement (4/5) good  -LC    Rt Knee Flexion MMT, Gross Movement (4/5) good  -LC       MMT Left Lower Ext    Lt Hip Flexion MMT, Gross Movement (4-/5) good minus  -LC    Lt Hip Extension MMT, Gross Movement (4-/5) good minus  -LC    Lt Hip ABduction MMT, Gross Movement (4-/5) good minus  -LC    Lt Hip ADduction MMT, Gross Movement (4-/5) good minus  -LC    Lt Knee Extension MMT, Gross Movement (4-/5) good minus  -LC    Lt Knee Flexion MMT, Gross Movement (4-/5) good minus  -LC       Lower Extremity Flexibility    Hamstrings Bilateral:;Mildly limited  -LC    Hip External Rotators Bilateral:;Mildly limited  -LC      User Key  (r) = Recorded By, (t) = Taken By, (c) = Cosigned By    Initials Name Provider Type    Darren Zaidi M, PT DPT Physical Therapist                      Therapy Education  Given: HEP, Symptoms/condition management, Pain management  Program: New  How Provided: Verbal, Demonstration, Written  Provided to: Patient  Level of Understanding: Teach back education performed, Demonstrated           PT OP Goals     Row Name 11/01/18 1400          PT Short Term Goals    STG 1 Pt will be independent with HEP to improve L SI joint mobility and alleviate low back pain.  -LC      STG 1 Progress New  -LC     STG 2 Pt will have Modified Oswestry <6%  -LC     STG 2 Progress New  -LC     STG 3 Pt will report pain no greater than 1/10 with ADL's.  -LC     STG 3 Progress New  -LC        Time Calculation    PT Goal Re-Cert Due Date 12/01/18  -       User Key  (r) = Recorded By, (t) = Taken By, (c) = Cosigned By    Initials Name Provider Type     Darren Cancino, PT DPT Physical Therapist                PT Assessment/Plan     Row Name 11/01/18 1528          PT Assessment    Functional Limitations Impaired gait;Performance in leisure activities;Limitations in functional capacity and performance  -     Impairments Muscle strength;Pain;Range of motion;Impaired flexibility  -     Assessment Comments Pt is 83 y.o. female who presents to PT with LEFT sided low back pain with no sciatica. She reports insidious onset of pain 1 month ago. Pt has no radicular symptoms and points directly to L SI joint when indicating site of pain. She has positive CLEMENTINA's and FAIR signs indicating piriformis syndrome and SI joint pathology on L side.  Pt has LLE MMT grossly 4-/5 specifically noted with hip ABD and hip ADD. She tolerated manual stretching and reported she felt more loose in her low back. Pt presented today with pain 1/10 indicating that pain only onsets after she has been standing doing house or yard work. Pt was issued written copy of HEP with exercises to promote core stability and reduce stress on low back.  -     Please refer to paper survey for additional self-reported information Yes  -LC     Rehab Potential Good  -LC     Patient/caregiver participated in establishment of treatment plan and goals Yes  -LC     Patient would benefit from skilled therapy intervention Yes  -LC        PT Plan    PT Frequency 1x/week  -LC     Predicted Duration of Therapy Intervention (Therapy Eval) 2 to 4 weeks  -LC     Planned CPT's? PT EVAL MOD COMPLELITY: 10504;PT RE-EVAL: 27863;PT NEUROMUSC RE-EDUCATION EA 15  MIN: 01617;PT MANUAL THERAPY EA 15 MIN: 06178;PT THER ACT EA 15 MIN: 12731;PT THER PROC EA 15 MIN: 79030  -LC     Physical Therapy Interventions (Optional Details) home exercise program;patient/family education;stretching;strengthening;ROM (Range of Motion);lumbar stabilization;manual therapy techniques;modalities  -LC     PT Plan Comments Pt requires skilled therapy to improve SI joint mobility and increase core stability to alleviate pain and reduce risk of reinjury.  -LC       User Key  (r) = Recorded By, (t) = Taken By, (c) = Cosigned By    Initials Name Provider Type    Darren Zaidi, PT DPT Physical Therapist                  Exercises     Row Name 11/01/18 1400 11/01/18 1300          Total Minutes    49356 - PT Therapeutic Exercise Minutes 15  -LC  --     34226 - PT Manual Therapy Minutes  -- 15  -LC        Exercise 1    Exercise Name 1 supine hamstring stretch with strap  -LC  --     Sets 1 2  -LC  --     Reps 1 5  -LC  --     Time 1 15  -LC  --        Exercise 2    Exercise Name 2 SKTC  -LC  --     Sets 2 2  -LC  --     Reps 2 5  -LC  --     Time 2 15  -LC  --        Exercise 3    Exercise Name 3 piriformis figure 4 stretch supine  -LC  --     Sets 3 2  -LC  --     Reps 3 5  -LC  --     Time 3 15  -LC  --        Exercise 4    Exercise Name 4 low trunk rotation  -LC  --     Sets 4 2  -LC  --     Reps 4 10  -LC  --     Time 4 3  -LC  --        Exercise 5    Exercise Name 5 supine hip ADD  -LC  --     Sets 5 3  -LC  --     Reps 5 5  -LC  --     Time 5 3  -LC  --        Exercise 6    Exercise Name 6 supine hip ABD BTB  -LC  --     Sets 6 3  -LC  --     Reps 6 5  -LC  --       User Key  (r) = Recorded By, (t) = Taken By, (c) = Cosigned By    Initials Name Provider Type    Darren Zaidi PT DPT Physical Therapist           Manual Rx (last 36 hours)      Manual Treatments     Row Name 11/01/18 1300             Total Minutes    84383 - PT Manual Therapy Minutes 15  -LC         Manual Rx 1    Manual Rx 1  Location LLE  -LC      Manual Rx 1 Type long axis traction, piriformis stretch, hip extensor stretch  -LC      Manual Rx 1 Duration 15 min  -        User Key  (r) = Recorded By, (t) = Taken By, (c) = Cosigned By    Initials Name Provider Type    Darren Zaidi, PT DPT Physical Therapist                      Outcome Measure Options: Modifed Nadine  Knee Outcome Score  Knee Outcome Score Comments: 12%      Time Calculation:     Therapy Suggested Charges     Code   Minutes Charges    75104 (CPT®) Hc Pt Neuromusc Re Education Ea 15 Min      82154 (CPT®) Hc Pt Ther Proc Ea 15 Min 15 1    76156 (CPT®) Hc Gait Training Ea 15 Min      23079 (CPT®) Hc Pt Therapeutic Act Ea 15 Min      64134 (CPT®) Hc Pt Manual Therapy Ea 15 Min 15 1    61551 (CPT®) Hc Pt Ther Massage- Per 15 Min      21910 (CPT®) Hc Pt Iontophoresis Ea 15 Min      39216 (CPT®) Hc Pt Elec Stim Ea-Per 15 Min      37799 (CPT®) Hc Pt Ultrasound Ea 15 Min      45163 (CPT®) Hc Pt Self Care/Mgmt/Train Ea 15 Min      89384 (CPT®) Hc Pt Prosthetic (S) Train Initial Encounter, Each 15 Min      89308 (CPT®) Hc Orthotic(S) Mgmt/Train Initial Encounter, Each 15min      76534 (CPT®) Hc Pt Aquatic Therapy Ea 15 Min      06509 (CPT®) Hc Pt Orthotic(S)/Prosthetic(S) Encounter, Each 15 Min       (CPT®) Hc Pt Electrical Stim Unattended      Total  30 2          Start Time: 1345  Stop Time: 1430  Time Calculation (min): 45 min  Total Timed Code Minutes- PT: 45 minute(s)     Therapy Charges for Today     Code Description Service Date Service Provider Modifiers Qty    94180877160 HC PT MOBILITY CURRENT 11/1/2018 Darren Cancino, PT DPT GP, CI 1    30990959506 HC PT MOBILITY PROJECTED 11/1/2018 Darren Cancino, PT DPT GP,  1    98980364583 HC PT THER PROC EA 15 MIN 11/1/2018 Darren Cancino, PT DPT GP 1    24685895943 HC PT MANUAL THERAPY EA 15 MIN 11/1/2018 Darren Cancino, PT DPT GP 1    80493211150 HC PT EVAL MOD COMPLEXITY 1 11/1/2018 Darren Cancino, PT DPT GP 1           PT G-Codes  PT Professional Judgement Used?: Yes  Outcome Measure Options: Tricia Mccoy  Functional Limitation: Mobility: Walking and moving around  Mobility: Walking and Moving Around Current Status (): At least 1 percent but less than 20 percent impaired, limited or restricted  Mobility: Walking and Moving Around Goal Status (): 0 percent impaired, limited or restricted         Darren Cancino, PT DPT  11/1/2018

## 2018-11-08 ENCOUNTER — HOSPITAL ENCOUNTER (OUTPATIENT)
Dept: PHYSICAL THERAPY | Facility: HOSPITAL | Age: 83
Discharge: HOME | End: 2018-11-08
Admitting: NURSE PRACTITIONER

## 2018-11-08 DIAGNOSIS — Z96.651 HISTORY OF TOTAL RIGHT KNEE REPLACEMENT: ICD-10-CM

## 2018-11-08 DIAGNOSIS — M54.42 CHRONIC LEFT-SIDED LOW BACK PAIN WITH BILATERAL SCIATICA: Primary | ICD-10-CM

## 2018-11-08 DIAGNOSIS — G89.29 CHRONIC LEFT-SIDED LOW BACK PAIN WITH BILATERAL SCIATICA: Primary | ICD-10-CM

## 2018-11-08 DIAGNOSIS — M54.41 CHRONIC LEFT-SIDED LOW BACK PAIN WITH BILATERAL SCIATICA: Primary | ICD-10-CM

## 2018-11-08 PROCEDURE — 97140 MANUAL THERAPY 1/> REGIONS: CPT

## 2018-11-08 PROCEDURE — 97110 THERAPEUTIC EXERCISES: CPT

## 2018-11-08 NOTE — THERAPY TREATMENT NOTE
Outpatient Physical Therapy Ortho Treatment Note  [facility]     Patient Name: Lanie Ochoa  : 1935  MRN: [id number]  Today's Date: 2018      Visit Date: 2018    Visit Dx:    ICD-10-CM ICD-9-CM   1. 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 History of total right knee replacement Z96.651 V43.65       Patient Active Problem List   Diagnosis   • Anxiety   • HLD (hyperlipidemia)   • Benign essential hypertension   • Osteopenia   • Avitaminosis D   • History of left knee replacement   • Titubation   • Non-rheumatic mitral regurgitation   • Non-rheumatic tricuspid valve insufficiency   • Chronic pain of right knee   • Venous incompetence   • Dysfunction of both eustachian tubes   • Primary localized osteoarthrosis of right lower leg   • Primary osteoarthritis of right knee   • OA (osteoarthritis) of knee        Past Medical History:   Diagnosis Date   • Anxiety    • BMS (burning mouth syndrome) 2015   • Breast cancer (CMS/HCC)    • Cancer (CMS/HCC)     STAGE 1 INVASIVE DUCTAL CARCINOMA ER AND [place] POSITIVE, HER-2/[place] NEGATIVE   • Cancer (CMS/HCC)     SKIN LEFT ARM; SQUAMOUS CELL   • Constipation    • Degenerative disc disease    • Functional murmur     RESOLVED 2014   • Gastritis     RESOLVED 2014   • GERD (gastroesophageal reflux disease)    • Glossalgia 2015   • Hypertension    • Internal hemorrhoids    • Osteoarthritis 2014    HAND   • PNA (pneumonia) 2015   • Trigger ring finger 2014    RESOLVED 2014        Past Surgical History:   Procedure Laterality Date   • BREAST BIOPSY     • BREAST LUMPECTOMY  07/15/2009    LUMPECTOMY   • COLONOSCOPY     • DILATATION AND CURETTAGE      RESOLVED 06/15/2010   • EYE SURGERY Bilateral 10/15/2012    CATARACT EXTRACTION   • HEMORRHOIDECTOMY     • JOINT REPLACEMENT Left     KNEE   • KNEE ARTHROSCOPY Right 2017    Procedure: KNEE  ARTHROSCOPY WITH PARTIAL MEDIAL AND LATERAL MENISECTOMY AND DEBRIDEMENT OF ARTHRITIS.;  Surgeon: Lauren Krishna MD;  Location: [facility];  Service:    • TOTAL KNEE ARTHROPLASTY Right 5/21/2018    Procedure: RIGHT TOTAL KNEE ARTHROPLASTY;  Surgeon: Kiko Solano MD;  Location: [facility];  Service: Orthopedics             PT Ortho     Row Name 11/08/18 1600       Subjective Comments    Subjective Comments Pt states her left back pain continues to improve, no pain today.  Has realy not hurt since first PT session  -SI      User Key  (r) = Recorded By, (t) = Taken By, (c) = Cosigned By    Initials Name Provider Type    Brit Green PTA Physical Therapy Assistant                            PT Assessment/Plan     Row Name 11/08/18 1657          PT Assessment    Assessment Comments some review of ex on initial HEP needed.  Sx resolving.  May only need one more session next week.  pt very active and reminded her she needs rest.   -SI       User Key  (r) = Recorded By, (t) = Taken By, (c) = Cosigned By    Initials Name Provider Type    Brit Green PTA Physical Therapy Assistant                    Exercises     Row Name 11/08/18 1600 11/08/18 1500          Subjective Comments    Subjective Comments Pt states her left back pain continues to improve, no pain today.  Has realy not hurt since first PT session  -SI  --        Total Minutes    94021 - PT Therapeutic Exercise Minutes 30  -SI  --     90471 - PT Manual Therapy Minutes  -- 10  -SI        Exercise 1    Exercise Name 1 supine hamstring stretch with strap  -SI  --     Sets 1 2  -SI  --     Reps 1 5  -SI  --     Time 1 15  -SI  --        Exercise 2    Exercise Name 2 SKTC  -SI  --     Sets 2 2  -SI  --     Reps 2 5  -SI  --     Time 2 15  -SI  --        Exercise 3    Exercise Name 3 piriformis figure 4 stretch supine  -SI  --     Sets 3 2  -SI  --     Reps 3 5  -SI  --     Time 3 15  -SI  --        Exercise 4    Exercise Name 4 low trunk rotation   -SI  --     Sets 4 2  -SI  --     Reps 4 10  -SI  --     Time 4 3  -SI  --        Exercise 5    Exercise Name 5 supine hip ADD  -SI  --     Sets 5 3  -SI  --     Reps 5 5  -SI  --     Time 5 3  -SI  --        Exercise 6    Exercise Name 6 supine hip ABD BTB  -SI  --     Sets 6 3  -SI  --     Reps 6 5  -SI  --       User Key  (r) = Recorded By, (t) = Taken By, (c) = Cosigned By    Initials Name Provider Type    Brit Green PTA Physical Therapy Assistant                        Manual Rx (last 36 hours)      Manual Treatments     Row Name 11/08/18 1500             Total Minutes    97671 - PT Manual Therapy Minutes 10  -SI         Manual Rx 1    Manual Rx 1 Location LLE  -SI      Manual Rx 1 Type long axis traction, piriformis stretch, hip extensor stretch  -SI      Manual Rx 1 Duration 10  -SI        User Key  (r) = Recorded By, (t) = Taken By, (c) = Cosigned By    Initials Name Provider Type    Brit Green PTA Physical Therapy Assistant              Therapy Education  Given: HEP, Symptoms/condition management  Program: Reinforced  How Provided: Verbal, Demonstration, Written  Provided to: Patient  Level of Understanding: Teach back education performed              Time Calculation:   Start Time: 1630  Stop Time: 1715  Time Calculation (min): 45 min  Total Timed Code Minutes- PT: 45 minute(s)  Therapy Suggested Charges     Code   Minutes Charges    56706 (CPT®) Hc Pt Neuromusc Re Education Ea 15 Min      74222 (CPT®) Hc Pt Ther Proc Ea 15 Min 30 2    54319 (CPT®) Hc Gait Training Ea 15 Min      12797 (CPT®) Hc Pt Therapeutic Act Ea 15 Min      67860 (CPT®) Hc Pt Manual Therapy Ea 15 Min 10 1    85665 (CPT®) Hc Pt Ther Massage- Per 15 Min      36116 (CPT®) Hc Pt Iontophoresis Ea 15 Min      17092 (CPT®) Hc Pt Elec Stim Ea-Per 15 Min      50504 (CPT®) Hc Pt Ultrasound Ea 15 Min      99028 (CPT®) Hc Pt Self Care/Mgmt/Train Ea 15 Min      48696 (CPT®) Hc Pt Prosthetic (S) Train Initial Encounter, Each 15  Min      28849 (CPT®) Hc Orthotic(S) Mgmt/Train Initial Encounter, Each 15min      18281 (CPT®) Hc Pt Aquatic Therapy Ea 15 Min      98924 (CPT®) Hc Pt Orthotic(S)/Prosthetic(S) Encounter, Each 15 Min       (CPT®) Hc Pt Electrical Stim Unattended      Total  40 3        Therapy Charges for Today     Code Description Service Date Service Provider Modifiers Qty    23062038717 HC PT THER PROC EA 15 MIN 11/8/2018 Brit Banda, PTA GP 2    77288544255 HC PT MANUAL THERAPY EA 15 MIN 11/8/2018 Brit Banda, PTA GP 1                    Brit Banda PTA  11/8/2018

## 2018-11-15 ENCOUNTER — HOSPITAL ENCOUNTER (OUTPATIENT)
Dept: PHYSICAL THERAPY | Facility: HOSPITAL | Age: 83
Setting detail: THERAPIES SERIES
Discharge: HOME | End: 2018-11-15

## 2018-11-15 DIAGNOSIS — M54.41 CHRONIC LEFT-SIDED LOW BACK PAIN WITH BILATERAL SCIATICA: Primary | ICD-10-CM

## 2018-11-15 DIAGNOSIS — G89.29 CHRONIC LEFT-SIDED LOW BACK PAIN WITH BILATERAL SCIATICA: Primary | ICD-10-CM

## 2018-11-15 DIAGNOSIS — M54.42 CHRONIC LEFT-SIDED LOW BACK PAIN WITH BILATERAL SCIATICA: Primary | ICD-10-CM

## 2018-11-15 PROCEDURE — 97140 MANUAL THERAPY 1/> REGIONS: CPT

## 2018-11-15 PROCEDURE — 97110 THERAPEUTIC EXERCISES: CPT

## 2018-11-15 NOTE — THERAPY DISCHARGE NOTE
Outpatient Physical Therapy Ortho Treatment Note/Discharge Summary  [facility]     Patient Name: Lanie Ochoa  : 1935  MRN: [id number]  Today's Date: 11/15/2018      Visit Date: 11/15/2018    Visit Dx:    ICD-10-CM ICD-9-CM   1. 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 M54.41 724.2    M54.42 724.3    G89.29 338.29       Patient Active Problem List   Diagnosis   • Anxiety   • HLD (hyperlipidemia)   • Benign essential hypertension   • Osteopenia   • Avitaminosis D   • History of left knee replacement   • Titubation   • Non-rheumatic mitral regurgitation   • Non-rheumatic tricuspid valve insufficiency   • Chronic pain of right knee   • Venous incompetence   • Dysfunction of both eustachian tubes   • Primary localized osteoarthrosis of right lower leg   • Primary osteoarthritis of right knee   • OA (osteoarthritis) of knee        Past Medical History:   Diagnosis Date   • Anxiety    • BMS (burning mouth syndrome) 2015   • Breast cancer (CMS/HCC)    • Cancer (CMS/[facility])     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, HER-2/LOYDA NEGATIVE   • Cancer (CMS/HCC)     SKIN LEFT ARM; SQUAMOUS CELL   • Constipation    • Degenerative disc disease    • Functional murmur     RESOLVED 2014   • Gastritis     RESOLVED 2014   • GERD (gastroesophageal reflux disease)    • Glossalgia 2015   • Hypertension    • Internal hemorrhoids    • Osteoarthritis 2014    HAND   • PNA (pneumonia) 2015   • Trigger ring finger 2014    RESOLVED 2014        Past Surgical History:   Procedure Laterality Date   • BREAST BIOPSY     • BREAST LUMPECTOMY  07/15/2009    LUMPECTOMY   • COLONOSCOPY     • DILATATION AND CURETTAGE      RESOLVED 06/15/2010   • EYE SURGERY Bilateral 10/15/2012    CATARACT EXTRACTION   • HEMORRHOIDECTOMY     • JOINT REPLACEMENT Left     KNEE       PT Ortho     Row Name 11/15/18 1600       Subjective Comments    Subjective Comments   "\"twinges of left lower back pain now and then but doing full A\"DL's\"  -SI       Subjective Pain    Able to rate subjective pain?  yes  -SI    Subjective Pain Comment  1-2 at worst  -SI       MMT Left Lower Ext    Lt Hip Flexion MMT, Gross Movement  (4/5) good  -SI    Lt Hip Extension MMT, Gross Movement  (4/5) good  -SI    Lt Hip ABduction MMT, Gross Movement  (4/5) good  -SI    Lt Hip ADduction MMT, Gross Movement  (4/5) good  -SI    Lt Knee Extension MMT, Gross Movement  (4/5) good  -SI    Lt Knee Flexion MMT, Gross Movement  (4/5) good  -SI       Gait/Stairs Assessment/Training    Comment (Gait/Stairs)  Mild ff trunck posture  -SI      User Key  (r) = Recorded By, (t) = Taken By, (c) = Cosigned By    Initials Name Provider Type    Brit Green PTA Physical Therapy Assistant                      PT Assessment/Plan     Row Name 11/15/18 1638          PT Assessment    Assessment Comments  Pt is Ind with HEP and feels she can do the HEP every other day.  She does water ex several days a week and oob a lot.  Min to no left LBP and no radicular sx  -SI       User Key  (r) = Recorded By, (t) = Taken By, (c) = Cosigned By    Initials Name Provider Type    Brit Green PTA Physical Therapy Assistant              Exercises     Row Name 11/15/18 1600 11/15/18 1500          Subjective Comments    Subjective Comments  \"twinges of left lower back pain now and then but doing full A\"DL's\"  -SI  --        Subjective Pain    Able to rate subjective pain?  yes  -SI  --     Subjective Pain Comment  1-2 at worst  -SI  --        Total Minutes    06007 - PT Therapeutic Exercise Minutes  30  -SI  --     36333 - PT Manual Therapy Minutes  --  10  -SI        Exercise 1    Exercise Name 1  supine hamstring stretch with strap  -SI  --     Sets 1  2  -SI  --     Reps 1  5  -SI  --     Time 1  15  -SI  --        Exercise 2    Exercise Name 2  SKTC  -SI  --     Sets 2  2  -SI  --     Reps 2  5  -SI  --     Time 2  15  -SI  --     "    Exercise 3    Exercise Name 3  piriformis figure 4 stretch supine  -SI  --     Sets 3  2  -SI  --     Reps 3  5  -SI  --     Time 3  15  -SI  --        Exercise 4    Exercise Name 4  low trunk rotation  -SI  --     Sets 4  2  -SI  --     Reps 4  10  -SI  --     Time 4  3  -SI  --        Exercise 5    Exercise Name 5  supine hip ADD  -SI  --     Sets 5  3  -SI  --     Reps 5  5  -SI  --     Time 5  3  -SI  --        Exercise 6    Exercise Name 6  supine hip ABD BTB  -SI  --     Sets 6  3  -SI  --     Reps 6  5  -SI  --       User Key  (r) = Recorded By, (t) = Taken By, (c) = Cosigned By    Initials Name Provider Type    Brit Green PTA Physical Therapy Assistant                        Manual Rx (last 36 hours)      Manual Treatments     Row Name 11/15/18 1500             Total Minutes    92432 - PT Manual Therapy Minutes  10  -SI         Manual Rx 1    Manual Rx 1 Location  LLE  -SI      Manual Rx 1 Type  long axis traction, piriformis stretch, hip extensor stretch  -SI      Manual Rx 1 Duration  10  -SI        User Key  (r) = Recorded By, (t) = Taken By, (c) = Cosigned By    Initials Name Provider Type    Brit Green PTA Physical Therapy Assistant          PT OP Goals     Row Name 11/15/18 1600          PT Short Term Goals    STG 1  Pt will be independent with HEP to improve L SI joint mobility and alleviate low back pain.  -SI     STG 1 Progress  Met  -SI     STG 2  Pt will have Modified Oswestry <6%  -SI     STG 2 Progress  Partially Met 8%  -SI     STG 3  Pt will report pain no greater than 1/10 with ADL's.  -SI     STG 3 Progress  Met  -SI     STG 4  Pt will report 10% improvement on KOS.  -SI     STG 4 Progress  Met  -SI        Long Term Goals    LTG 1  Patient will report being able to bend knee more comfortably in order to garden and plant flowers  -SI     LTG 1 Progress  Met  Went to work in garden x 1 and no problems other than too h  -SI     LTG 2  Patient will demonstrate independence  with advanced HEP in order to promote self management of condition  -SI     LTG 2 Progress  Met  -SI       User Key  (r) = Recorded By, (t) = Taken By, (c) = Cosigned By    Initials Name Provider Type    Brit Green PTA Physical Therapy Assistant          Therapy Education  Given: HEP, Posture/body mechanics, Symptoms/condition management  Program: Reinforced  How Provided: Verbal, Demonstration, Written  Provided to: Patient    Outcome Measure Options: Modifed Owestry  Knee Outcome Score  Knee Outcome Score Comments: 8%      Time Calculation:   Start Time: 1500  Stop Time: 1545  Time Calculation (min): 45 min  Total Timed Code Minutes- PT: 45 minute(s)  Therapy Suggested Charges     Code   Minutes Charges    90777 (CPT®) Hc Pt Neuromusc Re Education Ea 15 Min      94849 (CPT®) Hc Pt Ther Proc Ea 15 Min 30 2    46637 (CPT®) Hc Gait Training Ea 15 Min      14896 (CPT®) Hc Pt Therapeutic Act Ea 15 Min      57568 (CPT®) Hc Pt Manual Therapy Ea 15 Min 10 1    07823 (CPT®) Hc Pt Ther Massage- Per 15 Min      77115 (CPT®) Hc Pt Iontophoresis Ea 15 Min      02226 (CPT®) Hc Pt Elec Stim Ea-Per 15 Min      03568 (CPT®) Hc Pt Ultrasound Ea 15 Min      25800 (CPT®) Hc Pt Self Care/Mgmt/Train Ea 15 Min      19914 (CPT®) Hc Pt Prosthetic (S) Train Initial Encounter, Each 15 Min      28869 (CPT®) Hc Orthotic(S) Mgmt/Train Initial Encounter, Each 15min      84365 (CPT®) Hc Pt Aquatic Therapy Ea 15 Min      31120 (CPT®) Hc Pt Orthotic(S)/Prosthetic(S) Encounter, Each 15 Min       (CPT®) Hc Pt Electrical Stim Unattended      Total  40 3        Therapy Charges for Today     Code Description Service Date Service Provider Modifiers Qty    23069940496 HC PT THER PROC EA 15 MIN 11/15/2018 Brit Banda PTA GP 2    86428777379 HC PT MANUAL THERAPY EA 15 MIN 11/15/2018 Brit Banda PTA GP 1          PT G-Codes  Outcome Measure Options: Modifed Owestry     OP PT Discharge Summary  Date of Discharge: 11/15/18  Reason for  Discharge: Independent  Outcomes Achieved: Patient able to partially acheive established goals  Discharge Destination: Home with home program  Discharge Instructions/Additional Comments: PT HEP every other day      Brit Banda, PTA  11/15/2018

## 2018-12-31 DIAGNOSIS — I10 BENIGN ESSENTIAL HYPERTENSION: ICD-10-CM

## 2018-12-31 RX ORDER — LOSARTAN POTASSIUM 100 MG/1
TABLET ORAL
Qty: 90 TABLET | Refills: 2 | Status: SHIPPED | OUTPATIENT
Start: 2018-12-31 | End: 2019-01-23 | Stop reason: SDUPTHER

## 2019-01-03 RX ORDER — DILTIAZEM HYDROCHLORIDE 240 MG/1
CAPSULE, COATED, EXTENDED RELEASE ORAL
Qty: 90 CAPSULE | Refills: 0 | Status: SHIPPED | OUTPATIENT
Start: 2019-01-03 | End: 2019-01-23 | Stop reason: SDUPTHER

## 2019-01-23 ENCOUNTER — OFFICE VISIT (OUTPATIENT)
Dept: FAMILY MEDICINE CLINIC | Facility: CLINIC | Age: 84
End: 2019-01-23

## 2019-01-23 VITALS
BODY MASS INDEX: 27.64 KG/M2 | OXYGEN SATURATION: 95 % | HEIGHT: 62 IN | HEART RATE: 64 BPM | WEIGHT: 150.2 LBS | TEMPERATURE: 98 F | DIASTOLIC BLOOD PRESSURE: 64 MMHG | SYSTOLIC BLOOD PRESSURE: 128 MMHG | RESPIRATION RATE: 17 BRPM

## 2019-01-23 DIAGNOSIS — Z23 NEED FOR VACCINATION: ICD-10-CM

## 2019-01-23 DIAGNOSIS — I10 BENIGN ESSENTIAL HYPERTENSION: Primary | ICD-10-CM

## 2019-01-23 PROCEDURE — G0439 PPPS, SUBSEQ VISIT: HCPCS | Performed by: FAMILY MEDICINE

## 2019-01-23 RX ORDER — NEBIVOLOL 10 MG/1
10 TABLET ORAL EVERY MORNING
Qty: 90 TABLET | Refills: 2 | Status: SHIPPED | OUTPATIENT
Start: 2019-01-23 | End: 2019-07-23 | Stop reason: SDUPTHER

## 2019-01-23 RX ORDER — HEPATITIS A VACCINE 1440 [IU]/ML
INJECTION, SUSPENSION INTRAMUSCULAR
Refills: 1 | COMMUNITY
Start: 2019-01-07 | End: 2019-06-06

## 2019-01-23 RX ORDER — ZOSTER VACCINE RECOMBINANT, ADJUVANTED 50 MCG/0.5
KIT INTRAMUSCULAR
Refills: 1 | COMMUNITY
Start: 2019-01-14 | End: 2019-06-06

## 2019-01-23 RX ORDER — LOSARTAN POTASSIUM 100 MG/1
100 TABLET ORAL DAILY
Qty: 90 TABLET | Refills: 2 | Status: SHIPPED | OUTPATIENT
Start: 2019-01-23 | End: 2019-07-23 | Stop reason: SDUPTHER

## 2019-01-23 RX ORDER — DILTIAZEM HYDROCHLORIDE 240 MG/1
240 CAPSULE, COATED, EXTENDED RELEASE ORAL DAILY
Qty: 90 CAPSULE | Refills: 2 | Status: SHIPPED | OUTPATIENT
Start: 2019-01-23 | End: 2020-01-28 | Stop reason: SDUPTHER

## 2019-01-24 LAB
BUN SERPL-MCNC: 16 MG/DL (ref 8–23)
BUN/CREAT SERPL: 18.4 (ref 7–25)
CALCIUM SERPL-MCNC: 9.4 MG/DL (ref 8.6–10.5)
CHLORIDE SERPL-SCNC: 101 MMOL/L (ref 98–107)
CO2 SERPL-SCNC: 27.9 MMOL/L (ref 22–29)
CREAT SERPL-MCNC: 0.87 MG/DL (ref 0.57–1)
GLUCOSE SERPL-MCNC: 94 MG/DL (ref 65–99)
POTASSIUM SERPL-SCNC: 4.3 MMOL/L (ref 3.5–5.2)
SODIUM SERPL-SCNC: 141 MMOL/L (ref 136–145)

## 2019-03-14 DIAGNOSIS — F41.9 ANXIETY: ICD-10-CM

## 2019-03-14 RX ORDER — SERTRALINE HYDROCHLORIDE 25 MG/1
TABLET, FILM COATED ORAL
Qty: 90 TABLET | Refills: 0 | Status: SHIPPED | OUTPATIENT
Start: 2019-03-14 | End: 2019-06-11 | Stop reason: SDUPTHER

## 2019-03-17 DIAGNOSIS — F41.9 ANXIETY: ICD-10-CM

## 2019-03-18 RX ORDER — SERTRALINE HYDROCHLORIDE 25 MG/1
TABLET, FILM COATED ORAL
Qty: 90 TABLET | Refills: 0 | Status: SHIPPED | OUTPATIENT
Start: 2019-03-18 | End: 2019-06-06

## 2019-04-16 ENCOUNTER — OFFICE VISIT (OUTPATIENT)
Dept: FAMILY MEDICINE CLINIC | Facility: CLINIC | Age: 84
End: 2019-04-16

## 2019-04-16 VITALS
WEIGHT: 146.6 LBS | HEART RATE: 50 BPM | OXYGEN SATURATION: 98 % | RESPIRATION RATE: 14 BRPM | BODY MASS INDEX: 26.98 KG/M2 | SYSTOLIC BLOOD PRESSURE: 130 MMHG | TEMPERATURE: 98.6 F | HEIGHT: 62 IN | DIASTOLIC BLOOD PRESSURE: 62 MMHG

## 2019-04-16 DIAGNOSIS — S81.812D LACERATION OF LEFT LOWER EXTREMITY, SUBSEQUENT ENCOUNTER: Primary | ICD-10-CM

## 2019-04-16 PROCEDURE — 99213 OFFICE O/P EST LOW 20 MIN: CPT | Performed by: NURSE PRACTITIONER

## 2019-04-16 NOTE — PROGRESS NOTES
Subjective   Lanie Ochoa is a 83 y.o. female presents with recent trauma to her left shin. States she was opening her car door and hit her leg on the corner of the door. She proceeded to  and the wound was cleansed, closed with steri strips, given a tetanus, and started on cephalexin x 10 days, TID. Tolerating well. Denies pain.  No fever or chills.     Wound Check   She was originally treated 2 to 3 days ago. Previous treatment included laceration repair. Maximum temperature: afebrile. There has been no drainage from the wound. The redness has not changed. The swelling has improved. There is no pain present. She has no difficulty moving the affected extremity or digit.   Leg Injury   This is a new problem. The current episode started in the past 7 days (Sunday). The problem occurs constantly. The problem has been unchanged. Pertinent negatives include no abdominal pain, anorexia, arthralgias, change in bowel habit, chest pain, chills, congestion, coughing, diaphoresis, fatigue, fever, headaches, joint swelling, myalgias, nausea, neck pain, numbness, rash, sore throat, swollen glands, urinary symptoms, vertigo, visual change, vomiting or weakness. Nothing aggravates the symptoms. Treatments tried: wrapped with kerlix/ace, covered with non-adhesive dressing, steri strips applied at , [facility]. The treatment provided moderate relief.        The following portions of the patient's history were reviewed and updated as appropriate: allergies, current medications, past family history, past medical history, past social history, past surgical history and problem list.    Review of Systems   Constitutional: Negative for chills, diaphoresis, fatigue and fever.   HENT: Negative for congestion and sore throat.    Respiratory: Negative for cough.    Cardiovascular: Negative for chest pain.   Gastrointestinal: Negative for abdominal pain, anorexia, change in bowel habit, nausea and vomiting.   Musculoskeletal: Negative for  arthralgias, joint swelling, myalgias and neck pain.   Skin: Negative for rash.   Neurological: Negative for vertigo, weakness, numbness and headaches.       Objective   Physical Exam   Constitutional: She is oriented to person, place, and time. She appears well-developed and well-nourished.   Cardiovascular: Normal rate, regular rhythm and normal heart sounds. Exam reveals no gallop and no friction rub.   No murmur heard.  Pulmonary/Chest: Effort normal and breath sounds normal. No stridor. She has no decreased breath sounds. She has no wheezes. She has no rhonchi. She has no rales.   Neurological: She is alert and oriented to person, place, and time.   Skin:        Cleansed wound with wound cleanser, applied non-adhesive bandage and wrapped with kerlix   Psychiatric: She has a normal mood and affect.   Vitals reviewed.      Assessment/Plan   Lanie was seen today for leg pain.    Diagnoses and all orders for this visit:    Laceration of left lower extremity, subsequent encounter  -     Ambulatory Referral to Wound Clinic    will refer to wound clinic, due to location, may have delayed healing  Continue abx as prescribed  Instructed to leave wound covered while out, but at home, ok to leave open to air  Leave steri strips in place until they fall off  For worsening symptoms, return to office, including fever/chills, drainage/odor

## 2019-04-25 ENCOUNTER — OFFICE VISIT (OUTPATIENT)
Dept: WOUND CARE | Facility: HOSPITAL | Age: 84
End: 2019-04-25

## 2019-04-25 PROCEDURE — G0463 HOSPITAL OUTPT CLINIC VISIT: HCPCS

## 2019-05-02 ENCOUNTER — OFFICE VISIT (OUTPATIENT)
Dept: WOUND CARE | Facility: HOSPITAL | Age: 84
End: 2019-05-02

## 2019-05-02 PROCEDURE — G0463 HOSPITAL OUTPT CLINIC VISIT: HCPCS

## 2019-05-09 ENCOUNTER — OFFICE VISIT (OUTPATIENT)
Dept: WOUND CARE | Facility: HOSPITAL | Age: 84
End: 2019-05-09

## 2019-05-09 PROCEDURE — G0463 HOSPITAL OUTPT CLINIC VISIT: HCPCS

## 2019-05-13 ENCOUNTER — TRANSCRIBE ORDERS (OUTPATIENT)
Dept: ADMINISTRATIVE | Facility: HOSPITAL | Age: 84
End: 2019-05-13

## 2019-05-13 DIAGNOSIS — Z12.39 BREAST CANCER SCREENING: Primary | ICD-10-CM

## 2019-05-16 ENCOUNTER — OFFICE VISIT (OUTPATIENT)
Dept: WOUND CARE | Facility: HOSPITAL | Age: 84
End: 2019-05-16

## 2019-05-16 PROCEDURE — G0463 HOSPITAL OUTPT CLINIC VISIT: HCPCS

## 2019-05-23 ENCOUNTER — OFFICE VISIT (OUTPATIENT)
Dept: WOUND CARE | Facility: HOSPITAL | Age: 84
End: 2019-05-23

## 2019-05-23 ENCOUNTER — OFFICE VISIT (OUTPATIENT)
Dept: ORTHOPEDIC SURGERY | Facility: CLINIC | Age: 84
End: 2019-05-23

## 2019-05-23 VITALS — WEIGHT: 146 LBS | TEMPERATURE: 98 F | HEIGHT: 61 IN | BODY MASS INDEX: 27.56 KG/M2

## 2019-05-23 DIAGNOSIS — Z96.651 HISTORY OF TOTAL RIGHT KNEE REPLACEMENT: Primary | ICD-10-CM

## 2019-05-23 PROCEDURE — 73562 X-RAY EXAM OF KNEE 3: CPT | Performed by: ORTHOPAEDIC SURGERY

## 2019-05-23 PROCEDURE — 99212 OFFICE O/P EST SF 10 MIN: CPT | Performed by: ORTHOPAEDIC SURGERY

## 2019-05-23 PROCEDURE — G0463 HOSPITAL OUTPT CLINIC VISIT: HCPCS

## 2019-05-23 NOTE — PROGRESS NOTES
"Lanie Ochoa : 1935 MRN: [id number] DATE: 2019    DIAGNOSIS: Annual follow up right total knee      SUBJECTIVE:Patient returns today for  1 year follow up of right total knee replacement. Patient reports doing well with no unusual complaints. Denies any limitations due to the knee.    OBJECTIVE:    Temp 98 °F (36.7 °C)   Ht 153.7 cm (60.5\")   Wt 66.2 kg (146 lb)   LMP  (LMP Unknown)   BMI 28.04 kg/m²   Family History   Problem Relation Age of Onset   • COPD Mother    • COPD Father    • Malig Hyperthermia Neg Hx      Past Medical History:   Diagnosis Date   • Anxiety    • BMS (burning mouth syndrome) 2015   • Breast cancer (CMS/HCC)    • Cancer (CMS/HCC)     STAGE 1 INVASIVE DUCTAL CARCINOMA ER AND TX POSITIVE, HER-2/LOYDA NEGATIVE   • Cancer (CMS/HCC)     SKIN LEFT ARM; SQUAMOUS CELL   • Constipation    • Degenerative disc disease    • Functional murmur     RESOLVED 2014   • Gastritis     RESOLVED 2014   • GERD (gastroesophageal reflux disease)    • Glossalgia 2015   • Hypertension    • Internal hemorrhoids    • Osteoarthritis 2014    HAND   • PNA (pneumonia) 2015   • Trigger ring finger 2014    RESOLVED 2014     Past Surgical History:   Procedure Laterality Date   • BREAST BIOPSY     • BREAST LUMPECTOMY  07/15/2009    LUMPECTOMY   • COLONOSCOPY     • DILATATION AND CURETTAGE      RESOLVED 06/15/2010   • EYE SURGERY Bilateral 10/15/2012    CATARACT EXTRACTION   • HEMORRHOIDECTOMY     • JOINT REPLACEMENT Left     KNEE   • KNEE ARTHROSCOPY Right 2017    Procedure: KNEE ARTHROSCOPY WITH PARTIAL MEDIAL AND LATERAL MENISECTOMY AND DEBRIDEMENT OF ARTHRITIS.;  Surgeon: Lauren Krishna MD;  Location: [facility];  Service:    • TOTAL KNEE ARTHROPLASTY Right 2018    Procedure: RIGHT TOTAL KNEE ARTHROPLASTY;  Surgeon: Kiko Solano MD;  Location: [facility];  Service: Orthopedics     Social History     Socioeconomic History   • " Marital status:      Spouse name: Not on file   • Number of children: Not on file   • Years of education: Not on file   • Highest education level: Not on file   Occupational History   • Occupation: retired, retail   Tobacco Use   • Smoking status: Never Smoker   • Smokeless tobacco: Never Used   Substance and Sexual Activity   • Alcohol use: No   • Drug use: No   • Sexual activity: Defer     Review of Systems: 14 point review of systems performed, all systems negative     Exam:. The incision is well healed. Range of motion is measured at 0 to 120. The calf is soft and nontender with a negative Homans sign. Alignment is neutral. Good quad strength. There is no evidence of varus/valgus or flexion instability. No effusion. Intact to light touch with palpable distal pulses.     DIAGNOSTIC STUDIES  Xrays: 3 views(AP bilateral knees, lateral right, and sunrise bilateral knees) were ordered and reviewed for evaluation of right knee replacement. They demonstrate a well positioned, well aligned knee replacement without complicating factors noted. In comparison with previous films there has been no change.    ASSESSMENT: Annual follow up right knee replacement.    PLAN:  Continue activities as tolerated    Follow up ALANNAH Solano MD  5/23/2019

## 2019-06-06 ENCOUNTER — OFFICE VISIT (OUTPATIENT)
Dept: FAMILY MEDICINE CLINIC | Facility: CLINIC | Age: 84
End: 2019-06-06

## 2019-06-06 VITALS
SYSTOLIC BLOOD PRESSURE: 130 MMHG | TEMPERATURE: 97.6 F | DIASTOLIC BLOOD PRESSURE: 72 MMHG | OXYGEN SATURATION: 97 % | WEIGHT: 142.7 LBS | HEART RATE: 55 BPM | BODY MASS INDEX: 27.41 KG/M2

## 2019-06-06 DIAGNOSIS — L23.9 ALLERGIC CONTACT DERMATITIS, UNSPECIFIED TRIGGER: Primary | ICD-10-CM

## 2019-06-06 PROCEDURE — 96372 THER/PROPH/DIAG INJ SC/IM: CPT | Performed by: NURSE PRACTITIONER

## 2019-06-06 PROCEDURE — 99213 OFFICE O/P EST LOW 20 MIN: CPT | Performed by: NURSE PRACTITIONER

## 2019-06-06 RX ORDER — METHYLPREDNISOLONE ACETATE 40 MG/ML
40 INJECTION, SUSPENSION INTRA-ARTICULAR; INTRALESIONAL; INTRAMUSCULAR; SOFT TISSUE ONCE
Status: COMPLETED | OUTPATIENT
Start: 2019-06-06 | End: 2019-06-06

## 2019-06-06 RX ADMIN — METHYLPREDNISOLONE ACETATE 40 MG: 40 INJECTION, SUSPENSION INTRA-ARTICULAR; INTRALESIONAL; INTRAMUSCULAR; SOFT TISSUE at 12:08

## 2019-06-06 NOTE — PROGRESS NOTES
Subjective   Lanie Ochoa is a 84 y.o. female presents with rash and itching x 2 days. States she noticed it on the back of her neck/hairline, upper arms and thighs/groin area. No known allergic contacts. As not been out working in the yard or near [employer]. No known ill contacts. Improvement with cortisone cream.     Rash   This is a new problem. The current episode started yesterday. The problem is unchanged. The affected locations include the neck, left arm, left upper leg, right arm and right upper leg. The rash is characterized by redness. She was exposed to nothing. Pertinent negatives include no anorexia, congestion, cough, diarrhea, eye pain, facial edema, fatigue, fever, joint pain, nail changes, rhinorrhea, shortness of breath, sore throat or vomiting. Past treatments include topical steroids. The treatment provided moderate relief.        The following portions of the patient's history were reviewed and updated as appropriate: allergies, current medications, past family history, past medical history, past social history, past surgical history and problem list.    Review of Systems   Constitutional: Negative for fatigue and fever.   HENT: Negative for congestion, rhinorrhea and sore throat.    Eyes: Negative for pain.   Respiratory: Negative for cough and shortness of breath.    Gastrointestinal: Negative for anorexia, diarrhea and vomiting.   Musculoskeletal: Negative for joint pain.   Skin: Positive for rash. Negative for nail changes.       Objective   Physical Exam   Constitutional: She is oriented to person, place, and time. She appears well-developed and well-nourished. No distress.   Neurological: She is alert and oriented to person, place, and time.   Skin: Skin is warm and dry. Rash noted. Rash is maculopapular. Rash is not vesicular. She is not diaphoretic.        Psychiatric: She has a normal mood and affect.   Vitals reviewed.      Assessment/Plan   Lanie was seen today for  rash.    Diagnoses and all orders for this visit:    Allergic contact dermatitis, unspecified trigger  -     methylPREDNISolone acetate (DEPO-medrol) injection 40 mg      Steroid injection today  Recommend benadryl and hydrocortisone cream, otc prn  Cool compresses  For worsening symptoms, return to office

## 2019-06-11 DIAGNOSIS — F41.9 ANXIETY: ICD-10-CM

## 2019-06-11 RX ORDER — SERTRALINE HYDROCHLORIDE 25 MG/1
TABLET, FILM COATED ORAL
Qty: 90 TABLET | Refills: 0 | Status: SHIPPED | OUTPATIENT
Start: 2019-06-11 | End: 2019-07-23 | Stop reason: SDUPTHER

## 2019-07-08 ENCOUNTER — HOSPITAL ENCOUNTER (OUTPATIENT)
Dept: MAMMOGRAPHY | Facility: HOSPITAL | Age: 84
Discharge: HOME | End: 2019-07-08
Admitting: FAMILY MEDICINE

## 2019-07-08 DIAGNOSIS — Z12.39 BREAST CANCER SCREENING: ICD-10-CM

## 2019-07-08 PROCEDURE — 77063 BREAST TOMOSYNTHESIS BI: CPT

## 2019-07-08 PROCEDURE — 77067 SCR MAMMO BI INCL CAD: CPT

## 2019-07-23 ENCOUNTER — OFFICE VISIT (OUTPATIENT)
Dept: FAMILY MEDICINE CLINIC | Facility: CLINIC | Age: 84
End: 2019-07-23

## 2019-07-23 VITALS
RESPIRATION RATE: 13 BRPM | DIASTOLIC BLOOD PRESSURE: 62 MMHG | SYSTOLIC BLOOD PRESSURE: 114 MMHG | HEIGHT: 61 IN | TEMPERATURE: 98.2 F | WEIGHT: 144.7 LBS | OXYGEN SATURATION: 98 % | BODY MASS INDEX: 27.32 KG/M2 | HEART RATE: 63 BPM

## 2019-07-23 DIAGNOSIS — F41.9 ANXIETY: ICD-10-CM

## 2019-07-23 DIAGNOSIS — I10 BENIGN ESSENTIAL HYPERTENSION: ICD-10-CM

## 2019-07-23 PROCEDURE — 99213 OFFICE O/P EST LOW 20 MIN: CPT | Performed by: FAMILY MEDICINE

## 2019-07-23 RX ORDER — SERTRALINE HYDROCHLORIDE 25 MG/1
25 TABLET, FILM COATED ORAL DAILY
Qty: 90 TABLET | Refills: 1 | Status: SHIPPED | OUTPATIENT
Start: 2019-07-23 | End: 2020-01-28 | Stop reason: SDUPTHER

## 2019-07-23 RX ORDER — LOSARTAN POTASSIUM 100 MG/1
100 TABLET ORAL DAILY
Qty: 90 TABLET | Refills: 1 | Status: SHIPPED | OUTPATIENT
Start: 2019-07-23 | End: 2020-01-28 | Stop reason: SDUPTHER

## 2019-07-23 RX ORDER — MONTELUKAST SODIUM 10 MG/1
10 TABLET ORAL NIGHTLY
Qty: 90 TABLET | Refills: 1 | Status: SHIPPED | OUTPATIENT
Start: 2019-07-23 | End: 2020-01-28 | Stop reason: SDUPTHER

## 2019-07-23 RX ORDER — NEBIVOLOL 10 MG/1
10 TABLET ORAL EVERY MORNING
Qty: 90 TABLET | Refills: 1 | Status: SHIPPED | OUTPATIENT
Start: 2019-07-23 | End: 2020-01-28 | Stop reason: SDUPTHER

## 2019-07-23 NOTE — PROGRESS NOTES
"Chief Complaint   Patient presents with   • Hypertension     6 mth folow        Subjective   Lanie Ochoa is a 84 y.o. female.     History of Present Illness   HTN  Doing well. She swims during the summer. She is good on all meds tolerating well. She rides bike. She eats mostly fish, chicken and furts and veggies.     Anxiety  Taking sertraline and feels it is working well.     The following portions of the patient's history were reviewed and updated as appropriate: allergies, current medications, past medical history, past social history and problem list.    Review of Systems   Respiratory: Negative.    Cardiovascular: Negative.    Neurological: Negative.        Vitals:    07/23/19 1151   BP: 114/62   BP Location: Left arm   Patient Position: Sitting   Cuff Size: Adult   Pulse: 63   Resp: 13   Temp: 98.2 °F (36.8 °C)   TempSrc: Oral   SpO2: 98%   Weight: 65.6 kg (144 lb 11.2 oz)   Height: 153.7 cm (60.5\")       Objective   Physical Exam   Constitutional: She is oriented to person, place, and time. She appears well-nourished. No distress.   Eyes: Conjunctivae are normal. Right eye exhibits no discharge. Left eye exhibits no discharge. No scleral icterus.   Cardiovascular: Normal rate, regular rhythm, normal heart sounds and intact distal pulses. Exam reveals no gallop and no friction rub.   No murmur heard.  Pulmonary/Chest: Effort normal and breath sounds normal. No respiratory distress. She has no wheezes.   Musculoskeletal: She exhibits no edema.   Neurological: She is alert and oriented to person, place, and time.   Psychiatric: She has a normal mood and affect. Her behavior is normal.   Vitals reviewed.      Assessment/Plan   Lanie was seen today for hypertension.    Diagnoses and all orders for this visit:    Benign essential hypertension  -     CBC & Differential  -     Comprehensive Metabolic Panel    Anxiety    Other orders  -     nebivolol (BYSTOLIC) 10 MG tablet; Take 1 tablet by mouth Every " Morning.  -     losartan (COZAAR) 100 MG tablet; Take 1 tablet by mouth Daily.  -     sertraline (ZOLOFT) 25 MG tablet; Take 1 tablet by mouth Daily. 200001  -     montelukast (SINGULAIR) 10 MG tablet; Take 1 tablet by mouth Every Night.    Doing well. Well controlled. Refills  Wellness in 6 months.

## 2019-07-24 LAB
ALBUMIN SERPL-MCNC: 4.2 G/DL (ref 3.5–5.2)
ALBUMIN/GLOB SERPL: 1.8 G/DL
ALP SERPL-CCNC: 83 U/L (ref 39–117)
ALT SERPL-CCNC: 11 U/L (ref 1–33)
AST SERPL-CCNC: 14 U/L (ref 1–32)
BASOPHILS # BLD AUTO: 0.04 10*3/MM3 (ref 0–0.2)
BASOPHILS NFR BLD AUTO: 0.8 % (ref 0–1.5)
BILIRUB SERPL-MCNC: 0.3 MG/DL (ref 0.2–1.2)
BUN SERPL-MCNC: 15 MG/DL (ref 8–23)
BUN/CREAT SERPL: 18.3 (ref 7–25)
CALCIUM SERPL-MCNC: 9.3 MG/DL (ref 8.6–10.5)
CHLORIDE SERPL-SCNC: 101 MMOL/L (ref 98–107)
CO2 SERPL-SCNC: 29.9 MMOL/L (ref 22–29)
CREAT SERPL-MCNC: 0.82 MG/DL (ref 0.57–1)
EOSINOPHIL # BLD AUTO: 0.25 10*3/MM3 (ref 0–0.4)
EOSINOPHIL NFR BLD AUTO: 4.9 % (ref 0.3–6.2)
ERYTHROCYTE [DISTWIDTH] IN BLOOD BY AUTOMATED COUNT: 14.4 % (ref 12.3–15.4)
GLOBULIN SER CALC-MCNC: 2.3 GM/DL
GLUCOSE SERPL-MCNC: 101 MG/DL (ref 65–99)
HCT VFR BLD AUTO: 44.9 % (ref 34–46.6)
HGB BLD-MCNC: 13.5 G/DL (ref 12–15.9)
IMM GRANULOCYTES # BLD AUTO: 0.02 10*3/MM3 (ref 0–0.05)
IMM GRANULOCYTES NFR BLD AUTO: 0.4 % (ref 0–0.5)
LYMPHOCYTES # BLD AUTO: 1.08 10*3/MM3 (ref 0.7–3.1)
LYMPHOCYTES NFR BLD AUTO: 21.2 % (ref 19.6–45.3)
MCH RBC QN AUTO: 28.9 PG (ref 26.6–33)
MCHC RBC AUTO-ENTMCNC: 30.1 G/DL (ref 31.5–35.7)
MCV RBC AUTO: 96.1 FL (ref 79–97)
MONOCYTES # BLD AUTO: 0.43 10*3/MM3 (ref 0.1–0.9)
MONOCYTES NFR BLD AUTO: 8.4 % (ref 5–12)
NEUTROPHILS # BLD AUTO: 3.27 10*3/MM3 (ref 1.7–7)
NEUTROPHILS NFR BLD AUTO: 64.3 % (ref 42.7–76)
NRBC BLD AUTO-RTO: 0.2 /100 WBC (ref 0–0.2)
PLATELET # BLD AUTO: 306 10*3/MM3 (ref 140–450)
POTASSIUM SERPL-SCNC: 4.5 MMOL/L (ref 3.5–5.2)
PROT SERPL-MCNC: 6.5 G/DL (ref 6–8.5)
RBC # BLD AUTO: 4.67 10*6/MM3 (ref 3.77–5.28)
SODIUM SERPL-SCNC: 142 MMOL/L (ref 136–145)
WBC # BLD AUTO: 5.09 10*3/MM3 (ref 3.4–10.8)

## 2019-11-27 RX ORDER — NEBIVOLOL HYDROCHLORIDE 10 MG/1
TABLET ORAL
Qty: 30 TABLET | Refills: 1 | Status: SHIPPED | OUTPATIENT
Start: 2019-11-27 | End: 2020-01-28

## 2020-01-28 ENCOUNTER — OFFICE VISIT (OUTPATIENT)
Dept: FAMILY MEDICINE CLINIC | Facility: CLINIC | Age: 85
End: 2020-01-28

## 2020-01-28 VITALS
DIASTOLIC BLOOD PRESSURE: 68 MMHG | HEIGHT: 61 IN | WEIGHT: 148.6 LBS | HEART RATE: 66 BPM | SYSTOLIC BLOOD PRESSURE: 142 MMHG | TEMPERATURE: 97.5 F | OXYGEN SATURATION: 99 % | RESPIRATION RATE: 12 BRPM | BODY MASS INDEX: 28.05 KG/M2

## 2020-01-28 DIAGNOSIS — I36.1 NON-RHEUMATIC TRICUSPID VALVE INSUFFICIENCY: ICD-10-CM

## 2020-01-28 DIAGNOSIS — E78.5 HYPERLIPIDEMIA, UNSPECIFIED HYPERLIPIDEMIA TYPE: ICD-10-CM

## 2020-01-28 DIAGNOSIS — I34.0 NON-RHEUMATIC MITRAL REGURGITATION: ICD-10-CM

## 2020-01-28 DIAGNOSIS — I10 BENIGN ESSENTIAL HYPERTENSION: ICD-10-CM

## 2020-01-28 DIAGNOSIS — Z00.00 MEDICARE ANNUAL WELLNESS VISIT, SUBSEQUENT: Primary | ICD-10-CM

## 2020-01-28 PROCEDURE — G0439 PPPS, SUBSEQ VISIT: HCPCS | Performed by: FAMILY MEDICINE

## 2020-01-28 RX ORDER — NEBIVOLOL 10 MG/1
10 TABLET ORAL EVERY MORNING
Qty: 90 TABLET | Refills: 1 | Status: SHIPPED | OUTPATIENT
Start: 2020-01-28 | End: 2020-07-21 | Stop reason: SDUPTHER

## 2020-01-28 RX ORDER — SERTRALINE HYDROCHLORIDE 25 MG/1
25 TABLET, FILM COATED ORAL DAILY
Qty: 90 TABLET | Refills: 1 | Status: SHIPPED | OUTPATIENT
Start: 2020-01-28 | End: 2020-07-21 | Stop reason: SDUPTHER

## 2020-01-28 RX ORDER — MONTELUKAST SODIUM 10 MG/1
10 TABLET ORAL NIGHTLY
Qty: 90 TABLET | Refills: 1 | Status: SHIPPED | OUTPATIENT
Start: 2020-01-28 | End: 2020-07-21 | Stop reason: SDUPTHER

## 2020-01-28 RX ORDER — DILTIAZEM HYDROCHLORIDE 240 MG/1
240 CAPSULE, COATED, EXTENDED RELEASE ORAL DAILY
Qty: 90 CAPSULE | Refills: 1 | Status: SHIPPED | OUTPATIENT
Start: 2020-01-28 | End: 2020-07-21 | Stop reason: SDUPTHER

## 2020-01-28 RX ORDER — LOSARTAN POTASSIUM 100 MG/1
100 TABLET ORAL DAILY
Qty: 90 TABLET | Refills: 1 | Status: SHIPPED | OUTPATIENT
Start: 2020-01-28 | End: 2020-07-21 | Stop reason: SDUPTHER

## 2020-01-29 LAB
ALBUMIN SERPL-MCNC: 4.3 G/DL (ref 3.5–5.2)
ALBUMIN/GLOB SERPL: 2.2 G/DL
ALP SERPL-CCNC: 71 U/L (ref 39–117)
ALT SERPL-CCNC: 10 U/L (ref 1–33)
AST SERPL-CCNC: 15 U/L (ref 1–32)
BASOPHILS # BLD AUTO: 0.08 10*3/MM3 (ref 0–0.2)
BASOPHILS NFR BLD AUTO: 1.4 % (ref 0–1.5)
BILIRUB SERPL-MCNC: 0.2 MG/DL (ref 0.2–1.2)
BUN SERPL-MCNC: 16 MG/DL (ref 8–23)
BUN/CREAT SERPL: 19.5 (ref 7–25)
CALCIUM SERPL-MCNC: 9.3 MG/DL (ref 8.6–10.5)
CHLORIDE SERPL-SCNC: 97 MMOL/L (ref 98–107)
CHOLEST SERPL-MCNC: 208 MG/DL (ref 0–200)
CO2 SERPL-SCNC: 30.3 MMOL/L (ref 22–29)
CREAT SERPL-MCNC: 0.82 MG/DL (ref 0.57–1)
EOSINOPHIL # BLD AUTO: 0.54 10*3/MM3 (ref 0–0.4)
EOSINOPHIL NFR BLD AUTO: 9.6 % (ref 0.3–6.2)
ERYTHROCYTE [DISTWIDTH] IN BLOOD BY AUTOMATED COUNT: 12.7 % (ref 12.3–15.4)
GLOBULIN SER CALC-MCNC: 2 GM/DL
GLUCOSE SERPL-MCNC: 84 MG/DL (ref 65–99)
HCT VFR BLD AUTO: 42.5 % (ref 34–46.6)
HDLC SERPL-MCNC: 96 MG/DL (ref 40–60)
HGB BLD-MCNC: 14.2 G/DL (ref 12–15.9)
IMM GRANULOCYTES # BLD AUTO: 0.02 10*3/MM3 (ref 0–0.05)
IMM GRANULOCYTES NFR BLD AUTO: 0.4 % (ref 0–0.5)
LDLC SERPL CALC-MCNC: 89 MG/DL (ref 0–100)
LYMPHOCYTES # BLD AUTO: 1.54 10*3/MM3 (ref 0.7–3.1)
LYMPHOCYTES NFR BLD AUTO: 27.5 % (ref 19.6–45.3)
MCH RBC QN AUTO: 30.4 PG (ref 26.6–33)
MCHC RBC AUTO-ENTMCNC: 33.4 G/DL (ref 31.5–35.7)
MCV RBC AUTO: 91 FL (ref 79–97)
MONOCYTES # BLD AUTO: 0.48 10*3/MM3 (ref 0.1–0.9)
MONOCYTES NFR BLD AUTO: 8.6 % (ref 5–12)
NEUTROPHILS # BLD AUTO: 2.94 10*3/MM3 (ref 1.7–7)
NEUTROPHILS NFR BLD AUTO: 52.5 % (ref 42.7–76)
NRBC BLD AUTO-RTO: 0 /100 WBC (ref 0–0.2)
PLATELET # BLD AUTO: 275 10*3/MM3 (ref 140–450)
POTASSIUM SERPL-SCNC: 4.5 MMOL/L (ref 3.5–5.2)
PROT SERPL-MCNC: 6.3 G/DL (ref 6–8.5)
RBC # BLD AUTO: 4.67 10*6/MM3 (ref 3.77–5.28)
SODIUM SERPL-SCNC: 139 MMOL/L (ref 136–145)
TRIGL SERPL-MCNC: 113 MG/DL (ref 0–150)
VLDLC SERPL CALC-MCNC: 22.6 MG/DL (ref 5–40)
WBC # BLD AUTO: 5.6 10*3/MM3 (ref 3.4–10.8)

## 2020-01-30 ENCOUNTER — TELEPHONE (OUTPATIENT)
Dept: ORTHOPEDIC SURGERY | Facility: CLINIC | Age: 85
End: 2020-01-30

## 2020-01-30 RX ORDER — CEPHALEXIN 500 MG/1
CAPSULE ORAL
Qty: 4 CAPSULE | Refills: 2 | Status: SHIPPED | OUTPATIENT
Start: 2020-01-30 | End: 2020-07-21

## 2020-01-30 NOTE — TELEPHONE ENCOUNTER
Have E prescribed a new prescription to [facility] pharmacy at [phone] for Keflex 500 mg, #4, directions are to take all 4 capsules 1 hour prior to her procedure.  Refill x2 per RBB

## 2020-02-13 ENCOUNTER — HOSPITAL ENCOUNTER (OUTPATIENT)
Dept: CARDIOLOGY | Facility: HOSPITAL | Age: 85
Discharge: HOME | End: 2020-02-13
Admitting: FAMILY MEDICINE

## 2020-02-13 VITALS
HEIGHT: 62 IN | BODY MASS INDEX: 26.13 KG/M2 | DIASTOLIC BLOOD PRESSURE: 80 MMHG | SYSTOLIC BLOOD PRESSURE: 166 MMHG | WEIGHT: 142 LBS | HEART RATE: 62 BPM

## 2020-02-13 DIAGNOSIS — I34.0 NON-RHEUMATIC MITRAL REGURGITATION: ICD-10-CM

## 2020-02-13 DIAGNOSIS — I36.1 NON-RHEUMATIC TRICUSPID VALVE INSUFFICIENCY: ICD-10-CM

## 2020-02-13 LAB
AORTIC ROOT ANNULUS: 1.6 CM
ASCENDING AORTA: 2.6 CM
BH CV ECHO MEAS - ACS: 1.4 CM
BH CV ECHO MEAS - AO MAX PG (FULL): 23.5 MMHG
BH CV ECHO MEAS - AO MAX PG: 28.7 MMHG
BH CV ECHO MEAS - AO MEAN PG (FULL): 12 MMHG
BH CV ECHO MEAS - AO MEAN PG: 15 MMHG
BH CV ECHO MEAS - AO ROOT AREA (BSA CORRECTED): 1.5
BH CV ECHO MEAS - AO ROOT AREA: 4.5 CM^2
BH CV ECHO MEAS - AO ROOT DIAM: 2.4 CM
BH CV ECHO MEAS - AO V2 MAX: 268 CM/SEC
BH CV ECHO MEAS - AO V2 MEAN: 187 CM/SEC
BH CV ECHO MEAS - AO V2 VTI: 71.1 CM
BH CV ECHO MEAS - AVA(I,A): 1.1 CM^2
BH CV ECHO MEAS - AVA(I,D): 1.1 CM^2
BH CV ECHO MEAS - AVA(V,A): 1.1 CM^2
BH CV ECHO MEAS - AVA(V,D): 1.1 CM^2
BH CV ECHO MEAS - BSA(HAYCOCK): 1.7 M^2
BH CV ECHO MEAS - BSA: 1.7 M^2
BH CV ECHO MEAS - BZI_BMI: 26 KILOGRAMS/M^2
BH CV ECHO MEAS - BZI_METRIC_HEIGHT: 157.5 CM
BH CV ECHO MEAS - BZI_METRIC_WEIGHT: 64.4 KG
BH CV ECHO MEAS - EDV(MOD-SP2): 54 ML
BH CV ECHO MEAS - EDV(MOD-SP4): 65 ML
BH CV ECHO MEAS - EDV(TEICH): 92.4 ML
BH CV ECHO MEAS - EF(CUBED): 80.7 %
BH CV ECHO MEAS - EF(MOD-BP): 64 %
BH CV ECHO MEAS - EF(MOD-SP2): 63 %
BH CV ECHO MEAS - EF(MOD-SP4): 63.1 %
BH CV ECHO MEAS - EF(TEICH): 73.4 %
BH CV ECHO MEAS - ESV(MOD-SP2): 20 ML
BH CV ECHO MEAS - ESV(MOD-SP4): 24 ML
BH CV ECHO MEAS - ESV(TEICH): 24.6 ML
BH CV ECHO MEAS - FS: 42.2 %
BH CV ECHO MEAS - IVS/LVPW: 1
BH CV ECHO MEAS - IVSD: 0.8 CM
BH CV ECHO MEAS - LAT PEAK E' VEL: 5 CM/SEC
BH CV ECHO MEAS - LV DIASTOLIC VOL/BSA (35-75): 39.3 ML/M^2
BH CV ECHO MEAS - LV MASS(C)D: 113.6 GRAMS
BH CV ECHO MEAS - LV MASS(C)DI: 68.8 GRAMS/M^2
BH CV ECHO MEAS - LV MAX PG: 5.2 MMHG
BH CV ECHO MEAS - LV MEAN PG: 3 MMHG
BH CV ECHO MEAS - LV SYSTOLIC VOL/BSA (12-30): 14.5 ML/M^2
BH CV ECHO MEAS - LV V1 MAX: 114 CM/SEC
BH CV ECHO MEAS - LV V1 MEAN: 73.9 CM/SEC
BH CV ECHO MEAS - LV V1 VTI: 30.3 CM
BH CV ECHO MEAS - LVIDD: 4.5 CM
BH CV ECHO MEAS - LVIDS: 2.6 CM
BH CV ECHO MEAS - LVLD AP2: 6.1 CM
BH CV ECHO MEAS - LVLD AP4: 5.4 CM
BH CV ECHO MEAS - LVLS AP2: 5 CM
BH CV ECHO MEAS - LVLS AP4: 4.6 CM
BH CV ECHO MEAS - LVOT AREA (M): 2.5 CM^2
BH CV ECHO MEAS - LVOT AREA: 2.5 CM^2
BH CV ECHO MEAS - LVOT DIAM: 1.8 CM
BH CV ECHO MEAS - LVPWD: 0.8 CM
BH CV ECHO MEAS - MED PEAK E' VEL: 7 CM/SEC
BH CV ECHO MEAS - MR MAX PG: 83.9 MMHG
BH CV ECHO MEAS - MR MAX VEL: 458 CM/SEC
BH CV ECHO MEAS - MV A DUR: 0.12 SEC
BH CV ECHO MEAS - MV A MAX VEL: 103 CM/SEC
BH CV ECHO MEAS - MV DEC SLOPE: 654 CM/SEC^2
BH CV ECHO MEAS - MV DEC TIME: 0.2 SEC
BH CV ECHO MEAS - MV E MAX VEL: 89.2 CM/SEC
BH CV ECHO MEAS - MV E/A: 0.87
BH CV ECHO MEAS - MV MAX PG: 6.4 MMHG
BH CV ECHO MEAS - MV MEAN PG: 2 MMHG
BH CV ECHO MEAS - MV P1/2T MAX VEL: 140 CM/SEC
BH CV ECHO MEAS - MV P1/2T: 62.7 MSEC
BH CV ECHO MEAS - MV V2 MAX: 126 CM/SEC
BH CV ECHO MEAS - MV V2 MEAN: 66.5 CM/SEC
BH CV ECHO MEAS - MV V2 VTI: 43.6 CM
BH CV ECHO MEAS - MVA P1/2T LCG: 1.6 CM^2
BH CV ECHO MEAS - MVA(P1/2T): 3.5 CM^2
BH CV ECHO MEAS - MVA(VTI): 1.8 CM^2
BH CV ECHO MEAS - PA MAX PG (FULL): 4.6 MMHG
BH CV ECHO MEAS - PA MAX PG: 5.7 MMHG
BH CV ECHO MEAS - PA V2 MAX: 119 CM/SEC
BH CV ECHO MEAS - PULM A REVS DUR: 0.1 SEC
BH CV ECHO MEAS - PULM A REVS VEL: 32.8 CM/SEC
BH CV ECHO MEAS - PULM DIAS VEL: 61.3 CM/SEC
BH CV ECHO MEAS - PULM S/D: 1.3
BH CV ECHO MEAS - PULM SYS VEL: 77.1 CM/SEC
BH CV ECHO MEAS - PVA(V,A): 1.3 CM^2
BH CV ECHO MEAS - PVA(V,D): 1.3 CM^2
BH CV ECHO MEAS - QP/QS: 0.45
BH CV ECHO MEAS - RAP SYSTOLE: 8 MMHG
BH CV ECHO MEAS - RV MAX PG: 1 MMHG
BH CV ECHO MEAS - RV MEAN PG: 1 MMHG
BH CV ECHO MEAS - RV V1 MAX: 50.9 CM/SEC
BH CV ECHO MEAS - RV V1 MEAN: 39.2 CM/SEC
BH CV ECHO MEAS - RV V1 VTI: 11.1 CM
BH CV ECHO MEAS - RVOT AREA: 3.1 CM^2
BH CV ECHO MEAS - RVOT DIAM: 2 CM
BH CV ECHO MEAS - RVSP: 37 MMHG
BH CV ECHO MEAS - SI(AO): 194.6 ML/M^2
BH CV ECHO MEAS - SI(CUBED): 44.5 ML/M^2
BH CV ECHO MEAS - SI(LVOT): 46.7 ML/M^2
BH CV ECHO MEAS - SI(MOD-SP2): 20.6 ML/M^2
BH CV ECHO MEAS - SI(MOD-SP4): 24.8 ML/M^2
BH CV ECHO MEAS - SI(TEICH): 41.1 ML/M^2
BH CV ECHO MEAS - SUP REN AO DIAM: 2 CM
BH CV ECHO MEAS - SV(AO): 321.6 ML
BH CV ECHO MEAS - SV(CUBED): 73.5 ML
BH CV ECHO MEAS - SV(LVOT): 77.1 ML
BH CV ECHO MEAS - SV(MOD-SP2): 34 ML
BH CV ECHO MEAS - SV(MOD-SP4): 41 ML
BH CV ECHO MEAS - SV(RVOT): 34.9 ML
BH CV ECHO MEAS - SV(TEICH): 67.8 ML
BH CV ECHO MEAS - TAPSE (>1.6): 2 CM2
BH CV ECHO MEAS - TR MAX VEL: 269 CM/SEC
BH CV ECHO MEASUREMENTS AVERAGE E/E' RATIO: 14.87
BH CV XLRA - RV BASE: 3.5 CM
BH CV XLRA - RV LENGTH: 5.8 CM
BH CV XLRA - RV MID: 2.5 CM
BH CV XLRA - TDI S': 13 CM/SEC
LEFT ATRIUM VOLUME INDEX: 32 ML/M2
LEFT ATRIUM VOLUME: 49 CM3
LV EF 2D ECHO EST: 64 %
SINUS: 2.2 CM
STJ: 2.5 CM

## 2020-02-13 PROCEDURE — 93306 TTE W/DOPPLER COMPLETE: CPT

## 2020-02-13 PROCEDURE — 93306 TTE W/DOPPLER COMPLETE: CPT | Performed by: INTERNAL MEDICINE

## 2020-06-01 ENCOUNTER — TRANSCRIBE ORDERS (OUTPATIENT)
Dept: ADMINISTRATIVE | Facility: HOSPITAL | Age: 85
End: 2020-06-01

## 2020-06-01 DIAGNOSIS — Z12.31 VISIT FOR SCREENING MAMMOGRAM: Primary | ICD-10-CM

## 2020-07-15 ENCOUNTER — HOSPITAL ENCOUNTER (OUTPATIENT)
Dept: MAMMOGRAPHY | Facility: HOSPITAL | Age: 85
Discharge: HOME | End: 2020-07-15
Admitting: FAMILY MEDICINE

## 2020-07-15 DIAGNOSIS — Z12.31 VISIT FOR SCREENING MAMMOGRAM: ICD-10-CM

## 2020-07-15 PROCEDURE — 77063 BREAST TOMOSYNTHESIS BI: CPT

## 2020-07-15 PROCEDURE — 77067 SCR MAMMO BI INCL CAD: CPT

## 2020-07-21 ENCOUNTER — OFFICE VISIT (OUTPATIENT)
Dept: FAMILY MEDICINE CLINIC | Facility: CLINIC | Age: 85
End: 2020-07-21

## 2020-07-21 VITALS
RESPIRATION RATE: 13 BRPM | DIASTOLIC BLOOD PRESSURE: 74 MMHG | TEMPERATURE: 97.5 F | HEIGHT: 62 IN | WEIGHT: 143.2 LBS | BODY MASS INDEX: 26.35 KG/M2 | OXYGEN SATURATION: 98 % | HEART RATE: 64 BPM | SYSTOLIC BLOOD PRESSURE: 138 MMHG

## 2020-07-21 DIAGNOSIS — G56.01 CARPAL TUNNEL SYNDROME OF RIGHT WRIST: ICD-10-CM

## 2020-07-21 DIAGNOSIS — I10 BENIGN ESSENTIAL HYPERTENSION: Primary | ICD-10-CM

## 2020-07-21 PROCEDURE — 99213 OFFICE O/P EST LOW 20 MIN: CPT | Performed by: FAMILY MEDICINE

## 2020-07-21 RX ORDER — LOSARTAN POTASSIUM 100 MG/1
100 TABLET ORAL DAILY
Qty: 90 TABLET | Refills: 1 | Status: SHIPPED | OUTPATIENT
Start: 2020-07-21 | End: 2021-02-01 | Stop reason: SDUPTHER

## 2020-07-21 RX ORDER — NEBIVOLOL 10 MG/1
10 TABLET ORAL EVERY MORNING
Qty: 90 TABLET | Refills: 1 | Status: SHIPPED | OUTPATIENT
Start: 2020-07-21 | End: 2020-07-27

## 2020-07-21 RX ORDER — SERTRALINE HYDROCHLORIDE 25 MG/1
25 TABLET, FILM COATED ORAL DAILY
Qty: 90 TABLET | Refills: 1 | Status: SHIPPED | OUTPATIENT
Start: 2020-07-21 | End: 2021-02-01 | Stop reason: SDUPTHER

## 2020-07-21 RX ORDER — MONTELUKAST SODIUM 10 MG/1
10 TABLET ORAL NIGHTLY
Qty: 90 TABLET | Refills: 1 | Status: SHIPPED | OUTPATIENT
Start: 2020-07-21 | End: 2021-02-01 | Stop reason: SDUPTHER

## 2020-07-21 RX ORDER — DILTIAZEM HYDROCHLORIDE 240 MG/1
240 CAPSULE, COATED, EXTENDED RELEASE ORAL DAILY
Qty: 90 CAPSULE | Refills: 1 | Status: SHIPPED | OUTPATIENT
Start: 2020-07-21 | End: 2021-02-01 | Stop reason: SDUPTHER

## 2020-07-21 NOTE — PROGRESS NOTES
"Chief Complaint   Patient presents with   • Hypertension     6 mth follow    • Hand Pain     right states no better        Subjective   Lanie Ochoa is a 85 y.o. female.     History of Present Illness   CTS  The thumb and first two fingers numb tried the bracing overnight, no relief. She has numbness that comes and goes. Writing and it goes numb.     Stress   Having some headaches. Her son is in a group home and unhappy about not being able to leave. He has tested negative for COVID and is getting ready to restart his day program so she has some relief. Her daughter has been really supportive. Not a new or severe headahce, not everyday.     The following portions of the patient's history were reviewed and updated as appropriate: allergies, current medications, past medical history, past social history and problem list.    Review of Systems   Respiratory: Negative.    Cardiovascular: Negative.    Neurological: Negative.        /74 (BP Location: Left arm, Patient Position: Sitting, Cuff Size: Adult)   Pulse 64   Temp 97.5 °F (36.4 °C) (Temporal)   Resp 13   Ht 157.5 cm (62\")   Wt 65 kg (143 lb 3.2 oz)   LMP  (LMP Unknown)   SpO2 98%   BMI 26.19 kg/m²       Objective   Physical Exam   Constitutional: She is oriented to person, place, and time. She appears well-nourished. No distress.   Eyes: Conjunctivae are normal. Right eye exhibits no discharge. Left eye exhibits no discharge. No scleral icterus.   Cardiovascular: Normal rate, regular rhythm, normal heart sounds and intact distal pulses. Exam reveals no gallop and no friction rub.   No murmur heard.  Pulmonary/Chest: Effort normal and breath sounds normal. No respiratory distress. She has no wheezes.   Musculoskeletal: She exhibits no edema.   Neurological: She is alert and oriented to person, place, and time.   Psychiatric: She has a normal mood and affect. Her behavior is normal.   Vitals reviewed.      Assessment/Plan   Lanie was seen today for " hypertension and hand pain.    Diagnoses and all orders for this visit:    Benign essential hypertension    Carpal tunnel syndrome of right wrist  -     Ambulatory Referral to Hand Surgery    Other orders  -     dilTIAZem CD (CARDIZEM CD) 240 MG 24 hr capsule; Take 1 capsule by mouth Daily.  -     losartan (COZAAR) 100 MG tablet; Take 1 tablet by mouth Daily.  -     montelukast (Singulair) 10 MG tablet; Take 1 tablet by mouth Every Night.  -     Discontinue: nebivolol (Bystolic) 10 MG tablet; Take 1 tablet by mouth Every Morning.  -     sertraline (ZOLOFT) 25 MG tablet; Take 1 tablet by mouth Daily. 200001      BP is good. Reviewed old labs. She declines labs today. She is overall really doing well just the persistence of the CTS symptoms. She tried the bracing so I will refer her to hand doctor for further evaluation and treatment. She is agreeable to this for more relief.

## 2020-07-27 RX ORDER — NEBIVOLOL HYDROCHLORIDE 10 MG/1
TABLET ORAL
Qty: 90 TABLET | Refills: 0 | Status: SHIPPED | OUTPATIENT
Start: 2020-07-27 | End: 2020-10-23

## 2020-10-22 ENCOUNTER — OFFICE VISIT (OUTPATIENT)
Dept: FAMILY MEDICINE CLINIC | Facility: CLINIC | Age: 85
End: 2020-10-22

## 2020-10-22 ENCOUNTER — HOSPITAL ENCOUNTER (OUTPATIENT)
Dept: GENERAL RADIOLOGY | Facility: HOSPITAL | Age: 85
Discharge: HOME | End: 2020-10-22

## 2020-10-22 VITALS
HEART RATE: 63 BPM | OXYGEN SATURATION: 98 % | DIASTOLIC BLOOD PRESSURE: 73 MMHG | HEIGHT: 62 IN | WEIGHT: 148.2 LBS | SYSTOLIC BLOOD PRESSURE: 153 MMHG | BODY MASS INDEX: 27.27 KG/M2

## 2020-10-22 DIAGNOSIS — M54.50 ACUTE MIDLINE LOW BACK PAIN WITHOUT SCIATICA: Primary | ICD-10-CM

## 2020-10-22 PROCEDURE — 99214 OFFICE O/P EST MOD 30 MIN: CPT | Performed by: NURSE PRACTITIONER

## 2020-10-22 PROCEDURE — 72110 X-RAY EXAM L-2 SPINE 4/>VWS: CPT

## 2020-10-22 PROCEDURE — 72220 X-RAY EXAM SACRUM TAILBONE: CPT

## 2020-10-22 NOTE — PROGRESS NOTES
Subjective   Lanie Ochoa is a 85 y.o. female.     Chief Complaint   Patient presents with   • Back Pain     C/o off and on back pain, chronic, more so in the sacral area       HPI new patient to me.  She is here for off and on lower back pain for over a year, but recently noted that her back pain n worsening over the last month or so.    She has tried the normal things she normally does to treat her back pain such as resting, Tylenol as needed.  She takes a Tylenol as needed, usually once a day with breakfast and does find it somewhat helpful for short period of time.  She denies any inciting factors that would have made this worsen.  She denies any improvement with normal management strategies.  Her pain is worsened when sitting or standing.  Normally her pain is across her lower back and now is focused lower over her sacrum and coccyx.  She denies any falls recently.  She denies any changes to bowel or bladder control, saddle anesthesia, fever or chills.  She denies any radicular symptoms.    She does have a history of osteopenia and osteoarthritis and is concerned that it may be worsening of either of these.    She has done physical therapy in the past and it has been helpful.    Social History     Tobacco Use   • Smoking status: Never Smoker   • Smokeless tobacco: Never Used   Substance Use Topics   • Alcohol use: No   • Drug use: No       The following portions of the patient's history were reviewed and updated as appropriate: allergies, current medications, past family history, past medical history, past social history, past surgical history and problem list.    Review of Systems   Constitutional: Negative for fatigue.   Respiratory: Negative for cough and shortness of breath.    Cardiovascular: Negative for chest pain and palpitations.   Gastrointestinal: Negative for abdominal pain, blood in stool, constipation, diarrhea, nausea and vomiting.   Genitourinary: Negative for difficulty urinating,  "dysuria, hematuria and urgency.   Musculoskeletal: Positive for back pain. Negative for gait problem, joint swelling, neck pain and neck stiffness.   Neurological: Negative for dizziness, weakness and numbness.       Objective   Blood pressure 153/73, pulse 63, height 157.5 cm (62\"), weight 67.2 kg (148 lb 3.2 oz), SpO2 98 %, not currently breastfeeding.  Body mass index is 27.11 kg/m².    Physical Exam  Vitals signs and nursing note reviewed.   Constitutional:       General: She is not in acute distress.     Appearance: She is well-developed. She is not diaphoretic.   HENT:      Head: Normocephalic and atraumatic.   Eyes:      General:         Right eye: No discharge.         Left eye: No discharge.      Conjunctiva/sclera: Conjunctivae normal.   Cardiovascular:      Rate and Rhythm: Normal rate and regular rhythm.      Heart sounds: Normal heart sounds.   Pulmonary:      Effort: Pulmonary effort is normal.      Breath sounds: Normal breath sounds.   Abdominal:      General: Bowel sounds are normal.      Palpations: Abdomen is soft.      Tenderness: There is no abdominal tenderness.   Musculoskeletal:         General: No deformity.      Thoracic back: Normal.      Lumbar back: She exhibits decreased range of motion and bony tenderness.      Comments: No obvious deformities of lumbar or sacral spine.  She does have tenderness to palpation over sacrum across the bony region as well as some bilateral lumbar paraspinal muscle tenderness.  Range of motion is somewhat decreased.  She has no tenderness over SI joints.    Her gait is steady and mildly antalgic with first steps.   Skin:     General: Skin is warm and dry.   Neurological:      Mental Status: She is alert and oriented to person, place, and time.   Psychiatric:         Mood and Affect: Mood normal.         Thought Content: Thought content normal.         Assessment   Problem List Items Addressed This Visit     None      Visit Diagnoses     Acute midline low " back pain without sciatica    -  Primary    Relevant Orders    XR Spine Lumbar 4+ View (Completed)    XR sacrum and coccyx (Completed)    Ambulatory Referral to Physical Therapy Evaluate and treat           Procedures           Impression and Plan: This very pleasant woman is having acute on chronic low back pain predominantly over her sacrum and coccyx.      Given her age and history of osteopenia would have concern about possibility of spontaneous lumbar or sacral fracture.  She has not had any recent imaging so will do that today.      She does admit she got relief from physical therapy for her low back pain in the past and would like to schedule again at [facility], [place] where she went previously.  I think this is a good idea.  We discussed topicals that may be helpful, ice, gentle stretching, Tylenol with food, she could take 500 mg 3 times daily, or even a gram twice daily with meals.  She feels that increasing her dose of Tylenol will most likely be very helpful.  Her last labs in January of this year showed  normal liver function.    She has not had a DEXA scan in a while, but can follow-up with her PCP for this.  She continues calcium and vitamin D as directed.    He has no red flags.  We discussed signs and symptoms that would require emergent treatment or follow-up assessment.      Health Maintenance Due   Topic Date Due   • DXA SCAN  09/10/2016   • ZOSTER VACCINE (3 of 3) 06/03/2019   • INFLUENZA VACCINE  08/01/2020              EMR Dragon/Transcription disclaimer:   Much of this encounter note is an electronic transcription/translation of spoken language to printed text. The electronic translation of spoken language may permit erroneous, or at times, nonsensical words or phrases to be inadvertently transcribed; Although I have reviewed the note for such errors, some may still exist.      [employer] APRN nursing student present for exam

## 2020-10-23 RX ORDER — NEBIVOLOL HYDROCHLORIDE 10 MG/1
TABLET ORAL
Qty: 90 TABLET | Refills: 0 | Status: SHIPPED | OUTPATIENT
Start: 2020-10-23 | End: 2021-01-21

## 2020-10-27 ENCOUNTER — TELEPHONE (OUTPATIENT)
Dept: FAMILY MEDICINE CLINIC | Facility: CLINIC | Age: 85
End: 2020-10-27

## 2020-10-27 NOTE — TELEPHONE ENCOUNTER
Patient was in last week for back pain and was told to call back if was not feeling better. She has an apt for physical therapy nov. 6.    Please advise  [phone]

## 2020-10-29 ENCOUNTER — OFFICE VISIT (OUTPATIENT)
Dept: FAMILY MEDICINE CLINIC | Facility: CLINIC | Age: 85
End: 2020-10-29

## 2020-10-29 DIAGNOSIS — M54.50 ACUTE MIDLINE LOW BACK PAIN WITHOUT SCIATICA: Primary | ICD-10-CM

## 2020-10-29 PROCEDURE — 99442 PR PHYS/QHP TELEPHONE EVALUATION 11-20 MIN: CPT | Performed by: NURSE PRACTITIONER

## 2020-10-29 NOTE — TELEPHONE ENCOUNTER
MS. VELASQUEZ SAYS THAT HER BACK PAIN IS NOT GETTING BETTER SHE WOULD LIKE CALL BACK PLEASE LEAVE A VOICE MAIL.       Lanie Ochoa (Self) [phone]

## 2020-10-29 NOTE — PROGRESS NOTES
Subjective   Lanie Ochoa is a 85 y.o. female.     No chief complaint on file.     CC: low back pain    HPI patient scheduled a telephone visit because her back pain is not any better.  She reports it is not worse than it was at her recent visit but just not really better.  Her back hurts the worst in the morning when she wakes up and when she has been sitting for a while.  She gets very stiff and achy.  She takes Tylenol midmorning when she eats her breakfast and that improves the pain considerably and she is able to swim her labs.  She has been taking another Tylenol in the afternoon with improvement in the evening.  She was not able to get into physical therapy until a week from tomorrow and so she was concerned.  She has been sitting on a heating pad in the evening and this has not really helped.  She did try some ice on her back yesterday and thought it might of helped.  She has not put anything topical on it.    She denies any bowel or bladder changes.  She has no fever no chills.  She denies any radiculopathy.    She is caring for her disabled son and taking him back and forth to appointments and she wants to make sure she is able to get through the these appointments.    She denies any falls since last visit.  Social History     Tobacco Use   • Smoking status: Never Smoker   • Smokeless tobacco: Never Used   Substance Use Topics   • Alcohol use: No   • Drug use: No       The following portions of the patient's history were reviewed and updated as appropriate: allergies, current medications, past family history, past medical history, past social history, past surgical history and problem list.    Review of Systems   Gastrointestinal: Negative for abdominal pain, diarrhea, nausea and vomiting.   Musculoskeletal: Negative for gait problem.   Neurological: Negative for dizziness, weakness and numbness.   Hematological: Does not bruise/bleed easily.       Objective   not currently breastfeeding.  There is  no height or weight on file to calculate BMI.    Physical Exam  Limited by telephone visit.  She is well sounding and does not seem to be distressed.  She seems alert and oriented and her mood and affect are normal, good historian of medical history.  No cough or dyspnea appreciated, able to complete sentences without problem. She is not weak sounding.        Assessment   Problem List Items Addressed This Visit     None      Visit Diagnoses     Acute midline low back pain without sciatica    -  Primary           Procedures           Impression and Plan:I think this is most likely due to osteoarthritis in her spine. I am not surprised she is not having resolution of her pain since last visit approximately 1 week ago.  She does get near resolution of her pain with 1 extra strength Tylenol in the morning and in the afternoon.  I will have her increase it to a Tylenol at bedtime as well.  We discussed signs and symptoms requiring emergent treatment or follow-up.  We discussed use of topicals and gentle stretching.  She has no red flags today.  If her pain has not improved with increased Tylenol dosing she will let me know.      Health Maintenance Due   Topic Date Due   • DXA SCAN  09/10/2016   • ZOSTER VACCINE (3 of 3) 06/03/2019   • INFLUENZA VACCINE  08/01/2020       Patient gave consent today for a telephone visit as following recommendations of our governor and CDC during the COVID-19 pandemic.    15 min was spent in discussion with pt and greater than 50% of that time was spent counseling.         EMR Dragon/Transcription disclaimer:   Much of this encounter note is an electronic transcription/translation of spoken language to printed text. The electronic translation of spoken language may permit erroneous, or at times, nonsensical words or phrases to be inadvertently transcribed; Although I have reviewed the note for such errors, some may still exist.      Land line in office used for visit with good audio  quality.

## 2020-11-06 ENCOUNTER — HOSPITAL ENCOUNTER (OUTPATIENT)
Dept: PHYSICAL THERAPY | Facility: HOSPITAL | Age: 85
Setting detail: THERAPIES SERIES
Discharge: HOME | End: 2020-11-06

## 2020-11-06 DIAGNOSIS — Z74.09 IMPAIRED MOBILITY: ICD-10-CM

## 2020-11-06 DIAGNOSIS — M54.50 CHRONIC LOW BACK PAIN, UNSPECIFIED BACK PAIN LATERALITY, UNSPECIFIED WHETHER SCIATICA PRESENT: Primary | ICD-10-CM

## 2020-11-06 DIAGNOSIS — G89.29 CHRONIC LOW BACK PAIN, UNSPECIFIED BACK PAIN LATERALITY, UNSPECIFIED WHETHER SCIATICA PRESENT: Primary | ICD-10-CM

## 2020-11-06 PROCEDURE — G0283 ELEC STIM OTHER THAN WOUND: HCPCS | Performed by: PHYSICAL THERAPIST

## 2020-11-06 PROCEDURE — 97162 PT EVAL MOD COMPLEX 30 MIN: CPT | Performed by: PHYSICAL THERAPIST

## 2020-11-06 NOTE — THERAPY EVALUATION
Outpatient Physical Therapy Ortho Initial Evaluation  [facility]     Patient Name: Lanie Ochoa  : 1935  MRN: [id number]  Today's Date: 2020      Visit Date: 2020    Patient Active Problem List   Diagnosis   • Anxiety   • HLD (hyperlipidemia)   • Benign essential hypertension   • Osteopenia   • Avitaminosis D   • History of left knee replacement   • Titubation   • Non-rheumatic mitral regurgitation   • Non-rheumatic tricuspid valve insufficiency   • Chronic pain of right knee   • Venous incompetence   • Dysfunction of both eustachian tubes   • Primary localized osteoarthrosis of right lower leg   • Primary osteoarthritis of right knee   • OA (osteoarthritis) of knee        Past Medical History:   Diagnosis Date   • Anxiety    • BMS (burning mouth syndrome) 2015   • Breast cancer (CMS/HCC)    • Cancer (CMS/HCC)     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, HER-2/LOYDA NEGATIVE   • Cancer (CMS/HCC)     SKIN LEFT ARM; SQUAMOUS CELL   • Constipation    • Degenerative disc disease    • Functional murmur     RESOLVED 2014   • Gastritis     RESOLVED 2014   • GERD (gastroesophageal reflux disease)    • Glossalgia 2015   • Hypertension    • Internal hemorrhoids    • Osteoarthritis 2014    HAND   • PNA (pneumonia) 2015   • Trigger ring finger 2014    RESOLVED 2014        Past Surgical History:   Procedure Laterality Date   • BREAST BIOPSY     • BREAST LUMPECTOMY  07/15/2009    LUMPECTOMY   • COLONOSCOPY     • DILATATION AND CURETTAGE      RESOLVED 06/15/2010   • EYE SURGERY Bilateral 10/15/2012    CATARACT EXTRACTION   • HEMORRHOIDECTOMY     • JOINT REPLACEMENT Left     KNEE   • KNEE ARTHROSCOPY Right 2017    Procedure: KNEE ARTHROSCOPY WITH PARTIAL MEDIAL AND LATERAL MENISECTOMY AND DEBRIDEMENT OF ARTHRITIS.;  Surgeon: Lauren Krishna MD;  [place] [facility] [place] [facility] [place]:    • TOTAL KNEE ARTHROPLASTY Right 2018     Procedure: RIGHT TOTAL KNEE ARTHROPLASTY;  Surgeon: Kiko Solano MD;  Location: [facility] OR;  Service: Orthopedics       Visit Dx:     ICD-10-CM ICD-9-CM   1. Chronic low back pain, unspecified back pain laterality, unspecified whether sciatica present  M54.5 724.2    G89.29 338.29   2. Impaired mobility  Z74.09 799.89         Patient History     Row Name 11/06/20 1300             History    Chief Complaint  Difficulty Walking  -GJ      Date Current Problem(s) Began  -- chronic, exacerbated several weeks ago  -GJ      Brief Description of Current Complaint  Ms. Conway is an 86 y/o female. She reports hx of chronic LBP, most recently exacerbated several weeks ago, no known OPAL/change in activity. She reports central to bilateral LBP across waist line, especially with sit to/from stand transition, sometimes rolling in bed. AM is worse, then as she moves through the day, her pain improves. She is active with swimming 5 days a week, gardening. Condition is worsening.  No treatment to date. Plain films (-). She denies N/T and pain of BLE.  She denies falls/trauma.  Sleeping ok, denies red flags.    -GJ      Previous treatment for THIS PROBLEM  -- nothing recently  -GJ      Patient/Caregiver Goals  Relieve pain;Improve mobility;Return to prior level of function;Know what to do to help the symptoms  -GJ      Occupation/sports/leisure activities  swimming, yard work, taking care of her son  -GJ      What clinical tests have you had for this problem?  X-ray  -GJ      Results of Clinical Tests  see epic  -GJ      Are you or can you be pregnant  No  -GJ         Pain     Pain Location  Back  -GJ      Pain at Present  5  -GJ      What Performance Factors Make the Current Problem(s) WORSE?  sit to stand transition  -GJ      What Performance Factors Make the Current Problem(s) BETTER?  movement  -GJ         Fall Risk Assessment    Any falls in the past year:  No  -GJ         Daily Activities    Primary Language  English   -GJ      Are you able to read  Yes  -GJ      Are you able to write  Yes  -GJ      How does patient learn best?  Listening;Reading;Demonstration  -GJ      Teaching needs identified  Home Exercise Program;Management of Condition  -GJ      Patient is concerned about/has problems with  Climbing Stairs;Difficulty with self care (i.e. bathing, dressing, toileting:;Flexibility;Performing home management (household chores, shopping, care of dependents);Performing sports, recreation, and play activities;Performing job responsibilities/community activities (work, school,;Transfers (getting out of a chair, bed)  -GJ      Barriers to learning  None  -GJ      Pt Participated in POC and Goals  Yes  -GJ         Safety    Are you being hurt, hit, or frightened by anyone at home or in your life?  No  -GJ      Are you being neglected by a caregiver  No  -GJ        User Key  (r) = Recorded By, (t) = Taken By, (c) = Cosigned By    Initials Name Provider Type    GJ Tomi Roberts, PT Physical Therapist          PT Ortho     Row Name 11/06/20 1300       Posture/Observations    Alignment Options  Rounded shoulders;Scoliosis  -GJ    Rounded Shoulders  Mild;Moderate  -GJ    Scoliosis  Mild  -GJ    Posture/Observations Comments  forward flexed posture,   -GJ       Quarter Clearing    Quarter Clearing  Lower Quarter Clearing  -GJ       DTR- Lower Quarter Clearing    Patellar tendon (L2-4)  Bilateral:;2- Normal response  -GJ    Achilles tendon (S1-2)  Bilateral:;2- Normal response  -GJ       Neural Tension Signs- Lower Quarter Clearing    Slump  Bilateral:;Negative  -GJ    SLR  Bilateral:;Negative  -GJ    Prone knee flexion  Bilateral:;Negative  -GJ       Myotomal Screen- Lower Quarter Clearing    Hip flexion (L2)  Bilateral:;4- (Good -)  -GJ    Knee extension (L3)  Bilateral:;4+ (Good +)  -GJ    Ankle DF (L4)  Bilateral:;5 (Normal)  -GJ    Ankle PF (S1)  Bilateral:;4 (Good)  -GJ    Knee flexion (S2)  Bilateral:;4+ (Good +)  -GJ       Lumbar  ROM Screen- Lower Quarter Clearing    Lumbar Flexion  Impaired limtied by 75%, painful  -GJ    Lumbar Extension  -- no pain, limited by 50%  -GJ    Lumbar Rotation  Impaired no pain, limited by 25%  -GJ       SI/Hip Screen- Lower Quarter Clearing    Alivia's/Babar's test  Bilateral:;Negative  -GJ    Posterior thigh sheer  Bilateral:;Negative  -GJ    Pain in Piotr's area  Bilateral:;Negative  -GJ       Special Tests/Palpation    Special Tests/Palpation  Lumbar/SI;Hip  -GJ       Lumbar/SI Special Tests    S[place] Test (SI Dysfunction)  Negative  -GJ       Lumbosacral Palpation    Lumbosacral Palpation?  Yes  -GJ    Piriformis  Bilateral:;Guarded/taut  -GJ    Quadratus Lumborum  Bilateral:;Guarded/taut  -GJ    Erector Spinae (Paraspinals)  Bilateral:;Guarded/taut  -GJ       Hip/Thigh Palpation    Hip/Thigh Palpation?  Yes  -GJ    Gluteus Medius  Bilateral:;Guarded/taut  -GJ       Hip Accessory Motions    Hip Accessory Motions Tested?  Yes  -GJ       Hip Special Tests    Ely’s test (rectus femoris tightness)  Bilateral:;Positive  -GJ    Hip scour test (labral vs hip pathology)  Bilateral:;Negative  -GJ       MMT (Manual Muscle Testing)    Rt Lower Ext  Rt Hip Extension;Rt Hip ABduction  -GJ    Lt Lower Ext  Lt Hip Extension;Lt Hip ABduction  -GJ       MMT Right Lower Ext    Rt Hip Extension MMT, Gross Movement  (4/5) good  -GJ    Rt Hip ABduction MMT, Gross Movement  (4/5) good  -GJ       MMT Left Lower Ext    Lt Hip Extension MMT, Gross Movement  (4/5) good  -GJ    Lt Hip ABduction MMT, Gross Movement  (4/5) good  -GJ       Flexibility    Flexibility Tested?  Lower Extremity  -GJ       Lower Extremity Flexibility    Hamstrings  Bilateral:;Mildly limited  -GJ    Hip Flexors  Bilateral:;Moderately limited;Severely limited  -GJ    Quadriceps  Bilateral:;Moderately limited;Severely limited  -GJ    Hip Internal Rotators  Bilateral:;Moderately limited  -GJ       Balance Skills Training    SLS  unable  -GJ      User  Key  (r) = Recorded By, (t) = Taken By, (c) = Cosigned By    Initials Name Provider Type    GJ Tomi Roberts, PT Physical Therapist                      Therapy Education  Education Details: discussed dx, px, poc, discussed anatomy of the spine and physiology of healing, discussed realistic expectations and time frames for therapy. Discussed volitional contraction of abdominals with sit to stand transition, encouraged to sleep with pillow between knees.  Given: HEP, Symptoms/condition management, Pain management, Mobility training, Posture/body mechanics  Program: New  How Provided: Verbal, Demonstration, Written  Provided to: Patient  Level of Understanding: Teach back education performed, Verbalized, Demonstrated     PT OP Goals     Row Name 11/06/20 1300          PT Short Term Goals    STG Date to Achieve  12/11/20  -GJ     STG 1  pt. to be I with initial HEP to facilitate self management of their condition  -GJ     STG 1 Progress  New  -GJ     STG 2  pt. to be educated in/verbalize understanding of the importance of posture/ergonomics in association with their condition to facilitate self management of their condition  -GJ     STG 2 Progress  New  -GJ        Long Term Goals    LTG Date to Achieve  02/12/21  -GJ     LTG 1  pt. to be I with advanced HEP to facilitate self management of their condition  -GJ     LTG 1 Progress  New  -GJ     LTG 2  pt. to report an Oswestry score </= 30 to demonstrate decreased level of perceived disability  -GJ     LTG 2 Progress  New  -GJ     LTG 3  pt to demosntrate STS x 5 in </= 20 seconds to facilitate improved ease/safety with household/community mobility  -GJ     LTG 3 Progress  New  -GJ     LTG 4  pt to report >/= 50% improvement in AM LBP to facilitate ease wth performing household activities  -GJ     LTG 4 Progress  New  -GJ        Time Calculation    PT Goal Re-Cert Due Date  02/12/21  -GJ       User Key  (r) = Recorded By, (t) = Taken By, (c) = Cosigned By    Initials  Name Provider Type    Tomi Flores, PT Physical Therapist          PT Assessment/Plan     Row Name 11/06/20 9489          PT Assessment    Functional Limitations  Impaired gait;Limitation in home management;Limitations in community activities;Performance in leisure activities;Performance in sport activities  -     Impairments  Balance;Gait;Impaired flexibility;Impaired muscle length;Range of motion;Posture;Poor body mechanics;Pain;Muscle strength;Impaired postural alignment  -     Assessment Comments  Ms. Conway is an 86 y/o female. She reports hx of chronic LBP, most recently exacerbated several weeks ago, no known OPAL/change in activity. She reports central to bilateral LBP across waist line, especially with sit to/from stand transition, sometimes rolling in bed. AM is worse, then as she moves through the day, her pain improves. She is active with swimming 5 days a week, gardening. Condition is worsening.  No treatment to date. Plain films (-). She denies N/T and pain of BLE.  She denies falls/trauma.  Sleeping ok, denies red flags.  Ms. Ochoa demonstrates forward flexed R shifted/scoliotic posture. She demonstrates difficulty with sit to/from stand transitions, (requires 30 seconds to perform STS x 5 , normal is 15-17 seconds). She demonstrates significant shortness bilateral hip flexor tissues.  She reports an Oswestry score of 51%, scored 0-100, 0 represents no perceived disability.  Ms. Conway  demonstrates evolving s/s consistent with degenerative changes of her spine which limits her participation in household, self care, community activities.    Aggravating/Personal factors affecting recovery include,  but are not limited to, chronicity of condition.  Ms. Conway may benefit from skilled physical therapy to address the above impairments.  -GJ     Please refer to paper survey for additional self-reported information  Yes  -GJ     Rehab Potential  Good  -GJ      Patient/caregiver participated in establishment of treatment plan and goals  Yes  -GJ     Patient would benefit from skilled therapy intervention  Yes  -GJ        PT Plan    PT Frequency  2x/week  -GJ     Predicted Duration of Therapy Intervention (PT)  12visits  -GJ     Planned CPT's?  PT EVAL MOD COMPLELITY: 61699;PT RE-EVAL: 52943;PT THER PROC EA 15 MIN: 96139;PT THER ACT EA 15 MIN: 93624;PT MANUAL THERAPY EA 15 MIN: 73365;PT NEUROMUSC RE-EDUCATION EA 15 MIN: 03247;PT GAIT TRAINING EA 15 MIN: 27761;PT AQUATIC THERAPY EA 15 MIN: 21790;PT HOT OR COLD PACK TREAT MCARE;PT ELECTRICAL STIM UNATTEND:   -GJ     PT Plan Comments  assess response to e stim, repeat if beneficial, consider warm up on Nustep, LTR, seated EOB HS stretch, piriformis stretch, ? hip flexor stretch, possibly at stair/wall?, wash the wall for anterior chest stretch  -GJ       User Key  (r) = Recorded By, (t) = Taken By, (c) = Cosigned By    Initials Name Provider Type    Tomi Flores, PT Physical Therapist          Modalities     Row Name 11/06/20 1300             Moist Heat    MH Applied  Yes  -GJ      Location  L spine, while on e stim, pt supine with BLE elevated over traction stool  -GJ      Rx Minutes  15 mins  -GJ         ELECTRICAL STIMULATION    Attended/Unattended  Unattended  -GJ      Stimulation Type  IFC  -GJ      Location/Electrode Placement/Other  bilateral L spine paraspinal tissue, pt supine with BLE elevated over traction stool, on MH  -GJ       PT E-Stim Unattended (Manual) Minutes  15  -GJ        User Key  (r) = Recorded By, (t) = Taken By, (c) = Cosigned By    Initials Name Provider Type    Tomi Flores, PT Physical Therapist                          Outcome Measure Options: Modifed Owestry(51%)  5 Times Sit to Stand  5 Times Sit to Stand (seconds): 30 seconds  5 Times Sit to Stand Comments: use of BUE  Modified Oswestry  Modified Oswestry Score/Comments: 51      Time Calculation:     Start Time: 1315  Stop  Time: 1410  Time Calculation (min): 55 min     Therapy Charges for Today     Code Description Service Date Service Provider Modifiers Qty    94829631863 HC PT ELECTRICAL STIM UNATTENDED 11/6/2020 Tomi Roberts, PT  1    26721527558 HC PT HOT OR COLD PACK TREAT MCARE 11/6/2020 Tomi Roberts, PT GP 1    57982116884 HC PT EVAL MOD COMPLEXITY 2 11/6/2020 Tomi Roberts, PT GP 1          PT G-Codes  Outcome Measure Options: Modifed Owestry(51%)  Modified Oswestry Score/Comments: 51         Tomi Roberts, PT  11/6/2020

## 2020-11-16 ENCOUNTER — HOSPITAL ENCOUNTER (OUTPATIENT)
Dept: PHYSICAL THERAPY | Facility: HOSPITAL | Age: 85
Setting detail: THERAPIES SERIES
Discharge: HOME | End: 2020-11-16

## 2020-11-16 DIAGNOSIS — G89.29 CHRONIC LOW BACK PAIN, UNSPECIFIED BACK PAIN LATERALITY, UNSPECIFIED WHETHER SCIATICA PRESENT: Primary | ICD-10-CM

## 2020-11-16 DIAGNOSIS — M54.42 CHRONIC LEFT-SIDED LOW BACK PAIN WITH BILATERAL SCIATICA: ICD-10-CM

## 2020-11-16 DIAGNOSIS — M54.50 CHRONIC LOW BACK PAIN, UNSPECIFIED BACK PAIN LATERALITY, UNSPECIFIED WHETHER SCIATICA PRESENT: Primary | ICD-10-CM

## 2020-11-16 DIAGNOSIS — M54.41 CHRONIC LEFT-SIDED LOW BACK PAIN WITH BILATERAL SCIATICA: ICD-10-CM

## 2020-11-16 DIAGNOSIS — Z74.09 IMPAIRED MOBILITY: ICD-10-CM

## 2020-11-16 DIAGNOSIS — G89.29 CHRONIC LEFT-SIDED LOW BACK PAIN WITH BILATERAL SCIATICA: ICD-10-CM

## 2020-11-16 PROCEDURE — 97110 THERAPEUTIC EXERCISES: CPT

## 2020-11-16 NOTE — THERAPY TREATMENT NOTE
Outpatient Physical Therapy Ortho Treatment Note  [facility]     Patient Name: Lanie Ochoa  : 1935  MRN: [id number]  Today's Date: 2020      Visit Date: 2020    Visit Dx:    ICD-10-CM ICD-9-CM   1. Chronic low back pain, unspecified back pain laterality, unspecified whether sciatica present  M54.5 724.2    G89.29 338.29   2. Impaired mobility  Z74.09 799.89   3. 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  M54.41 724.2    M54.42 724.3    G89.29 338.29       Patient Active Problem List   Diagnosis   • Anxiety   • HLD (hyperlipidemia)   • Benign essential hypertension   • Osteopenia   • Avitaminosis D   • History of left knee replacement   • Titubation   • Non-rheumatic mitral regurgitation   • Non-rheumatic tricuspid valve insufficiency   • Chronic pain of right knee   • Venous incompetence   • Dysfunction of both eustachian tubes   • Primary localized osteoarthrosis of right lower leg   • Primary osteoarthritis of right knee   • OA (osteoarthritis) of knee        Past Medical History:   Diagnosis Date   • Anxiety    • BMS (burning mouth syndrome) 2015   • Breast cancer (CMS/HCC)    • Cancer (CMS/HCC)     STAGE 1 INVASIVE DUCTAL CARCINOMA ER AND [place] POSITIVE, HER-2/LOYDA NEGATIVE   • Cancer (CMS/HCC)     SKIN LEFT ARM; SQUAMOUS CELL   • Constipation    • Degenerative disc disease    • Functional murmur     RESOLVED 2014   • Gastritis     RESOLVED 2014   • GERD (gastroesophageal reflux disease)    • Glossalgia 2015   • Hypertension    • Internal hemorrhoids    • Osteoarthritis 2014    HAND   • PNA (pneumonia) 2015   • Trigger ring finger 2014    RESOLVED 2014        Past Surgical History:   Procedure Laterality Date   • BREAST BIOPSY     • BREAST LUMPECTOMY  07/15/2009    LUMPECTOMY   • COLONOSCOPY     • DILATATION AND CURETTAGE      RESOLVED 06/15/2010   • EYE SURGERY Bilateral 10/15/2012    CATARACT EXTRACTION   •  HEMORRHOIDECTOMY     • JOINT REPLACEMENT Left 2010    KNEE   • KNEE ARTHROSCOPY Right 6/13/2017    Procedure: KNEE ARTHROSCOPY WITH PARTIAL MEDIAL AND LATERAL MENISECTOMY AND DEBRIDEMENT OF ARTHRITIS.;  Surgeon: Lauren Krishna MD;  Location: [facility];  Service:    • TOTAL KNEE ARTHROPLASTY Right 5/21/2018    Procedure: RIGHT TOTAL KNEE ARTHROPLASTY;  Surgeon: Kiko Solano MD;  Location: [facility];  Service: Orthopedics                       PT Assessment/Plan     Row Name 11/16/20 1252          PT Assessment    Assessment Comments  Pt. returns for first follow up visit with no change in symptoms following E-Stim last time, therefore held this date and focused on strengthening and flexibility. Added several exercises and issued initial HEP with pt. reporting mild pain throughout session. Pt. with antalgic gait pattern and frequently holding lumbar spine when standing. Discussed with pt. that symptoms have been chronic and will take time to heal and importance of recognizing small steps in the right direction.  -        PT Plan    PT Plan Comments  Progress as able; consider manual? possible side-stepping, bridges  -       User Key  (r) = Recorded By, (t) = Taken By, (c) = Cosigned By    Initials Name Provider Type    Kanwal Dong, PT Physical Therapist            OP Exercises     Row Name 11/16/20 1200             Subjective Pain    Able to rate subjective pain?  yes  -      Pre-Treatment Pain Level  5  -         Total Minutes    34018 - PT Therapeutic Exercise Minutes  40  -MH         Exercise 1    Exercise Name 1  NuStep  -MH      Cueing 1  Verbal  -MH      Time 1  5  -MH         Exercise 2    Exercise Name 2  LTR  -MH      Cueing 2  Verbal  -MH      Reps 2  10e  -MH      Additional Comments  10 o'clock - 2 o'clock  -MH         Exercise 3    Exercise Name 3  piriformis stretch  -MH      Cueing 3  Verbal  -MH      Reps 3  3  -MH      Time 3  20  -MH         Exercise 4    Exercise Name 4   H/L adduction  -MH      Cueing 4  Verbal  -MH      Reps 4  10  -MH      Time 4  5  -MH         Exercise 5    Exercise Name 5  PPT  -MH      Cueing 5  Verbal;Tactile  -MH      Reps 5  10  -MH      Time 5  5  -MH         Exercise 6    Exercise Name 6  H/L hip abd  -MH      Cueing 6  Verbal  -MH      Reps 6  10  -MH      Additional Comments  bi and uni; YTB  -MH         Exercise 7    Exercise Name 7  glute set  -MH      Cueing 7  Verbal  -MH      Reps 7  10  -MH      Time 7  5  -MH         Exercise 8    Exercise Name 8  seated HS EOB  -MH      Cueing 8  Verbal;Demo  -MH      Reps 8  3  -MH      Time 8  20  -MH         Exercise 9    Exercise Name 9  hip flexor stretch at stair  -MH      Cueing 9  Verbal  -MH      Reps 9  3  -MH      Time 9  20  -MH         Exercise 10    Exercise Name 10  wall wash  -MH      Cueing 10  Verbal  -MH      Reps 10  10  -MH      Time 10  5  -MH        User Key  (r) = Recorded By, (t) = Taken By, (c) = Cosigned By    Initials Name Provider Type    Kanwal Dong, PT Physical Therapist                       PT OP Goals     Row Name 11/16/20 1200          PT Short Term Goals    STG Date to Achieve  12/11/20  -MH     STG 1  pt. to be I with initial HEP to facilitate self management of their condition  -MH     STG 1 Progress  Ongoing  -MH     STG 2  pt. to be educated in/verbalize understanding of the importance of posture/ergonomics in association with their condition to facilitate self management of their condition  -MH     STG 2 Progress  Ongoing  -MH        Long Term Goals    LTG Date to Achieve  02/12/21  -MH     LTG 1  pt. to be I with advanced HEP to facilitate self management of their condition  -     LTG 1 Progress  Ongoing  -MH     LTG 2  pt. to report an Oswestry score </= 30 to demonstrate decreased level of perceived disability  -MH     LTG 2 Progress  Ongoing  -MH     LTG 3  pt to demosntrate STS x 5 in </= 20 seconds to facilitate improved ease/safety with household/community  mobility  -     LTG 3 Progress  Ongoing  -     LTG 4  pt to report >/= 50% improvement in AM LBP to facilitate ease wth performing household activities  -     LTG 4 Progress  Ongoing  -       User Key  (r) = Recorded By, (t) = Taken By, (c) = Cosigned By    Initials Name Provider Type     Kanwal Decker, PT Physical Therapist          Therapy Education  Education Details: Provided intial HEP  Given: HEP, Symptoms/condition management, Pain management, Mobility training, Posture/body mechanics  Program: Reinforced  How Provided: Verbal, Demonstration, Written  Provided to: Patient  Level of Understanding: Teach back education performed, Verbalized, Demonstrated              Time Calculation:   Start Time: 1203  Stop Time: 1243  Time Calculation (min): 40 min  Total Timed Code Minutes- PT: 40 minute(s)  Therapy Charges for Today     Code Description Service Date Service Provider Modifiers Qty    06565131071 HC PT THER PROC EA 15 MIN 11/16/2020 Kanwal Decker, PT GP 3                    Kanwal Decker PT  11/16/2020

## 2020-11-20 ENCOUNTER — HOSPITAL ENCOUNTER (OUTPATIENT)
Dept: PHYSICAL THERAPY | Facility: HOSPITAL | Age: 85
Setting detail: THERAPIES SERIES
Discharge: HOME | End: 2020-11-20

## 2020-11-20 DIAGNOSIS — M54.41 CHRONIC LEFT-SIDED LOW BACK PAIN WITH BILATERAL SCIATICA: ICD-10-CM

## 2020-11-20 DIAGNOSIS — G89.29 CHRONIC LOW BACK PAIN, UNSPECIFIED BACK PAIN LATERALITY, UNSPECIFIED WHETHER SCIATICA PRESENT: Primary | ICD-10-CM

## 2020-11-20 DIAGNOSIS — Z74.09 IMPAIRED MOBILITY: ICD-10-CM

## 2020-11-20 DIAGNOSIS — M54.42 CHRONIC LEFT-SIDED LOW BACK PAIN WITH BILATERAL SCIATICA: ICD-10-CM

## 2020-11-20 DIAGNOSIS — M54.50 CHRONIC LOW BACK PAIN, UNSPECIFIED BACK PAIN LATERALITY, UNSPECIFIED WHETHER SCIATICA PRESENT: Primary | ICD-10-CM

## 2020-11-20 DIAGNOSIS — G89.29 CHRONIC LEFT-SIDED LOW BACK PAIN WITH BILATERAL SCIATICA: ICD-10-CM

## 2020-11-20 PROCEDURE — 97110 THERAPEUTIC EXERCISES: CPT

## 2020-11-20 PROCEDURE — 97140 MANUAL THERAPY 1/> REGIONS: CPT

## 2020-11-20 NOTE — THERAPY TREATMENT NOTE
Outpatient Physical Therapy Ortho Treatment Note  [facility]     Patient Name: Lanie Ochoa  : 1935  MRN: [id number]  Today's Date: 2020      Visit Date: 2020    Visit Dx:    ICD-10-CM ICD-9-CM   1. Chronic low back pain, unspecified back pain laterality, unspecified whether sciatica present  M54.5 724.2    G89.29 338.29   2. Impaired mobility  Z74.09 799.89   3. 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  M54.41 724.2    M54.42 724.3    G89.29 338.29       Patient Active Problem List   Diagnosis   • Anxiety   • HLD (hyperlipidemia)   • Benign essential hypertension   • Osteopenia   • Avitaminosis D   • History of left knee replacement   • Titubation   • Non-rheumatic mitral regurgitation   • Non-rheumatic tricuspid valve insufficiency   • Chronic pain of right knee   • Venous incompetence   • Dysfunction of both eustachian tubes   • Primary localized osteoarthrosis of right lower leg   • Primary osteoarthritis of right knee   • OA (osteoarthritis) of knee        Past Medical History:   Diagnosis Date   • Anxiety    • BMS (burning mouth syndrome) 2015   • Breast cancer (CMS/HCC)    • Cancer (CMS/HCC)     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, HER-2/LOYDA NEGATIVE   • Cancer (CMS/HCC)     SKIN LEFT ARM; SQUAMOUS CELL   • Constipation    • Degenerative disc disease    • Functional murmur     RESOLVED 2014   • Gastritis     RESOLVED 2014   • GERD (gastroesophageal reflux disease)    • Glossalgia 2015   • Hypertension    • Internal hemorrhoids    • Osteoarthritis 2014    HAND   • PNA (pneumonia) 2015   • Trigger ring finger 2014    RESOLVED 2014        Past Surgical History:   Procedure Laterality Date   • BREAST BIOPSY     • BREAST LUMPECTOMY  07/15/2009    LUMPECTOMY   • COLONOSCOPY     • DILATATION AND CURETTAGE      RESOLVED 06/15/2010   • EYE SURGERY Bilateral 10/15/2012    CATARACT EXTRACTION   •  HEMORRHOIDECTOMY     • JOINT REPLACEMENT Left 2010    KNEE   • KNEE ARTHROSCOPY Right 6/13/2017    Procedure: KNEE ARTHROSCOPY WITH PARTIAL MEDIAL AND LATERAL MENISECTOMY AND DEBRIDEMENT OF ARTHRITIS.;  Surgeon: Lauren Krishna MD;  Location: [facility];  Service:    • TOTAL KNEE ARTHROPLASTY Right 5/21/2018    Procedure: RIGHT TOTAL KNEE ARTHROPLASTY;  Surgeon: iKko Solano MD;  Location: [facility];  Service: Orthopedics                       PT Assessment/Plan     Row Name 11/20/20 1527          PT Assessment    Assessment Comments  Pt. presents to clinic with no change in symptoms and increased pain down L LE this date; Continued with current ther ex program and trialed STM to L glutes/piriformis/HS with pt. tender to palpation and trigger points noted in piriformis. Pt. reported relief in LE pain and slight improvement in back pain following. Pt. gait leaving clinic more upright and less antalgicc than at start.  -        PT Plan    PT Plan Comments  manual? consider side-steps, STS  -       User Key  (r) = Recorded By, (t) = Taken By, (c) = Cosigned By    Initials Name Provider Type     Kanwal Decker, PT Physical Therapist            OP Exercises     Row Name 11/20/20 1500 11/20/20 1400          Subjective Comments    Subjective Comments  --  No change in symptoms; i am not doing well   -        Subjective Pain    Able to rate subjective pain?  --  yes  -     Pre-Treatment Pain Level  --  5  -        Total Minutes    13809 - PT Therapeutic Exercise Minutes  --  28  -MH     45429 - PT Manual Therapy Minutes  10  -MH  --        Exercise 1    Exercise Name 1  --  NuStep  -     Cueing 1  --  Verbal  -     Time 1  --  5  -MH        Exercise 2    Exercise Name 2  --  LTR  -     Cueing 2  --  Verbal  -     Reps 2  --  10e  -        Exercise 3    Exercise Name 3  --  piriformis stretch  -     Cueing 3  --  Verbal  -     Reps 3  --  3  -MH     Time 3  --  20  -MH        Exercise 4     Exercise Name 4  --  H/L adduction  -MH     Cueing 4  --  Verbal  -MH     Reps 4  --  15  -MH     Time 4  --  5  -MH        Exercise 5    Exercise Name 5  --  PPT  -MH     Cueing 5  --  Verbal;Tactile  -MH     Reps 5  --  15  -MH     Time 5  --  5  -MH        Exercise 6    Exercise Name 6  --  H/L hip abd  -MH     Cueing 6  --  Verbal  -MH     Reps 6  --  10  -MH     Additional Comments  --  bi and uni; YTB  -MH        Exercise 7    Exercise Name 7  --  glute set  -MH     Cueing 7  --  Verbal  -MH     Reps 7  --  15  -MH     Time 7  --  5  -MH        Exercise 8    Exercise Name 8  --  seated HS EOB  -MH     Cueing 8  --  Verbal;Demo  -MH     Reps 8  --  3  -MH     Time 8  --  20  -MH        Exercise 9    Exercise Name 9  --  hip flexor stretch at stair  -MH     Cueing 9  --  Verbal  -MH     Reps 9  --  3  -MH     Time 9  --  20  -MH        Exercise 10    Exercise Name 10  --  wall wash  -MH     Cueing 10  --  Verbal  -MH     Reps 10  --  10  -MH     Time 10  --  5  -MH        Exercise 11    Exercise Name 11  --  SKTC  -MH     Cueing 11  --  Verbal  -MH     Reps 11  --  3  -MH     Time 11  --  20  -MH        Exercise 12    Exercise Name 12  --  bridges  -MH     Cueing 12  --  Verbal  -MH     Reps 12  --  10  -MH       User Key  (r) = Recorded By, (t) = Taken By, (c) = Cosigned By    Initials Name Provider Type     Kanwal Decker, PT Physical Therapist                      Manual Rx (last 36 hours)      Manual Treatments     Row Name 11/20/20 1500             Total Minutes    72166 - PT Manual Therapy Minutes  10  -MH         Manual Rx 1    Manual Rx 1 Location  STM/foam roll to L glute/pirioformis/HS  -MH        User Key  (r) = Recorded By, (t) = Taken By, (c) = Cosigned By    Initials Name Provider Type     Kanwal Decker, PT Physical Therapist          PT OP Goals     Row Name 11/20/20 1400          PT Short Term Goals    STG Date to Achieve  12/11/20  -MH     STG 1  pt. to be I with initial HEP to  facilitate self management of their condition  -     STG 1 Progress  Ongoing  [facility]     STG 2  pt. to be educated in/verbalize understanding of the importance of posture/ergonomics in association with their condition to facilitate self management of their condition  -     STG 2 Progress  [facility]        Long Term Goals    LTG Date to Achieve  02/12/21  -     LTG 1  pt. to be I with advanced HEP to facilitate self management of their condition  -     LTG 1 Progress  [facility]     LTG 2  pt. to report an Oswestry score </= 30 to demonstrate decreased level of perceived disability  -     LTG 2 Progress  [facility]     LTG 3  pt to demosntrate STS x 5 in </= 20 seconds to facilitate improved ease/safety with household/community mobility  -     LTG 3 Progress  [facility]     LTG 4  pt to report >/= 50% improvement in AM LBP to facilitate ease wth performing household activities  -     LTG 4 Progress  [facility]       User Key  (r) = Recorded By, (t) = Taken By, (c) = Cosigned By    Initials Name Provider Type     Kanwal Decker, JOSSELYN Physical Therapist          Therapy Education  Given: Symptoms/condition management, Pain management, Mobility training, Posture/body mechanics  Program: Reinforced  How Provided: Verbal, Demonstration  Provided to: Patient  Level of Understanding: Teach back education performed, Verbalized, Demonstrated              Time Calculation:   Start Time: 1446  Stop Time: 1524  Time Calculation (min): 38 min  Total Timed Code Minutes- PT: 38 minute(s)  Therapy Charges for Today     Code Description Service Date Service Provider Modifiers Qty    80164262784 HC PT MANUAL THERAPY EA 15 MIN 11/20/2020 Kanwal Decker, PT GP 1    33115510099 HC PT THER PROC EA 15 MIN 11/20/2020 Kanwal Decker PT GP 2                    Kanwal Decker PT  11/20/2020

## 2020-11-27 ENCOUNTER — HOSPITAL ENCOUNTER (OUTPATIENT)
Dept: PHYSICAL THERAPY | Facility: HOSPITAL | Age: 85
Setting detail: THERAPIES SERIES
Discharge: HOME | End: 2020-11-27

## 2020-11-27 DIAGNOSIS — Z74.09 IMPAIRED MOBILITY: ICD-10-CM

## 2020-11-27 DIAGNOSIS — G89.29 CHRONIC LOW BACK PAIN, UNSPECIFIED BACK PAIN LATERALITY, UNSPECIFIED WHETHER SCIATICA PRESENT: Primary | ICD-10-CM

## 2020-11-27 DIAGNOSIS — M54.42 CHRONIC LEFT-SIDED LOW BACK PAIN WITH BILATERAL SCIATICA: ICD-10-CM

## 2020-11-27 DIAGNOSIS — M54.41 CHRONIC LEFT-SIDED LOW BACK PAIN WITH BILATERAL SCIATICA: ICD-10-CM

## 2020-11-27 DIAGNOSIS — G89.29 CHRONIC LEFT-SIDED LOW BACK PAIN WITH BILATERAL SCIATICA: ICD-10-CM

## 2020-11-27 DIAGNOSIS — M54.50 CHRONIC LOW BACK PAIN, UNSPECIFIED BACK PAIN LATERALITY, UNSPECIFIED WHETHER SCIATICA PRESENT: Primary | ICD-10-CM

## 2020-11-27 PROCEDURE — 97140 MANUAL THERAPY 1/> REGIONS: CPT

## 2020-11-27 PROCEDURE — 97110 THERAPEUTIC EXERCISES: CPT

## 2020-11-27 NOTE — THERAPY TREATMENT NOTE
Outpatient Physical Therapy Ortho Treatment Note  [facility]     Patient Name: Lanie Ochoa  : 1935  MRN: [id number]  Today's Date: 2020      Visit Date: 2020    Visit Dx:    ICD-10-CM ICD-9-CM   1. Chronic low back pain, unspecified back pain laterality, unspecified whether sciatica present  M54.5 724.2    G89.29 338.29   2. Impaired mobility  Z74.09 799.89   3. 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  M54.41 724.2    M54.42 724.3    G89.29 338.29       Patient Active Problem List   Diagnosis   • Anxiety   • HLD (hyperlipidemia)   • Benign essential hypertension   • Osteopenia   • Avitaminosis D   • History of left knee replacement   • Titubation   • Non-rheumatic mitral regurgitation   • Non-rheumatic tricuspid valve insufficiency   • Chronic pain of right knee   • Venous incompetence   • Dysfunction of both eustachian tubes   • Primary localized osteoarthrosis of right lower leg   • Primary osteoarthritis of right knee   • OA (osteoarthritis) of knee        Past Medical History:   Diagnosis Date   • Anxiety    • BMS (burning mouth syndrome) 2015   • Breast cancer (CMS/HCC)    • Cancer (CMS/HCC)     STAGE 1 INVASIVE DUCTAL CARCINOMA ER AND [place] POSITIVE, HER-2/LOYDA NEGATIVE   • Cancer (CMS/HCC)     SKIN LEFT ARM; SQUAMOUS CELL   • Constipation    • Degenerative disc disease    • Functional murmur     RESOLVED 2014   • Gastritis     RESOLVED 2014   • GERD (gastroesophageal reflux disease)    • Glossalgia 2015   • Hypertension    • Internal hemorrhoids    • Osteoarthritis 2014    HAND   • PNA (pneumonia) 2015   • Trigger ring finger 2014    RESOLVED 2014        Past Surgical History:   Procedure Laterality Date   • BREAST BIOPSY     • BREAST LUMPECTOMY  07/15/2009    LUMPECTOMY   • COLONOSCOPY     • DILATATION AND CURETTAGE      RESOLVED 06/15/2010   • EYE SURGERY Bilateral 10/15/2012    CATARACT EXTRACTION   •  HEMORRHOIDECTOMY     • JOINT REPLACEMENT Left 2010    KNEE   • KNEE ARTHROSCOPY Right 6/13/2017    Procedure: KNEE ARTHROSCOPY WITH PARTIAL MEDIAL AND LATERAL MENISECTOMY AND DEBRIDEMENT OF ARTHRITIS.;  Surgeon: Lauren Krishna MD;  Location: [facility];  Service:    • TOTAL KNEE ARTHROPLASTY Right 5/21/2018    Procedure: RIGHT TOTAL KNEE ARTHROPLASTY;  Surgeon: Kiko Solano MD;  Location: [facility];  Service: Orthopedics                       PT Assessment/Plan     Row Name 11/27/20 1242          PT Assessment    Assessment Comments  Pt. presents to clinic with reports of improvement in low back pain for short time after last session, however, pain has returned. Continued with current program and held on updating HEP as pt. requires cues in clinic for current program. Pt. with increased pain when coming to stand from mat and needed to sit back down. Performed STM to glutes and piriformis with reduction in pain and ease with transition from sit to stand following. Pt. tolerated standing exercises well after manual and demonsrtated improved gait from beginning of session.  -        PT Plan    PT Plan Comments  continue manual PRN; consider adding STS; update HEP when appropriate but keep simple  -       User Key  (r) = Recorded By, (t) = Taken By, (c) = Cosigned By    Initials Name Provider Type     Kanwal Decker, PT Physical Therapist            OP Exercises     Row Name 11/27/20 1100             Subjective Comments    Subjective Comments  It felt btter for a little bit after last time but its back  -         Subjective Pain    Able to rate subjective pain?  yes  -      Pre-Treatment Pain Level  5  -         Total Minutes    64881 - PT Therapeutic Exercise Minutes  30  -MH      61739 - PT Manual Therapy Minutes  10  -MH         Exercise 1    Exercise Name 1  NuStep  -      Cueing 1  Verbal  -      Time 1  5  -MH         Exercise 2    Exercise Name 2  LTR  -      Cueing 2  Verbal  -       Reps 2  10e  -MH         Exercise 3    Exercise Name 3  piriformis stretch  -MH      Cueing 3  Verbal  -MH      Reps 3  3  -MH      Time 3  20  -MH         Exercise 4    Exercise Name 4  H/L adduction  -MH      Cueing 4  Verbal  -MH      Reps 4  15  -MH      Time 4  5  -MH         Exercise 5    Exercise Name 5  PPT  -MH      Cueing 5  Verbal;Tactile  -MH      Reps 5  15  -MH      Time 5  5  -MH         Exercise 6    Exercise Name 6  H/L hip abd  -MH      Cueing 6  Verbal  -MH      Reps 6  10e  -MH      Additional Comments  bi and uni; RTB  -MH         Exercise 8    Exercise Name 8  seated HS EOB  -MH      Cueing 8  Verbal;Demo  -MH      Reps 8  3  -MH      Time 8  20  -MH         Exercise 9    Exercise Name 9  hip flexor stretch at stair  -MH      Cueing 9  Verbal  -MH      Reps 9  3  -MH      Time 9  20  -MH         Exercise 10    Exercise Name 10  wall wash  -MH      Cueing 10  Verbal  -MH      Reps 10  10  -MH      Time 10  5  -MH         Exercise 11    Exercise Name 11  SKTC  -MH      Cueing 11  Verbal  -MH      Reps 11  3  -MH      Time 11  20  -MH         Exercise 12    Exercise Name 12  bridges  -MH      Cueing 12  Verbal  -MH      Reps 12  20  -MH         Exercise 13    Exercise Name 13  DKTC  -MH      Cueing 13  Verbal  -MH      Reps 13  10  -MH      Time 13  red swiss ball  -MH         Exercise 14    Exercise Name 14  side-stepping  -MH      Cueing 14  Verbal  -MH      Reps 14  3 laps  -MH      Additional Comments  no TB  -MH        User Key  (r) = Recorded By, (t) = Taken By, (c) = Cosigned By    Initials Name Provider Type    MH Kanwal Decker, PT Physical Therapist                      Manual Rx (last 36 hours)      Manual Treatments     Row Name 11/27/20 1100             Total Minutes    69227 - PT Manual Therapy Minutes  10  -MH         Manual Rx 1    Manual Rx 1 Location  STM/foam roll to L glute/pirioformis/HS  -MH        User Key  (r) = Recorded By, (t) = Taken By, (c) = Cosigned By    Initials  Name Provider Type     Kanwal Decker PT Physical Therapist              Therapy Education  Education Details: Held on updating HEP as pt. still requiring cues in clinic for current program  Given: Symptoms/condition management, Pain management, Mobility training, Posture/body mechanics  Program: Reinforced  How Provided: Verbal, Demonstration  Provided to: Patient  Level of Understanding: Teach back education performed, Verbalized, Demonstrated              Time Calculation:   Start Time: 1132  Stop Time: 1215  Time Calculation (min): 43 min  Total Timed Code Minutes- PT: 40 minute(s)  Therapy Charges for Today     Code Description Service Date Service Provider Modifiers Qty    05290686470  PT MANUAL THERAPY EA 15 MIN 11/27/2020 Kanwal Decker, PT GP 1    42768358460 HC PT THER PROC EA 15 MIN 11/27/2020 Kanwal Decker PT GP 2                    Kanwal Decker PT  11/27/2020

## 2020-12-01 ENCOUNTER — HOSPITAL ENCOUNTER (OUTPATIENT)
Dept: PHYSICAL THERAPY | Facility: HOSPITAL | Age: 85
Setting detail: THERAPIES SERIES
Discharge: HOME | End: 2020-12-01

## 2020-12-01 DIAGNOSIS — Z74.09 IMPAIRED MOBILITY: ICD-10-CM

## 2020-12-01 DIAGNOSIS — M54.50 CHRONIC LOW BACK PAIN, UNSPECIFIED BACK PAIN LATERALITY, UNSPECIFIED WHETHER SCIATICA PRESENT: Primary | ICD-10-CM

## 2020-12-01 DIAGNOSIS — M54.41 CHRONIC LEFT-SIDED LOW BACK PAIN WITH BILATERAL SCIATICA: ICD-10-CM

## 2020-12-01 DIAGNOSIS — M54.42 CHRONIC LEFT-SIDED LOW BACK PAIN WITH BILATERAL SCIATICA: ICD-10-CM

## 2020-12-01 DIAGNOSIS — G89.29 CHRONIC LOW BACK PAIN, UNSPECIFIED BACK PAIN LATERALITY, UNSPECIFIED WHETHER SCIATICA PRESENT: Primary | ICD-10-CM

## 2020-12-01 DIAGNOSIS — G89.29 CHRONIC LEFT-SIDED LOW BACK PAIN WITH BILATERAL SCIATICA: ICD-10-CM

## 2020-12-01 PROCEDURE — 97140 MANUAL THERAPY 1/> REGIONS: CPT

## 2020-12-01 PROCEDURE — 97110 THERAPEUTIC EXERCISES: CPT

## 2020-12-01 NOTE — THERAPY TREATMENT NOTE
Outpatient Physical Therapy Ortho Treatment Note  [facility]     Patient Name: Lanie Ochoa  : 1935  MRN: [id number]  Today's Date: 2020      Visit Date: 2020    Visit Dx:    ICD-10-CM ICD-9-CM   1. Chronic low back pain, unspecified back pain laterality, unspecified whether sciatica present  M54.5 724.2    G89.29 338.29   2. Impaired mobility  Z74.09 799.89   3. 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  M54.41 724.2    M54.42 724.3    G89.29 338.29       Patient Active Problem List   Diagnosis   • Anxiety   • HLD (hyperlipidemia)   • Benign essential hypertension   • Osteopenia   • Avitaminosis D   • History of left knee replacement   • Titubation   • Non-rheumatic mitral regurgitation   • Non-rheumatic tricuspid valve insufficiency   • Chronic pain of right knee   • Venous incompetence   • Dysfunction of both eustachian tubes   • Primary localized osteoarthrosis of right lower leg   • Primary osteoarthritis of right knee   • OA (osteoarthritis) of knee        Past Medical History:   Diagnosis Date   • Anxiety    • BMS (burning mouth syndrome) 2015   • Breast cancer (CMS/HCC)    • Cancer (CMS/HCC)     STAGE 1 INVASIVE DUCTAL CARCINOMA ER AND IN POSITIVE, HER-2/LOYDA NEGATIVE   • Cancer (CMS/HCC)     SKIN LEFT ARM; SQUAMOUS CELL   • Constipation    • Degenerative disc disease    • Functional murmur     RESOLVED 2014   • Gastritis     RESOLVED 2014   • GERD (gastroesophageal reflux disease)    • Glossalgia 2015   • Hypertension    • Internal hemorrhoids    • Osteoarthritis 2014    HAND   • PNA (pneumonia) 2015   • Trigger ring finger 2014    RESOLVED 2014        Past Surgical History:   Procedure Laterality Date   • BREAST BIOPSY     • BREAST LUMPECTOMY  07/15/2009    LUMPECTOMY   • COLONOSCOPY     • DILATATION AND CURETTAGE      RESOLVED 06/15/2010   • EYE SURGERY Bilateral 10/15/2012    CATARACT EXTRACTION   •  HEMORRHOIDECTOMY     • JOINT REPLACEMENT Left 2010    KNEE   • KNEE ARTHROSCOPY Right 6/13/2017    Procedure: KNEE ARTHROSCOPY WITH PARTIAL MEDIAL AND LATERAL MENISECTOMY AND DEBRIDEMENT OF ARTHRITIS.;  Surgeon: Lauren Krishna MD;  Location: [facility];  Service:    • TOTAL KNEE ARTHROPLASTY Right 5/21/2018    Procedure: RIGHT TOTAL KNEE ARTHROPLASTY;  Surgeon: Kiko Solano MD;  Location: [facility];  Service: Orthopedics                       PT Assessment/Plan     Row Name 12/01/20 1227          PT Assessment    Assessment Comments  Pt. continues to present to clinic with increased back pain, but resolves following ther ex and manual. Spoke with pt. about daily activities (cleaning, yard work, bike riding, etc.) and discussed how to improve body mechancics to reduce lumbar strain. Educated and pt. demonstrated proper log roll to reduce lumbar strain and reported improved ease with supine to sit transition. Continued with current plan with focus on imrpoved postural awareness and reduce forward flexed posture in sitting and standing activities.  -        PT Plan    PT Plan Comments  Progress as able; assess response to use of tennis ball for STM at home? 90/90 decompression at home?  -       User Key  (r) = Recorded By, (t) = Taken By, (c) = Cosigned By    Initials Name Provider Type     Kanwal Decker, PT Physical Therapist            OP Exercises     Row Name 12/01/20 1100             Subjective Comments    Subjective Comments  It feels better after but pain comes back  -         Subjective Pain    Able to rate subjective pain?  yes  -      Pre-Treatment Pain Level  5  -         Total Minutes    38288 - PT Therapeutic Exercise Minutes  25  -      18650 - PT Manual Therapy Minutes  20  -MH         Exercise 1    Exercise Name 1  NuStep  -      Cueing 1  Verbal  -      Time 1  5  -MH         Exercise 2    Exercise Name 2  LTR  -      Cueing 2  Verbal  -      Reps 2  10e  -          Exercise 3    Exercise Name 3  piriformis stretch  -MH      Cueing 3  Verbal  -MH      Reps 3  3  -MH      Time 3  20  -MH         Exercise 4    Exercise Name 4  H/L adduction  -MH      Cueing 4  --  -MH      Reps 4  --  -MH      Time 4  --  -MH      Additional Comments  with bridge  -MH         Exercise 5    Exercise Name 5  PPT  -MH      Cueing 5  Verbal;Tactile  -MH      Reps 5  15  -MH      Time 5  5  -MH         Exercise 6    Exercise Name 6  H/L hip abd  -MH      Cueing 6  Verbal  -MH      Reps 6  10e  -MH      Additional Comments  bi and uni; RTB  -MH         Exercise 8    Exercise Name 8  seated HS EOB  -MH      Cueing 8  Verbal;Demo  -MH      Reps 8  3  -MH      Time 8  20  -MH         Exercise 9    Exercise Name 9  hip flexor stretch at stair  -MH      Cueing 9  Verbal  -MH      Reps 9  3  -MH      Time 9  20  -MH         Exercise 10    Exercise Name 10  wall wash  -MH      Cueing 10  Verbal  -MH      Reps 10  10  -MH      Time 10  5  -MH         Exercise 11    Exercise Name 11  SKTC  -MH      Cueing 11  Verbal  -MH      Reps 11  3  -MH      Time 11  20  -MH         Exercise 12    Exercise Name 12  bridges  -MH      Cueing 12  Verbal  -MH      Reps 12  20  -MH      Additional Comments  small range; with hip add  -MH         Exercise 13    Exercise Name 13  DKTC  -MH      Cueing 13  Verbal  -MH      Reps 13  10  -MH      Time 13  red swiss ball  -MH         Exercise 14    Exercise Name 14  side-stepping  -MH      Cueing 14  Verbal  -MH      Reps 14  3 laps  -MH      Additional Comments  no TB  -MH        User Key  (r) = Recorded By, (t) = Taken By, (c) = Cosigned By    Initials Name Provider Type    MH Kanwal Decker, PT Physical Therapist                      Manual Rx (last 36 hours)      Manual Treatments     Row Name 12/01/20 1100             Total Minutes    36653 - PT Manual Therapy Minutes  20  -MH         Manual Rx 1    Manual Rx 1 Location  STM/foam roll to L glute/pirioformis/HS  -MH        User  Key  (r) = Recorded By, (t) = Taken By, (c) = Cosigned By    Initials Name Provider Type    Kanwal Dong PT Physical Therapist          PT OP Goals     Row Name 12/01/20 1100          PT Short Term Goals    STG Date to Achieve  12/11/20  -MH     STG 1  pt. to be I with initial HEP to facilitate self management of their condition  -     STG 1 Progress  Ongoing  -     STG 2  pt. to be educated in/verbalize understanding of the importance of posture/ergonomics in association with their condition to facilitate self management of their condition  -     STG 2 Progress  Ongoing  -        Long Term Goals    LTG Date to Achieve  02/12/21  -     LTG 1  pt. to be I with advanced HEP to facilitate self management of their condition  -     LTG 1 Progress  Ongoing  -     LTG 2  pt. to report an Oswestry score </= 30 to demonstrate decreased level of perceived disability  -     LTG 2 Progress  Ongoing  [facility]     LTG 3  pt to demosntrate STS x 5 in </= 20 seconds to facilitate improved ease/safety with household/community mobility  -     LTG 3 Progress  Ongoing  -     LTG 4  pt to report >/= 50% improvement in AM LBP to facilitate ease wth performing household activities  -     LTG 4 Progress  Ongoing  -       User Key  (r) = Recorded By, (t) = Taken By, (c) = Cosigned By    Initials Name Provider Type    Kanwal Dong PT Physical Therapist          Therapy Education  Education Details: Educated on log roll; altering body mechanics to protect lumbar spine. Discussed 90/90 decompression and use of tennis ball for STM  Given: Symptoms/condition management, Pain management, Mobility training, Posture/body mechanics  Program: Reinforced  How Provided: Verbal, Demonstration  Provided to: Patient  Level of Understanding: Teach back education performed, Verbalized, Demonstrated              Time Calculation:   Start Time: 1130  Stop Time: 1215  Time Calculation (min): 45 min  Total Timed Code Minutes-  PT: 45 minute(s)  Therapy Charges for Today     Code Description Service Date Service Provider Modifiers Qty    94615950763 HC PT THER PROC EA 15 MIN 12/1/2020 Kanwal Decker, PT GP 2    26867145891 HC PT MANUAL THERAPY EA 15 MIN 12/1/2020 Kanwal Decker, PT GP 1                    Kanwal Decker, PT  12/1/2020

## 2020-12-03 ENCOUNTER — HOSPITAL ENCOUNTER (OUTPATIENT)
Dept: PHYSICAL THERAPY | Facility: HOSPITAL | Age: 85
Setting detail: THERAPIES SERIES
Discharge: HOME | End: 2020-12-03

## 2020-12-03 DIAGNOSIS — M54.41 CHRONIC LEFT-SIDED LOW BACK PAIN WITH BILATERAL SCIATICA: ICD-10-CM

## 2020-12-03 DIAGNOSIS — M54.50 CHRONIC LOW BACK PAIN, UNSPECIFIED BACK PAIN LATERALITY, UNSPECIFIED WHETHER SCIATICA PRESENT: Primary | ICD-10-CM

## 2020-12-03 DIAGNOSIS — G89.29 CHRONIC LOW BACK PAIN, UNSPECIFIED BACK PAIN LATERALITY, UNSPECIFIED WHETHER SCIATICA PRESENT: Primary | ICD-10-CM

## 2020-12-03 DIAGNOSIS — G89.29 CHRONIC LEFT-SIDED LOW BACK PAIN WITH BILATERAL SCIATICA: ICD-10-CM

## 2020-12-03 DIAGNOSIS — M54.42 CHRONIC LEFT-SIDED LOW BACK PAIN WITH BILATERAL SCIATICA: ICD-10-CM

## 2020-12-03 DIAGNOSIS — Z74.09 IMPAIRED MOBILITY: ICD-10-CM

## 2020-12-03 PROCEDURE — 97110 THERAPEUTIC EXERCISES: CPT

## 2020-12-03 PROCEDURE — 97140 MANUAL THERAPY 1/> REGIONS: CPT

## 2020-12-03 NOTE — THERAPY TREATMENT NOTE
Outpatient Physical Therapy Ortho Treatment Note  [facility]     Patient Name: Lanie Ochoa  : 1935  MRN: [id number]  Today's Date: 12/3/2020      Visit Date: 2020    Visit Dx:    ICD-10-CM ICD-9-CM   1. Chronic low back pain, unspecified back pain laterality, unspecified whether sciatica present  M54.5 724.2    G89.29 338.29   2. Impaired mobility  Z74.09 799.89   3. 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  M54.41 724.2    M54.42 724.3    G89.29 338.29       Patient Active Problem List   Diagnosis   • Anxiety   • HLD (hyperlipidemia)   • Benign essential hypertension   • Osteopenia   • Avitaminosis D   • History of left knee replacement   • Titubation   • Non-rheumatic mitral regurgitation   • Non-rheumatic tricuspid valve insufficiency   • Chronic pain of right knee   • Venous incompetence   • Dysfunction of both eustachian tubes   • Primary localized osteoarthrosis of right lower leg   • Primary osteoarthritis of right knee   • OA (osteoarthritis) of knee        Past Medical History:   Diagnosis Date   • Anxiety    • BMS (burning mouth syndrome) 2015   • Breast cancer (CMS/HCC)    • Cancer (CMS/HCC)     STAGE 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, HER-2/LOYDA NEGATIVE   • Cancer (CMS/HCC)     SKIN LEFT ARM; SQUAMOUS CELL   • Constipation    • Degenerative disc disease    • Functional murmur     RESOLVED 2014   • Gastritis     RESOLVED 2014   • GERD (gastroesophageal reflux disease)    • Glossalgia 2015   • Hypertension    • Internal hemorrhoids    • Osteoarthritis 2014    HAND   • PNA (pneumonia) 2015   • Trigger ring finger 2014    RESOLVED 2014        Past Surgical History:   Procedure Laterality Date   • BREAST BIOPSY     • BREAST LUMPECTOMY  07/15/2009    LUMPECTOMY   • COLONOSCOPY     • DILATATION AND CURETTAGE      RESOLVED 06/15/2010   • EYE SURGERY Bilateral 10/15/2012    CATARACT EXTRACTION   •  HEMORRHOIDECTOMY     • JOINT REPLACEMENT Left 2010    KNEE   • KNEE ARTHROSCOPY Right 6/13/2017    Procedure: KNEE ARTHROSCOPY WITH PARTIAL MEDIAL AND LATERAL MENISECTOMY AND DEBRIDEMENT OF ARTHRITIS.;  Surgeon: Lauren Krishna MD;  Location: [facility];  Service:    • TOTAL KNEE ARTHROPLASTY Right 5/21/2018    Procedure: RIGHT TOTAL KNEE ARTHROPLASTY;  Surgeon: Kiko Solano MD;  Location: [facility];  Service: Orthopedics                       PT Assessment/Plan     Row Name 12/03/20 1144          PT Assessment    Assessment Comments  P. presents to clinic with reports of improvement in pain from last session until this morning when getting out of her car to come to therapy. Pt. appeared to have increased ease rising from chair requiring less use of UE and more upright with ambulation, however, still painful and gait remains antalgic.  Educated pt. on proper car transfer to reduce lumbar strain and reinforced log roll tehcnique. Pt. tolerated session well, added STS from elevated surface with cues for glute squeeze at top to imrpove ease with STS transition.  -        PT Plan    PT Plan Comments  Consider shoulder ext with TrA  -       User Key  (r) = Recorded By, (t) = Taken By, (c) = Cosigned By    Initials Name Provider Type     Kanwal Decker, PT Physical Therapist            OP Exercises     Row Name 12/03/20 1100             Subjective Comments    Subjective Comments  It felt better until I drove over here and got out of the car.  -         Subjective Pain    Able to rate subjective pain?  yes  -      Pre-Treatment Pain Level  4  -         Total Minutes    12721 - PT Therapeutic Exercise Minutes  30  -      59697 - PT Manual Therapy Minutes  13  -         Exercise 1    Exercise Name 1  NuStep  -      Cueing 1  Verbal  -      Time 1  5  -MH         Exercise 2    Exercise Name 2  LTR  -      Cueing 2  Verbal  -      Reps 2  10e  -      Additional Comments  over green swiss  ball  -MH         Exercise 3    Exercise Name 3  piriformis stretch  -MH      Cueing 3  Verbal  -MH      Reps 3  3  -MH      Time 3  20  -MH         Exercise 4    Exercise Name 4  H/L adduction  -MH      Additional Comments  with bridge  -MH         Exercise 5    Exercise Name 5  PPT  -MH      Cueing 5  Verbal;Tactile  -MH      Reps 5  20  -MH      Time 5  5  -MH         Exercise 6    Exercise Name 6  H/L hip abd  -MH      Cueing 6  Verbal  -MH      Reps 6  10e  -MH      Additional Comments  bi and uni; GTB  -MH         Exercise 8    Exercise Name 8  seated HS EOB  -MH      Cueing 8  --  -MH      Reps 8  --  -MH      Time 8  --  -MH         Exercise 9    Exercise Name 9  hip flexor stretch at stair  -MH      Cueing 9  --  -MH      Reps 9  --  -MH      Time 9  --  -MH         Exercise 10    Exercise Name 10  wall wash  -MH      Cueing 10  --  -MH      Reps 10  --  -MH      Time 10  --  -MH         Exercise 11    Exercise Name 11  SKTC  -MH      Cueing 11  Verbal  -MH      Reps 11  3  -MH      Time 11  20  -MH         Exercise 12    Exercise Name 12  bridges  -MH      Cueing 12  Verbal  -MH      Reps 12  20  -MH      Additional Comments  small range with hip add  -MH         Exercise 13    Exercise Name 13  DKTC  -MH      Cueing 13  Verbal  -MH      Reps 13  10  -MH      Time 13  green swiss ball  -MH         Exercise 14    Exercise Name 14  side-stepping  -MH      Cueing 14  Verbal  -MH      Reps 14  3 laps  -MH      Additional Comments  YTB  -MH         Exercise 15    Exercise Name 15  STS from elevated table  -MH      Cueing 15  Verbal;Tactile  -MH      Reps 15  10  -MH      Additional Comments  glute squeeze at top  -MH        User Key  (r) = Recorded By, (t) = Taken By, (c) = Cosigned By    Initials Name Provider Type    MH Kanwal Decker, PT Physical Therapist                      Manual Rx (last 36 hours)      Manual Treatments     Row Name 12/03/20 1100             Total Minutes    47182 - PT Manual Therapy  Minutes  13  -         Manual Rx 1    Manual Rx 1 Location  STM/foam roll to L glute/pirioformis/HS  -        User Key  (r) = Recorded By, (t) = Taken By, (c) = Cosigned By    Initials Name Provider Type    Kanwal Dong, PT Physical Therapist          PT OP Goals     Row Name 12/03/20 1100          PT Short Term Goals    STG Date to Achieve  12/11/20  -     STG 1  pt. to be I with initial HEP to facilitate self management of their condition  -     STG 1 Progress  Met  -     STG 2  pt. to be educated in/verbalize understanding of the importance of posture/ergonomics in association with their condition to facilitate self management of their condition  -     STG 2 Progress  Ongoing  -        Long Term Goals    LTG Date to Achieve  02/12/21  -     LTG 1  pt. to be I with advanced HEP to facilitate self management of their condition  -     LTG 1 Progress  Ongoing  -     LTG 2  pt. to report an Oswestry score </= 30 to demonstrate decreased level of perceived disability  -     LTG 2 Progress  Ongoing  -     LTG 3  pt to demosntrate STS x 5 in </= 20 seconds to facilitate improved ease/safety with household/community mobility  -     LTG 3 Progress  Ongoing  -     LTG 4  pt to report >/= 50% improvement in AM LBP to facilitate ease wth performing household activities  -     LTG 4 Progress  Ongoing  -       User Key  (r) = Recorded By, (t) = Taken By, (c) = Cosigned By    Initials Name Provider Type    Kanwal Dong, PT Physical Therapist          Therapy Education  Education Details: Education and demonstration on car transfers; re-educated on log roll technique  Given: Mobility training, Posture/body mechanics  Program: Reinforced  How Provided: Verbal, Demonstration  Provided to: Patient  Level of Understanding: Teach back education performed, Verbalized, Demonstrated              Time Calculation:   Start Time: 1132  Stop Time: 1215  Time Calculation (min): 43 min  Total Timed  Code Minutes- PT: 43 minute(s)  Therapy Charges for Today     Code Description Service Date Service Provider Modifiers Qty    33693676588 HC PT MANUAL THERAPY EA 15 MIN 12/3/2020 Kanwal Decker, PT GP 1    [id number]  PT THER PROC EA 15 MIN 12/3/2020 Kanwal Decker, PT GP 2                    Kanwal Decker, PT  12/3/2020

## 2020-12-04 ENCOUNTER — OFFICE VISIT (OUTPATIENT)
Dept: FAMILY MEDICINE CLINIC | Facility: CLINIC | Age: 85
End: 2020-12-04

## 2020-12-04 VITALS
OXYGEN SATURATION: 98 % | RESPIRATION RATE: 13 BRPM | WEIGHT: 143.6 LBS | HEIGHT: 62 IN | DIASTOLIC BLOOD PRESSURE: 72 MMHG | HEART RATE: 64 BPM | BODY MASS INDEX: 26.43 KG/M2 | TEMPERATURE: 96.9 F | SYSTOLIC BLOOD PRESSURE: 138 MMHG

## 2020-12-04 DIAGNOSIS — G89.29 CHRONIC BILATERAL LOW BACK PAIN WITH LEFT-SIDED SCIATICA: Primary | ICD-10-CM

## 2020-12-04 DIAGNOSIS — M54.42 CHRONIC BILATERAL LOW BACK PAIN WITH LEFT-SIDED SCIATICA: Primary | ICD-10-CM

## 2020-12-04 DIAGNOSIS — M62.838 MUSCLE SPASM: ICD-10-CM

## 2020-12-04 PROCEDURE — 99214 OFFICE O/P EST MOD 30 MIN: CPT | Performed by: FAMILY MEDICINE

## 2020-12-04 RX ORDER — PREDNISONE 10 MG/1
30 TABLET ORAL DAILY
Qty: 15 TABLET | Refills: 0 | Status: SHIPPED | OUTPATIENT
Start: 2020-12-04 | End: 2020-12-28

## 2020-12-04 RX ORDER — BACLOFEN 10 MG/1
10 TABLET ORAL 3 TIMES DAILY PRN
Qty: 30 TABLET | Refills: 0 | Status: SHIPPED | OUTPATIENT
Start: 2020-12-04 | End: 2020-12-28

## 2020-12-04 NOTE — PROGRESS NOTES
"Chief Complaint   Patient presents with   • Back Pain     states hurts most of time, worse with sitting       Subjective   Lanie Ochoa is a 85 y.o. female.     History of Present Illness   She has been doing PT and helped for a little had yesterday but then went to see her son and she could hardly get ou tof the car.   Has been going to PT for about 4 sessions. Pain is about the same from when it started. Says she has two more sessions scheduled for next week. She has not had symptoms like this before.   She is not having any urine or stool incontinence though she has some chronic issues with stool urgency intermittently. She does not feel weakness in her left leg, it does not feel like it is going to give out and she has not fallen.   Feels like the left thigh tightens up.   She is just uncomfortable. Stiffens up easily even after short amount of time of sitting.     The following portions of the patient's history were reviewed and updated as appropriate: allergies, current medications, past family history, past medical history, past social history, past surgical history and problem list.    Review of Systems   Respiratory: Negative.    Musculoskeletal: Positive for back pain.   Neurological: Negative.        /72 (BP Location: Left arm, Patient Position: Sitting, Cuff Size: Adult)   Pulse 64   Temp 96.9 °F (36.1 °C) (Temporal)   Resp 13   Ht 157.5 cm (62\")   Wt 65.1 kg (143 lb 9.6 oz)   LMP  (LMP Unknown)   SpO2 98%   Breastfeeding No   BMI 26.26 kg/m²       Objective   Physical Exam  Vitals signs reviewed.   Constitutional:       General: She is not in acute distress.  Eyes:      General: No scleral icterus.     Conjunctiva/sclera: Conjunctivae normal.   Pulmonary:      Effort: Pulmonary effort is normal. No respiratory distress.   Musculoskeletal:         General: No swelling or tenderness.      Right lower leg: No edema.      Left lower leg: No edema.      Comments: She has 5/5 strength in " BLE. Straight leg + on the left. Slow to get up on the exam table, no assist but uses both arms to pull herself up, same to get down. Visibly uncomfortable when moving to lie supine.    Neurological:      Mental Status: She is alert and oriented to person, place, and time.   Psychiatric:         Behavior: Behavior normal.         Assessment/Plan   Diagnoses and all orders for this visit:    1. Chronic bilateral low back pain with left-sided sciatica (Primary)    2. Muscle spasm    Other orders  -     predniSONE (DELTASONE) 10 MG tablet; Take 3 tablets by mouth Daily. Take in the AM with breakfast  Dispense: 15 tablet; Refill: 0  -     baclofen (LIORESAL) 10 MG tablet; Take 1 tablet by mouth 3 (Three) Times a Day As Needed for Muscle Spasms.  Dispense: 30 tablet; Refill: 0      New problem to me, she was seen by NP and referred to PT. Pt feeling worse. I encouraged her to continue PT, can get worse before it gets better. She is early in her course with only 2 weeks of PT. I encouraged her to give at least 4 weeks. After two sessions next week encouraged her to schedule more. She is uncomfortable in the office, she stood most of the time I was in the room. She really moved slow on and off the exam table. No palpable pain but pain with movement, she also had pain with rest.     I am going to give her a low dose of steroid and muscle relaxer. We talked extensively about possible side effects and she voiced understanding. She does not have a BP cuff at home but if she has any concerns she is going to stop medications and call to let us know. BP well controlled today in the office.

## 2020-12-08 ENCOUNTER — HOSPITAL ENCOUNTER (OUTPATIENT)
Dept: PHYSICAL THERAPY | Facility: HOSPITAL | Age: 85
Setting detail: THERAPIES SERIES
Discharge: HOME | End: 2020-12-08

## 2020-12-08 DIAGNOSIS — M54.41 CHRONIC LEFT-SIDED LOW BACK PAIN WITH BILATERAL SCIATICA: ICD-10-CM

## 2020-12-08 DIAGNOSIS — G89.29 CHRONIC LOW BACK PAIN, UNSPECIFIED BACK PAIN LATERALITY, UNSPECIFIED WHETHER SCIATICA PRESENT: Primary | ICD-10-CM

## 2020-12-08 DIAGNOSIS — Z74.09 IMPAIRED MOBILITY: ICD-10-CM

## 2020-12-08 DIAGNOSIS — M54.42 CHRONIC LEFT-SIDED LOW BACK PAIN WITH BILATERAL SCIATICA: ICD-10-CM

## 2020-12-08 DIAGNOSIS — G89.29 CHRONIC LEFT-SIDED LOW BACK PAIN WITH BILATERAL SCIATICA: ICD-10-CM

## 2020-12-08 DIAGNOSIS — M54.50 CHRONIC LOW BACK PAIN, UNSPECIFIED BACK PAIN LATERALITY, UNSPECIFIED WHETHER SCIATICA PRESENT: Primary | ICD-10-CM

## 2020-12-08 PROCEDURE — 97110 THERAPEUTIC EXERCISES: CPT

## 2020-12-08 PROCEDURE — 97140 MANUAL THERAPY 1/> REGIONS: CPT

## 2020-12-08 NOTE — THERAPY PROGRESS REPORT/RE-CERT
Outpatient Physical Therapy Ortho Treatment Note  [facility]     Patient Name: Lanie Ochoa  : 1935  MRN: [id number]  Today's Date: 2020      Visit Date: 2020    Visit Dx:    ICD-10-CM ICD-9-CM   1. Chronic low back pain, unspecified back pain laterality, unspecified whether sciatica present  M54.5 724.2    G89.29 338.29   2. Impaired mobility  Z74.09 799.89   3. 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  M54.41 724.2    M54.42 724.3    G89.29 338.29       Patient Active Problem List   Diagnosis   • Anxiety   • HLD (hyperlipidemia)   • Benign essential hypertension   • Osteopenia   • Avitaminosis D   • History of left knee replacement   • Titubation   • Non-rheumatic mitral regurgitation   • Non-rheumatic tricuspid valve insufficiency   • Chronic pain of right knee   • Venous incompetence   • Dysfunction of both eustachian tubes   • Primary localized osteoarthrosis of right lower leg   • Primary osteoarthritis of right knee   • OA (osteoarthritis) of knee        Past Medical History:   Diagnosis Date   • Anxiety    • BMS (burning mouth syndrome) 2015   • Breast cancer (CMS/HCC)    • Cancer (CMS/HCC)     STAGE 1 INVASIVE DUCTAL CARCINOMA ER AND DE POSITIVE, HER-2/LOYDA NEGATIVE   • Cancer (CMS/HCC)     SKIN LEFT ARM; SQUAMOUS CELL   • Constipation    • Degenerative disc disease    • Functional murmur     RESOLVED 2014   • Gastritis     RESOLVED 2014   • GERD (gastroesophageal reflux disease)    • Glossalgia 2015   • Hypertension    • Internal hemorrhoids    • Osteoarthritis 2014    HAND   • PNA (pneumonia) 2015   • Trigger ring finger 2014    RESOLVED 2014        Past Surgical History:   Procedure Laterality Date   • BREAST BIOPSY     • BREAST LUMPECTOMY  07/15/2009    LUMPECTOMY   • COLONOSCOPY     • DILATATION AND CURETTAGE      RESOLVED 06/15/2010   • EYE SURGERY Bilateral 10/15/2012    CATARACT EXTRACTION   •  HEMORRHOIDECTOMY     • JOINT REPLACEMENT Left 2010    KNEE   • KNEE ARTHROSCOPY Right 6/13/2017    Procedure: KNEE ARTHROSCOPY WITH PARTIAL MEDIAL AND LATERAL MENISECTOMY AND DEBRIDEMENT OF ARTHRITIS.;  Surgeon: Lauren Krishna MD;  Location: [facility];  Service:    • TOTAL KNEE ARTHROPLASTY Right 5/21/2018    Procedure: RIGHT TOTAL KNEE ARTHROPLASTY;  Surgeon: Kiko Solano MD;  Location: [facility];  Service: Orthopedics                       PT Assessment/Plan     Row Name 12/08/20 1231          PT Assessment    Functional Limitations  Impaired gait;Limitation in home management;Limitations in community activities;Performance in leisure activities;Performance in sport activities  -     Impairments  Balance;Gait;Impaired flexibility;Impaired muscle length;Range of motion;Posture;Poor body mechanics;Pain;Muscle strength;Impaired postural alignment  -     Assessment Comments  Lanie Ochoa has been seen for 8 physical therapy sessions for chronic low back pain. Treatment has included therapeutic exercise, manual therapy, therapeutic activity and patient education with home exercise program . Progress to physical therapy goals is fair as pt. Has met 4/4 STGs, and 1/4 LTGs and progressing toward remaining 3. Pt. Continues to have difficulty with transitional movements, long term pain relief, and poor posture/body awareness.She demonstrates improved ease with sit to stand transition, improved upright posture with gait, and improved tolerance to there ex. She will benefit from continued skilled physical therapy to address remaining impairments and functional limitations.  -        PT Plan    PT Plan Comments  progress as able; consider step taps  -       User Key  (r) = Recorded By, (t) = Taken By, (c) = Cosigned By    Initials Name Provider Type     Kanwal Decker, PT Physical Therapist            OP Exercises     Row Name 12/08/20 1100             Subjective Comments    Subjective Comments  I  went to the doctor and she gave me a steriod and muscle relaxer and it has helped. I think therapy helps too  -MH         Subjective Pain    Able to rate subjective pain?  yes  -MH      Pre-Treatment Pain Level  3  -MH         Total Minutes    01738 - PT Therapeutic Exercise Minutes  30  -MH      10169 - PT Manual Therapy Minutes  12  -MH         Exercise 1    Exercise Name 1  NuStep  -MH      Cueing 1  Verbal  -MH      Time 1  5  -MH         Exercise 2    Exercise Name 2  LTR  -MH      Cueing 2  Verbal  -MH      Reps 2  10e  -MH         Exercise 3    Exercise Name 3  piriformis stretch  -MH      Cueing 3  Verbal  -MH      Reps 3  3  -MH      Time 3  20  -MH         Exercise 4    Exercise Name 4  --  -MH         Exercise 5    Exercise Name 5  PPT  -MH      Cueing 5  Verbal;Tactile  -MH      Reps 5  20  -MH      Time 5  5  -MH         Exercise 6    Exercise Name 6  H/L hip abd  -MH      Cueing 6  Verbal  -MH      Reps 6  10e  -MH      Additional Comments  bi and uni; GTB  -MH         Exercise 8    Exercise Name 8  --  -MH         Exercise 9    Exercise Name 9  --  -MH         Exercise 10    Exercise Name 10  wall wash  -MH         Exercise 11    Exercise Name 11  SKTC  -MH      Cueing 11  Verbal  -MH      Reps 11  3  -MH      Time 11  20  -MH         Exercise 12    Exercise Name 12  bridges  -MH      Cueing 12  Verbal  -MH      Reps 12  20  -MH      Additional Comments  small range; hip add  -MH         Exercise 13    Exercise Name 13  DKTC  -MH      Cueing 13  Verbal  -MH      Reps 13  10  -MH      Time 13  green swiss ball  -MH         Exercise 14    Exercise Name 14  side-stepping  -MH      Cueing 14  Verbal  -MH      Reps 14  3 laps  -MH      Additional Comments  YTB  -MH         Exercise 15    Exercise Name 15  STS from elevated table  -MH      Cueing 15  Verbal;Tactile  -MH      Reps 15  10  -MH      Additional Comments  glute squeeze at top  -MH         Exercise 16    Exercise Name 16  shoulder ext with TrA   -      Cueing 16  Verbal  -      Reps 16  10  -MH      Additional Comments  YTB  -        User Key  (r) = Recorded By, (t) = Taken By, (c) = Cosigned By    Initials Name Provider Type    Kanwal Dong, PT Physical Therapist                      Manual Rx (last 36 hours)      Manual Treatments     Row Name 12/08/20 1100             Total Minutes    06163 - PT Manual Therapy Minutes  12  -MH         Manual Rx 1    Manual Rx 1 Location  STM/foam roll to L glute/pirioformis/HS  -        User Key  (r) = Recorded By, (t) = Taken By, (c) = Cosigned By    Initials Name Provider Type     Kanwal Decker, PT Physical Therapist          PT OP Goals     Row Name 12/08/20 1100          PT Short Term Goals    STG Date to Achieve  12/11/20  -     STG 1  pt. to be I with initial HEP to facilitate self management of their condition  -     STG 1 Progress  Met  -     STG 2  pt. to be educated in/verbalize understanding of the importance of posture/ergonomics in association with their condition to facilitate self management of their condition  -     STG 2 Progress  Met  -        Long Term Goals    LTG Date to Achieve  02/12/21  -     LTG 1  pt. to be I with advanced HEP to facilitate self management of their condition  -     LTG 1 Progress  Ongoing  -     LTG 1 Progress Comments  slow progression of HEP  -     LTG 2  pt. to report an Oswestry score </= 30 to demonstrate decreased level of perceived disability  -     LTG 2 Progress  Met  -     LTG 3  pt to demosntrate STS x 5 in </= 20 seconds to facilitate improved ease/safety with household/community mobility  -     LTG 3 Progress  Ongoing  -     LTG 4  pt to report >/= 50% improvement in AM LBP to facilitate ease wth performing household activities  -     LTG 4 Progress  Partially Met  -     LTG 4 Progress Comments  40-50%  -       User Key  (r) = Recorded By, (t) = Taken By, (c) = Cosigned By    Initials Name Provider Type    NELY Decker  Kanwal PT Physical Therapist          Therapy Education  Given: Mobility training, Posture/body mechanics  Program: Reinforced  How Provided: Verbal, Demonstration  Provided to: Patient  Level of Understanding: Teach back education performed, Verbalized, Demonstrated    Outcome Measure Options: Modifed Owestry  Modified Oswestry  Modified Oswestry Score/Comments: 15/50      Time Calculation:   Start Time: 1132  Stop Time: 1214  Time Calculation (min): 42 min  Total Timed Code Minutes- PT: 42 minute(s)  Therapy Charges for Today     Code Description Service Date Service Provider Modifiers Qty    35176659901 HC PT MANUAL THERAPY EA 15 MIN 12/8/2020 Kanwal Decker, PT GP 1    59184174453 HC PT THER PROC EA 15 MIN 12/8/2020 Kanwal Decker, PT GP 2          PT G-Codes  Outcome Measure Options: Modifed Owestry  Modified Oswestry Score/Comments: 15/50         Kanwal Decker, JOSSELYN  12/8/2020

## 2020-12-10 ENCOUNTER — HOSPITAL ENCOUNTER (OUTPATIENT)
Dept: PHYSICAL THERAPY | Facility: HOSPITAL | Age: 85
Setting detail: THERAPIES SERIES
Discharge: HOME | End: 2020-12-10

## 2020-12-10 DIAGNOSIS — G89.29 CHRONIC LEFT-SIDED LOW BACK PAIN WITH BILATERAL SCIATICA: ICD-10-CM

## 2020-12-10 DIAGNOSIS — M54.50 CHRONIC LOW BACK PAIN, UNSPECIFIED BACK PAIN LATERALITY, UNSPECIFIED WHETHER SCIATICA PRESENT: Primary | ICD-10-CM

## 2020-12-10 DIAGNOSIS — M54.41 CHRONIC LEFT-SIDED LOW BACK PAIN WITH BILATERAL SCIATICA: ICD-10-CM

## 2020-12-10 DIAGNOSIS — M54.42 CHRONIC LEFT-SIDED LOW BACK PAIN WITH BILATERAL SCIATICA: ICD-10-CM

## 2020-12-10 DIAGNOSIS — Z74.09 IMPAIRED MOBILITY: ICD-10-CM

## 2020-12-10 DIAGNOSIS — G89.29 CHRONIC LOW BACK PAIN, UNSPECIFIED BACK PAIN LATERALITY, UNSPECIFIED WHETHER SCIATICA PRESENT: Primary | ICD-10-CM

## 2020-12-10 PROCEDURE — 97110 THERAPEUTIC EXERCISES: CPT

## 2020-12-10 PROCEDURE — 97140 MANUAL THERAPY 1/> REGIONS: CPT

## 2020-12-10 NOTE — THERAPY TREATMENT NOTE
Outpatient Physical Therapy Ortho Treatment Note  [facility]     Patient Name: Lanie Ochoa  : 1935  MRN: [id number]  Today's Date: 12/10/2020      Visit Date: 12/10/2020    Visit Dx:    ICD-10-CM ICD-9-CM   1. Chronic low back pain, unspecified back pain laterality, unspecified whether sciatica present  M54.5 724.2    G89.29 338.29   2. 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  M54.41 724.2    M54.42 724.3    G89.29 338.29   3. Impaired mobility  Z74.09 799.89       Patient Active Problem List   Diagnosis   • Anxiety   • HLD (hyperlipidemia)   • Benign essential hypertension   • Osteopenia   • Avitaminosis D   • History of left knee replacement   • Titubation   • Non-rheumatic mitral regurgitation   • Non-rheumatic tricuspid valve insufficiency   • Chronic pain of right knee   • Venous incompetence   • Dysfunction of both eustachian tubes   • Primary localized osteoarthrosis of right lower leg   • Primary osteoarthritis of right knee   • OA (osteoarthritis) of knee        Past Medical History:   Diagnosis Date   • Anxiety    • BMS (burning mouth syndrome) 2015   • Breast cancer (CMS/HCC)    • Cancer (CMS/HCC)     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, HER-2/LOYDA NEGATIVE   • Cancer (CMS/HCC)     SKIN LEFT ARM; SQUAMOUS CELL   • Constipation    • Degenerative disc disease    • Functional murmur     RESOLVED 2014   • Gastritis     RESOLVED 2014   • GERD (gastroesophageal reflux disease)    • Glossalgia 2015   • Hypertension    • Internal hemorrhoids    • Osteoarthritis 2014    HAND   • PNA (pneumonia) 2015   • Trigger ring finger 2014    RESOLVED 2014        Past Surgical History:   Procedure Laterality Date   • BREAST BIOPSY     • BREAST LUMPECTOMY  07/15/2009    LUMPECTOMY   • COLONOSCOPY     • DILATATION AND CURETTAGE      RESOLVED 06/15/2010   • EYE SURGERY Bilateral 10/15/2012    CATARACT EXTRACTION   •  HEMORRHOIDECTOMY     • JOINT REPLACEMENT Left 2010    KNEE   • KNEE ARTHROSCOPY Right 6/13/2017    Procedure: KNEE ARTHROSCOPY WITH PARTIAL MEDIAL AND LATERAL MENISECTOMY AND DEBRIDEMENT OF ARTHRITIS.;  Surgeon: Lauren Krishna MD;  Location: [facility];  Service:    • TOTAL KNEE ARTHROPLASTY Right 5/21/2018    Procedure: RIGHT TOTAL KNEE ARTHROPLASTY;  Surgeon: Kiko Solano MD;  Location: [facility];  Service: Orthopedics                       PT Assessment/Plan     Row Name 12/10/20 1231          PT Assessment    Assessment Comments  Pt. presents to clinic with continued reports of slight improvement in symptoms, STS transition continues to improve with decreased use of UE and less pain upon initial standing. Pt. tolerated session well, reinforced current ther ex program with addition of HS curls. Focus on improving independence with current program to progress toward D/C to HEP possibly in next session.  -        PT Plan    PT Plan Comments  D/C to HEP?  -       User Key  (r) = Recorded By, (t) = Taken By, (c) = Cosigned By    Initials Name Provider Type     Kanwal Decker, PT Physical Therapist            OP Exercises     Row Name 12/10/20 1100             Subjective Comments    Subjective Comments  Doing well with the steroid pack  -         Subjective Pain    Able to rate subjective pain?  yes  -      Pre-Treatment Pain Level  3  -         Total Minutes    37674 - PT Therapeutic Exercise Minutes  30  -MH      46569 - PT Manual Therapy Minutes  8  -MH         Exercise 1    Exercise Name 1  NuStep  -      Cueing 1  Verbal  -MH      Time 1  5  -MH         Exercise 2    Exercise Name 2  LTR  -MH      Cueing 2  Verbal  -      Reps 2  10e  -MH         Exercise 3    Exercise Name 3  piriformis stretch  -      Cueing 3  Verbal  -      Reps 3  3  -MH      Time 3  20  -MH         Exercise 5    Exercise Name 5  PPT  -      Cueing 5  Verbal;Tactile  -      Reps 5  20  -MH      Time 5  5   -MH         Exercise 6    Exercise Name 6  H/L hip abd  -MH      Cueing 6  Verbal  -MH      Reps 6  10e  -MH      Additional Comments  bi and uni; GTB  -MH         Exercise 7    Exercise Name 7  standing HS curl  -MH      Cueing 7  Verbal  -MH      Reps 7  10  -MH      Additional Comments  1#  -MH         Exercise 10    Exercise Name 10  --  -MH         Exercise 11    Exercise Name 11  SKTC  -MH      Cueing 11  Verbal  -MH      Reps 11  3  -MH      Time 11  20  -MH         Exercise 12    Exercise Name 12  bridges  -MH      Cueing 12  Verbal  -MH      Reps 12  20  -MH      Additional Comments  small range  -MH         Exercise 13    Exercise Name 13  DKTC  -MH      Cueing 13  Verbal  -MH      Reps 13  10  -MH      Time 13  green swiss ball  -MH         Exercise 14    Exercise Name 14  side-stepping  -MH      Cueing 14  Verbal  -MH      Reps 14  3 laps  -MH      Additional Comments  YTB  -MH         Exercise 15    Exercise Name 15  STS from elevated table  -MH      Cueing 15  Verbal;Tactile  -MH      Reps 15  10  -MH      Additional Comments  glute squeeze  -MH         Exercise 16    Exercise Name 16  shoulder ext with TrA  -MH      Cueing 16  Verbal  -MH      Reps 16  10  -MH      Additional Comments  YTB  -MH        User Key  (r) = Recorded By, (t) = Taken By, (c) = Cosigned By    Initials Name Provider Type     Kanwal Decker, PT Physical Therapist                      Manual Rx (last 36 hours)      Manual Treatments     Row Name 12/10/20 1100             Total Minutes    38203 - PT Manual Therapy Minutes  8  -MH         Manual Rx 1    Manual Rx 1 Location  STM/foam roll to L glute/pirioformis/HS  -MH        User Key  (r) = Recorded By, (t) = Taken By, (c) = Cosigned By    Initials Name Provider Type     Kanwal Decker, PT Physical Therapist          PT OP Goals     Row Name 12/10/20 1100          PT Short Term Goals    STG Date to Achieve  12/11/20  -MH     STG 1  pt. to be I with initial HEP to facilitate  self management of their condition  -     STG 1 Progress  Met  -     STG 2  pt. to be educated in/verbalize understanding of the importance of posture/ergonomics in association with their condition to facilitate self management of their condition  -     STG 2 Progress  Met  -        Long Term Goals    LTG Date to Achieve  02/12/21  -     LTG 1  pt. to be I with advanced HEP to facilitate self management of their condition  -     LTG 1 Progress  Ongoing  -     LTG 2  pt. to report an Oswestry score </= 30 to demonstrate decreased level of perceived disability  -     LTG 2 Progress  Met  -     LTG 3  pt to demosntrate STS x 5 in </= 20 seconds to facilitate improved ease/safety with household/community mobility  -     LTG 3 Progress  Ongoing  -     LTG 4  pt to report >/= 50% improvement in AM LBP to facilitate ease wth performing household activities  -     LTG 4 Progress  Partially Met  -       User Key  (r) = Recorded By, (t) = Taken By, (c) = Cosigned By    Initials Name Provider Type     Kanwal Decker, PT Physical Therapist          Therapy Education  Education Details: Access Code: [email or web address]  Given: Mobility training, Posture/body mechanics, HEP  Program: Reinforced, Progressed  How Provided: Verbal, Demonstration, Written  Provided to: Patient  Level of Understanding: Teach back education performed, Verbalized, Demonstrated              Time Calculation:   Start Time: 1137  Stop Time: 1215  Time Calculation (min): 38 min  Total Timed Code Minutes- PT: 38 minute(s)  Therapy Charges for Today     Code Description Service Date Service Provider Modifiers Qty    54710392725 HC PT MANUAL THERAPY EA 15 MIN 12/10/2020 Kanwal Decker, PT GP 1    11362229419 HC PT THER PROC EA 15 MIN 12/10/2020 Kanwal Decker PT GP 2                    Kanwal Decker PT  12/10/2020

## 2020-12-28 ENCOUNTER — OFFICE VISIT (OUTPATIENT)
Dept: FAMILY MEDICINE CLINIC | Facility: CLINIC | Age: 85
End: 2020-12-28

## 2020-12-28 VITALS
DIASTOLIC BLOOD PRESSURE: 81 MMHG | BODY MASS INDEX: 26.98 KG/M2 | WEIGHT: 146.6 LBS | OXYGEN SATURATION: 98 % | TEMPERATURE: 97.5 F | SYSTOLIC BLOOD PRESSURE: 185 MMHG | HEART RATE: 68 BPM | HEIGHT: 62 IN

## 2020-12-28 DIAGNOSIS — M54.42 CHRONIC MIDLINE LOW BACK PAIN WITH LEFT-SIDED SCIATICA: Primary | ICD-10-CM

## 2020-12-28 DIAGNOSIS — G89.29 CHRONIC MIDLINE LOW BACK PAIN WITH LEFT-SIDED SCIATICA: Primary | ICD-10-CM

## 2020-12-28 PROCEDURE — 99214 OFFICE O/P EST MOD 30 MIN: CPT | Performed by: FAMILY MEDICINE

## 2020-12-28 RX ORDER — MELOXICAM 15 MG/1
15 TABLET ORAL DAILY PRN
Qty: 20 TABLET | Refills: 0 | Status: SHIPPED | OUTPATIENT
Start: 2020-12-28 | End: 2021-01-18

## 2020-12-28 NOTE — PROGRESS NOTES
"Subjective   Lanie Ochoa is a 85 y.o. female.     Chief Complaint   Patient presents with   • Back Pain     LOWER BACK PAIN        History of Present Illness    85-year-old female.  I am seeing her for the first time today.  She is a patient of one of our doctors in this practice.  Covering on vacation.  She has had some back pain now for about 3 or 4 months.  At the sacral area radiating into the left hip and left thigh.  She states she exercises a lot but she is to had trouble lately because of the pain.  She was given baclofen and some steroids and that did not really help that much.  The steroids not make her feel well.  She has gone to physical therapy and that is not helping.  The pain continues to be an issue.  She does not describe any weakness.  She thinks it may be arthritis.  It is worse with prolonged sitting and feels better when she is walking around.  The pain gets moderately severe at times.      The following portions of the patient's history were reviewed and updated as appropriate: allergies, current medications, past family history, past medical history, past social history, past surgical history and problem list.          Review of Systems   Constitutional: Negative.    Musculoskeletal: Positive for back pain.   Neurological: Negative for weakness.       Objective   Blood pressure (!) 185/81, pulse 68, temperature 97.5 °F (36.4 °C), temperature source Temporal, height 157.5 cm (62.01\"), weight 66.5 kg (146 lb 9.6 oz), SpO2 98 %, not currently breastfeeding.  Physical Exam  Constitutional:       Comments: The patient appears slightly uncomfortable but nontoxic.   Musculoskeletal:      Comments: There is no pain to palpation along the lumbar sacral spine.  No SI joint discomfort.  No sciatic notch discomfort.  No trochanteric discomfort of the hips.  She has a good range of motion especially of the left hip without any pain.  Her gait is antalgic.  There is no foot drop.  There is no rash. "         Assessment/Plan   Diagnoses and all orders for this visit:    1. Chronic midline low back pain with left-sided sciatica (Primary)  -     MRI Lumbar Spine Without Contrast; Future    Other orders  -     meloxicam (MOBIC) 15 MG tablet; Take 1 tablet by mouth Daily As Needed (back pain).  Dispense: 20 tablet; Refill: 0      3 or 4 months of worsening low back pain with some left-sided radiculopathy to the thigh/sciatica-like symptoms.  Unremarkable exam today.  It does not appear to be hip arthritis pain.  I am recommending MRI of the lumbosacral spine.  In addition recommending meloxicam 15 mg a day.  Her blood pressure slightly high today but previously controlled.  Her creatinine is normal.  She is aware the meloxicam could cause GI upset and other issues but short-term benefits outweigh risks at this time.  She will follow up with her PCP within 2 or 3 weeks for follow-up.  She will call with questions.

## 2020-12-29 ENCOUNTER — HOSPITAL ENCOUNTER (OUTPATIENT)
Dept: PHYSICAL THERAPY | Facility: HOSPITAL | Age: 85
Setting detail: THERAPIES SERIES
Discharge: HOME | End: 2020-12-29

## 2020-12-29 DIAGNOSIS — M54.50 CHRONIC LOW BACK PAIN, UNSPECIFIED BACK PAIN LATERALITY, UNSPECIFIED WHETHER SCIATICA PRESENT: Primary | ICD-10-CM

## 2020-12-29 DIAGNOSIS — Z74.09 IMPAIRED MOBILITY: ICD-10-CM

## 2020-12-29 DIAGNOSIS — M54.42 CHRONIC LEFT-SIDED LOW BACK PAIN WITH BILATERAL SCIATICA: ICD-10-CM

## 2020-12-29 DIAGNOSIS — G89.29 CHRONIC LEFT-SIDED LOW BACK PAIN WITH BILATERAL SCIATICA: ICD-10-CM

## 2020-12-29 DIAGNOSIS — G89.29 CHRONIC LOW BACK PAIN, UNSPECIFIED BACK PAIN LATERALITY, UNSPECIFIED WHETHER SCIATICA PRESENT: Primary | ICD-10-CM

## 2020-12-29 DIAGNOSIS — M54.41 CHRONIC LEFT-SIDED LOW BACK PAIN WITH BILATERAL SCIATICA: ICD-10-CM

## 2020-12-29 PROCEDURE — 97110 THERAPEUTIC EXERCISES: CPT

## 2020-12-29 NOTE — THERAPY DISCHARGE NOTE
Outpatient Physical Therapy Ortho Treatment Note/Discharge Summary  [facility]     Patient Name: Lanie Ochoa  : 1935  MRN: [id number]  Today's Date: 2020      Visit Date: 2020    Visit Dx:    ICD-10-CM ICD-9-CM   1. Chronic low back pain, unspecified back pain laterality, unspecified whether sciatica present  M54.5 724.2    G89.29 338.29   2. 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  M54.41 724.2    M54.42 724.3    G89.29 338.29   3. Impaired mobility  Z74.09 799.89       Patient Active Problem List   Diagnosis   • Anxiety   • HLD (hyperlipidemia)   • Benign essential hypertension   • Osteopenia   • Avitaminosis D   • History of left knee replacement   • Titubation   • Non-rheumatic mitral regurgitation   • Non-rheumatic tricuspid valve insufficiency   • Chronic pain of right knee   • Venous incompetence   • Dysfunction of both eustachian tubes   • Primary localized osteoarthrosis of right lower leg   • Primary osteoarthritis of right knee   • OA (osteoarthritis) of knee        Past Medical History:   Diagnosis Date   • Anxiety    • BMS (burning mouth syndrome) 2015   • Breast cancer (CMS/HCC)    • Cancer (CMS/HCC)     STAGE 1 INVASIVE DUCTAL CARCINOMA [place] AND [place] POSITIVE, HER-2/LOYDA NEGATIVE   • Cancer (CMS/HCC)     SKIN LEFT ARM; SQUAMOUS CELL   • Constipation    • Degenerative disc disease    • Functional murmur     RESOLVED 2014   • Gastritis     RESOLVED 2014   • GERD (gastroesophageal reflux disease)    • Glossalgia 2015   • Hypertension    • Internal hemorrhoids    • Osteoarthritis 2014    HAND   • PNA (pneumonia) 2015   • Trigger ring finger 2014    RESOLVED 2014        Past Surgical History:   Procedure Laterality Date   • BREAST BIOPSY     • BREAST LUMPECTOMY  07/15/2009    LUMPECTOMY   • COLONOSCOPY     • DILATATION AND CURETTAGE      RESOLVED 06/15/2010   • EYE SURGERY Bilateral 10/15/2012    CATARACT  EXTRACTION   • HEMORRHOIDECTOMY     • JOINT REPLACEMENT Left 2010    KNEE   • KNEE ARTHROSCOPY Right 6/13/2017    Procedure: KNEE ARTHROSCOPY WITH PARTIAL MEDIAL AND LATERAL MENISECTOMY AND DEBRIDEMENT OF ARTHRITIS.;  Surgeon: Lauren Krishna MD;  Location: [facility];  Service:    • TOTAL KNEE ARTHROPLASTY Right 5/21/2018    Procedure: RIGHT TOTAL KNEE ARTHROPLASTY;  Surgeon: Kiko Solano MD;  Location: [facility];  Service: Orthopedics                       PT Assessment/Plan     Row Name 12/29/20 1409          PT Assessment    Assessment Comments  Lanie Ochoa was seen for 9 physical therapy sessions for chronic low back pain. Treatment included therapeutic exercise, manual therapy and patient education with home exercise program . Progress to physical therapy goals was fair as pt. Met 2/2 STGs and 3/4 LTGs. Pt. Continues to have pain, worse in AM, despite improvement in transitional movements (5xSTS improved to 16 seconds down from 30 seconds at initial evaluation) and reports of reduced intensity in pain. Pt. Has returned to MD who has started her on pain medication and plans for pt. To have MRI of lumbar spine. At this time pt. Would like to D/C from formal therapy and manage condition independently and see the results of the MRI.  She was discharged to an independent [facility] and provided patient education to self-manage condition.  -        PT Plan    PT Plan Comments  D/C to [facility]  -       User Key  (r) = Recorded By, (t) = Taken By, (c) = Cosigned By    Initials Name Provider Type    Kanwal Dong, PT Physical Therapist              OP Exercises     Row Name 12/29/20 1300             Subjective Comments    Subjective Comments  It hurt bad after I finsihed my medicine, so went back to MD he gave me something else which is helping. I am also going to get nan MRI so I think i will make today the last session and have the MRI  -         Subjective Pain    Able to rate subjective pain?  yes   -MH      Pre-Treatment Pain Level  2  -MH         Total Minutes    90423 - PT Therapeutic Exercise Minutes  38  -MH         Exercise 1    Exercise Name 1  NuStep  -MH      Cueing 1  Verbal  -MH      Time 1  5  -MH         Exercise 2    Exercise Name 2  LTR  -MH      Cueing 2  Verbal  -MH      Reps 2  10e  -MH         Exercise 3    Exercise Name 3  piriformis stretch  -MH      Cueing 3  Verbal  -MH      Reps 3  3  -MH      Time 3  20  -MH         Exercise 5    Exercise Name 5  PPT  -MH      Cueing 5  Verbal;Tactile  -MH      Reps 5  20  -MH      Time 5  5  -MH         Exercise 6    Exercise Name 6  H/L hip abd  -MH      Cueing 6  Verbal  -MH      Reps 6  10e  -MH      Additional Comments  bi and uni; GTB  -MH         Exercise 7    Exercise Name 7  standing HS curl  -MH      Cueing 7  Verbal  -MH      Reps 7  10  -MH      Additional Comments  1#  -MH         Exercise 11    Exercise Name 11  SKTC  -MH      Cueing 11  Verbal  -MH      Reps 11  3  -MH      Time 11  20  -MH         Exercise 12    Exercise Name 12  bridges  -MH      Cueing 12  Verbal  -MH      Reps 12  20  -MH      Additional Comments  small range  -MH         Exercise 13    Exercise Name 13  DKTC  -MH      Cueing 13  Verbal  -MH      Reps 13  10  -MH      Time 13  green swiss ball  -MH         Exercise 14    Exercise Name 14  side-stepping  -MH      Cueing 14  Verbal  -MH      Reps 14  3 laps  -MH      Additional Comments  YTB  -MH         Exercise 15    Exercise Name 15  STS from elevated table  -MH      Cueing 15  Verbal;Tactile  -MH      Reps 15  10  -MH      Additional Comments  glute squeeze  -MH         Exercise 16    Exercise Name 16  shoulder ext with TrA  -MH      Cueing 16  Verbal  -MH      Reps 16  10  -MH      Additional Comments  YTB  -MH        User Key  (r) = Recorded By, (t) = Taken By, (c) = Cosigned By    Initials Name Provider Type    Kanwal Dong, PT Physical Therapist                         PT OP Goals     Row Name 12/29/20  1300          PT Short Term Goals    STG Date to Achieve  12/11/20  -     STG 1  pt. to be I with initial HEP to facilitate self management of their condition  -     STG 1 Progress  Met  -     STG 2  pt. to be educated in/verbalize understanding of the importance of posture/ergonomics in association with their condition to facilitate self management of their condition  -     STG 2 Progress  Met  -        Long Term Goals    LTG Date to Achieve  02/12/21  -     LTG 1  pt. to be I with advanced HEP to facilitate self management of their condition  -     LTG 1 Progress  Met  -     LTG 2  pt. to report an Oswestry score </= 30 to demonstrate decreased level of perceived disability  -     LTG 2 Progress  Met  -     LTG 3  pt to demosntrate STS x 5 in </= 20 seconds to facilitate improved ease/safety with household/community mobility  -     LTG 3 Progress  Met  -     LTG 3 Progress Comments  15.97 seconds   -     LTG 4  pt to report >/= 50% improvement in AM LBP to facilitate ease wth performing household activities  -     LTG 4 Progress  Partially Met  -     LTG 4 Progress Comments  25%  -       User Key  (r) = Recorded By, (t) = Taken By, (c) = Cosigned By    Initials Name Provider Type    Kanwal Dong, PT Physical Therapist          Therapy Education  Education Details: Finalized and re-issued HEP; Access Code: [email or web address]; call with any questions/concerns  Given: Mobility training, Posture/body mechanics, HEP  Program: Reinforced, Progressed  How Provided: Verbal, Demonstration, Written  Provided to: Patient  Level of Understanding: Teach back education performed, Verbalized, Demonstrated    5 Times Sit to Stand  5 Times Sit to Stand (seconds): 15.97 seconds  5 Times Sit to Stand Comments: use of B UE         Time Calculation:   Start Time: 1315  Stop Time: 1356  Time Calculation (min): 41 min  Total Timed Code Minutes- PT: 38 minute(s)  Therapy Charges for Today     Code Description  Service Date Service Provider Modifiers Qty    78817424761 HC PT THER PROC EA 15 MIN 12/29/2020 Kanwal Decker, PT GP 3                       Kanwal Decker, PT  12/29/2020

## 2021-01-13 ENCOUNTER — HOSPITAL ENCOUNTER (OUTPATIENT)
Dept: MRI IMAGING | Facility: HOSPITAL | Age: 86
Discharge: HOME | End: 2021-01-13
Admitting: FAMILY MEDICINE

## 2021-01-13 DIAGNOSIS — G89.29 CHRONIC MIDLINE LOW BACK PAIN WITH LEFT-SIDED SCIATICA: ICD-10-CM

## 2021-01-13 DIAGNOSIS — M54.42 CHRONIC MIDLINE LOW BACK PAIN WITH LEFT-SIDED SCIATICA: ICD-10-CM

## 2021-01-13 PROCEDURE — 72148 MRI LUMBAR SPINE W/O DYE: CPT

## 2021-01-15 NOTE — PROGRESS NOTES
The MRI of the lumbar spine demonstrates evidence of degenerative arthritis, along with spinal stenosis.  These findings certainly could be causing her back pain.  The patient needs to follow-up with her primary care provider as scheduled next week.

## 2021-01-18 ENCOUNTER — OFFICE VISIT (OUTPATIENT)
Dept: FAMILY MEDICINE CLINIC | Facility: CLINIC | Age: 86
End: 2021-01-18

## 2021-01-18 VITALS
DIASTOLIC BLOOD PRESSURE: 80 MMHG | TEMPERATURE: 96.9 F | WEIGHT: 147.6 LBS | HEIGHT: 62 IN | SYSTOLIC BLOOD PRESSURE: 130 MMHG | BODY MASS INDEX: 27.16 KG/M2 | RESPIRATION RATE: 16 BRPM | OXYGEN SATURATION: 98 % | HEART RATE: 71 BPM

## 2021-01-18 DIAGNOSIS — G89.29 CHRONIC RIGHT-SIDED LOW BACK PAIN WITH RIGHT-SIDED SCIATICA: Primary | ICD-10-CM

## 2021-01-18 DIAGNOSIS — M54.41 CHRONIC RIGHT-SIDED LOW BACK PAIN WITH RIGHT-SIDED SCIATICA: Primary | ICD-10-CM

## 2021-01-18 DIAGNOSIS — M51.36 DEGENERATIVE DISC DISEASE, LUMBAR: ICD-10-CM

## 2021-01-18 PROCEDURE — 99213 OFFICE O/P EST LOW 20 MIN: CPT | Performed by: FAMILY MEDICINE

## 2021-01-18 RX ORDER — NABUMETONE 500 MG/1
500 TABLET, FILM COATED ORAL 2 TIMES DAILY PRN
Qty: 30 TABLET | Refills: 1 | Status: SHIPPED | OUTPATIENT
Start: 2021-01-18 | End: 2021-03-02

## 2021-01-18 NOTE — PROGRESS NOTES
"Chief Complaint  Back Pain (F/U from [facility]. Right side, low back, moves sometime down leg)    Subjective          Lanie Ochoa presents to [facility] for   History of Present Illness  Continues to have back pain.   She says still low back and down in the butt. Goes down into the right leg. She was here in 12/4/20 and had prednisone and was given baclofen. She had 9 treatments with PT. She then was back in the office 12/28/20 and MRI was ordered and she was placed on meloxicam.   She said this has not helped much. She still has pain.   Last three days she has developed stool urgency. She has urge to have BM and rushes to bathroom and often is not making it in time. Says this is a little better today.     Objective   Vital Signs:   /80 (BP Location: Left arm, Patient Position: Sitting, Cuff Size: Adult)   Pulse 71   Temp 96.9 °F (36.1 °C) (Infrared)   Resp 16   Ht 157.5 cm (62.01\")   Wt 67 kg (147 lb 9.6 oz)   SpO2 98%   BMI 26.99 kg/m²     Physical Exam  Vitals signs reviewed.   Constitutional:       General: She is not in acute distress.  Eyes:      General: No scleral icterus.     Conjunctiva/sclera: Conjunctivae normal.   Pulmonary:      Effort: Pulmonary effort is normal. No respiratory distress.   Neurological:      Mental Status: She is alert and oriented to person, place, and time.      Comments: Stands for entire visit, leaned against the wall. She has slow antalgic gait, favoring the right leg. She can bend forward at the waist.    Psychiatric:         Behavior: Behavior normal.        Result Review :                 Assessment and Plan    Problem List Items Addressed This Visit     None          Follow Up   No follow-ups on file.  Patient was given instructions and counseling regarding her condition or for health maintenance advice. Please see specific information pulled into the AVS if appropriate.     Communicated with [facility] regarding her plan and if " evaluation was pressing for surgical intervention. Recommended first trying epidural and we will refer for this.   Communicated with pt about these options and she would like to proceed.

## 2021-01-21 ENCOUNTER — TELEPHONE (OUTPATIENT)
Dept: FAMILY MEDICINE CLINIC | Facility: CLINIC | Age: 86
End: 2021-01-21

## 2021-01-21 RX ORDER — NEBIVOLOL HYDROCHLORIDE 10 MG/1
TABLET ORAL
Qty: 90 TABLET | Refills: 0 | Status: SHIPPED | OUTPATIENT
Start: 2021-01-21 | End: 2021-02-01 | Stop reason: SDUPTHER

## 2021-01-21 NOTE — TELEPHONE ENCOUNTER
PATIENT WOULD LIKE TO KNOW IF SHE  HAS BEEN REFERRED FOR HER BACK. SHE HAS NOT HAD A CALL TO SCHEDULE YET AND SHE WOULD LIKE A CALL BACK TO KNOW THE STATUS OF HER REFERRAL.    PLEASE ADVISE  [phone] (

## 2021-01-29 ENCOUNTER — ANESTHESIA EVENT (OUTPATIENT)
Dept: PAIN MEDICINE | Facility: HOSPITAL | Age: 86
End: 2021-01-29

## 2021-01-29 ENCOUNTER — ANESTHESIA (OUTPATIENT)
Dept: PAIN MEDICINE | Facility: HOSPITAL | Age: 86
End: 2021-01-29

## 2021-01-29 ENCOUNTER — TELEPHONE (OUTPATIENT)
Dept: FAMILY MEDICINE CLINIC | Facility: CLINIC | Age: 86
End: 2021-01-29

## 2021-01-29 ENCOUNTER — HOSPITAL ENCOUNTER (OUTPATIENT)
Dept: PAIN MEDICINE | Facility: HOSPITAL | Age: 86
Discharge: HOME | End: 2021-01-29

## 2021-01-29 ENCOUNTER — HOSPITAL ENCOUNTER (OUTPATIENT)
Dept: GENERAL RADIOLOGY | Facility: HOSPITAL | Age: 86
Discharge: HOME | End: 2021-01-29

## 2021-01-29 VITALS
BODY MASS INDEX: 25.21 KG/M2 | SYSTOLIC BLOOD PRESSURE: 179 MMHG | HEIGHT: 62 IN | HEART RATE: 61 BPM | WEIGHT: 137 LBS | DIASTOLIC BLOOD PRESSURE: 74 MMHG | OXYGEN SATURATION: 99 % | RESPIRATION RATE: 2 BRPM

## 2021-01-29 DIAGNOSIS — R52 PAIN: ICD-10-CM

## 2021-01-29 DIAGNOSIS — M48.061 SPINAL STENOSIS OF LUMBAR REGION WITHOUT NEUROGENIC CLAUDICATION: Primary | ICD-10-CM

## 2021-01-29 PROCEDURE — 25010000002 METHYLPREDNISOLONE PER 80 MG: Performed by: ANESTHESIOLOGY

## 2021-01-29 PROCEDURE — 77003 FLUOROGUIDE FOR SPINE INJECT: CPT

## 2021-01-29 PROCEDURE — C1755 CATHETER, INTRASPINAL: HCPCS

## 2021-01-29 PROCEDURE — 0 IOPAMIDOL 41 % SOLUTION: Performed by: ANESTHESIOLOGY

## 2021-01-29 RX ORDER — MIDAZOLAM HYDROCHLORIDE 1 MG/ML
1 INJECTION INTRAMUSCULAR; INTRAVENOUS AS NEEDED
Status: DISCONTINUED | OUTPATIENT
Start: 2021-01-29 | End: 2021-01-30 | Stop reason: HOSPADM

## 2021-01-29 RX ORDER — SODIUM CHLORIDE 0.9 % (FLUSH) 0.9 %
1-10 SYRINGE (ML) INJECTION AS NEEDED
Status: DISCONTINUED | OUTPATIENT
Start: 2021-01-29 | End: 2021-01-30 | Stop reason: HOSPADM

## 2021-01-29 RX ORDER — METHYLPREDNISOLONE ACETATE 80 MG/ML
80 INJECTION, SUSPENSION INTRA-ARTICULAR; INTRALESIONAL; INTRAMUSCULAR; SOFT TISSUE ONCE
Status: COMPLETED | OUTPATIENT
Start: 2021-01-29 | End: 2021-01-29

## 2021-01-29 RX ORDER — LIDOCAINE HYDROCHLORIDE 10 MG/ML
1 INJECTION, SOLUTION INFILTRATION; PERINEURAL ONCE AS NEEDED
Status: DISCONTINUED | OUTPATIENT
Start: 2021-01-29 | End: 2021-01-30 | Stop reason: HOSPADM

## 2021-01-29 RX ORDER — FENTANYL CITRATE 50 UG/ML
50 INJECTION, SOLUTION INTRAMUSCULAR; INTRAVENOUS AS NEEDED
Status: DISCONTINUED | OUTPATIENT
Start: 2021-01-29 | End: 2021-01-30 | Stop reason: HOSPADM

## 2021-01-29 RX ADMIN — METHYLPREDNISOLONE ACETATE 80 MG: 80 INJECTION, SUSPENSION INTRA-ARTICULAR; INTRALESIONAL; INTRAMUSCULAR; SOFT TISSUE at 10:25

## 2021-01-29 RX ADMIN — IOPAMIDOL 10 ML: 408 INJECTION, SOLUTION INTRATHECAL at 10:25

## 2021-01-29 NOTE — ANESTHESIA PROCEDURE NOTES
PAIN Epidural block    Pre-sedation assessment completed: 1/29/2021 10:00 AM    Patient reassessed immediately prior to procedure    Patient location during procedure: pain clinic  Start Time: 1/29/2021 10:20 AM  Stop Time: 1/29/2021 10:26 AM  Indication:procedure for pain  Performed By  Anesthesiologist: Leonarda Romero MD  Preanesthetic Checklist  Completed: patient identified, site marked, surgical consent, pre-op evaluation, timeout performed, IV checked, risks and benefits discussed and monitors and equipment checked  Additional Notes  Fluoro used.    Lumbar degeneration, stenosis.    Prep:  Pt Position:prone  Sterile Tech:cap, gloves, mask and sterile barrier  Prep:chlorhexidine gluconate and isopropyl alcohol  Monitoring:blood pressure monitoring, continuous pulse oximetry and EKG  Procedure:Sedation: no     Approach:left paramedian  Guidance: fluoroscopy  Location:lumbar  Level:L5-S1  Needle Type:Tuohy  Needle Gauge:20  Aspiration:negative  Medications:  Depomedrol:80  Preservative Free Saline:3mL  Isovue:2mL  Comments:B/l spread  Post Assessment:  Dressing:occlusive dressing applied  Pt Tolerance:patient tolerated the procedure well with no apparent complications  Complications:no

## 2021-01-29 NOTE — H&P
[facility]    History and Physical    Patient Name: Lanie Ochoa  :  1935  MRN:  [id number]  Date of Admission: 2021    Subjective     Patient is a 85 y.o. female presents with chief complaint of chronic, moderate low back and leg: right pain.  Onset of symptoms was gradual starting several months ago.  Symptoms are associated/aggravated by activity. Symptoms improve with nothing.  Presents for lesi.      Mri shows degeneration & stenosis.    The following portions of the patients history were reviewed and updated as appropriate: current medications, allergies, past medical history, past surgical history, past family history, past social history and problem list                Objective     Past Medical History:   Past Medical History:   Diagnosis Date   • Anxiety    • BMS (burning mouth syndrome) 2015   • Breast cancer (CMS/HCC)    • Cancer (CMS/HCC)     STAGE 1 INVASIVE DUCTAL CARCINOMA ER AND CA POSITIVE, HER-2/LOYDA NEGATIVE   • Cancer (CMS/HCC)     SKIN LEFT ARM; SQUAMOUS CELL   • Constipation    • Degenerative disc disease    • Functional murmur     RESOLVED 2014   • Gastritis     RESOLVED 2014   • GERD (gastroesophageal reflux disease)    • Glossalgia 2015   • Hypertension    • Internal hemorrhoids    • Osteoarthritis 2014    HAND   • PNA (pneumonia) 2015   • Trigger ring finger 2014    RESOLVED 2014     Past Surgical History:   Past Surgical History:   Procedure Laterality Date   • BREAST BIOPSY     • BREAST LUMPECTOMY  07/15/2009    LUMPECTOMY   • COLONOSCOPY     • DILATATION AND CURETTAGE      RESOLVED 06/15/2010   • EYE SURGERY Bilateral 10/15/2012    CATARACT EXTRACTION   • HEMORRHOIDECTOMY     • JOINT REPLACEMENT Left     KNEE   • KNEE ARTHROSCOPY Right 2017    Procedure: KNEE ARTHROSCOPY WITH PARTIAL MEDIAL AND LATERAL MENISECTOMY AND DEBRIDEMENT OF ARTHRITIS.;  Surgeon: Lauren Krishna MD;  [place] [facility] [place] [facility] [place]:    • TOTAL KNEE ARTHROPLASTY Right 5/21/2018    Procedure: RIGHT TOTAL KNEE ARTHROPLASTY;  Surgeon: iKko Solano MD;  Location: [facility];  Service: Orthopedics     Family History:   Family History   Problem Relation Age of Onset   • COPD Mother    • COPD Father    • Malig Hyperthermia Neg Hx      Social History:   Social History     Tobacco Use   • Smoking status: Never Smoker   • Smokeless tobacco: Never Used   Substance Use Topics   • Alcohol use: No   • Drug use: No       Vital Signs Range for the last 24 hours  Temperature:     Temp Source:     BP:     Pulse:     Respirations:     SPO2:     O2 Amount (l/min):     O2 Devices     Weight:           --------------------------------------------------------------------------------    Current Outpatient Medications   Medication Sig Dispense Refill   • Bystolic 10 MG tablet TAKE 1 TABLET BY MOUTH IN THE MORNING  90 tablet 0   • CALCIUM PO Take 1 tablet by mouth Daily.     • cetirizine (zyrTEC) 10 MG tablet Take 10 mg by mouth Daily.     • Cholecalciferol (VITAMIN D3) 400 UNITS capsule Take 1 tablet by mouth Daily.     • dilTIAZem CD (CARDIZEM CD) 240 MG 24 hr capsule Take 1 capsule by mouth Daily. 90 capsule 1   • losartan (COZAAR) 100 MG tablet Take 1 tablet by mouth Daily. 90 tablet 1   • montelukast (Singulair) 10 MG tablet Take 1 tablet by mouth Every Night. 90 tablet 1   • nabumetone (RELAFEN) 500 MG tablet Take 1 tablet by mouth 2 (Two) Times a Day As Needed for Mild Pain . 30 tablet 1   • sertraline (ZOLOFT) 25 MG tablet Take 1 tablet by mouth Daily. 200001 90 tablet 1     No current facility-administered medications for this encounter.        --------------------------------------------------------------------------------  Assessment/Plan      Anesthesia Evaluation     Patient summary reviewed and Nursing notes reviewed                Airway   Mallampati: II  Dental - normal exam     Pulmonary - normal exam   Cardiovascular     Rhythm:  regular    (+) hypertension, valvular problems/murmurs MR and TI, murmur, hyperlipidemia,       Neuro/Psych- neuro exam normal  (+) headaches, psychiatric history Anxiety,     GI/Hepatic/Renal/Endo    (+)  GERD,      Musculoskeletal (-) normal exam    (+) back pain, chronic pain,   Abdominal  - normal exam   Substance History - negative use     OB/GYN negative ob/gyn ROS         Other   arthritis,    history of cancer remission               Diagnosis and Plan    Treatment Plan  ASA 3      Procedures: Lumbar Epidural Steroid Injection(LESI), With fluoroscopy,       Anesthetic plan and risks discussed with patient.          Diagnosis     * DDD (degenerative disc disease), lumbar [M51.36]     * Lumbar spinal stenosis [M48.061]

## 2021-01-29 NOTE — TELEPHONE ENCOUNTER
Can she see the site of injection? Any swelling or redness like she has an infection at the site of injection?   If not having those changes she could try something topical since it is one spot and superficial. She could try solonpas or lidocaine patches available over the counter.   I could send her a few opioid pain pills for over the weekend but they can make her sleepy and can cause constipation.

## 2021-01-29 NOTE — TELEPHONE ENCOUNTER
Patient was having some increased pain today after getting out of the car after having epidural but it has subsided now and she is doing okay.  But she is worried about the weekend should she have increased pain.  Wondering if she should have something stronger  Presently taking Tylenol  Without fever without skin changes or other problems.    Looks like she received fentanyl injection 50 mcg this morning as well as Depo-Medrol 80      She will follow instructions provided with pain management.  She can use ice as needed  Continue take Tylenol as needed    Should she have increased pain  Severe uncontrolled pain fever or other problems emergency room    Unfortunately I will not be able to prescribe any narcotics on call, as well as her pain presently is controlled.    Patient was pleasant and understands instructions with rationale

## 2021-01-29 NOTE — TELEPHONE ENCOUNTER
Caller: Lanie Ochoa    Relationship:     Best call back number: [phone]    What medication are you requesting: STRONGER PAIN MEDICATOIN    What are your current symptoms: EXTREME BACK PAIN    How long have you been experiencing symptoms: SINCE EPIDURAL    Have you had these symptoms before:    [] Yes  [x] No    Have you been treated for these symptoms before:   [] Yes  [x] No    If a prescription is needed, what is your preferred pharmacy and phone number: [id number] [address] - [phone] [facility] [phone] FX     Additional notes: EXTREME BACK PAIN-AT THE SITE OF EPIDURAL  INJECTION. PATIENT IS REQUESTING A STRONGER MEDICATION

## 2021-02-01 ENCOUNTER — OFFICE VISIT (OUTPATIENT)
Dept: FAMILY MEDICINE CLINIC | Facility: CLINIC | Age: 86
End: 2021-02-01

## 2021-02-01 VITALS
TEMPERATURE: 96.8 F | WEIGHT: 138.9 LBS | HEART RATE: 69 BPM | BODY MASS INDEX: 25.56 KG/M2 | RESPIRATION RATE: 15 BRPM | SYSTOLIC BLOOD PRESSURE: 144 MMHG | HEIGHT: 62 IN | OXYGEN SATURATION: 98 % | DIASTOLIC BLOOD PRESSURE: 84 MMHG

## 2021-02-01 DIAGNOSIS — R19.7 DIARRHEA, UNSPECIFIED TYPE: Primary | ICD-10-CM

## 2021-02-01 PROCEDURE — 99214 OFFICE O/P EST MOD 30 MIN: CPT | Performed by: FAMILY MEDICINE

## 2021-02-01 RX ORDER — DILTIAZEM HYDROCHLORIDE 240 MG/1
240 CAPSULE, COATED, EXTENDED RELEASE ORAL DAILY
Qty: 90 CAPSULE | Refills: 1 | Status: SHIPPED | OUTPATIENT
Start: 2021-02-01 | End: 2021-08-04

## 2021-02-01 RX ORDER — NEBIVOLOL 10 MG/1
10 TABLET ORAL EVERY MORNING
Qty: 90 TABLET | Refills: 3 | Status: SHIPPED | OUTPATIENT
Start: 2021-02-01 | End: 2021-04-19

## 2021-02-01 RX ORDER — MONTELUKAST SODIUM 10 MG/1
10 TABLET ORAL NIGHTLY
Qty: 90 TABLET | Refills: 1 | Status: SHIPPED | OUTPATIENT
Start: 2021-02-01 | End: 2021-08-02

## 2021-02-01 RX ORDER — LOSARTAN POTASSIUM 100 MG/1
100 TABLET ORAL DAILY
Qty: 90 TABLET | Refills: 1 | Status: SHIPPED | OUTPATIENT
Start: 2021-02-01 | End: 2021-03-25

## 2021-02-01 RX ORDER — CETIRIZINE HYDROCHLORIDE 10 MG/1
10 TABLET ORAL DAILY
Qty: 90 TABLET | Refills: 3 | Status: SHIPPED | OUTPATIENT
Start: 2021-02-01 | End: 2022-01-21

## 2021-02-01 RX ORDER — SERTRALINE HYDROCHLORIDE 25 MG/1
25 TABLET, FILM COATED ORAL DAILY
Qty: 90 TABLET | Refills: 1 | Status: SHIPPED | OUTPATIENT
Start: 2021-02-01 | End: 2021-09-01

## 2021-02-01 NOTE — PROGRESS NOTES
"Chief Complaint  Sciatica, Hypertension, and Other (fecal urgency)  She is here today with her son.   Subjective          Lanie Ochoa presents to [facility] for   History of Present Illness  She continues to have stool urgency. This has been going on for a couple of weeks now. Eating seems to make it worse. Not as bad today but she has not eaten much.   She can get a little more comfortable since the epidural but still with significant pain in the right buttock, she has to sit on the left buttock and not put pressure on the right.   She does have some relief in the back though.   Her stool is loose not watery and does not contain blood. She can't think of anything different she has eaten or changes in medication.     Objective   Vital Signs:   /84 (BP Location: Left arm, Patient Position: Sitting, Cuff Size: Small Adult)   Pulse 69   Temp 96.8 °F (36 °C) (Infrared)   Resp 15   Ht 157.5 cm (62\")   Wt 63 kg (138 lb 14.4 oz)   SpO2 98%   BMI 25.41 kg/m²     Physical Exam  Vitals signs reviewed.   Constitutional:       General: She is not in acute distress.     Appearance: She is normal weight.   Eyes:      General: No scleral icterus.     Conjunctiva/sclera: Conjunctivae normal.   Pulmonary:      Effort: Pulmonary effort is normal. No respiratory distress.   Neurological:      Mental Status: She is alert and oriented to person, place, and time.      Comments: She is sitting today for the first time in months. She is looking a little uncomfortable, leaning to the left in her chair with the right buttock off the surface. She stands slow but then stands independently.   Rectal tone good with rectal exam initiation, while examining rectum the tone seems less. There is loose stool and expels some stool have exam.    Psychiatric:         Behavior: Behavior normal.        Result Review :                 Assessment and Plan    Problem List Items Addressed This Visit     None "      Visit Diagnoses     Diarrhea, unspecified type    -  Primary    Relevant Orders    Comprehensive Metabolic Panel (Completed)    CBC & Differential (Completed)    Giardia Antigen - Stool, Per Rectum (Completed)    Clostridium Difficile Toxin, PCR - Stool, Per Rectum (Completed)          Follow Up   No follow-ups on file.  Patient was given instructions and counseling regarding her condition or for health maintenance advice. Please see specific information pulled into the AVS if appropriate.     Loose stools, urgency, stools waking her from sleep overnight, incontinence.   This has been going on for about 2 weeks.   Says worse when she eats. She has numerous stools per day.   She has been losing weight.   Will get labs, stool studies, will communicate symptoms with [facility]U to see if could be related to her spinal stenosis seen on imaging.   Reviewed medication list. Already started with loose stool before the nabumetone.   Consider medications but look into alternative etiology before changing medication.

## 2021-02-02 ENCOUNTER — TELEPHONE (OUTPATIENT)
Dept: FAMILY MEDICINE CLINIC | Facility: CLINIC | Age: 86
End: 2021-02-02

## 2021-02-02 DIAGNOSIS — M48.061 SPINAL STENOSIS OF LUMBAR REGION, UNSPECIFIED WHETHER NEUROGENIC CLAUDICATION PRESENT: Primary | ICD-10-CM

## 2021-02-02 DIAGNOSIS — M54.31 SCIATICA OF RIGHT SIDE: ICD-10-CM

## 2021-02-02 DIAGNOSIS — R15.2 FECAL URGENCY: ICD-10-CM

## 2021-02-02 LAB
ALBUMIN SERPL-MCNC: 4.7 G/DL (ref 3.5–5.2)
ALBUMIN/GLOB SERPL: 2 G/DL
ALP SERPL-CCNC: 68 U/L (ref 39–117)
ALT SERPL-CCNC: 14 U/L (ref 1–33)
AST SERPL-CCNC: 16 U/L (ref 1–32)
BASOPHILS # BLD AUTO: 0.05 10*3/MM3 (ref 0–0.2)
BASOPHILS NFR BLD AUTO: 0.6 % (ref 0–1.5)
BILIRUB SERPL-MCNC: 0.4 MG/DL (ref 0–1.2)
BUN SERPL-MCNC: 12 MG/DL (ref 8–23)
BUN/CREAT SERPL: 15 (ref 7–25)
CALCIUM SERPL-MCNC: 9.8 MG/DL (ref 8.6–10.5)
CHLORIDE SERPL-SCNC: 100 MMOL/L (ref 98–107)
CO2 SERPL-SCNC: 27.9 MMOL/L (ref 22–29)
CREAT SERPL-MCNC: 0.8 MG/DL (ref 0.57–1)
EOSINOPHIL # BLD AUTO: 0.09 10*3/MM3 (ref 0–0.4)
EOSINOPHIL NFR BLD AUTO: 1 % (ref 0.3–6.2)
ERYTHROCYTE [DISTWIDTH] IN BLOOD BY AUTOMATED COUNT: 12.3 % (ref 12.3–15.4)
GLOBULIN SER CALC-MCNC: 2.4 GM/DL
GLUCOSE SERPL-MCNC: 103 MG/DL (ref 65–99)
HCT VFR BLD AUTO: 43.1 % (ref 34–46.6)
HGB BLD-MCNC: 14.1 G/DL (ref 12–15.9)
IMM GRANULOCYTES # BLD AUTO: 0.05 10*3/MM3 (ref 0–0.05)
IMM GRANULOCYTES NFR BLD AUTO: 0.6 % (ref 0–0.5)
LYMPHOCYTES # BLD AUTO: 0.93 10*3/MM3 (ref 0.7–3.1)
LYMPHOCYTES NFR BLD AUTO: 10.7 % (ref 19.6–45.3)
MCH RBC QN AUTO: 30.5 PG (ref 26.6–33)
MCHC RBC AUTO-ENTMCNC: 32.7 G/DL (ref 31.5–35.7)
MCV RBC AUTO: 93.1 FL (ref 79–97)
MONOCYTES # BLD AUTO: 0.68 10*3/MM3 (ref 0.1–0.9)
MONOCYTES NFR BLD AUTO: 7.9 % (ref 5–12)
NEUTROPHILS # BLD AUTO: 6.86 10*3/MM3 (ref 1.7–7)
NEUTROPHILS NFR BLD AUTO: 79.2 % (ref 42.7–76)
NRBC BLD AUTO-RTO: 0 /100 WBC (ref 0–0.2)
PLATELET # BLD AUTO: 340 10*3/MM3 (ref 140–450)
POTASSIUM SERPL-SCNC: 4.5 MMOL/L (ref 3.5–5.2)
PROT SERPL-MCNC: 7.1 G/DL (ref 6–8.5)
RBC # BLD AUTO: 4.63 10*6/MM3 (ref 3.77–5.28)
SODIUM SERPL-SCNC: 139 MMOL/L (ref 136–145)
WBC # BLD AUTO: 8.66 10*3/MM3 (ref 3.4–10.8)

## 2021-02-02 NOTE — TELEPHONE ENCOUNTER
Called patient.  After hearing back from Dr. Coates with neurosurgery we are going to have him evaluate the patient in the office to see if the spinal stenosis could be causing her trouble with stool control and fecal urgency.  She continued to have symptoms today without improvement.  I told her also to go ahead and do her stool samples so we can rule out common infection and she will take care of this.

## 2021-02-03 ENCOUNTER — TELEPHONE (OUTPATIENT)
Dept: NEUROSURGERY | Facility: CLINIC | Age: 86
End: 2021-02-03

## 2021-02-03 NOTE — TELEPHONE ENCOUNTER
----- Message from Charli Haynes MD sent at 2/1/2021  5:48 PM EST -----  Regarding: RE: follow up on pt  I looked at MRI again.  Possible but not likely.  I would be glad to take a look at her.  ----- Message -----  From: Paris Mejía MD  Sent: 2/1/2021   5:27 PM EST  To: Charli Haynes MD  Subject: follow up on pt                                  Dr. Haynes,     I sent a message about this pt to you previously. You checked out her MRI L spine, we decided to go with epidural. She had first one 1/29/21. She is having some improvement, still significant pain making her uncomfortable just to sit on her right buttocks. That's where her pain is but this is the first time I have seen her in recent months that she could actually sit in a chair and not stand the whole visit.   She has been having stool urgency and incontinence since about 1/18/21. She has sensation to have a BM and the need is immediate. Has had a number of accidents. No normal BMs without urgency. Stool is formed to loose, not watery. Occurs 3-4 hours after eating, wakes her out of the sleep in the morning. No fever, cramping abdominal pain. No altered appetite.   She is having no urinary incontinence and though she has started walking with a cane she denies weakness or that her legs have given out.  I just wanted to [place] back to you on the stool symptoms could not be a result of her spinal stenosis.    Thanks again for your time.   Paris Mejía

## 2021-02-04 ENCOUNTER — OFFICE VISIT (OUTPATIENT)
Dept: NEUROSURGERY | Facility: CLINIC | Age: 86
End: 2021-02-04

## 2021-02-04 ENCOUNTER — LAB (OUTPATIENT)
Dept: LAB | Facility: HOSPITAL | Age: 86
End: 2021-02-04

## 2021-02-04 VITALS
HEART RATE: 71 BPM | HEIGHT: 62 IN | SYSTOLIC BLOOD PRESSURE: 167 MMHG | WEIGHT: 138 LBS | DIASTOLIC BLOOD PRESSURE: 75 MMHG | BODY MASS INDEX: 25.4 KG/M2 | TEMPERATURE: 98.2 F

## 2021-02-04 DIAGNOSIS — M48.062 SPINAL STENOSIS OF LUMBAR REGION WITH NEUROGENIC CLAUDICATION: Primary | ICD-10-CM

## 2021-02-04 PROBLEM — M48.061 LUMBAR SPINAL STENOSIS: Status: ACTIVE | Noted: 2021-02-04

## 2021-02-04 PROCEDURE — 87493 C DIFF AMPLIFIED PROBE: CPT | Performed by: FAMILY MEDICINE

## 2021-02-04 PROCEDURE — 99204 OFFICE O/P NEW MOD 45 MIN: CPT | Performed by: NEUROLOGICAL SURGERY

## 2021-02-04 PROCEDURE — 87329 GIARDIA AG IA: CPT | Performed by: FAMILY MEDICINE

## 2021-02-04 RX ORDER — DEXAMETHASONE 4 MG/1
8 TABLET ORAL TAKE AS DIRECTED
Qty: 2 TABLET | Refills: 0 | Status: SHIPPED | OUTPATIENT
Start: 2021-02-04 | End: 2021-02-11 | Stop reason: HOSPADM

## 2021-02-04 NOTE — H&P (VIEW-ONLY)
"Subjective   Patient ID: Lanie Ochoa is a 85 y.o. female is being seen for consultation today at the request of Paris Mejía MD for back pain.    Today patient has extreme R sided low back pain, and bowel incontinence.    Treatment:PT, SIMON    Patient, Provider, and MA are all wearing a mask in our office today.    History of Present Illness     This patient has been having pain in her back since last November.  The pain has gotten substantially worse.  It is located only in her back.  There is no radiation into her legs.  Recently she has been having some bowel incontinence as well.  This is over the last 2 or 3 weeks.  She has no difficulty with bladder control and no numbness or tingling in her perineum and no evidence of saddle anesthesia.    The following portions of the patient's history were reviewed and updated as appropriate: allergies, current medications, past family history, past medical history, past social history, past surgical history and problem list.    Review of Systems   Constitutional: Negative for chills and fever.   HENT: Negative for congestion.    Genitourinary: Negative for difficulty urinating and dysuria.   Musculoskeletal: Positive for gait problem. Negative for back pain.   Neurological: Positive for weakness. Negative for numbness.       I have reviewed the review of systems as documented by my MA.      Objective     Vitals:    02/04/21 1100   BP: 167/75   Cuff Size: Adult   Pulse: 71   Temp: 98.2 °F (36.8 °C)   Weight: 62.6 kg (138 lb)   Height: 157.5 cm (62\")     Body mass index is 25.24 kg/m².      Physical Exam  Constitutional:       Appearance: She is well-developed.   HENT:      Head: Normocephalic and atraumatic.   Eyes:      Extraocular Movements: EOM normal.      Conjunctiva/sclera: Conjunctivae normal.      Pupils: Pupils are equal, round, and reactive to light.   Neck:      Vascular: No carotid bruit.   Neurological:      Mental Status: She is oriented to person, " place, and time.      Coordination: Finger-Nose-Finger Test and Heel to Shin Test normal.      Gait: Gait is intact.      Deep Tendon Reflexes:      Reflex Scores:       Tricep reflexes are 2+ on the right side and 2+ on the left side.       Bicep reflexes are 2+ on the right side and 2+ on the left side.       Brachioradialis reflexes are 2+ on the right side and 2+ on the left side.       Patellar reflexes are 2+ on the right side and 2+ on the left side.       Achilles reflexes are 2+ on the right side and 2+ on the left side.  Psychiatric:         Speech: Speech normal.       Neurologic Exam     Mental Status   Oriented to person, place, and time.   Registration of memory: Good recent and remote memory.   Attention: normal. Concentration: normal.   Speech: speech is normal   Level of consciousness: alert  Knowledge: consistent with education.     Cranial Nerves     CN II   Visual fields full to confrontation.   Visual acuity: normal    CN III, IV, VI   Pupils are equal, round, and reactive to light.  Extraocular motions are normal.     CN V   Facial sensation intact.   Right corneal reflex: normal  Left corneal reflex: normal    CN VII   Facial expression full, symmetric.   Right facial weakness: none  Left facial weakness: none    CN VIII   Hearing: intact    CN IX, X   Palate: symmetric    CN XI   Right sternocleidomastoid strength: normal  Left sternocleidomastoid strength: normal    CN XII   Tongue: not atrophic  Tongue deviation: none    Motor Exam   Muscle bulk: normal  Right arm tone: normal  Left arm tone: normal  Right leg tone: normal  Left leg tone: normal    Strength   Strength 5/5 except as noted.     Sensory Exam   Light touch normal.     Gait, Coordination, and Reflexes     Gait  Gait: normal    Coordination   Finger to nose coordination: normal  Heel to shin coordination: normal    Reflexes   Right brachioradialis: 2+  Left brachioradialis: 2+  Right biceps: 2+  Left biceps: 2+  Right triceps:  2+  Left triceps: 2+  Right patellar: 2+  Left patellar: 2+  Right achilles: 2+  Left achilles: 2+  Right : 2+  Left : 2+          Assessment/Plan   Independent Review of Radiographic Studies:      I personally reviewed the images from the following studies.    I reviewed an MRI of her lumbar spine done on 13 January of this year.  This does show a grade 1 spondylolisthesis of L5 on S1.  There is a lumbarized first sacral vertebrae as well.  The axial images show a widely patent canal and neuroforamina at T12-L1.  What the radiologist is calling L1-2 oh does appear to have some vestigial ribs on it.  That also looks okay.  L2-3 shows some left-sided foraminal stenosis as well as a right-sided disc herniation.  L3-4 shows some bilateral lateral recess stenosis.  L4-5 also shows a grade 1 spondylolisthesis with severe stenosis as does L5-S1.  S1 and S2 looks okay.    Medical Decision Making:      I told the patient that I think we need to proceed with a lumbar myelogram.  I am not at all sure that her symptoms are coming from the lumbar stenosis.  The back pain probably is but I told her that usually when someone is developing a cauda equina syndrome it is more in the bladder then the bowel and in addition to have some symptoms in the legs as well.  In addition she is 85 and it looks to me like she would need at least a 2 or 3 level lumbar laminectomy and fusion at the minimum to adequately open the spinal canal which may not be a very good idea in someone this age.  Consequently I suggested we go ahead with a lumbar myelogram to try and differentiate this better.  I told the patient what a myelogram involves.  I explained that there is a 50% chance of developing a bad headache and nausea as a result of the test.  I explained that there is also a very small chance of infection, seizures, and bleeding.  I explained how we would treat a post myelogram headache including bedrest, caffeinated fluids, steroids, and  blood patch.  The patient does ask to proceed.    Diagnoses and all orders for this visit:    1. Spinal stenosis of lumbar region with neurogenic claudication (Primary)  -     Obtain Informed Consent; Standing  -     IR Myelogram Lumbar Spine; Future  -     CT Lumbar Spine With Intrathecal Contrast; Future  -     XR Spine Lumbar Complete With Flex & Ext; Future  -     No Lab Testing Needed; Standing  -     dexamethasone (DECADRON) 4 MG tablet; Take 2 tablets by mouth Take As Directed. Take both tablets by mouth 2 hours before myelogram  Dispense: 2 tablet; Refill: 0      Return for After radiology test.

## 2021-02-05 ENCOUNTER — TELEPHONE (OUTPATIENT)
Dept: FAMILY MEDICINE CLINIC | Facility: CLINIC | Age: 86
End: 2021-02-05

## 2021-02-05 ENCOUNTER — IMMUNIZATION (OUTPATIENT)
Dept: VACCINE CLINIC | Facility: HOSPITAL | Age: 86
End: 2021-02-05

## 2021-02-05 PROCEDURE — 0001A: CPT | Performed by: INTERNAL MEDICINE

## 2021-02-05 PROCEDURE — 91300 HC SARSCOV02 VAC 30MCG/0.3ML IM: CPT | Performed by: INTERNAL MEDICINE

## 2021-02-05 NOTE — TELEPHONE ENCOUNTER
Spoke with this patient in detail. Advised her that her c-diff was negative and was are still waiting on the giardia to come back. Voiced understanding.

## 2021-02-05 NOTE — TELEPHONE ENCOUNTER
Caller: Lanie Ochoa    Relationship: Self    Best call back number: [phone]       Caller requesting test results: STOOL SAMPLE STATES WAS DONE ON 2/3

## 2021-02-07 NOTE — PROGRESS NOTES
02/11/21 0000   Pre-Procedure Phone Call   Procedure Time Verified Yes   Arrival Time 0630   Procedure Location Verified Yes   Medical History Reviewed No   NPO Status Reinforced Yes   Ride and Caregiver Arranged Yes   Patient Knows to Bring Current Medications No   Bring Outside Films Requested No

## 2021-02-08 ENCOUNTER — TELEPHONE (OUTPATIENT)
Dept: NEUROSURGERY | Facility: CLINIC | Age: 86
End: 2021-02-08

## 2021-02-08 ENCOUNTER — OFFICE VISIT (OUTPATIENT)
Dept: FAMILY MEDICINE CLINIC | Facility: CLINIC | Age: 86
End: 2021-02-08

## 2021-02-08 ENCOUNTER — TELEPHONE (OUTPATIENT)
Dept: FAMILY MEDICINE CLINIC | Facility: CLINIC | Age: 86
End: 2021-02-08

## 2021-02-08 VITALS
WEIGHT: 133 LBS | HEIGHT: 62 IN | HEART RATE: 71 BPM | TEMPERATURE: 96.9 F | OXYGEN SATURATION: 97 % | DIASTOLIC BLOOD PRESSURE: 79 MMHG | SYSTOLIC BLOOD PRESSURE: 186 MMHG | BODY MASS INDEX: 24.48 KG/M2

## 2021-02-08 DIAGNOSIS — R15.2 FECAL URGENCY: ICD-10-CM

## 2021-02-08 DIAGNOSIS — R19.7 DIARRHEA, UNSPECIFIED TYPE: ICD-10-CM

## 2021-02-08 DIAGNOSIS — R63.4 WEIGHT LOSS: Primary | ICD-10-CM

## 2021-02-08 DIAGNOSIS — R19.7 DIARRHEA, UNSPECIFIED TYPE: Primary | ICD-10-CM

## 2021-02-08 PROCEDURE — 99213 OFFICE O/P EST LOW 20 MIN: CPT | Performed by: NURSE PRACTITIONER

## 2021-02-08 RX ORDER — METRONIDAZOLE 500 MG/1
500 TABLET ORAL 2 TIMES DAILY
Qty: 14 TABLET | Refills: 0 | Status: SHIPPED | OUTPATIENT
Start: 2021-02-08 | End: 2021-02-12 | Stop reason: SDUPTHER

## 2021-02-08 NOTE — TELEPHONE ENCOUNTER
PATIENT HAS BEEN UP ALL NIGHT WITH DIARRHEA. SHE SAYS SHE HAS HAD THIS FOR OVER A WEEK AND HAS LOST 12 POUNDS. SHE SAYS SHE NEED TO DO SOMETHING . OFFERED APPT BUT DID NOT KNOW WHAT TO DO. SHE IS NEEDING A CALL BACK ASAP PLEASE. SHE WANTED TO KNOW IF HER TESTING WAS BACK AND STATED SHE NEEDS TO DO SOMETHING.     PLEASE ADVISE  [phone]

## 2021-02-08 NOTE — PATIENT INSTRUCTIONS
Discharge instructions    Push fluids with electrolytes frequent sips such as ginger ale etc. as well as water    Bananas rice applesauce toast smaller more frequent meals    Try some Pepto-Bismol  A couple times a day    Recheck Thursday if not improving however  If your symptoms are not improving after 48 hours then start  Flagyl 500 mg twice daily x7 days  And follow-up Thursday    Severe pain fever chills weakness  Lethargy increased at worst abdominal pain unable keep fluids down dizziness emergency room  Any shortness of breath coffee-ground emesis black tarry stools

## 2021-02-09 ENCOUNTER — TELEPHONE (OUTPATIENT)
Dept: FAMILY MEDICINE CLINIC | Facility: CLINIC | Age: 86
End: 2021-02-09

## 2021-02-09 NOTE — TELEPHONE ENCOUNTER
Patient called back, she no longer needs to change her Myelogram, she has a ride now. Nothing further needed.

## 2021-02-09 NOTE — TELEPHONE ENCOUNTER
I called and spoke to the patient.  She is doing better.  She did take a dose of the metronidazole last night and this morning.  She feels like the diarrhea slowed down overnight and she did have to get up as many times.  She is on the brat diet.  Wondering how to advance it when she is able to.  I advised against dairy for a while.  Bring that back after treatment is through and she is tolerating other foods.  Try to bring back protein first like meat or beans.  Keep with simple vegetables and not things that are harder to digest like corn and carrots.  She has her stool sample running.  They were able to add onto the stool that was already in the lab.  She is going for myelogram on Thursday.  We can check in with the daughter on Friday and see how the treatment is going in with the myelogram showed and may be with the plan is with Dr. Coates if it is in there.  We will extend her Flagyl to 10 days if her stools are not much better than they are now just to complete the therapy.  Hopefully her stool will be back resulted at that time as well.  She is doing overall pretty good with improved diarrhea symptoms and some improvement in her back pain.

## 2021-02-11 ENCOUNTER — TELEPHONE (OUTPATIENT)
Dept: FAMILY MEDICINE CLINIC | Facility: CLINIC | Age: 86
End: 2021-02-11

## 2021-02-11 ENCOUNTER — HOSPITAL ENCOUNTER (OUTPATIENT)
Dept: CT IMAGING | Facility: HOSPITAL | Age: 86
Discharge: HOME | End: 2021-02-11

## 2021-02-11 ENCOUNTER — HOSPITAL ENCOUNTER (OUTPATIENT)
Dept: GENERAL RADIOLOGY | Facility: HOSPITAL | Age: 86
Discharge: HOME | End: 2021-02-11

## 2021-02-11 VITALS
HEART RATE: 71 BPM | RESPIRATION RATE: 16 BRPM | TEMPERATURE: 97.5 F | SYSTOLIC BLOOD PRESSURE: 186 MMHG | DIASTOLIC BLOOD PRESSURE: 83 MMHG | HEIGHT: 62 IN | WEIGHT: 130 LBS | BODY MASS INDEX: 23.92 KG/M2 | OXYGEN SATURATION: 98 %

## 2021-02-11 DIAGNOSIS — M48.062 SPINAL STENOSIS OF LUMBAR REGION WITH NEUROGENIC CLAUDICATION: ICD-10-CM

## 2021-02-11 PROCEDURE — 72240 MYELOGRAPHY NECK SPINE: CPT

## 2021-02-11 PROCEDURE — 72114 X-RAY EXAM L-S SPINE BENDING: CPT

## 2021-02-11 PROCEDURE — 25010000003 LIDOCAINE 1 % SOLUTION: Performed by: NEUROLOGICAL SURGERY

## 2021-02-11 PROCEDURE — 62284 INJECTION FOR MYELOGRAM: CPT | Performed by: NEUROLOGICAL SURGERY

## 2021-02-11 PROCEDURE — 72132 CT LUMBAR SPINE W/DYE: CPT

## 2021-02-11 PROCEDURE — 0 IOPAMIDOL 41 % SOLUTION: Performed by: NEUROLOGICAL SURGERY

## 2021-02-11 PROCEDURE — 62304 MYELOGRAPHY LUMBAR INJECTION: CPT

## 2021-02-11 RX ORDER — HYDROCODONE BITARTRATE AND ACETAMINOPHEN 5; 325 MG/1; MG/1
1 TABLET ORAL EVERY 4 HOURS PRN
Status: DISCONTINUED | OUTPATIENT
Start: 2021-02-11 | End: 2021-02-12 | Stop reason: HOSPADM

## 2021-02-11 RX ORDER — ACETAMINOPHEN 325 MG/1
650 TABLET ORAL EVERY 4 HOURS PRN
Status: DISCONTINUED | OUTPATIENT
Start: 2021-02-11 | End: 2021-02-12 | Stop reason: HOSPADM

## 2021-02-11 RX ORDER — LIDOCAINE HYDROCHLORIDE 10 MG/ML
10 INJECTION, SOLUTION INFILTRATION; PERINEURAL ONCE
Status: COMPLETED | OUTPATIENT
Start: 2021-02-11 | End: 2021-02-11

## 2021-02-11 RX ADMIN — LIDOCAINE HYDROCHLORIDE 5 ML: 10 INJECTION, SOLUTION INFILTRATION; PERINEURAL at 07:35

## 2021-02-11 RX ADMIN — IOPAMIDOL 20 ML: 408 INJECTION, SOLUTION INTRATHECAL at 07:37

## 2021-02-11 NOTE — TELEPHONE ENCOUNTER
PATIENT CALLED STATING THAT WHEN SHE SAW JAMES EPLEY ON MONDAY (2/8/21), HE PRESCRIBED HER A 7-DAY SUPPLY OF THE METRONIDAZOLE (FLAGYL) 500 MG TABLET. HOWEVER, THE PATIENT STATED THAT DR. THOMPSON CALLED HER ON TUESDAY (2/9/21) AND ADVISED HER THAT SHE WOULD EXTEND HER PRESCRIPTION FOR THIS MEDICATION TO BE A 10-DAY SUPPLY IF HER STOOLS WERE NOT MUCH BETTER, AND THEY ARE NOT. THE PATIENT STATED THAT HER DIARRHEA IS ABOUT JUST AS BAD AS IT HAS BEEN.    THE PATIENT REQUESTED FOR A 3-DAY SUPPLY OF THE METRONIDAZOLE (FLAGYL) 500 MG TABLET TO BE SENT TO HER PHARMACY AS SOON AS POSSIBLE.    THE PATIENT STATED THAT SHE TAKES 2 TABLETS A DAY, AND SHE WILL BE COMPLETELY OUT OF THIS MEDICATION ON MONDAY (2/15/21).    I CONFIRMED THE CORRECT PHARMACY WITH THE PATIENT TO BE [facility] (PHONE NUMBER: [phone]).    PLEASE CALL THE PATIENT [phone] WHEN THIS MESSAGE HAS BEEN RECEIVED AND INFORM HER OF THE STATUS OF THIS REQUEST.

## 2021-02-11 NOTE — NURSING NOTE
Pt in xray triage for [facility] Lumbar Myelogram  Pt wearing mask; RN wearing mask and goggles for all pt interactions

## 2021-02-11 NOTE — TELEPHONE ENCOUNTER
Can you call and see if she got worse again? When I spoke with her she had only had a couple of doses of the flagyl and was feeling like her diarrhea was improving. She was doing a [place] BRAT diet. Did she advance her diet? She may have to back down again on the kinds of food she is eating go back to bland diet. We can extend the treatment but I would not have expected her to get better than worse on the antibiotic. I want to make sure extending the treatment is what we need to do.

## 2021-02-11 NOTE — TELEPHONE ENCOUNTER
Called this patient and left VM for her to call office and speak with this nurse.         HUB transfer this patient to the office to speak with this nurse.

## 2021-02-11 NOTE — DISCHARGE INSTRUCTIONS
EDUCATION /DISCHARGE INSTRUCTIONS:    A myelogram is a special radiology procedure of the spinal cord, spinal nerves and other related structures.  You will be awake during the examination.  An area of your lower back will be cleansed with an antiseptic solution.  The physician will inject a numbing medication in your lower back.  While your back is numb, a needle will be placed in the lower back area.  A small amount of spinal fluid may be withdrawn and sent to the lab if ordered by your physician. While the needle is in the back, an injection of a contrast material (xray dye) will be given through the needle.  The contrast material will allow the physician to see the spinal cord and spinal nerves.  Once injected, the needle will be removed and a band aid will be placed over the injection site.  The table will be tilted during the process to allow the contrast material to flow to particular areas in the spine.  Following the injection and xrays, you will be taken to the CT scan where more pictures will be taken. After the procedure is finished, the contrast material will be absorbed by your body and eliminated through your kidneys.  The radiologist will study and interpret your myelogram and send the results to your physician.  Procedure risks of a myelogram include, but are not limited to:  *  Bleeding   *  seizure  *  Infection   *  Headache, possibly severe requiring  *  Contrast reaction      a blood patch  *  Nerve or cord injury  *  Paralysis and death  Benefits of the procedure:  *  Best examination for delineating pathology related to spinal cord compression from a    disc and/or nerve root compression  Alternatives to the procedure:  MRI - a non invasive procedure requiring intravenous contrast injection.  Cannot be done on patients with certain pacemakers or metal in the body.  MRI risks include possible reaction to the contrast material, movement of metal located in the body.Benefit to MRI:  Non-invasive  and usually painless procedure.  THIS EDUCATION INFORMATION WAS REVIEWED PRIOR TO PROCEDURE AND CONSENT. Patient initials________________Time___0700_______________    24 hour rest period ends __February 12, 2021 at 11:00 AM__________________.  Important information following your myelogram:  * ACTIVITY:   *  Lie down with your head elevated no more than 2 pillows high today & tonight  *  Sit up to eat your meals and use the restroom, otherwise, lie down.  *  Remain less active for two to three days.  *  Do not drive for 48 hours following a myelogram  *  You may remove the bandage and shower in the morning  *  Increase your fluids for the next 24 hours.  Caffeinated drinks are encouraged.    Resume taking blood thinner or aspirin today.    CALL YOUR PHYSICIAN FOR THE FOLLOWING:  * Pain at the injection site  * Reddness, swelling, bruising or drainage at the injection site.  * A fever by mouth of 101.0 or any new symptoms  Headaches are a common side effect after a myelogram.  If you get a headache, you should stay flat in bed and drink plenty of fluids. If the headache persist and does not go away with rest/medication, CALL Dr. Haynes at [phone]

## 2021-02-12 ENCOUNTER — TELEPHONE (OUTPATIENT)
Dept: FAMILY MEDICINE CLINIC | Facility: CLINIC | Age: 86
End: 2021-02-12

## 2021-02-12 ENCOUNTER — TELEPHONE (OUTPATIENT)
Dept: INTERVENTIONAL RADIOLOGY/VASCULAR | Facility: HOSPITAL | Age: 86
End: 2021-02-12

## 2021-02-12 RX ORDER — METRONIDAZOLE 500 MG/1
500 TABLET ORAL 2 TIMES DAILY
Qty: 6 TABLET | Refills: 0 | Status: SHIPPED | OUTPATIENT
Start: 2021-02-12 | End: 2021-03-02

## 2021-02-12 NOTE — TELEPHONE ENCOUNTER
Spoke with this patient today and she states that her diarhea was worse yesterday and better today. She states that noni told her she should be on 10 days of therapy and she was only given 7 days. She is still continuing the [place] [employer] diet. Not able to eat much. Has lost 13 lbs from this. Has been going on now 2 weeks. Pt is asking for a GI referral ad wants to know if she is going to get the 3 days of therapy that was not given to her. Please advise.

## 2021-02-12 NOTE — TELEPHONE ENCOUNTER
PATIENT CALLED AND STATES SHE CALLED TODAY WANTING TO SPEAK WITH DR. THOMPSON'S NURSE. SHE HAS QUESTIONS IN REGARDS TO MEDICATION    metroNIDAZOLE (Flagyl) 500 MG tablet    AND WANTS A REFERRAL FOR GASTRO.  PLEASE CALL [phone]

## 2021-02-16 ENCOUNTER — OFFICE VISIT (OUTPATIENT)
Dept: FAMILY MEDICINE CLINIC | Facility: CLINIC | Age: 86
End: 2021-02-16

## 2021-02-16 DIAGNOSIS — R63.4 WEIGHT LOSS: ICD-10-CM

## 2021-02-16 DIAGNOSIS — M48.061 SPINAL STENOSIS OF LUMBAR REGION, UNSPECIFIED WHETHER NEUROGENIC CLAUDICATION PRESENT: ICD-10-CM

## 2021-02-16 DIAGNOSIS — R19.7 DIARRHEA OF PRESUMED INFECTIOUS ORIGIN: Primary | ICD-10-CM

## 2021-02-16 PROCEDURE — 99442 PR PHYS/QHP TELEPHONE EVALUATION 11-20 MIN: CPT | Performed by: FAMILY MEDICINE

## 2021-02-16 NOTE — PROGRESS NOTES
Chief Complaint  No chief complaint on file.    Subjective          Lanie Ochoa presents to [facility]  History of Present Illness  Diarrhea continues  She is losing weight  She goes in the middle of the night and has cramping.   Said had a pain in the middle of her back but that is gone. Says her back and right buttock is getting better. Going to see Dr. Haynes next week about the myelogram.   Lost 21 lbs since the middle of January.   She has tried imodium but not taken as recommended.   She says she thinks the flagyl helped to decrease the number of BMs, has more form to it but not normal.   Now she is having a BM with each meal and then in the middle of the night. She has cramping sometimes after eating and then has a BM.        Objective   Vital Signs:   There were no vitals taken for this visit.    Physical Exam   Not available for visit type.   Result Review :                 Assessment and Plan    Diagnoses and all orders for this visit:    1. Diarrhea of presumed infectious origin (Primary)    2. Weight loss    3. Spinal stenosis of lumbar region, unspecified whether neurogenic claudication present        Follow Up   No follow-ups on file.  Patient was given instructions and counseling regarding her condition or for health maintenance advice. Please see specific information pulled into the AVS if appropriate.     Diarrhea has improved since taking the abx.   Pt was told that the stool in the lab could be used for the GI panel but appears this was not the case. Now she is treated.   She is taking flagyl 500 mg BID for 10 days.   She has much less frequent BMs but still having multiple times per day and overnight. More formed stool but not normal she says.   The biggest concern is her 21 lb weight loss as of this morning since this started back in January 2021.   Going to see if we can expidite the GI referral. It was placed 2/8 and no appointment yet.   Pt to work on  staying hydrated. We discussed the use of imodium.   Potentially infectious cause. Less likely now even more so this is related to her spinal stenosis given the improvement with antibiotic.       Good news is she does have improvement in the back and [place] though still having some trouble with this.       Office visit transitioned to telemedicine visit related to inclement weather and advisory to avoid driving on icing roads if possible for safety.   13 min was spent in discussion with pt and greater than 50% of that time was spent counseling.

## 2021-02-17 ENCOUNTER — TELEPHONE (OUTPATIENT)
Dept: GASTROENTEROLOGY | Facility: CLINIC | Age: 86
End: 2021-02-17

## 2021-02-17 NOTE — PROGRESS NOTES
"Subjective   Patient ID: Lanie Ochoa is a 85 y.o. female is here today for follow-up with a new Lumbar Myelogram that was ordered at her last visit on 02.04.2021 for back pain.    Today patient's symptoms are changed, patient states that she still has back pain but the pain is not as bad, patient also states that her bowel incontinence is better as well.     Patient, Provider, and MA are all wearing a mask in our office today.     History of Present Illness     This patient returns today with her son.  She continues with some pain in her back but not a lot of pain in her legs.  Her bowel incontinence has improved.    The following portions of the patient's history were reviewed and updated as appropriate: allergies, current medications, past family history, past medical history, past social history, past surgical history and problem list.    Review of Systems   Constitutional: Negative for chills and fever.   HENT: Negative for congestion.    Genitourinary: Negative for difficulty urinating and dysuria.   Musculoskeletal: Positive for back pain and gait problem. Negative for neck pain and neck stiffness.   Neurological: Positive for weakness. Negative for numbness.       I have reviewed the review of systems as documented by my MA.      Objective     Vitals:    02/23/21 1514   BP: 143/79   Cuff Size: Adult   Pulse: 81   Temp: 97.1 °F (36.2 °C)   Weight: 59 kg (130 lb)   Height: 157.5 cm (62\")     Body mass index is 23.78 kg/m².      Physical Exam  Neurological:      Mental Status: She is alert and oriented to person, place, and time.       Neurologic Exam     Mental Status   Oriented to person, place, and time.           Assessment/Plan   Independent Review of Radiographic Studies:      I personally reviewed the images from the following studies.    I reviewed her plain films, myelogram, and CT scan myself.  The plain films show a grade 1 spondylolisthesis of L4 on L5 and L5 on S1 and a vestigial disc at " S1-S2.  On the myelogram itself there appears to be some left-sided lateral recess stenosis at L1 to with bilateral lateral recess stenosis causing severe central stenosis at L2-3.  L3-4 shows severe right lateral recess stenosis with nerve compression and severe bilateral stenosis at L4-5 and L5-S1.  On the post myelographic CT scan the lower thoracic spine down to L1 looks okay.  At L1-2 there is some left lateral synovial cyst formation causing the lateral recess stenosis seen on the myelogram.  L2-3 shows moderate central stenosis with bilateral lateral recess stenosis.  L3-4 shows fairly severe central stenosis although 1 can still see some contrast.  L4-5 shows really severe canal stenosis with almost no canal at all.  L5-S1 shows lateral recess stenosis and foraminal stenosis but not as much central stenosis.    Medical Decision Making:      I told the patient and her son at great length about the issues.  I explained the potential development of a cauda equina syndrome because of the severe stenosis at L4-5.  I explained that this could be sudden and could be permanent if it happens but on the other hand the risk of surgery in an 85-year-old is fairly high.  I told her that we could certainly consider a minimally invasive decompression at L4-5 which would be relatively safe and would at least relieve some of the risk of developing a cauda equina syndrome.  The downside is is that the bones could continue to slip after a decompression and this could result in the need for a aggressive fusion.  The other option is to continue to live with it.  Since she is feeling better that may be the better option at least at this point.  She is scheduled for another epidural block next week and I told her to go ahead with that to see if it helps her pain further.  If it does not or if the pain gets worse or especially if she develops any difficulty with bowel bladder control or numbness in her perineum I need to know about  that.  We could consider a decompression at L4-5 if that happens.  I think we could safely leave the other levels alone.    Diagnoses and all orders for this visit:    1. Spinal stenosis of lumbar region with neurogenic claudication (Primary)      Return for Recheck and call after treatment or consultation.

## 2021-02-17 NOTE — TELEPHONE ENCOUNTER
We received a call today from Yohana at Dr. Paris Mejía's office and she is asking if we may possibly have a new patient work-in appointment available with a doctor or nurse practitioner to see this patient. There is an urgent referral in her chart to be seen for weight loss (20lbs since January), fecal urgency and diarrhea. Yohana would like to know if patient could be seen sooner by a nurse practitioner, or have a work-in appointment available with either doctor or does patient have to wait until 3/17 (1st available for Feng) or 3/18 (1st available for Jase). Please advise.

## 2021-02-18 ENCOUNTER — TELEPHONE (OUTPATIENT)
Dept: NEUROSURGERY | Facility: CLINIC | Age: 86
End: 2021-02-18

## 2021-02-18 NOTE — TELEPHONE ENCOUNTER
Provider: DR SOLIS    Caller: ASHER   Relationship to Patient: SON   Phone Number: [phone]    Reason for Call: PT IS COMING FOR HER FOLLOW UP APPT TO HER MYELOGRAM ON 2/23      HER SON WANTS TO MAKE SURE THAT IT IS OK FOR HIM TO COME BACK WITH HER AND SEE DR SOLIS WITH HER.  PLEASE ADVISE

## 2021-02-23 ENCOUNTER — OFFICE VISIT (OUTPATIENT)
Dept: NEUROSURGERY | Facility: CLINIC | Age: 86
End: 2021-02-23

## 2021-02-23 VITALS
SYSTOLIC BLOOD PRESSURE: 143 MMHG | BODY MASS INDEX: 23.92 KG/M2 | HEART RATE: 81 BPM | DIASTOLIC BLOOD PRESSURE: 79 MMHG | WEIGHT: 130 LBS | TEMPERATURE: 97.1 F | HEIGHT: 62 IN

## 2021-02-23 DIAGNOSIS — M48.062 SPINAL STENOSIS OF LUMBAR REGION WITH NEUROGENIC CLAUDICATION: Primary | ICD-10-CM

## 2021-02-23 PROCEDURE — 99213 OFFICE O/P EST LOW 20 MIN: CPT | Performed by: NEUROLOGICAL SURGERY

## 2021-02-23 NOTE — PROGRESS NOTES
"Chief Complaint  Diarrhea (x 1 week)    Subjective          Lanie Ochoa presents to [facility]  Patient complaining of frequent diarrhea last week without severe pain fever chills  Keeping fluids down frequently  Nothing making gradually worse has been referred to gastro.  No recent recent C. Difficile neg   Imodium not helping  No loss of taste or smell          Diarrhea         Review of Systems   Gastrointestinal: Positive for diarrhea.     Objective   Vital Signs:   BP (!) 186/79   Pulse 71   Temp 96.9 °F (36.1 °C) (Temporal)   Ht 157.5 cm (62\")   Wt 60.3 kg (133 lb)   SpO2 97%   BMI 24.33 kg/m²     Physical Exam  Vitals signs reviewed.   Constitutional:       Appearance: She is well-developed.      Comments: Pleasant   HENT:      Head: Normocephalic.      Nose: Nose normal.   Eyes:      General: No scleral icterus.     Conjunctiva/sclera: Conjunctivae normal.      Pupils: Pupils are equal, round, and reactive to light.   Neck:      Musculoskeletal: Neck supple.      Thyroid: No thyromegaly.      Vascular: No JVD.   Cardiovascular:      Rate and Rhythm: Normal rate and regular rhythm.      Heart sounds: Normal heart sounds. No murmur. No friction rub. No gallop.    Pulmonary:      Effort: Pulmonary effort is normal. No respiratory distress.      Breath sounds: Normal breath sounds. No stridor. No wheezing or rales.   Abdominal:      General: Bowel sounds are normal. There is no distension.      Palpations: Abdomen is soft.      Tenderness: There is no abdominal tenderness.      Comments: No hepatosplenomegaly, no ascites,   Musculoskeletal:         General: No tenderness.   Lymphadenopathy:      Cervical: No cervical adenopathy.   Skin:     General: Skin is warm and dry.      Findings: No erythema or rash.      Comments: Normal turgor   Neurological:      Mental Status: She is alert and oriented to person, place, and time.      Deep Tendon Reflexes: Reflexes are normal " and symmetric.   Psychiatric:         Behavior: Behavior normal.         Thought Content: Thought content normal.         Judgment: Judgment normal.        Result Review :                 Assessment and Plan    Diagnoses and all orders for this visit:    1. Diarrhea, unspecified type (Primary)  -     Gastrointestinal Panel, PCR - Stool, Per Rectum    Other orders  -     Discontinue: metroNIDAZOLE (Flagyl) 500 MG tablet; Take 1 tablet by mouth 2 (Two) Times a Day.  Dispense: 14 tablet; Refill: 0        Follow Up   No follow-ups on file.  Patient was given instructions and counseling regarding her condition or for health maintenance advice. Please see specific information pulled into the AVS if appropriate.   Patient still well hydrated without severe symptoms  With the exception of the loose stools itself  Gastrointestinal PCR push fluids electrolytes  Low-dose Pepcid    Flagyl if she is not better in 2 to 3 days severe complaints emergency room severe pain persistent vomiting worsening symptoms recheck next week with Dr. Mejía to follow her until she is completely normal

## 2021-02-25 ENCOUNTER — TELEPHONE (OUTPATIENT)
Dept: NEUROSURGERY | Facility: CLINIC | Age: 86
End: 2021-02-25

## 2021-02-25 ENCOUNTER — OFFICE VISIT (OUTPATIENT)
Dept: GASTROENTEROLOGY | Facility: CLINIC | Age: 86
End: 2021-02-25

## 2021-02-25 ENCOUNTER — PREP FOR SURGERY (OUTPATIENT)
Dept: OTHER | Facility: HOSPITAL | Age: 86
End: 2021-02-25

## 2021-02-25 VITALS — HEIGHT: 62 IN | BODY MASS INDEX: 24.22 KG/M2 | TEMPERATURE: 97.6 F | WEIGHT: 131.6 LBS

## 2021-02-25 DIAGNOSIS — Z12.11 ENCOUNTER FOR SCREENING FOR MALIGNANT NEOPLASM OF COLON: ICD-10-CM

## 2021-02-25 DIAGNOSIS — K59.04 CHRONIC IDIOPATHIC CONSTIPATION: ICD-10-CM

## 2021-02-25 DIAGNOSIS — M48.062 SPINAL STENOSIS OF LUMBAR REGION WITH NEUROGENIC CLAUDICATION: Primary | ICD-10-CM

## 2021-02-25 DIAGNOSIS — R19.7 DIARRHEA OF PRESUMED INFECTIOUS ORIGIN: Primary | ICD-10-CM

## 2021-02-25 PROCEDURE — 99203 OFFICE O/P NEW LOW 30 MIN: CPT | Performed by: INTERNAL MEDICINE

## 2021-02-25 RX ORDER — CEFAZOLIN SODIUM 2 G/100ML
2 INJECTION, SOLUTION INTRAVENOUS ONCE
Status: CANCELLED | OUTPATIENT
Start: 2021-03-10 | End: 2021-02-25

## 2021-02-25 NOTE — TELEPHONE ENCOUNTER
Patient called requesting to schedule surgery with Dr. Haynes. I advised her that per his last note she was supposed to have an epidural block next week and he wanted her to proceed with that to see if it helped her pain. She states that she is scheduled for her COVID vaccine tomorrow and will not be able to do the epidural block. She wants to go ahead and schedule the procedure (decompression at L4-5).

## 2021-02-25 NOTE — PROGRESS NOTES
PATIENT INFORMATION  Lanie Ochoa       - 1935    CHIEF COMPLAINT  Chief Complaint   Patient presents with   • Diarrhea   • Weight Loss       HISTORY OF PRESENT ILLNESS  A month of loose frequent stools up to 3-5 times a day with accidents. Took 10 days of Flagyl and finished last week and responded.     Her baseline is mild constipation and has miralax  And has been holding her miralax  For now and back to daily BMS.    No abx prior to her bourts of diarrhea.    Reviewed her daily Yogurt is her probiotic and is off NSAIDS and no Narcotics but does take her TA TID    Discussed options fo Screenign colonsocpy but she will think on that and her mild constipation when it recurs.      REVIEWED PERTINENT RESULTS/ LABS  No results found for: CASEREPORT, FINALDX  Lab Results   Component Value Date    HGB 14.1 2021    MCV 93.1 2021     2021    ALT 14 2021    AST 16 2021    HGBA1C 5.46 2016    INR 1.2 (H) 2015    TRIG 113 2020      Xr Spine Lumbar Complete With Flex & Ext    Result Date: 2021  Narrative: PROCEDURE:  XR SPINE LUMBAR COMPLETE W FLEX EXT-  HISTORY: Back and leg pain.  COMPARISON: CT lumbar spine myelogram to 2021. MR lumbar spine 2021.  FINDINGS:   7 views of the lumbar spine were obtained. There is diffuse osseous demineralization, which limits evaluation of fine osseous detail. There is mild lumbar levoscoliosis. There is exaggeration of the normal lumbar lordosis. There is transitional lumbosacral anatomy with partial lumbarization of S1. There is a 1.0 cm anterolisthesis of L5 on S1, 0.9 cm anterolisthesis of L4 and L5, and 0.4 cm anterolisthesis of L3 on L4, similar to prior MRI when accounting for differences in modality. There is no significant change in alignment with flexion or extension. Vertebral body heights are maintained. There is no acute compression fracture. There is advanced disc height loss at L5-S1 and  L1 S2 with lesser degrees of disc height loss at additional levels. There is multilevel degenerative disc disease with degenerative endplate osteophytes, vacuum disc phenomenon, endplate sclerosis, and facet osteoarthropathy at multiple levels.      Impression: Transitional lumbosacral anatomy with multilevel degenerative disc disease with varying degrees of spondylolisthesis, as above.   This report was finalized on 2/11/2021 12:20 PM by Dr. Kamala Rios M.D.      [facility] Guided Pain Management Spine    Result Date: 1/29/2021  Narrative: This procedure was auto-finalized with no dictation required.    Ct Lumbar Spine With Intrathecal Contrast    Result Date: 2/11/2021  Narrative: CT LUMBAR MYELOGRAM WITH CONTRAST  CLINICAL HISTORY: Back and leg pain. Neurogenic claudication.  TECHNIQUE: Subarachnoid contrast was injected in the lumbar cistern by Dr. Charli Haynes. Multiple plain film myelographic images were obtained by Dr. Wellington. Thereafter, the patient underwent a CT lumbar myelogram with 3 mm axial images in addition to sagittal and coronal reconstructed images.  FINDINGS: There is transitional anatomy at the level the lumbosacral junction. The transitional vertebral body has been enumerated as a partially lumbarized S1 segment for the purposes of this report. Please take careful note of this enumeration system at the time of any potential intervention. This enumeration system is consistent with the enumeration system implemented for the purposes of the previous 01/13/2021 MRI report.  The conus medullaris terminates at the level of the mid body of L2 and has normal morphology.  There is a moderate degree of levoconvex scoliotic curvature of the lumbar spine with its apex centered at L3-4. Advanced degenerative disc changes are seen along the inner curvature of the scoliosis along the right side of the L3-4 disc space. Vacuum disc phenomena, prominent loss of disc space height and degenerative endplate sclerotic  changes are appreciated at this level. Additionally, vacuum disc phenomena are identified throughout the lumbar spine from L1-2 down to L5-S1.  At the L1-2 level, there is a disc osteophyte complex eccentric to the left that results in a moderate degree of left foraminal stenosis and a mild degree of foraminal stenotic change. Only a minimal degree of canal stenosis is appreciated.  At L2-3, there is bulging disc material which results in a moderate degree of right and a mild degree of left foraminal stenosis. Mild central canal stenosis is identified secondary to this disc bulging.  At L3-4, there is degenerative anterior spondylolisthesis of L3 on L4 by approximately 3 mm. There is severe right foraminal stenosis secondary to loss of foraminal height, a disc osteophyte complex, and to lesser extent facet hypertrophic change. Mild left foraminal stenosis is identified secondary to a disc osteophyte complex. Mild central canal stenosis is identified secondary to disc osteophyte complex.  At L4-5, there is anterior spondylolisthesis of L4 on L5 by approximately 7 mm. There is unroofed disc material which results in a severe degree of bilateral foraminal stenosis in conjunction with loss of foraminal height and facet hypertrophic change. Severe central canal stenosis is identified at the L4-5 level secondary to disc bulging, ligamentum flavum thickening, and facet hypertrophic change.  At L5-S1, there is anterior spondylolisthesis of L5 on S1 by approximately 9 mm. There is mild-to-moderate bilateral foraminal stenosis secondary to unroofed disc material. Mild bilateral lateral recess narrowing is seen secondary to disc bulging and facet hypertrophic change. Minimal canal stenosis is identified.  The plain film myelographic images demonstrate grade 1 anterior spondylolisthesis of L3 on L4, L4 on L5, and L5 on S1. Moderate levoconvex scoliotic curvature of the lumbar spine is noted. Ventral indentations upon the thecal  sac are identified from L2-3 down to L5-S1 with the most prominent canal stenotic change appreciated at L4-5. No selective nerve root sleeve cutoff is appreciated.      Impression:  Transitional anatomy is noted at the level of the lumbosacral junction and the transitional vertebral body has been enumerated as a partially lumbarized S1 segment for the purposes of this report. Please take careful note of this enumeration system at the time of any potential intervention. Of note, the enumeration system implemented for this report is consistent with that of the prior 01/13/2021 MRI report.  Moderate levoconvex scoliotic curvature of the lumbar spine is noted with its apex centered at L3-4. Grade 1 degenerative anterior spondylolisthesis of L4 on L5 and L5 on S1 is identified. To a lesser extent, anterior spondylolisthesis of L3 on L4 is seen.  Multilevel canal stenotic changes are noted with the most prominent canal stenosis seen at the L4-5 level where there is a severe degree of central canal stenosis secondary to disc bulging, ligamentum flavum thickening, and facet arthropathy.  The most prominent foraminal stenotic change is identified bilaterally at the L4-5 level and within the right L3-4 neural foramen where severe foraminal stenotic changes are noted.  The remaining multilevel degenerative change has been discussed in detail above.   Radiation dose reduction techniques were utilized, including automated exposure control and exposure modulation based on body size.  This report was finalized on 2/11/2021 10:31 AM by Dr. Mark Salinas M.D.      Ir Myelogram Lumbar Spine    Result Date: 2/11/2021  Narrative: PLAIN FILM LUMBAR MYELOGRAM WITH CONTRAST  CLINICAL HISTORY: Back pain, spinal stenosis, and neurogenic claudication.  Subarachnoid contrast was injected into the lumbar cistern by Dr. Charli Haynes. Multiple plain film myelographic images were obtained by Dr. Wellington. The plain film myelographic results will be  discussed as part of the CT myelogram report.  FLUOROSCOPY TIME: 1 minute 1 second, 12 images.  This report was finalized on 2/11/2021 10:29 AM by Dr. Mark Salinas M.D.        REVIEW OF SYSTEMS  Review of Systems   Constitutional: Positive for unexpected weight change.   Gastrointestinal: Positive for diarrhea.   All other systems reviewed and are negative.        ACTIVE PROBLEMS  Patient Active Problem List    Diagnosis   • Lumbar spinal stenosis [M48.061]   • OA (osteoarthritis) of knee [M17.10]   • Primary osteoarthritis of right knee [M17.11]   • Primary localized osteoarthrosis of right lower leg [M17.11]   • Dysfunction of both eustachian tubes [H69.83]   • Venous incompetence [I87.2]   • Non-rheumatic mitral regurgitation [I34.0]   • Non-rheumatic tricuspid valve insufficiency [I36.1]   • Chronic pain of right knee [M25.561, G89.29]   • History of left knee replacement [Z96.652]   • Titubation [R26.0]   • Anxiety [F41.9]   • HLD (hyperlipidemia) [E78.5]   • Benign essential hypertension [I10]   • Osteopenia [M85.80]   • Avitaminosis D [E55.9]         PAST MEDICAL HISTORY  Past Medical History:   Diagnosis Date   • Anxiety    • BMS (burning mouth syndrome) 11/5/2015   • Breast cancer (CMS/HCC)    • Cancer (CMS/HCC)     STAGE 1 INVASIVE DUCTAL CARCINOMA ER AND [place] POSITIVE, HER-2/LOYDA NEGATIVE   • Cancer (CMS/HCC)     SKIN LEFT ARM; SQUAMOUS CELL   • Constipation    • Degenerative disc disease    • Functional murmur     RESOLVED 03/05/2014   • Gastritis     RESOLVED 03/05/2014   • GERD (gastroesophageal reflux disease)    • Glossalgia 11/5/2015   • Hypertension    • Internal hemorrhoids    • Osteoarthritis 04/08/2014    HAND   • PNA (pneumonia) 11/5/2015   • Trigger ring finger 03/05/2014    RESOLVED 08/22/2014         SURGICAL HISTORY  Past Surgical History:   Procedure Laterality Date   • BREAST BIOPSY  2009   • BREAST LUMPECTOMY  07/15/2009    LUMPECTOMY   • COLONOSCOPY  2005   • DILATATION AND CURETTAGE       RESOLVED 06/15/2010   • EYE SURGERY Bilateral 10/15/2012    CATARACT EXTRACTION   • HEMORRHOIDECTOMY     • JOINT REPLACEMENT Left 2010    KNEE   • KNEE ARTHROSCOPY Right 6/13/2017    Procedure: KNEE ARTHROSCOPY WITH PARTIAL MEDIAL AND LATERAL MENISECTOMY AND DEBRIDEMENT OF ARTHRITIS.;  Surgeon: Lauren Krishna MD;  [facility] OR [facility];  Service:    • TOTAL KNEE ARTHROPLASTY Right 5/21/2018    Procedure: RIGHT TOTAL KNEE ARTHROPLASTY;  Surgeon: Kiko Solano MD;  Location: [facility] OR;  Service: Orthopedics         FAMILY HISTORY  Family History   Problem Relation Age of Onset   • COPD Mother    • COPD Father    • Malig Hyperthermia Neg Hx          SOCIAL HISTORY  Social History     Occupational History   • Occupation: retired, retail   Tobacco Use   • Smoking status: Never Smoker   • Smokeless tobacco: Never Used   Substance and Sexual Activity   • Alcohol use: No   • Drug use: No   • Sexual activity: Defer         CURRENT MEDICATIONS    Current Outpatient Medications:   •  CALCIUM PO, Take 1 tablet by mouth Daily., Disp: , Rfl:   •  cetirizine (zyrTEC) 10 MG tablet, Take 1 tablet by mouth Daily., Disp: 90 tablet, Rfl: 3  •  Cholecalciferol (VITAMIN D3) 400 UNITS capsule, Take 1 tablet by mouth Daily., Disp: , Rfl:   •  dilTIAZem CD (CARDIZEM CD) 240 MG 24 hr capsule, Take 1 capsule by mouth Daily., Disp: 90 capsule, Rfl: 1  •  losartan (COZAAR) 100 MG tablet, Take 1 tablet by mouth Daily., Disp: 90 tablet, Rfl: 1  •  metroNIDAZOLE (Flagyl) 500 MG tablet, Take 1 tablet by mouth 2 (Two) Times a Day., Disp: 6 tablet, Rfl: 0  •  montelukast (Singulair) 10 MG tablet, Take 1 tablet by mouth Every Night., Disp: 90 tablet, Rfl: 1  •  nabumetone (RELAFEN) 500 MG tablet, Take 1 tablet by mouth 2 (Two) Times a Day As Needed for Mild Pain ., Disp: 30 tablet, Rfl: 1  •  nebivolol (Bystolic) 10 MG tablet, Take 1 tablet by mouth Every Morning., Disp: 90 tablet, Rfl: 3  •  sertraline (ZOLOFT) 25 MG tablet, Take 1 tablet by  "mouth Daily. 200001, Disp: 90 tablet, Rfl: 1    ALLERGIES  Sulfa antibiotics    VITALS  Vitals:    02/25/21 1109   Temp: 97.6 °F (36.4 °C)   TempSrc: Temporal   Weight: 59.7 kg (131 lb 9.6 oz)   Height: 157.5 cm (62.01\")       PHYSICAL EXAM  Debilities/Disabilities Identified: None  Emotional Behavior: Appropriate  Wt Readings from Last 3 Encounters:   02/25/21 59.7 kg (131 lb 9.6 oz)   02/23/21 59 kg (130 lb)   02/11/21 59 kg (130 lb)     Ht Readings from Last 1 Encounters:   02/25/21 157.5 cm (62.01\")     Body mass index is 24.06 kg/m².  Physical Exam  Constitutional:       Appearance: She is well-developed. She is not diaphoretic.   HENT:      Head: Normocephalic and atraumatic.   Eyes:      General: No scleral icterus.     Conjunctiva/sclera: Conjunctivae normal.      Pupils: Pupils are equal, round, and reactive to light.   Neck:      Musculoskeletal: Normal range of motion and neck supple.      Thyroid: No thyromegaly.   Cardiovascular:      Rate and Rhythm: Normal rate and regular rhythm.      Heart sounds: Murmur present. Systolic murmur present with a grade of 3/6. No gallop.       Comments: Murmer at [facility] and one at [facility]  Pulmonary:      Effort: Pulmonary effort is normal.      Breath sounds: Normal breath sounds. No wheezing or rales.   Abdominal:      General: Bowel sounds are normal. There is no distension or abdominal bruit.      Palpations: Abdomen is soft. There is no shifting dullness, fluid wave or mass.      Tenderness: There is no abdominal tenderness. There is no guarding. Negative signs include Dugan's sign.      Hernia: There is no hernia in the ventral area.   Musculoskeletal: Normal range of motion.   Lymphadenopathy:      Cervical: No cervical adenopathy.   Skin:     General: Skin is warm and dry.      Findings: No erythema or rash.   Neurological:      Mental Status: She is alert and oriented to person, place, and time.   Psychiatric:         Mood and Affect: Mood normal.         " Behavior: Behavior normal.         CLINICAL DATA REVIEWED   reviewed previous lab results and integrated with today's visit, reviewed notes from other physicians and/or last GI encounter, reviewed previous endoscopy results and available photos, reviewed surgical pathology results from previous biopsies    ASSESSMENT  Diagnoses and all orders for this visit:    Diarrhea of presumed infectious origin    Chronic idiopathic constipation    Encounter for screening for malignant neoplasm of colon          PLAN  OK to return to High fiber diet and her exercise with or withou imodium    Return if symptoms worsen or fail to improve.    I have discussed the above plan with the patient.  They verbalize understanding and are in agreement with the plan.  They have been advised to contact the office for any questions, concerns, or changes related to their health.

## 2021-02-26 ENCOUNTER — TELEPHONE (OUTPATIENT)
Dept: NEUROSURGERY | Facility: CLINIC | Age: 86
End: 2021-02-26

## 2021-02-26 ENCOUNTER — APPOINTMENT (OUTPATIENT)
Dept: PAIN MEDICINE | Facility: HOSPITAL | Age: 86
End: 2021-02-26

## 2021-02-26 ENCOUNTER — IMMUNIZATION (OUTPATIENT)
Dept: VACCINE CLINIC | Facility: HOSPITAL | Age: 86
End: 2021-02-26

## 2021-02-26 PROBLEM — M48.062 SPINAL STENOSIS OF LUMBAR REGION WITH NEUROGENIC CLAUDICATION: Status: ACTIVE | Noted: 2021-02-26

## 2021-02-26 PROCEDURE — 0002A: CPT | Performed by: INTERNAL MEDICINE

## 2021-02-26 PROCEDURE — 91300 HC SARSCOV02 VAC 30MCG/0.3ML IM: CPT | Performed by: INTERNAL MEDICINE

## 2021-02-26 NOTE — TELEPHONE ENCOUNTER
Pt is calling back to get on Dr Haynes surgery schedule.  Please call ASAP as her daughter is there and needs to take part in the conversation.   [phone]

## 2021-02-26 NOTE — TELEPHONE ENCOUNTER
Lynda Ferraro MA 32 minutes ago (9:59 AM)        Pt is calling back to get on Dr Haynes surgery schedule.  Please call ASAP as her daughter is there and needs to take part in the conversation.   [phone]

## 2021-03-01 ENCOUNTER — TRANSCRIBE ORDERS (OUTPATIENT)
Dept: PREADMISSION TESTING | Facility: HOSPITAL | Age: 86
End: 2021-03-01

## 2021-03-01 DIAGNOSIS — Z01.818 OTHER SPECIFIED PRE-OPERATIVE EXAMINATION: Primary | ICD-10-CM

## 2021-03-02 ENCOUNTER — APPOINTMENT (OUTPATIENT)
Dept: PREADMISSION TESTING | Facility: HOSPITAL | Age: 86
End: 2021-03-02

## 2021-03-02 VITALS
TEMPERATURE: 97.5 F | HEART RATE: 90 BPM | WEIGHT: 131.6 LBS | HEIGHT: 61 IN | DIASTOLIC BLOOD PRESSURE: 77 MMHG | OXYGEN SATURATION: 98 % | BODY MASS INDEX: 24.84 KG/M2 | SYSTOLIC BLOOD PRESSURE: 188 MMHG | RESPIRATION RATE: 16 BRPM

## 2021-03-02 LAB
ANION GAP SERPL CALCULATED.3IONS-SCNC: 7.4 MMOL/L (ref 5–15)
BUN SERPL-MCNC: 10 MG/DL (ref 8–23)
BUN/CREAT SERPL: 13.9 (ref 7–25)
CALCIUM SPEC-SCNC: 9.1 MG/DL (ref 8.6–10.5)
CHLORIDE SERPL-SCNC: 103 MMOL/L (ref 98–107)
CO2 SERPL-SCNC: 28.6 MMOL/L (ref 22–29)
CREAT SERPL-MCNC: 0.72 MG/DL (ref 0.57–1)
DEPRECATED RDW RBC AUTO: 45.8 FL (ref 37–54)
ERYTHROCYTE [DISTWIDTH] IN BLOOD BY AUTOMATED COUNT: 12.7 % (ref 12.3–15.4)
GFR SERPL CREATININE-BSD FRML MDRD: 77 ML/MIN/1.73
GLUCOSE SERPL-MCNC: 101 MG/DL (ref 65–99)
HCT VFR BLD AUTO: 42.5 % (ref 34–46.6)
HGB BLD-MCNC: 13.4 G/DL (ref 12–15.9)
MCH RBC QN AUTO: 30.9 PG (ref 26.6–33)
MCHC RBC AUTO-ENTMCNC: 31.5 G/DL (ref 31.5–35.7)
MCV RBC AUTO: 97.9 FL (ref 79–97)
PLATELET # BLD AUTO: 282 10*3/MM3 (ref 140–450)
PMV BLD AUTO: 10 FL (ref 6–12)
POTASSIUM SERPL-SCNC: 4.1 MMOL/L (ref 3.5–5.2)
QT INTERVAL: 423 MS
RBC # BLD AUTO: 4.34 10*6/MM3 (ref 3.77–5.28)
SODIUM SERPL-SCNC: 139 MMOL/L (ref 136–145)
WBC # BLD AUTO: 4.09 10*3/MM3 (ref 3.4–10.8)

## 2021-03-02 PROCEDURE — 80048 BASIC METABOLIC PNL TOTAL CA: CPT

## 2021-03-02 PROCEDURE — 85027 COMPLETE CBC AUTOMATED: CPT

## 2021-03-02 PROCEDURE — 93010 ELECTROCARDIOGRAM REPORT: CPT | Performed by: INTERNAL MEDICINE

## 2021-03-02 PROCEDURE — 36415 COLL VENOUS BLD VENIPUNCTURE: CPT

## 2021-03-02 PROCEDURE — 93005 ELECTROCARDIOGRAM TRACING: CPT

## 2021-03-02 RX ORDER — CHLORHEXIDINE GLUCONATE 500 MG/1
1 CLOTH TOPICAL TAKE AS DIRECTED
COMMUNITY
End: 2021-03-10 | Stop reason: HOSPADM

## 2021-03-02 NOTE — DISCHARGE INSTRUCTIONS
Take the following medications the morning of surgery:  BYSTOLIC    Arrive to hospital on your day of surgery at 8:00 AM.      If you are on prescription narcotic pain medication to control your pain you may also take that medication the morning of surgery.    General Instructions:  • Do not eat solid food after midnight the night before surgery.  • You may drink clear liquids day of surgery but must stop at least one hour before your hospital arrival time.  • It is beneficial for you to have a clear drink that contains carbohydrates the day of surgery.  We suggest a 12 to 20 ounce bottle of Gatorade or Powerade for non-diabetic patients or a 12 to 20 ounce bottle of G2 or Powerade Zero for diabetic patients. (Pediatric patients, are not advised to drink a 12 to 20 ounce carbohydrate drink)    Clear liquids are liquids you can see through.  Nothing red in color.     Plain water                               Sports drinks  Sodas                                   Gelatin (Jell-O)  Fruit juices without pulp such as white grape juice and apple juice  Popsicles that contain no fruit or yogurt  Tea or coffee (no cream or milk added)  Gatorade / Powerade  G2 / Powerade Zero    • Infants may have breast milk up to four hours before surgery.  • Infants drinking formula may drink formula up to six hours before surgery.   • Patients who avoid smoking, chewing tobacco and alcohol for 4 weeks prior to surgery have a reduced risk of post-operative complications.  Quit smoking as many days before surgery as you can.  • Do not smoke, use chewing tobacco or drink alcohol the day of surgery.   • If applicable bring your C-PAP/ BI-PAP machine.  • Bring any papers given to you in the doctor’s office.  • Wear clean comfortable clothes.  • Do not wear contact lenses, false eyelashes or make-up.  Bring a case for your glasses.   • Bring crutches or walker if applicable.  • Remove all piercings.  Leave jewelry and any other valuables at  home.  • Hair extensions with metal clips must be removed prior to surgery.  • The Pre-Admission Testing nurse will instruct you to bring medications if unable to obtain an accurate list in Pre-Admission Testing.        If you were given a blood bank ID arm band remember to bring it with you the day of surgery.    Preventing a Surgical Site Infection:  • For 2 to 3 days before surgery, avoid shaving with a razor because the razor can irritate skin and make it easier to develop an infection.    • Any areas of open skin can increase the risk of a post-operative wound infection by allowing bacteria to enter and travel throughout the body.  Notify your surgeon if you have any skin wounds / rashes even if it is not near the expected surgical site.  The area will need assessed to determine if surgery should be delayed until it is healed.  • The night prior to surgery shower using a fresh bar of anti-bacterial soap (such as Dial) and clean washcloth.  Sleep in a clean bed with clean clothing.  Do not allow pets to sleep with you.  • Shower on the morning of surgery using a fresh bar of anti-bacterial soap (such as Dial) and clean washcloth.  Dry with a clean towel and dress in clean clothing.  • Ask your surgeon if you will be receiving antibiotics prior to surgery.  • Make sure you, your family, and all healthcare providers clean their hands with soap and water or an alcohol based hand  before caring for you or your wound.    Day of surgery:  Your arrival time is approximately two hours before your scheduled surgery time.  Upon arrival, a Pre-op nurse and Anesthesiologist will review your health history, obtain vital signs, and answer questions you may have.  The only belongings needed at this time will be a list of your home medications and if applicable your C-PAP/BI-PAP machine.  A Pre-op nurse will start an IV and you may receive medication in preparation for surgery, including something to help you relax.      Please be aware that surgery does come with discomfort.  We want to make every effort to control your discomfort so please discuss any uncontrolled symptoms with your nurse.   Your doctor will most likely have prescribed pain medications.      If you are going home after surgery you will receive individualized written care instructions before being discharged.  A responsible adult must drive you to and from the hospital on the day of your surgery and stay with you for 24 hours.  Discharge prescriptions can be filled by the hospital pharmacy during regular pharmacy hours.  If you are having surgery late in the day/evening your prescription may be e-prescribed to your pharmacy.  Please verify your pharmacy hours or chose a 24 hour pharmacy to avoid not having access to your prescription because your pharmacy has closed for the day.    If you are staying overnight following surgery, you will be transported to your hospital room following the recovery period.  [facility] has all private rooms.    If you have any questions please call Pre-Admission Testing at [phone].  Deductibles and co-payments are collected on the day of service. Please be prepared to pay the required co-pay, deductible or deposit on the day of service as defined by your plan.    Patient Education for Self-Quarantine Process    Following your COVID testing, we strongly recommend that you do not leave your home after you have been tested for COVID except to get medical care. This includes not going to work, school or to public areas.  If this is not possible for you to do please limit your activities to only required outings.  Be sure to wear a mask when you are with other people, practice social distancing and wash your hands frequently.      The following items provide additional details to keep you safe.  • Wash your hands with soap and water frequently for at least 20 seconds.   • Avoid touching your eyes, nose and mouth  with unwashed hands.  • Do not share anything - utensils, towels, food from the same bowl.   • Have your own utensils, drinking glass, dishes, towels and bedding.   • Do not have visitors.   • Do use FaceTime to stay in touch with family and friends.  • You should stay in a specific room away from others if possible.   • Stay at least 6 feet away from others in the home if you cannot have a dedicated room to yourself.   • Do not snuggle with your pet. While the CDC says there is no evidence that pets can spread COVID-19 or be infected from humans, it is probably best to avoid “petting, snuggling, being kissed or licked and sharing food (during self-quarantine)”, according to the CDC.   • Sanitize household surfaces daily. Include all high touch areas (door handles, light switches, phones, countertops, etc.)  • Do not share a bathroom with others, if possible.   • Wear a mask around others in your home if you are unable to stay in a separate room or 6 feet apart. If  you are unable to wear a mask, have your family member wear a mask if they must be within 6 feet of you.   Call your surgeon immediately if you experience any of the following symptoms:  • Sore Throat  • Shortness of Breath or difficulty breathing  • Cough  • Chills  • Body soreness or muscle pain  • Headache  • Fever  • New loss of taste or smell  • Do not arrive for your surgery ill.  Your procedure will need to be rescheduled to another time.  You will need to call your physician before the day of surgery to avoid any unnecessary exposure to hospital staff as well as other patients.    CHLORHEXIDINE CLOTH INSTRUCTIONS  The morning of surgery follow these instructions using the Chlorhexidine cloths you've been given.  These steps reduce bacteria on the body.  Do not use the cloths near your eyes, ears mouth, genitalia or on open wounds.  Throw the cloths away after use but do not try to flush them down a toilet.      • Open and remove one cloth at a  time from the package.    • Leave the cloth unfolded and begin the bathing.  • Massage the skin with the cloths using gentle pressure to remove bacteria.  Do not scrub harshly.   • Follow the steps below with one 2% CHG cloth per area (6 total cloths).  • One cloth for neck, shoulders and chest.  • One cloth for both arms, hands, fingers and underarms (do underarms last).  • One cloth for the abdomen followed by groin.  • One cloth for right leg and foot including between the toes.  • One cloth for left leg and foot including between the toes.  • The last cloth is to be used for the back of the neck, back and buttocks.    Allow the CHG to air dry 3 minutes on the skin which will give it time to work and decrease the chance of irritation.  The skin may feel sticky until it is dry.  Do not rinse with water or any other liquid or you will lose the beneficial effects of the CHG.  If mild skin irritation occurs, do rinse the skin to remove the CHG.  Report this to the nurse at time of admission.  Do not apply lotions, creams, ointments, deodorants or perfumes after using the clothes. Dress in clean clothes before coming to the hospital.

## 2021-03-03 ENCOUNTER — TELEPHONE (OUTPATIENT)
Dept: NEUROSURGERY | Facility: CLINIC | Age: 86
End: 2021-03-03

## 2021-03-03 NOTE — TELEPHONE ENCOUNTER
Patient called and was returning Anita's call.  Patient will be in today to sign controlled substance contract and ask about the Cardiologist

## 2021-03-08 ENCOUNTER — OFFICE VISIT (OUTPATIENT)
Dept: CARDIOLOGY | Facility: CLINIC | Age: 86
End: 2021-03-08

## 2021-03-08 ENCOUNTER — LAB (OUTPATIENT)
Dept: LAB | Facility: HOSPITAL | Age: 86
End: 2021-03-08

## 2021-03-08 VITALS
DIASTOLIC BLOOD PRESSURE: 80 MMHG | BODY MASS INDEX: 24.84 KG/M2 | HEIGHT: 61 IN | OXYGEN SATURATION: 98 % | WEIGHT: 131.6 LBS | HEART RATE: 65 BPM | SYSTOLIC BLOOD PRESSURE: 170 MMHG

## 2021-03-08 DIAGNOSIS — Z01.818 OTHER SPECIFIED PRE-OPERATIVE EXAMINATION: ICD-10-CM

## 2021-03-08 DIAGNOSIS — R94.31 ABNORMAL ECG: Primary | ICD-10-CM

## 2021-03-08 DIAGNOSIS — I10 BENIGN ESSENTIAL HYPERTENSION: ICD-10-CM

## 2021-03-08 PROCEDURE — 99204 OFFICE O/P NEW MOD 45 MIN: CPT | Performed by: INTERNAL MEDICINE

## 2021-03-08 PROCEDURE — U0005 INFEC AGEN DETEC AMPLI PROBE: HCPCS

## 2021-03-08 PROCEDURE — U0004 COV-19 TEST NON-CDC HGH THRU: HCPCS

## 2021-03-08 PROCEDURE — C9803 HOPD COVID-19 SPEC COLLECT: HCPCS

## 2021-03-08 NOTE — PROGRESS NOTES
"      CARDIOLOGY    Ray Downs MD    ENCOUNTER DATE:  03/08/2021    Lanie Ochoa / 85 y.o. / female        CHIEF COMPLAINT / REASON FOR OFFICE VISIT     Surgical Clearance      HISTORY OF PRESENT ILLNESS       HPI  Lanie Ochoa is a 85 y.o. female who presents today for preoperative risk assessment.  Clinically patient is actually doing well until about a month ago.  Patient said she would swim and ride her bike.  She did a variable length on her bike said she could do up to 5 miles but did depend on the time she stopped to talk to somebody.  She had to swim about 10-15 laps.  Month ago her back actually got significantly worse.  As her back pain worsened she also said her blood pressure went up some which was elevated today.  When she got up from the chair she could barely get up.  A preop ECG was performed and she was found to have a new right bundle branch block.      The following portions of the patient's history were reviewed and updated as appropriate: allergies, current medications, past family history, past medical history, past social history, past surgical history and problem list.      VITAL SIGNS     Visit Vitals  /80 (BP Location: Left arm)   Pulse 65   Ht 154.9 cm (61\")   Wt 59.7 kg (131 lb 9.6 oz)   LMP  (LMP Unknown)   SpO2 98%   BMI 24.87 kg/m²         Wt Readings from Last 3 Encounters:   03/08/21 59.7 kg (131 lb 9.6 oz)   03/02/21 59.7 kg (131 lb 9.6 oz)   02/25/21 59.7 kg (131 lb 9.6 oz)     Body mass index is 24.87 kg/m².      REVIEW OF SYSTEMS   Review of Systems   Musculoskeletal: Positive for joint pain.   All other systems reviewed and are negative.          PHYSICAL EXAMINATION     Constitutional:       Appearance: Healthy appearance.   Pulmonary:      Effort: Pulmonary effort is normal.      Breath sounds: Normal breath sounds.   Cardiovascular:      PMI at left midclavicular line. Normal rate. Regular rhythm. Normal S1. Normal S2.      Murmurs: There is a harsh " midsystolic murmur at the URSB.      No gallop. No click. No rub.   Pulses:     Intact distal pulses.   Edema:     Peripheral edema absent.           REVIEWED DATA     Procedures    Cardiac Procedures:  Interpretation Summary    · Estimated EF = 64%.  · Left ventricular systolic function is normal.  · There is calcification of the aortic valve.  · Mild aortic valve stenosis is present.  · Peak velocity of the flow distal to the aortic valve is 268.0 cm/s.  · Mild mitral valve regurgitation is present  · Mild tricuspid valve regurgitation is present.  · Calculated right ventricular systolic pressure from tricuspid regurgitation is 37 mmHg.             ASSESSMENT & PLAN      Diagnosis Plan   1. Abnormal ECG  Stress Test With Pet Myocardial Perfusion (SINGLE STUDY, REST OR STRESS ONLY)   2. Benign essential hypertension           SUMMARY/DISCUSSION  1. Hypertension patient's blood pressure has been up for about the last 2 weeks.  She said her back really started hurting her about a month ago and correlates when her blood pressure started going up.  2. Patient with a 1 out of 6 heart murmur.  She had echocardiogram a year ago that showed some calcification of her aortic valve and this is exactly what her physical exam is consistent with.  3. Perioperative risk assessment for surgery.  On looking back in 2018 she had a normal ECG.  Right bundle branch block is new.  In light of that she is going to need a nuclear perfusion study due to the fact she will build walk on a treadmill a fast pace.  Unfortunately she ate this morning and drank coffee so we will have to do the stress test tomorrow.  She is currently on a surgery for Wednesday in regard to try to keep her on that surgical schedule.        MEDICATIONS         Discharge Medications          Accurate as of March 8, 2021  8:31 AM. If you have any questions, ask your nurse or doctor.            Changes to Medications      Instructions Start Date   dilTIAZem  MG  24 hr capsule  Commonly known as: CARDIZEM CD  What changed: when to take this   240 mg, Oral, Daily      sertraline 25 MG tablet  Commonly known as: ZOLOFT  What changed:   · when to take this  · additional instructions   25 mg, Oral, Daily, 200001         Continue These Medications      Instructions Start Date   CALCIUM PO   1 tablet, Oral, Daily      cetirizine 10 MG tablet  Commonly known as: zyrTEC   10 mg, Oral, Daily      Chlorhexidine Gluconate Cloth 2 % pads   1 application, Apply externally, Take As Directed, Use as directed prior to OR       losartan 100 MG tablet  Commonly known as: COZAAR   100 mg, Oral, Daily      montelukast 10 MG tablet  Commonly known as: Singulair   10 mg, Oral, Nightly      nebivolol 10 MG tablet  Commonly known as: Bystolic   10 mg, Oral, Every Morning      Vitamin D3 10 MCG (400 UNIT) capsule   1 tablet, Oral, Daily                 **Dragon Disclaimer:   Much of this encounter note is an electronic transcription/translation of spoken language to printed text. The electronic translation of spoken language may permit erroneous, or at times, nonsensical words or phrases to be inadvertently transcribed. Although I have reviewed the note for such errors, some may still exist.

## 2021-03-09 ENCOUNTER — HOSPITAL ENCOUNTER (OUTPATIENT)
Dept: CARDIOLOGY | Facility: HOSPITAL | Age: 86
Discharge: HOME | End: 2021-03-09
Admitting: INTERNAL MEDICINE

## 2021-03-09 DIAGNOSIS — R94.31 ABNORMAL ECG: ICD-10-CM

## 2021-03-09 LAB
BH CV NUCLEAR PRIOR STUDY: 2
BH CV REST NUCLEAR ISOTOPE DOSE: 48.4 MCI
BH CV STRESS BP STAGE 1: NORMAL
BH CV STRESS COMMENTS STAGE 1: NORMAL
BH CV STRESS COMMENTS STAGE 2: NORMAL
BH CV STRESS DOSE REGADENOSON STAGE 1: 0.4
BH CV STRESS DURATION MIN STAGE 1: 0
BH CV STRESS DURATION SEC STAGE 1: 10
BH CV STRESS HR STAGE 1: 88
BH CV STRESS NUCLEAR ISOTOPE DOSE: 48.4 MCI
BH CV STRESS PROTOCOL 1: NORMAL
BH CV STRESS RECOVERY BP: NORMAL MMHG
BH CV STRESS RECOVERY HR: 77 BPM
BH CV STRESS STAGE 1: 1
LV EF NUC BP: 78 %
MAXIMAL PREDICTED HEART RATE: 135 BPM
PERCENT MAX PREDICTED HR: 65.19 %
SARS-COV-2 RNA RESP QL NAA+PROBE: NOT DETECTED
STRESS BASELINE BP: NORMAL MMHG
STRESS BASELINE HR: 61 BPM
STRESS PERCENT HR: 77 %
STRESS POST EXERCISE DUR MIN: 0 MIN
STRESS POST EXERCISE DUR SEC: 10 SEC
STRESS POST PEAK BP: NORMAL MMHG
STRESS POST PEAK HR: 88 BPM
STRESS TARGET HR: 115 BPM

## 2021-03-09 PROCEDURE — 78492 MYOCRD IMG PET MLT RST&STRS: CPT | Performed by: INTERNAL MEDICINE

## 2021-03-09 PROCEDURE — 25010000002 REGADENOSON 0.4 MG/5ML SOLUTION: Performed by: INTERNAL MEDICINE

## 2021-03-09 PROCEDURE — 93018 CV STRESS TEST I&R ONLY: CPT | Performed by: INTERNAL MEDICINE

## 2021-03-09 PROCEDURE — 78492 MYOCRD IMG PET MLT RST&STRS: CPT

## 2021-03-09 PROCEDURE — A9555 RB82 RUBIDIUM: HCPCS | Performed by: INTERNAL MEDICINE

## 2021-03-09 PROCEDURE — 93016 CV STRESS TEST SUPVJ ONLY: CPT | Performed by: INTERNAL MEDICINE

## 2021-03-09 PROCEDURE — 93017 CV STRESS TEST TRACING ONLY: CPT

## 2021-03-09 PROCEDURE — 0 RUBIDIUM CHLORIDE: Performed by: INTERNAL MEDICINE

## 2021-03-09 RX ADMIN — REGADENOSON 0.4 MG: 0.08 INJECTION, SOLUTION INTRAVENOUS at 09:58

## 2021-03-10 ENCOUNTER — APPOINTMENT (OUTPATIENT)
Dept: GENERAL RADIOLOGY | Facility: HOSPITAL | Age: 86
End: 2021-03-10

## 2021-03-10 ENCOUNTER — ANESTHESIA EVENT (OUTPATIENT)
Dept: PERIOP | Facility: HOSPITAL | Age: 86
End: 2021-03-10

## 2021-03-10 ENCOUNTER — ANESTHESIA (OUTPATIENT)
Dept: PERIOP | Facility: HOSPITAL | Age: 86
End: 2021-03-10

## 2021-03-10 ENCOUNTER — HOSPITAL ENCOUNTER (OUTPATIENT)
Facility: HOSPITAL | Age: 86
Setting detail: HOSPITAL OUTPATIENT SURGERY
Discharge: HOME | End: 2021-03-10
Attending: NEUROLOGICAL SURGERY | Admitting: NEUROLOGICAL SURGERY

## 2021-03-10 VITALS
SYSTOLIC BLOOD PRESSURE: 166 MMHG | DIASTOLIC BLOOD PRESSURE: 76 MMHG | TEMPERATURE: 97.6 F | OXYGEN SATURATION: 96 % | RESPIRATION RATE: 16 BRPM | HEIGHT: 62 IN | BODY MASS INDEX: 24.02 KG/M2 | WEIGHT: 130.51 LBS | HEART RATE: 60 BPM

## 2021-03-10 DIAGNOSIS — M48.062 SPINAL STENOSIS OF LUMBAR REGION WITH NEUROGENIC CLAUDICATION: ICD-10-CM

## 2021-03-10 PROCEDURE — 76000 FLUOROSCOPY <1 HR PHYS/QHP: CPT

## 2021-03-10 PROCEDURE — 25010000002 MIDAZOLAM PER 1 MG: Performed by: ANESTHESIOLOGY

## 2021-03-10 PROCEDURE — 25010000002 DEXAMETHASONE PER 1 MG: Performed by: NURSE ANESTHETIST, CERTIFIED REGISTERED

## 2021-03-10 PROCEDURE — 25010000002 VANCOMYCIN 1 G RECONSTITUTED SOLUTION 1 EACH VIAL: Performed by: NEUROLOGICAL SURGERY

## 2021-03-10 PROCEDURE — 63047 LAM FACETEC & FORAMOT LUMBAR: CPT | Performed by: NEUROLOGICAL SURGERY

## 2021-03-10 PROCEDURE — 25010000002 PROPOFOL 10 MG/ML EMULSION: Performed by: NURSE ANESTHETIST, CERTIFIED REGISTERED

## 2021-03-10 PROCEDURE — 25010000002 ONDANSETRON PER 1 MG: Performed by: NURSE ANESTHETIST, CERTIFIED REGISTERED

## 2021-03-10 PROCEDURE — 25010000002 HYDROMORPHONE PER 4 MG: Performed by: NURSE ANESTHETIST, CERTIFIED REGISTERED

## 2021-03-10 PROCEDURE — 25010000002 FENTANYL CITRATE (PF) 100 MCG/2ML SOLUTION: Performed by: NURSE ANESTHETIST, CERTIFIED REGISTERED

## 2021-03-10 PROCEDURE — 25010000002 NEOSTIGMINE PER 0.5 MG: Performed by: NURSE ANESTHETIST, CERTIFIED REGISTERED

## 2021-03-10 PROCEDURE — 25010000003 CEFAZOLIN IN DEXTROSE 2-4 GM/100ML-% SOLUTION: Performed by: NEUROLOGICAL SURGERY

## 2021-03-10 PROCEDURE — 72100 X-RAY EXAM L-S SPINE 2/3 VWS: CPT

## 2021-03-10 DEVICE — SSC BONE WAX
Type: IMPLANTABLE DEVICE | Site: SPINE LUMBAR | Status: FUNCTIONAL
Brand: SSC BONE WAX

## 2021-03-10 DEVICE — FLOSEAL HEMOSTATIC MATRIX, 5ML
Type: IMPLANTABLE DEVICE | Site: SPINE LUMBAR | Status: FUNCTIONAL
Brand: FLOSEAL HEMOSTATIC MATRIX

## 2021-03-10 RX ORDER — OXYCODONE AND ACETAMINOPHEN 7.5; 325 MG/1; MG/1
1 TABLET ORAL ONCE AS NEEDED
Status: DISCONTINUED | OUTPATIENT
Start: 2021-03-10 | End: 2021-03-10 | Stop reason: HOSPADM

## 2021-03-10 RX ORDER — GLYCOPYRROLATE 0.2 MG/ML
INJECTION INTRAMUSCULAR; INTRAVENOUS AS NEEDED
Status: DISCONTINUED | OUTPATIENT
Start: 2021-03-10 | End: 2021-03-10 | Stop reason: SURG

## 2021-03-10 RX ORDER — FENTANYL CITRATE 50 UG/ML
50 INJECTION, SOLUTION INTRAMUSCULAR; INTRAVENOUS
Status: DISCONTINUED | OUTPATIENT
Start: 2021-03-10 | End: 2021-03-10 | Stop reason: HOSPADM

## 2021-03-10 RX ORDER — ROCURONIUM BROMIDE 10 MG/ML
INJECTION, SOLUTION INTRAVENOUS AS NEEDED
Status: DISCONTINUED | OUTPATIENT
Start: 2021-03-10 | End: 2021-03-10 | Stop reason: SURG

## 2021-03-10 RX ORDER — HYDROCODONE BITARTRATE AND ACETAMINOPHEN 7.5; 325 MG/1; MG/1
1 TABLET ORAL ONCE AS NEEDED
Status: COMPLETED | OUTPATIENT
Start: 2021-03-10 | End: 2021-03-10

## 2021-03-10 RX ORDER — FENTANYL CITRATE 50 UG/ML
INJECTION, SOLUTION INTRAMUSCULAR; INTRAVENOUS AS NEEDED
Status: DISCONTINUED | OUTPATIENT
Start: 2021-03-10 | End: 2021-03-10 | Stop reason: SURG

## 2021-03-10 RX ORDER — PROMETHAZINE HYDROCHLORIDE 25 MG/1
25 SUPPOSITORY RECTAL ONCE AS NEEDED
Status: DISCONTINUED | OUTPATIENT
Start: 2021-03-10 | End: 2021-03-10 | Stop reason: HOSPADM

## 2021-03-10 RX ORDER — HYDRALAZINE HYDROCHLORIDE 20 MG/ML
5 INJECTION INTRAMUSCULAR; INTRAVENOUS
Status: DISCONTINUED | OUTPATIENT
Start: 2021-03-10 | End: 2021-03-10 | Stop reason: HOSPADM

## 2021-03-10 RX ORDER — MIDAZOLAM HYDROCHLORIDE 1 MG/ML
0.5 INJECTION INTRAMUSCULAR; INTRAVENOUS
Status: DISCONTINUED | OUTPATIENT
Start: 2021-03-10 | End: 2021-03-10 | Stop reason: HOSPADM

## 2021-03-10 RX ORDER — LIDOCAINE HYDROCHLORIDE 20 MG/ML
INJECTION, SOLUTION INFILTRATION; PERINEURAL AS NEEDED
Status: DISCONTINUED | OUTPATIENT
Start: 2021-03-10 | End: 2021-03-10 | Stop reason: SURG

## 2021-03-10 RX ORDER — DIPHENHYDRAMINE HCL 25 MG
25 CAPSULE ORAL
Status: DISCONTINUED | OUTPATIENT
Start: 2021-03-10 | End: 2021-03-10 | Stop reason: HOSPADM

## 2021-03-10 RX ORDER — NALOXONE HCL 0.4 MG/ML
0.2 VIAL (ML) INJECTION AS NEEDED
Status: DISCONTINUED | OUTPATIENT
Start: 2021-03-10 | End: 2021-03-10 | Stop reason: HOSPADM

## 2021-03-10 RX ORDER — PROMETHAZINE HYDROCHLORIDE 25 MG/1
25 TABLET ORAL ONCE AS NEEDED
Status: DISCONTINUED | OUTPATIENT
Start: 2021-03-10 | End: 2021-03-10 | Stop reason: HOSPADM

## 2021-03-10 RX ORDER — FAMOTIDINE 10 MG/ML
20 INJECTION, SOLUTION INTRAVENOUS ONCE
Status: COMPLETED | OUTPATIENT
Start: 2021-03-10 | End: 2021-03-10

## 2021-03-10 RX ORDER — SODIUM CHLORIDE 0.9 % (FLUSH) 0.9 %
3 SYRINGE (ML) INJECTION EVERY 12 HOURS SCHEDULED
Status: DISCONTINUED | OUTPATIENT
Start: 2021-03-10 | End: 2021-03-10 | Stop reason: HOSPADM

## 2021-03-10 RX ORDER — HYDROMORPHONE HYDROCHLORIDE 1 MG/ML
0.5 INJECTION, SOLUTION INTRAMUSCULAR; INTRAVENOUS; SUBCUTANEOUS
Status: DISCONTINUED | OUTPATIENT
Start: 2021-03-10 | End: 2021-03-10 | Stop reason: HOSPADM

## 2021-03-10 RX ORDER — FLUMAZENIL 0.1 MG/ML
0.2 INJECTION INTRAVENOUS AS NEEDED
Status: DISCONTINUED | OUTPATIENT
Start: 2021-03-10 | End: 2021-03-10 | Stop reason: HOSPADM

## 2021-03-10 RX ORDER — PROPOFOL 10 MG/ML
VIAL (ML) INTRAVENOUS AS NEEDED
Status: DISCONTINUED | OUTPATIENT
Start: 2021-03-10 | End: 2021-03-10 | Stop reason: SURG

## 2021-03-10 RX ORDER — LIDOCAINE HYDROCHLORIDE 10 MG/ML
0.5 INJECTION, SOLUTION EPIDURAL; INFILTRATION; INTRACAUDAL; PERINEURAL ONCE AS NEEDED
Status: DISCONTINUED | OUTPATIENT
Start: 2021-03-10 | End: 2021-03-10 | Stop reason: HOSPADM

## 2021-03-10 RX ORDER — ONDANSETRON 2 MG/ML
INJECTION INTRAMUSCULAR; INTRAVENOUS AS NEEDED
Status: DISCONTINUED | OUTPATIENT
Start: 2021-03-10 | End: 2021-03-10 | Stop reason: SURG

## 2021-03-10 RX ORDER — DIPHENHYDRAMINE HYDROCHLORIDE 50 MG/ML
12.5 INJECTION INTRAMUSCULAR; INTRAVENOUS
Status: DISCONTINUED | OUTPATIENT
Start: 2021-03-10 | End: 2021-03-10 | Stop reason: HOSPADM

## 2021-03-10 RX ORDER — SODIUM CHLORIDE, SODIUM LACTATE, POTASSIUM CHLORIDE, CALCIUM CHLORIDE 600; 310; 30; 20 MG/100ML; MG/100ML; MG/100ML; MG/100ML
9 INJECTION, SOLUTION INTRAVENOUS CONTINUOUS
Status: DISCONTINUED | OUTPATIENT
Start: 2021-03-10 | End: 2021-03-10 | Stop reason: HOSPADM

## 2021-03-10 RX ORDER — EPHEDRINE SULFATE 50 MG/ML
5 INJECTION, SOLUTION INTRAVENOUS ONCE AS NEEDED
Status: DISCONTINUED | OUTPATIENT
Start: 2021-03-10 | End: 2021-03-10 | Stop reason: HOSPADM

## 2021-03-10 RX ORDER — EPHEDRINE SULFATE 50 MG/ML
INJECTION, SOLUTION INTRAVENOUS AS NEEDED
Status: DISCONTINUED | OUTPATIENT
Start: 2021-03-10 | End: 2021-03-10 | Stop reason: SURG

## 2021-03-10 RX ORDER — ONDANSETRON 2 MG/ML
4 INJECTION INTRAMUSCULAR; INTRAVENOUS ONCE AS NEEDED
Status: DISCONTINUED | OUTPATIENT
Start: 2021-03-10 | End: 2021-03-10 | Stop reason: HOSPADM

## 2021-03-10 RX ORDER — HYDROMORPHONE HCL 110MG/55ML
PATIENT CONTROLLED ANALGESIA SYRINGE INTRAVENOUS AS NEEDED
Status: DISCONTINUED | OUTPATIENT
Start: 2021-03-10 | End: 2021-03-10 | Stop reason: SURG

## 2021-03-10 RX ORDER — SODIUM CHLORIDE 0.9 % (FLUSH) 0.9 %
3-10 SYRINGE (ML) INJECTION AS NEEDED
Status: DISCONTINUED | OUTPATIENT
Start: 2021-03-10 | End: 2021-03-10 | Stop reason: HOSPADM

## 2021-03-10 RX ORDER — LABETALOL HYDROCHLORIDE 5 MG/ML
5 INJECTION, SOLUTION INTRAVENOUS
Status: DISCONTINUED | OUTPATIENT
Start: 2021-03-10 | End: 2021-03-10 | Stop reason: HOSPADM

## 2021-03-10 RX ORDER — HYDROCODONE BITARTRATE AND ACETAMINOPHEN 5; 325 MG/1; MG/1
1 TABLET ORAL EVERY 4 HOURS PRN
Qty: 35 TABLET | Refills: 0 | Status: SHIPPED | OUTPATIENT
Start: 2021-03-10 | End: 2021-03-24

## 2021-03-10 RX ORDER — MIDAZOLAM HYDROCHLORIDE 1 MG/ML
1 INJECTION INTRAMUSCULAR; INTRAVENOUS
Status: DISCONTINUED | OUTPATIENT
Start: 2021-03-10 | End: 2021-03-10 | Stop reason: HOSPADM

## 2021-03-10 RX ORDER — CEFAZOLIN SODIUM 2 G/100ML
2 INJECTION, SOLUTION INTRAVENOUS ONCE
Status: COMPLETED | OUTPATIENT
Start: 2021-03-10 | End: 2021-03-10

## 2021-03-10 RX ORDER — DEXAMETHASONE SODIUM PHOSPHATE 10 MG/ML
INJECTION INTRAMUSCULAR; INTRAVENOUS AS NEEDED
Status: DISCONTINUED | OUTPATIENT
Start: 2021-03-10 | End: 2021-03-10 | Stop reason: SURG

## 2021-03-10 RX ORDER — CEPHALEXIN 500 MG/1
500 CAPSULE ORAL 4 TIMES DAILY
Qty: 20 CAPSULE | Refills: 0 | Status: SHIPPED | OUTPATIENT
Start: 2021-03-10 | End: 2021-03-15

## 2021-03-10 RX ADMIN — PROPOFOL 120 MG: 10 INJECTION, EMULSION INTRAVENOUS at 10:27

## 2021-03-10 RX ADMIN — EPHEDRINE SULFATE 10 MG: 50 INJECTION INTRAVENOUS at 11:10

## 2021-03-10 RX ADMIN — FAMOTIDINE 20 MG: 10 INJECTION, SOLUTION INTRAVENOUS at 09:09

## 2021-03-10 RX ADMIN — ROCURONIUM BROMIDE 50 MG: 50 INJECTION INTRAVENOUS at 10:27

## 2021-03-10 RX ADMIN — SODIUM CHLORIDE, POTASSIUM CHLORIDE, SODIUM LACTATE AND CALCIUM CHLORIDE: 600; 310; 30; 20 INJECTION, SOLUTION INTRAVENOUS at 12:20

## 2021-03-10 RX ADMIN — MIDAZOLAM 0.5 MG: 1 INJECTION INTRAMUSCULAR; INTRAVENOUS at 09:10

## 2021-03-10 RX ADMIN — HYDROCODONE BITARTRATE AND ACETAMINOPHEN 1 TABLET: 7.5; 325 TABLET ORAL at 13:42

## 2021-03-10 RX ADMIN — ONDANSETRON 4 MG: 2 INJECTION INTRAMUSCULAR; INTRAVENOUS at 12:12

## 2021-03-10 RX ADMIN — SODIUM CHLORIDE, POTASSIUM CHLORIDE, SODIUM LACTATE AND CALCIUM CHLORIDE 9 ML/HR: 600; 310; 30; 20 INJECTION, SOLUTION INTRAVENOUS at 09:09

## 2021-03-10 RX ADMIN — LIDOCAINE HYDROCHLORIDE 100 MG: 20 INJECTION, SOLUTION INFILTRATION; PERINEURAL at 10:27

## 2021-03-10 RX ADMIN — CEFAZOLIN SODIUM 2 G: 2 INJECTION, SOLUTION INTRAVENOUS at 10:30

## 2021-03-10 RX ADMIN — HYDROMORPHONE HYDROCHLORIDE 0.25 MG: 2 INJECTION, SOLUTION INTRAMUSCULAR; INTRAVENOUS; SUBCUTANEOUS at 11:48

## 2021-03-10 RX ADMIN — FENTANYL CITRATE 50 MCG: 50 INJECTION INTRAMUSCULAR; INTRAVENOUS at 10:35

## 2021-03-10 RX ADMIN — DEXAMETHASONE SODIUM PHOSPHATE 8 MG: 10 INJECTION INTRAMUSCULAR; INTRAVENOUS at 11:11

## 2021-03-10 RX ADMIN — NEOSTIGMINE METHYLSULFATE 3.5 MG: 1 INJECTION INTRAMUSCULAR; INTRAVENOUS; SUBCUTANEOUS at 12:20

## 2021-03-10 RX ADMIN — FENTANYL CITRATE 50 MCG: 50 INJECTION INTRAMUSCULAR; INTRAVENOUS at 10:22

## 2021-03-10 RX ADMIN — HYDROMORPHONE HYDROCHLORIDE 0.25 MG: 2 INJECTION, SOLUTION INTRAMUSCULAR; INTRAVENOUS; SUBCUTANEOUS at 12:26

## 2021-03-10 RX ADMIN — GLYCOPYRROLATE 0.6 MG: 0.2 INJECTION INTRAMUSCULAR; INTRAVENOUS at 12:20

## 2021-03-10 NOTE — ANESTHESIA PREPROCEDURE EVALUATION
Anesthesia Evaluation                  Airway   Mallampati: II  TM distance: >3 FB  Dental    (+) upper dentures    Pulmonary    (+) pneumonia ,   Cardiovascular     Rhythm: regular  Rate: normal    (+) hypertension, valvular problems/murmurs murmur, hyperlipidemia,       Neuro/Psych  (+) headaches, numbness, psychiatric history Anxiety,     GI/Hepatic/Renal/Endo    (+)  GERD,      Musculoskeletal     Abdominal    Substance History      OB/GYN          Other   arthritis,    history of cancer                    Anesthesia Plan    ASA 3     general     intravenous induction     Anesthetic plan, all risks, benefits, and alternatives have been provided, discussed and informed consent has been obtained with: patient.

## 2021-03-10 NOTE — ANESTHESIA POSTPROCEDURE EVALUATION
Patient: Lanie Ochoa    Procedure Summary     Date: 03/10/21 Room / Location: [facility] OR [address] [place] [address] MAIN OR    Anesthesia Start: 1020 Anesthesia Stop: 1232    Procedure: Lumbar 4-5 laminectomy with metrx (N/A Spine Lumbar) Diagnosis:       Spinal stenosis of lumbar region with neurogenic claudication      (Spinal stenosis of lumbar region with neurogenic claudication [M48.062])    Surgeons: Charli Haynes MD Provider: Jose Daniel May MD    Anesthesia Type: general ASA Status: 3          Anesthesia Type: general    Vitals  Vitals Value Taken Time   /114 03/10/21 1330   Temp 36.4 °C (97.6 °F) 03/10/21 1230   Pulse 96 03/10/21 1330   Resp 16 03/10/21 1315   SpO2 97 % 03/10/21 1332   Vitals shown include unvalidated device data.        Post Anesthesia Care and Evaluation    Patient location during evaluation: PACU  Patient participation: complete - patient participated  Level of consciousness: awake and alert  Pain management: adequate  Airway patency: patent  Anesthetic complications: No anesthetic complications    Cardiovascular status: acceptable  Respiratory status: acceptable  Hydration status: acceptable    Comments: --------------------            03/10/21               1315     --------------------   BP:       167/69     Pulse:      58       Resp:       16       Temp:                SpO2:      100%     --------------------

## 2021-03-10 NOTE — BRIEF OP NOTE
LUMBAR DISCECTOMY POSTERIOR WITH METRIX  Progress Note    Lanie Ochoa  3/10/2021    Pre-op Diagnosis:   Spinal stenosis of lumbar region with neurogenic claudication [M48.062]       Post-Op Diagnosis Codes:     * Spinal stenosis of lumbar region with neurogenic claudication [M48.062]    Procedure/CPT® Codes:        Procedure(s):  Lumbar 4-5 laminectomy with metrx    Surgeon(s):  Charli Haynes MD    Anesthesia: General    Staff:   Circulator: Elizabeth Walker RN; Esther Garcia RN  Radiology Technologist: Amie Nguyen  Scrub Person: Lito Lainez Daniel  Assistant: Love Solano CSA  Other: Malathi Holden  Assistant: Love Solano CSA      Estimated Blood Loss: 100ml    Urine Voided: * No values recorded between 3/10/2021 10:20 AM and 3/10/2021 12:25 PM *    Specimens:                None          Drains: * No LDAs found *    Findings: Severe stenosis    Complications: None      Charli Haynes MD     Date: 3/10/2021  Time: 12:25 EST

## 2021-03-10 NOTE — DISCHARGE INSTRUCTIONS
LUMBAR SURGERY - DANIELE SOLIS M.D.  [address], [address]  [place] [address]  [phone]    Instructions & Care After Your Lumbar Surgery    1. No sitting except on the commode.  You may lie on a firm couch but not on a waterbed or recliner.  You may lie in any position that is comfortable, using only one pillow under your head. Either stand at a counter or lie on your side for meals. Three weeks after surgery you may begin sitting for 30 minutes 3 times per day.    2. No driving for three weeks.  You may ride in the car in a passenger seat that reclines or lying down in the back seat.      3. No bending. If you drop something allow someone else to pick it up.    4. Don’t lift anything heavier than a coffee cup or paperback book.    5. Gradually increase your activity each day.  You should get out of bed every hour during the day.  Walk outside as soon as you feel up to it.  Walk short distances frequently rather than making a long trip.  Frequency is more important than distance.  Your goal is to be walking 2 to 3 miles per day when you return for your post-operative visit. (Never do this in one trip.)    6. You may climb stairs.    7. Remove your bandage the second day after surgery and leave it off.  If you notice any redness, swelling or drainage, call the office.  There are steri-strips across the incision.  If these are still present ten days after surgery, remove them gently.      8. You may shower five days after surgery.  Keep the incision dry until then.  Don’t let the water beat directly on the incision and gently pat it dry.    9. Physical Therapy will be arranged at your post-operative visit if needed.    10. Your prescription for pain medication may be filled for half the original amount prior to your return office visit.  Due to changes in Federal Law in order to have this medication refilled you must contact the office four days prior to the date and make arrangements to pick the  prescription up in the office.  This prescription refill cannot be called in to the pharmacy. Your prescription will be ready for pick-up the day the refill is due.    Don’t be alarmed if you experience some of your pre-operative symptoms after going home.  This is not uncommon and normally goes away in a few days but may last longer.  If you have any questions or concerns, please call our office.

## 2021-03-10 NOTE — OP NOTE
Preop diagnosis: Severe lumbar stenosis L4-5    Postop diagnosis: same    Procedure performed: Right L4-5 laminectomy, foraminotomies, medial facetectomy with crossover to the left using minimal access spinal technologies microsurgical technique microsurgical instrumentation    Surgeon: Charli Haynes M.D.    Assistant: Love Solano CFA who was instrumental in helping with visualization of neural structures, hemostasis, and retraction of neural structures.  Her skilled assistance was necessary for the success of this case    Indications for the procedure:  This is a patient with severe pain in the legs.  Previous imaging had shown neural compression at the L4-5 levels.  As a result of this and the failure of conservative therapy the patient elected to proceed with surgery.    Operative summary:  After induction of general anesthesia the patient was intubated and placed on the operating table in the prone position on a Farshad table.  All pressure points were padded including peripheral points of entrapment.  The back was prepped with Chloraprep and then draped with Ioban, half sheets, and a split sheet.      The L4-5 level was localized with intraoperative fluoroscopy an incision was made on the right just above the pedicle.  Successive dilating tubes up to 18 mm by 5 cm were placed over that area.  Soft tissue was then removed from the supralaminar space.  The inferior laminar arch of L4 as well as the superior laminar arch of L5 and the medial aspect of facet were removed with the [employer] drill.  The remainder of the operation was done under high-power magnification of the operating microscope using microsurgical technique and microsurgical instrumentation.  The ligamentum flavum was opened and removed out to the level of the pedicle using the Kerrison rongeurs.  This exposed the lateral thecal sac and the nerve root of L5.  Once the lateral recess was opened the dissection was carried up to the 4 pedicle  completely decompressing the superior nerve root.    Once this was done the L5 nerve root was mobilized medially to expose the disc.  There was no evidence of a disc herniation.  Following this the tube was angled toward the left in the anterior aspect of the spinous process was removed with the [employer] drill.  Following this we were able to mobilize the thecal sac both toward the midline and anteriorly in order to remove bone from the left side and open the lateral recess on the left as well.  Once the canal was fairly open we were able to control bleeding with FloSeal and other bleeding was controlled with bipolar cautery.    Once the entire decompression was completed we were able to explore under the nerve root and the thecal sac using the Dugan ball probe to be sure there was no evidence of residual compression.  There being none bleeding was controlled again using the bipolar cautery, FloSeal, and thrombin and Gelfoam.  The area was then copiously irrigated with vancomycin solution and the tube was removed. The paraspinous musculature was injected with 100 cc 1/8% Marcaine with 1:200,000 epinephrine solution.    Another gram of Kefzol was given prior to closure.    The incision was then closed in layers and dressed and the patient was taken to the recovery room in stable condition there were no apparent complications.  Sponge, instrument, and needle counts were correct at the end of the procedure.

## 2021-03-10 NOTE — ANESTHESIA PROCEDURE NOTES
Airway  Urgency: elective    Date/Time: 3/10/2021 10:29 AM  End Time:3/10/2021 10:29 AM  Airway not difficult    General Information and Staff    Patient location during procedure: OR  Anesthesiologist: Jose Daniel May MD  CRNA: Rosy Laguna CRNA    Indications and Patient Condition  Indications for airway management: airway protection    Preoxygenated: yes  Mask difficulty assessment: 1 - vent by mask    Final Airway Details  Final airway type: endotracheal airway      Successful airway: ETT  Cuffed: yes   Successful intubation technique: direct laryngoscopy  Facilitating devices/methods: cricoid pressure and intubating stylet  Endotracheal tube insertion site: oral  Blade: Hinton  Blade size: 2  ETT size (mm): 7.0  Cormack-Lehane Classification: grade IIb - view of arytenoids or posterior of glottis only  Placement verified by: chest auscultation and capnometry   Cuff volume (mL): 8  Measured from: lips  ETT/EBT  to lips (cm): 21  Number of attempts at approach: 1  Assessment: lips, teeth, and gum same as pre-op and atraumatic intubation    Additional Comments  SIVI.  EYES TAPED CLOSED PRIOR TO DL.  INTUBATION AS CHARTED ABOVE.  APPEARS ATRAUMATIC.  NO CHANGE TO DENTITION. +ETCO2. +BBS. +CR.

## 2021-03-12 ENCOUNTER — APPOINTMENT (OUTPATIENT)
Dept: PAIN MEDICINE | Facility: HOSPITAL | Age: 86
End: 2021-03-12

## 2021-03-16 ENCOUNTER — TELEPHONE (OUTPATIENT)
Dept: NEUROSURGERY | Facility: CLINIC | Age: 86
End: 2021-03-16

## 2021-03-16 NOTE — TELEPHONE ENCOUNTER
How are you feeling? fine    Are you having any pain? Where? Left side buttock    Rate pain from 1-10 -  4/5    Are you taking the pain RX? Was taking it but doesn't like being constipated    Do you think it's helping? N/A    Do you feel better than before surgery? yes    Was your dressing clean and dry? yes    Is you incision red, swollen or bleeding? No one has checked it for her lately but I asked her to have someone look at it and to let us know if they observe any of the concerning things listed above    Are you having any trouble with nausea or constipation? None    Were your discharge instructions easy to understand? yes    Any other questions?    Confirm post op appt - yes

## 2021-03-18 ENCOUNTER — APPOINTMENT (OUTPATIENT)
Dept: GENERAL RADIOLOGY | Facility: HOSPITAL | Age: 86
End: 2021-03-18

## 2021-03-18 ENCOUNTER — HOSPITAL ENCOUNTER (OUTPATIENT)
Facility: HOSPITAL | Age: 86
Setting detail: OBSERVATION
Discharge: HOME | End: 2021-03-19
Attending: EMERGENCY MEDICINE | Admitting: HOSPITALIST

## 2021-03-18 ENCOUNTER — TELEPHONE (OUTPATIENT)
Dept: NEUROSURGERY | Facility: CLINIC | Age: 86
End: 2021-03-18

## 2021-03-18 ENCOUNTER — APPOINTMENT (OUTPATIENT)
Dept: CT IMAGING | Facility: HOSPITAL | Age: 86
End: 2021-03-18

## 2021-03-18 DIAGNOSIS — I60.9 SAH (SUBARACHNOID HEMORRHAGE) (HCC): ICD-10-CM

## 2021-03-18 DIAGNOSIS — Z92.89 HISTORY OF RECENT HOSPITALIZATION: ICD-10-CM

## 2021-03-18 DIAGNOSIS — R55 SYNCOPE, UNSPECIFIED SYNCOPE TYPE: ICD-10-CM

## 2021-03-18 DIAGNOSIS — S01.01XA SCALP LACERATION, INITIAL ENCOUNTER: ICD-10-CM

## 2021-03-18 DIAGNOSIS — I60.9 SUBARACHNOID HEMORRHAGE (HCC): Primary | ICD-10-CM

## 2021-03-18 DIAGNOSIS — M48.061 SPINAL STENOSIS OF LUMBAR REGION, UNSPECIFIED WHETHER NEUROGENIC CLAUDICATION PRESENT: ICD-10-CM

## 2021-03-18 DIAGNOSIS — I10 BENIGN ESSENTIAL HYPERTENSION: ICD-10-CM

## 2021-03-18 PROBLEM — I45.10 RBBB (RIGHT BUNDLE BRANCH BLOCK): Status: ACTIVE | Noted: 2021-03-18

## 2021-03-18 LAB
ALBUMIN SERPL-MCNC: 3.8 G/DL (ref 3.5–5.2)
ALBUMIN/GLOB SERPL: 1.4 G/DL
ALP SERPL-CCNC: 54 U/L (ref 39–117)
ALT SERPL W P-5'-P-CCNC: 9 U/L (ref 1–33)
ANION GAP SERPL CALCULATED.3IONS-SCNC: 7 MMOL/L (ref 5–15)
AST SERPL-CCNC: 12 U/L (ref 1–32)
BASOPHILS # BLD AUTO: 0.04 10*3/MM3 (ref 0–0.2)
BASOPHILS NFR BLD AUTO: 0.8 % (ref 0–1.5)
BILIRUB SERPL-MCNC: 0.3 MG/DL (ref 0–1.2)
BILIRUB UR QL STRIP: NEGATIVE
BUN SERPL-MCNC: 13 MG/DL (ref 8–23)
BUN/CREAT SERPL: 16.3 (ref 7–25)
CALCIUM SPEC-SCNC: 9.4 MG/DL (ref 8.6–10.5)
CHLORIDE SERPL-SCNC: 103 MMOL/L (ref 98–107)
CLARITY UR: CLEAR
CO2 SERPL-SCNC: 29 MMOL/L (ref 22–29)
COLOR UR: YELLOW
CREAT SERPL-MCNC: 0.8 MG/DL (ref 0.57–1)
DEPRECATED RDW RBC AUTO: 45.9 FL (ref 37–54)
EOSINOPHIL # BLD AUTO: 0.3 10*3/MM3 (ref 0–0.4)
EOSINOPHIL NFR BLD AUTO: 5.8 % (ref 0.3–6.2)
ERYTHROCYTE [DISTWIDTH] IN BLOOD BY AUTOMATED COUNT: 13 % (ref 12.3–15.4)
GFR SERPL CREATININE-BSD FRML MDRD: 68 ML/MIN/1.73
GLOBULIN UR ELPH-MCNC: 2.7 GM/DL
GLUCOSE BLDC GLUCOMTR-MCNC: 113 MG/DL (ref 70–130)
GLUCOSE SERPL-MCNC: 121 MG/DL (ref 65–99)
GLUCOSE UR STRIP-MCNC: NEGATIVE MG/DL
HCT VFR BLD AUTO: 41.8 % (ref 34–46.6)
HGB BLD-MCNC: 13.4 G/DL (ref 12–15.9)
HGB UR QL STRIP.AUTO: NEGATIVE
HOLD SPECIMEN: NORMAL
IMM GRANULOCYTES # BLD AUTO: 0.11 10*3/MM3 (ref 0–0.05)
IMM GRANULOCYTES NFR BLD AUTO: 2.1 % (ref 0–0.5)
KETONES UR QL STRIP: NEGATIVE
LEUKOCYTE ESTERASE UR QL STRIP.AUTO: NEGATIVE
LYMPHOCYTES # BLD AUTO: 0.9 10*3/MM3 (ref 0.7–3.1)
LYMPHOCYTES NFR BLD AUTO: 17.5 % (ref 19.6–45.3)
MAGNESIUM SERPL-MCNC: 2 MG/DL (ref 1.6–2.4)
MCH RBC QN AUTO: 30.9 PG (ref 26.6–33)
MCHC RBC AUTO-ENTMCNC: 32.1 G/DL (ref 31.5–35.7)
MCV RBC AUTO: 96.3 FL (ref 79–97)
MONOCYTES # BLD AUTO: 0.48 10*3/MM3 (ref 0.1–0.9)
MONOCYTES NFR BLD AUTO: 9.4 % (ref 5–12)
NEUTROPHILS NFR BLD AUTO: 3.3 10*3/MM3 (ref 1.7–7)
NEUTROPHILS NFR BLD AUTO: 64.4 % (ref 42.7–76)
NITRITE UR QL STRIP: NEGATIVE
NRBC BLD AUTO-RTO: 0 /100 WBC (ref 0–0.2)
PH UR STRIP.AUTO: 7.5 [PH] (ref 5–8)
PLATELET # BLD AUTO: 275 10*3/MM3 (ref 140–450)
PMV BLD AUTO: 9.8 FL (ref 6–12)
POTASSIUM SERPL-SCNC: 3.9 MMOL/L (ref 3.5–5.2)
PROT SERPL-MCNC: 6.5 G/DL (ref 6–8.5)
PROT UR QL STRIP: NEGATIVE
RBC # BLD AUTO: 4.34 10*6/MM3 (ref 3.77–5.28)
SARS-COV-2 RDRP RESP QL NAA+PROBE: NORMAL
SODIUM SERPL-SCNC: 139 MMOL/L (ref 136–145)
SP GR UR STRIP: 1.01 (ref 1–1.03)
TROPONIN T SERPL-MCNC: <0.01 NG/ML (ref 0–0.03)
UROBILINOGEN UR QL STRIP: NORMAL
WBC # BLD AUTO: 5.13 10*3/MM3 (ref 3.4–10.8)
WHOLE BLOOD HOLD SPECIMEN: NORMAL
WHOLE BLOOD HOLD SPECIMEN: NORMAL

## 2021-03-18 PROCEDURE — 81003 URINALYSIS AUTO W/O SCOPE: CPT | Performed by: EMERGENCY MEDICINE

## 2021-03-18 PROCEDURE — 83735 ASSAY OF MAGNESIUM: CPT | Performed by: EMERGENCY MEDICINE

## 2021-03-18 PROCEDURE — 82962 GLUCOSE BLOOD TEST: CPT

## 2021-03-18 PROCEDURE — 70450 CT HEAD/BRAIN W/O DYE: CPT

## 2021-03-18 PROCEDURE — G0378 HOSPITAL OBSERVATION PER HR: HCPCS

## 2021-03-18 PROCEDURE — 84484 ASSAY OF TROPONIN QUANT: CPT | Performed by: EMERGENCY MEDICINE

## 2021-03-18 PROCEDURE — 99285 EMERGENCY DEPT VISIT HI MDM: CPT

## 2021-03-18 PROCEDURE — 93005 ELECTROCARDIOGRAM TRACING: CPT | Performed by: EMERGENCY MEDICINE

## 2021-03-18 PROCEDURE — P9612 CATHETERIZE FOR URINE SPEC: HCPCS

## 2021-03-18 PROCEDURE — 87635 SARS-COV-2 COVID-19 AMP PRB: CPT | Performed by: INTERNAL MEDICINE

## 2021-03-18 PROCEDURE — 99213 OFFICE O/P EST LOW 20 MIN: CPT | Performed by: NEUROLOGICAL SURGERY

## 2021-03-18 PROCEDURE — 99220 PR INITIAL OBSERVATION CARE/DAY 70 MINUTES: CPT | Performed by: INTERNAL MEDICINE

## 2021-03-18 PROCEDURE — 80053 COMPREHEN METABOLIC PANEL: CPT | Performed by: EMERGENCY MEDICINE

## 2021-03-18 PROCEDURE — 72125 CT NECK SPINE W/O DYE: CPT

## 2021-03-18 PROCEDURE — 71045 X-RAY EXAM CHEST 1 VIEW: CPT

## 2021-03-18 PROCEDURE — 72100 X-RAY EXAM L-S SPINE 2/3 VWS: CPT

## 2021-03-18 PROCEDURE — 93005 ELECTROCARDIOGRAM TRACING: CPT

## 2021-03-18 PROCEDURE — 85025 COMPLETE CBC W/AUTO DIFF WBC: CPT | Performed by: EMERGENCY MEDICINE

## 2021-03-18 RX ORDER — ACETAMINOPHEN 160 MG/5ML
650 SOLUTION ORAL EVERY 4 HOURS PRN
Status: DISCONTINUED | OUTPATIENT
Start: 2021-03-18 | End: 2021-03-20 | Stop reason: HOSPADM

## 2021-03-18 RX ORDER — NEBIVOLOL 5 MG/1
10 TABLET ORAL EVERY MORNING
Status: DISCONTINUED | OUTPATIENT
Start: 2021-03-19 | End: 2021-03-18

## 2021-03-18 RX ORDER — SODIUM CHLORIDE 0.9 % (FLUSH) 0.9 %
1-10 SYRINGE (ML) INJECTION AS NEEDED
Status: DISCONTINUED | OUTPATIENT
Start: 2021-03-18 | End: 2021-03-20 | Stop reason: HOSPADM

## 2021-03-18 RX ORDER — AMOXICILLIN 250 MG
2 CAPSULE ORAL 2 TIMES DAILY PRN
Status: DISCONTINUED | OUTPATIENT
Start: 2021-03-18 | End: 2021-03-20 | Stop reason: HOSPADM

## 2021-03-18 RX ORDER — SERTRALINE HYDROCHLORIDE 25 MG/1
25 TABLET, FILM COATED ORAL EVERY EVENING
Status: DISCONTINUED | OUTPATIENT
Start: 2021-03-18 | End: 2021-03-20 | Stop reason: HOSPADM

## 2021-03-18 RX ORDER — ONDANSETRON 2 MG/ML
4 INJECTION INTRAMUSCULAR; INTRAVENOUS EVERY 6 HOURS PRN
Status: DISCONTINUED | OUTPATIENT
Start: 2021-03-18 | End: 2021-03-20 | Stop reason: HOSPADM

## 2021-03-18 RX ORDER — HYDROCODONE BITARTRATE AND ACETAMINOPHEN 5; 325 MG/1; MG/1
1 TABLET ORAL EVERY 4 HOURS PRN
Status: DISCONTINUED | OUTPATIENT
Start: 2021-03-18 | End: 2021-03-20 | Stop reason: HOSPADM

## 2021-03-18 RX ORDER — NEBIVOLOL 5 MG/1
10 TABLET ORAL EVERY MORNING
Status: DISCONTINUED | OUTPATIENT
Start: 2021-03-18 | End: 2021-03-20 | Stop reason: HOSPADM

## 2021-03-18 RX ORDER — CETIRIZINE HYDROCHLORIDE 10 MG/1
10 TABLET ORAL DAILY
Status: DISCONTINUED | OUTPATIENT
Start: 2021-03-18 | End: 2021-03-20 | Stop reason: HOSPADM

## 2021-03-18 RX ORDER — DILTIAZEM HYDROCHLORIDE 240 MG/1
240 CAPSULE, COATED, EXTENDED RELEASE ORAL EVERY EVENING
Status: DISCONTINUED | OUTPATIENT
Start: 2021-03-18 | End: 2021-03-20 | Stop reason: HOSPADM

## 2021-03-18 RX ORDER — ACETAMINOPHEN 650 MG/1
650 SUPPOSITORY RECTAL EVERY 4 HOURS PRN
Status: DISCONTINUED | OUTPATIENT
Start: 2021-03-18 | End: 2021-03-20 | Stop reason: HOSPADM

## 2021-03-18 RX ORDER — PHENOL 1.4 %
600 AEROSOL, SPRAY (ML) MUCOUS MEMBRANE DAILY
COMMUNITY

## 2021-03-18 RX ORDER — ONDANSETRON 4 MG/1
4 TABLET, FILM COATED ORAL EVERY 6 HOURS PRN
Status: DISCONTINUED | OUTPATIENT
Start: 2021-03-18 | End: 2021-03-20 | Stop reason: HOSPADM

## 2021-03-18 RX ORDER — SODIUM CHLORIDE 0.9 % (FLUSH) 0.9 %
10 SYRINGE (ML) INJECTION AS NEEDED
Status: DISCONTINUED | OUTPATIENT
Start: 2021-03-18 | End: 2021-03-20 | Stop reason: HOSPADM

## 2021-03-18 RX ORDER — POLYETHYLENE GLYCOL 3350 17 G/17G
17 POWDER, FOR SOLUTION ORAL DAILY
Status: DISCONTINUED | OUTPATIENT
Start: 2021-03-19 | End: 2021-03-20 | Stop reason: HOSPADM

## 2021-03-18 RX ORDER — ACETAMINOPHEN 325 MG/1
650 TABLET ORAL EVERY 4 HOURS PRN
Status: DISCONTINUED | OUTPATIENT
Start: 2021-03-18 | End: 2021-03-20 | Stop reason: HOSPADM

## 2021-03-18 RX ORDER — MONTELUKAST SODIUM 10 MG/1
10 TABLET ORAL NIGHTLY
Status: DISCONTINUED | OUTPATIENT
Start: 2021-03-18 | End: 2021-03-20 | Stop reason: HOSPADM

## 2021-03-18 RX ORDER — LIDOCAINE HYDROCHLORIDE AND EPINEPHRINE 10; 10 MG/ML; UG/ML
10 INJECTION, SOLUTION INFILTRATION; PERINEURAL ONCE
Status: COMPLETED | OUTPATIENT
Start: 2021-03-18 | End: 2021-03-18

## 2021-03-18 RX ORDER — SODIUM CHLORIDE 0.9 % (FLUSH) 0.9 %
10 SYRINGE (ML) INJECTION EVERY 12 HOURS SCHEDULED
Status: DISCONTINUED | OUTPATIENT
Start: 2021-03-18 | End: 2021-03-20 | Stop reason: HOSPADM

## 2021-03-18 RX ORDER — LOSARTAN POTASSIUM 50 MG/1
100 TABLET ORAL DAILY
Status: DISCONTINUED | OUTPATIENT
Start: 2021-03-18 | End: 2021-03-20 | Stop reason: HOSPADM

## 2021-03-18 RX ADMIN — ACETAMINOPHEN 650 MG: 325 TABLET ORAL at 16:36

## 2021-03-18 RX ADMIN — SERTRALINE HYDROCHLORIDE 25 MG: 25 TABLET ORAL at 16:37

## 2021-03-18 RX ADMIN — Medication 3 ML: at 13:18

## 2021-03-18 RX ADMIN — NEBIVOLOL HYDROCHLORIDE 10 MG: 5 TABLET ORAL at 16:52

## 2021-03-18 RX ADMIN — LIDOCAINE HYDROCHLORIDE,EPINEPHRINE BITARTRATE 10 ML: 10; .01 INJECTION, SOLUTION INFILTRATION; PERINEURAL at 13:15

## 2021-03-18 RX ADMIN — DILTIAZEM HYDROCHLORIDE 240 MG: 240 CAPSULE, COATED, EXTENDED RELEASE ORAL at 16:36

## 2021-03-18 RX ADMIN — SODIUM CHLORIDE, PRESERVATIVE FREE 10 ML: 5 INJECTION INTRAVENOUS at 21:05

## 2021-03-18 RX ADMIN — LOSARTAN POTASSIUM 100 MG: 50 TABLET, FILM COATED ORAL at 16:36

## 2021-03-18 RX ADMIN — MONTELUKAST SODIUM 10 MG: 10 TABLET, COATED ORAL at 21:02

## 2021-03-18 RX ADMIN — ACETAMINOPHEN 650 MG: 325 TABLET ORAL at 21:03

## 2021-03-18 RX ADMIN — SODIUM CHLORIDE 500 ML: 9 INJECTION, SOLUTION INTRAVENOUS at 11:22

## 2021-03-18 RX ADMIN — CETIRIZINE HYDROCHLORIDE 10 MG: 10 TABLET, FILM COATED ORAL at 16:37

## 2021-03-18 NOTE — ED PROVIDER NOTES
Subjective   Lanie Ochoa is an 85 y.o. female who presents to the ED with c/o syncope and head injury. Approximately 30 minutes ago the patient was standing in her kitchen drinking coffee when she had an unwitnessed syncopal episode. Her family believes she hit her head on the kitchen floor during the episode as she has a laceration to the back of her head. The patient was unconscious for 1 minute before regaining consciousness and contacting EMS. She denies chest pain, palpitations, dysuria, cough, shortness of breath, lightheadedness, back pain, and neck pain. The patient had a cardiovascular stress test performed last week which was normal, along with a procedure on her lumbar spine at [facility]. There are no other acute complaints at this time.      History provided by:  Patient, EMS personnel and relative  Syncope  Episode history:  Unable to specify  Most recent episode:  Today  Duration:  1 minute  Timing:  Constant  Progression:  Resolved  Chronicity:  New  Context: normal activity    Witnessed: no    Relieved by:  None tried  Worsened by:  Nothing  Ineffective treatments:  None tried  Associated symptoms: recent fall    Associated symptoms: no chest pain, no palpitations and no shortness of breath    Risk factors: no congenital heart disease, no coronary artery disease and no seizures        Review of Systems   HENT:        The patient hit her head during the syncopal episode and has a laceration to her scalp.   Respiratory: Negative for cough and shortness of breath.    Cardiovascular: Positive for syncope. Negative for chest pain and palpitations.   Genitourinary: Negative for dysuria.   Musculoskeletal: Negative for back pain and neck pain.   Neurological: Positive for syncope. Negative for light-headedness.   All other systems reviewed and are negative.      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 10 YEARS AGO   • Cancer (CMS/HCC)     SKIN LEFT ARM; SQUAMOUS CELL   • Carpal tunnel syndrome     RIGHT   • Degenerative disc disease    • Functional murmur    • Gastritis     RESOLVED 03/05/2014   • GERD (gastroesophageal reflux disease)    • Glossalgia 11/05/2015   • Hypertension    • Internal hemorrhoids    • Low back pain    • Osteoarthritis 04/08/2014    HAND   • PNA (pneumonia) 11/5/2015   • Trigger ring finger 03/05/2014       Allergies   Allergen Reactions   • Sulfa Antibiotics Rash       Past Surgical History:   Procedure Laterality Date   • BREAST BIOPSY  2009   • BREAST LUMPECTOMY  07/15/2009    LUMPECTOMY   • COLONOSCOPY  2005   • DILATATION AND CURETTAGE      RESOLVED 06/15/2010   • EYE SURGERY Bilateral 10/15/2012    CATARACT EXTRACTION   • HEMORRHOIDECTOMY     • JOINT REPLACEMENT Left 2010    KNEE   • KNEE ARTHROSCOPY Right 6/13/2017    Procedure: KNEE ARTHROSCOPY WITH PARTIAL MEDIAL AND LATERAL MENISECTOMY AND DEBRIDEMENT OF ARTHRITIS.;  Surgeon: Lauren Krishna MD;  Location: [facility];  Service:    • LUMBAR DISCECTOMY N/A 3/10/2021    Procedure: Lumbar 4-5 , L5-S1 laminectomy with metrx;  Surgeon: Charli Haynes MD;  Location: [facility];  Service: Neurosurgery;  Laterality: N/A;   • TOTAL KNEE ARTHROPLASTY Right 5/21/2018    Procedure: RIGHT TOTAL KNEE ARTHROPLASTY;  Surgeon: Kiko Solano MD;  Location: [facility];  Service: Orthopedics   • TOTAL KNEE ARTHROPLASTY Left        Family History   Problem Relation Age of Onset   • COPD Mother    • Stroke Mother    • Aneurysm Mother    • COPD Father    • Malig Hyperthermia Neg Hx        Social History     Socioeconomic History   • Marital status:      Spouse name: Not on file   • Number of children: Not on file   • Years of education: Not on file   • Highest education level: Not on file    Tobacco Use   • Smoking status: Never Smoker   • Smokeless tobacco: Never Used   • Tobacco comment: No Caffeine use   Substance and Sexual Activity   • Alcohol use: No   • Drug use: No   • Sexual activity: Defer         Objective   Physical Exam  Vitals and nursing note reviewed.   Constitutional:       General: She is not in acute distress.     Appearance: She is well-developed.   HENT:      Head: Normocephalic. Laceration present.      Comments: 2.5 cm gaping laceration to the left occipital scalp. Bleeding is controlled.  Eyes:      General: No scleral icterus.     Conjunctiva/sclera: Conjunctivae normal.   Cardiovascular:      Rate and Rhythm: Normal rate and regular rhythm.      Heart sounds: Murmur heard.   Systolic murmur is present with a grade of 4/6.     Pulmonary:      Effort: Pulmonary effort is normal. No respiratory distress.      Breath sounds: Normal breath sounds.   Abdominal:      Palpations: Abdomen is soft.      Tenderness: There is no abdominal tenderness.   Musculoskeletal:         General: Normal range of motion.      Cervical back: Normal range of motion and neck supple.      Comments: No cervical or thoracic tenderness to palpation.  Stereo-strips are in place on the lumbar spine. Wound appears to be healing well.   Skin:     General: Skin is warm and dry.   Neurological:      General: No focal deficit present.      Mental Status: She is alert and oriented to person, place, and time.   Psychiatric:         Behavior: Behavior normal.         Laceration Repair    Date/Time: 3/18/2021 10:00 AM  Performed by: Juan Alberto Aguirre DO  Authorized by: Juan Alberto Aguirre DO     Consent:     Alternatives discussed:  No treatment  Anesthesia (see MAR for exact dosages):     Anesthesia method:  Local infiltration and topical application    Topical anesthetic:  LET    Local anesthetic:  Lidocaine 1% WITH epi  Laceration details:     Location:  Scalp    Scalp location:  Occipital    Length (cm):  3.5    Depth (mm):   3  Repair type:     Repair type:  Intermediate  Pre-procedure details:     Preparation:  Patient was prepped and draped in usual sterile fashion and imaging obtained to evaluate for foreign bodies  Exploration:     Hemostasis achieved with:  LET, epinephrine and direct pressure    Wound exploration: entire depth of wound probed and visualized      Wound extent: no underlying fracture noted      Contaminated: no    Treatment:     Area cleansed with:  Saline    Amount of cleaning:  Extensive    Irrigation solution:  Sterile saline    Irrigation volume:  400    Irrigation method:  Syringe    Visualized foreign bodies/material removed: no    Skin repair:     Repair method:  Sutures    Suture size:  4-0    Suture material:  Prolene    Suture technique:  Simple interrupted    Number of sutures:  6  Approximation:     Approximation:  Close  Post-procedure details:     Dressing:  Sterile dressing    Patient tolerance of procedure:  Tolerated well, no immediate complications  Comments:      This was a blunt force injury to the occipital scalp from a fall.  Wound edges were irregular with friable edges requiring a moderate amount of revision.             ED Course  ED Course as of Mar 19 0611   Thu Mar 18, 2021   1032 I discussed the case with Dr. Franks, neurosurgery.  He is reviewed the CT imaging.  He states this is a a small blade and should not have any issue.  Outpatient treatment is recommended and patient will follow up with Dr. Allen's PA next week in the office.  He states that Eliane, the NS office contact, in the will be aware of this patient and will arrange for the follow-up appointment.    [CP]      ED Course User Index  [CP] Juan Alberto Aguirre DO     Recent Results (from the past 24 hour(s))   Comprehensive Metabolic Panel    Collection Time: 03/18/21  8:50 AM    Specimen: Blood   Result Value Ref Range    Glucose 121 (H) 65 - 99 mg/dL    BUN 13 8 - 23 mg/dL    Creatinine 0.80 0.57 - 1.00 mg/dL    Sodium 139 136 - 145  mmol/L    Potassium 3.9 3.5 - 5.2 mmol/L    Chloride 103 98 - 107 mmol/L    CO2 29.0 22.0 - 29.0 mmol/L    Calcium 9.4 8.6 - 10.5 mg/dL    Total Protein 6.5 6.0 - 8.5 g/dL    Albumin 3.80 3.50 - 5.20 g/dL    ALT (SGPT) 9 1 - 33 U/L    AST (SGOT) 12 1 - 32 U/L    Alkaline Phosphatase 54 39 - 117 U/L    Total Bilirubin 0.3 0.0 - 1.2 mg/dL    eGFR Non African Amer 68 >60 mL/min/1.73    Globulin 2.7 gm/dL    A/G Ratio 1.4 g/dL    BUN/Creatinine Ratio 16.3 7.0 - 25.0    Anion Gap 7.0 5.0 - 15.0 mmol/L   Magnesium    Collection Time: 03/18/21  8:50 AM    Specimen: Blood   Result Value Ref Range    Magnesium 2.0 1.6 - 2.4 mg/dL   Troponin    Collection Time: 03/18/21  8:50 AM    Specimen: Blood   Result Value Ref Range    Troponin T <0.010 0.000 - 0.030 ng/mL   Light Blue Top    Collection Time: 03/18/21  8:50 AM   Result Value Ref Range    Extra Tube hold for add-on    Green Top (Gel)    Collection Time: 03/18/21  8:50 AM   Result Value Ref Range    Extra Tube Hold for add-ons.    Lavender Top    Collection Time: 03/18/21  8:50 AM   Result Value Ref Range    Extra Tube hold for add-on    Gold Top - SST    Collection Time: 03/18/21  8:50 AM   Result Value Ref Range    Extra Tube Hold for add-ons.    Gray Top - Ice    Collection Time: 03/18/21  8:50 AM   Result Value Ref Range    Extra Tube Hold for add-ons.    CBC Auto Differential    Collection Time: 03/18/21  8:50 AM    Specimen: Blood   Result Value Ref Range    WBC 5.13 3.40 - 10.80 10*3/mm3    RBC 4.34 3.77 - 5.28 10*6/mm3    Hemoglobin 13.4 12.0 - 15.9 g/dL    Hematocrit 41.8 34.0 - 46.6 %    MCV 96.3 79.0 - 97.0 fL    MCH 30.9 26.6 - 33.0 pg    MCHC 32.1 31.5 - 35.7 g/dL    RDW 13.0 12.3 - 15.4 %    RDW-SD 45.9 37.0 - 54.0 fl    MPV 9.8 6.0 - 12.0 fL    Platelets 275 140 - 450 10*3/mm3    Neutrophil % 64.4 42.7 - 76.0 %    Lymphocyte % 17.5 (L) 19.6 - 45.3 %    Monocyte % 9.4 5.0 - 12.0 %    Eosinophil % 5.8 0.3 - 6.2 %    Basophil % 0.8 0.0 - 1.5 %    Immature  Grans % 2.1 (H) 0.0 - 0.5 %    Neutrophils, Absolute 3.30 1.70 - 7.00 10*3/mm3    Lymphocytes, Absolute 0.90 0.70 - 3.10 10*3/mm3    Monocytes, Absolute 0.48 0.10 - 0.90 10*3/mm3    Eosinophils, Absolute 0.30 0.00 - 0.40 10*3/mm3    Basophils, Absolute 0.04 0.00 - 0.20 10*3/mm3    Immature Grans, Absolute 0.11 (H) 0.00 - 0.05 10*3/mm3    nRBC 0.0 0.0 - 0.2 /100 WBC   COVID-19, [facility] IN-HOUSE,NASAL Swab (NO TRANSPORT MEDIA) 2 HR TAT - Swab, Nasopharynx    Collection Time: 03/18/21 11:45 AM    Specimen: Nasopharynx; Swab   Result Value Ref Range    COVID19 Presumptive Negative Presumptive Negative - Ref. Range   POC Glucose Once    Collection Time: 03/18/21  6:05 PM    Specimen: Blood   Result Value Ref Range    Glucose 113 70 - 130 mg/dL   Urinalysis With Culture If Indicated - Urine, Catheter In/Out    Collection Time: 03/18/21  8:59 PM    Specimen: Urine, Catheter In/Out   Result Value Ref Range    Color, UA Yellow Yellow, Straw    Appearance, UA Clear Clear    pH, UA 7.5 5.0 - 8.0    Specific Gravity, UA 1.010 1.001 - 1.030    Glucose, UA Negative Negative    Ketones, UA Negative Negative    Bilirubin, UA Negative Negative    Blood, UA Negative Negative    Protein, UA Negative Negative    Leuk Esterase, UA Negative Negative    Nitrite, UA Negative Negative    Urobilinogen, UA 0.2 E.U./dL 0.2 - 1.0 E.U./dL     Note: In addition to lab results from this visit, the labs listed above may include labs taken at another facility or during a different encounter within the last 24 hours. Please correlate lab times with ED admission and discharge times for further clarification of the services performed during this visit.    CT Head Without Contrast   Final Result   Small area of acute traumatic subarachnoid hemorrhage noted   in the right parietal lobe with minimal adjacent cerebral edema. No   global mass effect or midline shift. The basal cisterns are patent.       Advanced multilevel cervical spondylosis, otherwise  without evidence of   acute fracture or traumatic malalignment.           This report was finalized on 3/18/2021 10:08 AM by Danyel Ponce.          CT Cervical Spine Without Contrast   Final Result   Small area of acute traumatic subarachnoid hemorrhage noted   in the right parietal lobe with minimal adjacent cerebral edema. No   global mass effect or midline shift. The basal cisterns are patent.       Advanced multilevel cervical spondylosis, otherwise without evidence of   acute fracture or traumatic malalignment.           This report was finalized on 3/18/2021 10:08 AM by Danyel Ponce.          XR Spine Lumbar 2 or 3 View   Final Result   Advanced multilevel lumbar spondylosis is present, likely   most advanced at L4-5 with moderate grade 1 anterolisthesis. Vertebral   body heights are otherwise maintained without definite evidence of acute   fracture or traumatic malalignment. There are advanced bilateral hip   arthrosis changes, somewhat greater on the right.       This report was finalized on 3/18/2021 10:15 AM by Danyel Ponce.          XR Chest 1 View   Final Result   Minimal pulmonary vascular engorgement, otherwise without   evidence of overt edema or other acute disease in the chest.       This report was finalized on 3/18/2021 10:02 AM by Danyel Ponce.            Vitals:    03/18/21 1700 03/18/21 1841 03/18/21 2337 03/19/21 0405   BP: (!) 117/106 128/55 138/57    BP Location: Right arm Right arm Right arm    Patient Position: Standing Lying Lying    Pulse:  67 63 68   Resp:  18 18    Temp:   98.2 °F (36.8 °C)    TempSrc:   Oral    SpO2:       Weight:       Height:           ECG/EMG Results (last 24 hours)     Procedure Component Value Units Date/Time    ECG 12 Lead [[id number]] Collected: 03/18/21 0845     Updated: 03/18/21 0858        ECG 12 Lead                   Hunt & Silverman Classification (for subarachnoid hemorrhage) reviewed and/or performed as part of the patient evaluation and  treatment planning process.  The result associated with this review/performance is: 1           MDM    Final diagnoses:   Syncope, unspecified syncope type   Scalp laceration, initial encounter   SAH (subarachnoid hemorrhage) (CMS/[facility])   History of recent hospitalization       Documentation assistance provided by rosa Fleming.  Information recorded by the scribe was done at my direction and has been verified and validated by me.     Bari Fleming  03/18/21 1308       Bari Fleming  03/19/21 0611       Juan Alberto Aguirre DO  03/20/21 1242

## 2021-03-18 NOTE — H&P
[facility]  HISTORY AND PHYSICAL    Patient Name: Lanie Ochoa  : 1935  MRN: [id number]  Primary Care Physician: Paris Mejía MD  Date of admission: 3/18/2021      Subjective   Subjective     Chief Complaint:  syncope    HPI:  Lanie Ochoa is a 85 y.o. female with PMH of HTN, HLD, aortic stenosis, recent lumbar spine surgery in [place] who presented today after a syncopal event while at home. Pt reports that she was standing at the kitchen counter drinking coffee and suddenly passed out and hit the floor. She lost consciousness for about one minute per pt's daughter-in-law who was present at the time. She denies any loss of bowel or bladder continence. She denies any prodromal symptoms.  She was found to have a small SAH on imaging in the ED.         COVID Details: [x] No Symptoms  Symptoms: [] Fever []  Cough [] Shortness of breath [] Change in taste or smell  Risks:  [] Direct Exposure [] High risk facility   Date of Onset:  ____  Date of first positive COVID test:     Review of Systems   General- no F/C, no weight loss or gain, no fatigue  HEENT- no blurry vision, no nasal congestion, no hearing loss, no sore throat, no dysphagia, no oral ulcers, no dental caries, no bleeding gums  CVS- no chest pain, no palpitations  Pulm- no SOA, no cough, no orthopnea, no PND  GI- no diarrhea, no constipation, no BRBPR, no nausea, no vomiting  MSK- no joint pain, no swelling, no erythema  - no dysuria, no hematuria  Neuro- no headaches, no focal motor deficits  Psych- no SI/ HI, no depression/anxiety    All other systems reviewed and are negative.     Personal History     Past Medical History:   Diagnosis Date   • Anxiety    • BMS (burning mouth syndrome) 2015   • Breast cancer (CMS/HCC)    • Cancer (CMS/HCC)      HX LEFT STAGE 1 INVASIVE DUCTAL CARCINOMA ER AND IA POSITIVE, HER-2/LOYDA NEGATIVE, HAD LUMPECTOMY AND RADIATION APPROX. 10 YEARS AGO   •  Cancer (CMS/HCC)     SKIN LEFT ARM; SQUAMOUS CELL   • Carpal tunnel syndrome     RIGHT   • Degenerative disc disease    • Functional murmur    • Gastritis     RESOLVED 03/05/2014   • GERD (gastroesophageal reflux disease)    • Glossalgia 11/05/2015   • Hypertension    • Internal hemorrhoids    • Low back pain    • Osteoarthritis 04/08/2014    HAND   • PNA (pneumonia) 11/5/2015   • Trigger ring finger 03/05/2014       Past Surgical History:   Procedure Laterality Date   • BREAST BIOPSY  2009   • BREAST LUMPECTOMY  07/15/2009    LUMPECTOMY   • COLONOSCOPY  2005   • DILATATION AND CURETTAGE      RESOLVED 06/15/2010   • EYE SURGERY Bilateral 10/15/2012    CATARACT EXTRACTION   • HEMORRHOIDECTOMY     • JOINT REPLACEMENT Left 2010    KNEE   • KNEE ARTHROSCOPY Right 6/13/2017    Procedure: KNEE ARTHROSCOPY WITH PARTIAL MEDIAL AND LATERAL MENISECTOMY AND DEBRIDEMENT OF ARTHRITIS.;  Surgeon: Lauren Krishna MD;  Location: [facility];  Service:    • LUMBAR DISCECTOMY N/A 3/10/2021    Procedure: Lumbar 4-5 , L5-S1 laminectomy with metrx;  Surgeon: Charli Haynes MD;  Location: [facility];  Service: Neurosurgery;  Laterality: N/A;   • TOTAL KNEE ARTHROPLASTY Right 5/21/2018    Procedure: RIGHT TOTAL KNEE ARTHROPLASTY;  Surgeon: Kiko Solano MD;  Location: [facility];  Service: Orthopedics   • TOTAL KNEE ARTHROPLASTY Left        Family History: family history includes Aneurysm in her mother; COPD in her father and mother; Stroke in her mother. Otherwise pertinent FHx was reviewed and unremarkable.     Social History:  reports that she has never smoked. She has never used smokeless tobacco. She reports that she does not drink alcohol and does not use drugs.  Social History     Social History Narrative   • Not on file       Medications:  Available home medication information reviewed.    No current facility-administered medications on file prior to encounter.     Current Outpatient Medications on File Prior to  Encounter   Medication Sig Dispense Refill   • calcium carbonate (OS-NELLI) 600 MG tablet Take 600 mg by mouth Daily.     • cetirizine (zyrTEC) 10 MG tablet Take 1 tablet by mouth Daily. 90 tablet 3   • Cholecalciferol (VITAMIN D3) 400 UNITS capsule Take 1 tablet by mouth Daily.     • dilTIAZem CD (CARDIZEM CD) 240 MG 24 hr capsule Take 1 capsule by mouth Daily. (Patient taking differently: Take 240 mg by mouth Every Evening.) 90 capsule 1   • HYDROcodone-acetaminophen (NORCO) 5-325 MG per tablet Take 1 tablet by mouth Every 4 (Four) Hours As Needed for Moderate Pain  (pain). 35 tablet 0   • losartan (COZAAR) 100 MG tablet Take 1 tablet by mouth Daily. 90 tablet 1   • montelukast (Singulair) 10 MG tablet Take 1 tablet by mouth Every Night. 90 tablet 1   • nebivolol (Bystolic) 10 MG tablet Take 1 tablet by mouth Every Morning. 90 tablet 3   • sertraline (ZOLOFT) 25 MG tablet Take 1 tablet by mouth Daily. 200001 (Patient taking differently: Take 25 mg by mouth Every Evening.) 90 tablet 1   • [DISCONTINUED] CALCIUM PO Take 1 tablet by mouth Daily.       Allergies   Allergen Reactions   • Sulfa Antibiotics Rash       Objective   Objective     Vital Signs:   Temp:  [97.9 °F (36.6 °C)] 97.9 °F (36.6 °C)  Heart Rate:  [67] 67  Resp:  [18] 18  BP: (176)/(75) 176/75       Physical Exam   Constitutional: Awake, alert  Eyes: PERRLA, sclerae anicteric, no conjunctival injection  HENT: dried blood on scalp,  mucous membranes moist  Neck: Supple, no thyromegaly, no lymphadenopathy, trachea midline  Respiratory: Clear to auscultation bilaterally, nonlabored respirations   Cardiovascular: RRR, grade III/VI CHANTE heard best at RUSB, no rubs or gallops, palpable pedal pulses bilaterally  Gastrointestinal: Positive bowel sounds, soft, nontender, nondistended  Musculoskeletal: No bilateral ankle edema, no clubbing or cyanosis to extremities  Psychiatric: Appropriate affect, cooperative  Neurologic: Oriented x 3, strength symmetric in all  extremities, Cranial Nerves grossly intact to confrontation, speech clear  Skin: No rashes    Result Review:  I have personally reviewed the results from the time of this admission to 03/18/21 11:28 AM EDT and agree with these findings:  [x]  Laboratory  []  Microbiology  [x]  Radiology  [x]  EKG/Telemetry   []  Cardiology/Vascular   []  Pathology  []  Old records  []  Other:  Most notable findings include: SAH on imaging    LAB RESULTS:      Lab 03/18/21  0850   WBC 5.13   HEMOGLOBIN 13.4   HEMATOCRIT 41.8   PLATELETS 275   NEUTROS ABS 3.30   IMMATURE GRANS (ABS) 0.11*   LYMPHS ABS 0.90   MONOS ABS 0.48   EOS ABS 0.30   MCV 96.3         Lab 03/18/21  0850   SODIUM 139   POTASSIUM 3.9   CHLORIDE 103   CO2 29.0   ANION GAP 7.0   BUN 13   CREATININE 0.80   GLUCOSE 121*   CALCIUM 9.4   MAGNESIUM 2.0         Lab 03/18/21  0850   TOTAL PROTEIN 6.5   ALBUMIN 3.80   GLOBULIN 2.7   ALT (SGPT) 9   AST (SGOT) 12   BILIRUBIN 0.3   ALK PHOS 54         Lab 03/18/21  0850   TROPONIN T <0.010                 Brief Urine Lab Results     None        Microbiology Results (last 10 days)     ** No results found for the last 240 hours. **          XR Spine Lumbar 2 or 3 View    Result Date: 3/18/2021  EXAMINATION: XR SPINE LUMBAR 2 OR 3 VW-  INDICATION: trauma  COMPARISON: NONE  FINDINGS: Advanced multilevel lumbar spondylosis is present, likely most advanced at L4-5 with moderate grade 1 anterolisthesis. Vertebral body heights are otherwise maintained without definite evidence of acute fracture or traumatic malalignment. There are advanced bilateral hip arthrosis changes, somewhat greater on the right.      Impression: Advanced multilevel lumbar spondylosis is present, likely most advanced at L4-5 with moderate grade 1 anterolisthesis. Vertebral body heights are otherwise maintained without definite evidence of acute fracture or traumatic malalignment. There are advanced bilateral hip arthrosis changes, somewhat greater on the right.   This report was finalized on 3/18/2021 10:15 AM by Danyel Ponce.      CT Head Without Contrast    Result Date: 3/18/2021  EXAMINATION: CT CERVICAL SPINE WO CONTRAST-, CT HEAD WO CONTRAST-  INDICATION: trauma  TECHNIQUE: Axial noncontrast CT of the head and cervical spine with multiplanar reconstruction  The radiation dose reduction device was turned on for each scan per the ALARA (As Low as Reasonably Achievable) protocol.  COMPARISON: NONE  FINDINGS: CT head: There is sulcal hyperdensity identified involving the right parietal lobe compatible with small area of traumatic subarachnoid hemorrhage. There is some minimal associated right-sided cerebral edema, otherwise without evidence of global mass effect or midline shift. The basal cisterns are grossly patent. No evidence of acute extra-axial hemorrhage. The ventricles are normal in size and configuration accounting for some mild surrounding volume loss. The orbits are normal and the paranasal sinuses are grossly clear. The calvarium is intact.  Cervical spine: Advanced multilevel cervical spondylosis changes are evident with areas of disc osteophyte complex formation and facet arthropathy. There is some associated straightening of usual cervical lordosis, otherwise without evidence of listhesis or subluxation. The dens is intact and there is no evidence of acute fracture. The paraspinal soft tissues are unremarkable.      Impression: Small area of acute traumatic subarachnoid hemorrhage noted in the right parietal lobe with minimal adjacent cerebral edema. No global mass effect or midline shift. The basal cisterns are patent.  Advanced multilevel cervical spondylosis, otherwise without evidence of acute fracture or traumatic malalignment.   This report was finalized on 3/18/2021 10:08 AM by Danyel Ponce.      CT Cervical Spine Without Contrast    Result Date: 3/18/2021  EXAMINATION: CT CERVICAL SPINE WO CONTRAST-, CT HEAD WO CONTRAST-  INDICATION: trauma   TECHNIQUE: Axial noncontrast CT of the head and cervical spine with multiplanar reconstruction  The radiation dose reduction device was turned on for each scan per the ALARA (As Low as Reasonably Achievable) protocol.  COMPARISON: NONE  FINDINGS: CT head: There is sulcal hyperdensity identified involving the right parietal lobe compatible with small area of traumatic subarachnoid hemorrhage. There is some minimal associated right-sided cerebral edema, otherwise without evidence of global mass effect or midline shift. The basal cisterns are grossly patent. No evidence of acute extra-axial hemorrhage. The ventricles are normal in size and configuration accounting for some mild surrounding volume loss. The orbits are normal and the paranasal sinuses are grossly clear. The calvarium is intact.  Cervical spine: Advanced multilevel cervical spondylosis changes are evident with areas of disc osteophyte complex formation and facet arthropathy. There is some associated straightening of usual cervical lordosis, otherwise without evidence of listhesis or subluxation. The dens is intact and there is no evidence of acute fracture. The paraspinal soft tissues are unremarkable.      Impression: Small area of acute traumatic subarachnoid hemorrhage noted in the right parietal lobe with minimal adjacent cerebral edema. No global mass effect or midline shift. The basal cisterns are patent.  Advanced multilevel cervical spondylosis, otherwise without evidence of acute fracture or traumatic malalignment.   This report was finalized on 3/18/2021 10:08 AM by Danyel Ponce.      XR Chest 1 View    Result Date: 3/18/2021  EXAMINATION: XR CHEST 1 VW-  INDICATION: syncope  COMPARISON: NONE  FINDINGS: There is mild cardiac enlargement and central pulmonary vascular prominence. There is no evidence of overt edema or focal consolidation. No significant pleural effusion or distinct pneumothorax.      Impression: Minimal pulmonary vascular  engorgement, otherwise without evidence of overt edema or other acute disease in the chest.  This report was finalized on 3/18/2021 10:02 AM by Danyel Ponce.        Results for orders placed during the hospital encounter of 02/13/20    Adult Transthoracic Echo Complete W/ Cont if Necessary Per Protocol    Interpretation Summary  · Estimated EF = 64%.  · Left ventricular systolic function is normal.  · There is calcification of the aortic valve.  · Mild aortic valve stenosis is present.  · Peak velocity of the flow distal to the aortic valve is 268.0 cm/s.  · Mild mitral valve regurgitation is present  · Mild tricuspid valve regurgitation is present.  · Calculated right ventricular systolic pressure from tricuspid regurgitation is 37 mmHg.      Assessment/Plan   Assessment & Plan     Active Hospital Problems    Diagnosis  POA   • Syncope [R55]  Yes     Priority: High   • Subarachnoid hemorrhage (CMS/HCC) [I60.9]  Unknown     Priority: High   • RBBB (right bundle branch block) [I45.10]  Unknown     Priority: Medium   • Spinal stenosis of lumbar region with neurogenic claudication [M48.062]  Yes     Priority: Medium     Added automatically from request for surgery [id number]     • Lumbar spinal stenosis [M48.061]  Yes     Priority: Medium   • Benign essential hypertension [I10]  Yes     Priority: Low   • HLD (hyperlipidemia) [E78.5]  Yes     Priority: Low       Ms. Ochoa is an 84 yo WF w/ PMH of HLD, HTN, aortic stenosis,  spinal stenosis s/p recent L4-5 laminectomy in [place] who presented after a syncopal event with SAH.      Plan:    Syncope  --will get TTE here, she does have a systolic murmur, but she has aortic stenosis as per last TTE in our system (2/2020)  --monitor on tele  --BID orthostatics  --of note, she does have a relatively new RBBB but was seen recently by Cardiology in [place] and had a stress test for preop clearance- stress test was unremarkable      SAH  --Dr. Allen was contacted by  the ED, no intervention and no need to reimage   --monitor and follow up outpatient NSGY    HTN  -- resume home meds  --check orthostatics    HLD  --continue statin      DVT prophylaxis:  Mechanical       CODE STATUS:    Code Status and Medical Interventions:   Ordered at: 03/18/21 1247     Limited Support to NOT Include:    Antiarrhythmic Drugs    Antibiotics    Blood Products    NIPPV (Non-Invasive Positive Pressure Support)     Code Status:    No CPR     Medical Interventions (Level of Support Prior to Arrest):    Limited       Admission Status:  I believe this patient meets OBSERVATION status, however if further evaluation or treatment plans warrant, status may change.  Based upon current information, I predict patient's care encounter to be less than or equal to 2 midnights.    Xiao Garcia MD  03/18/21

## 2021-03-18 NOTE — PROGRESS NOTES
Discharge Planning Assessment  [facility]     Patient Name: Lanie Ochoa  MRN: [id number]  Today's Date: 3/18/2021    Admit Date: 3/18/2021    Discharge Needs Assessment     Row Name 03/18/21 4177       Living Environment    Lives With  alone;other (see comments) Currently staying with son in [place]    Current Living Arrangements  home/apartment/condo    Primary Care Provided by  self    Provides Primary Care For  no one    Family Caregiver if Needed  child(huey), adult    Family Caregiver Names  Abilio Ochoa - Relation: Son - Home: [phone]    Quality of Family Relationships  helpful;involved;supportive    Able to Return to Prior Arrangements  yes       Resource/Environmental Concerns    Resource/Environmental Concerns  none    Transportation Concerns  car, none       Transition Planning    Patient/Family Anticipates Transition to  home with family    Patient/Family Anticipated Services at Transition  none    Transportation Anticipated  family or friend will provide       Discharge Needs Assessment    Readmission Within the Last 30 Days  no previous admission in last 30 days    Equipment Currently Used at Home  cane, straight    Concerns to be Addressed  denies needs/concerns at this time    Anticipated Changes Related to Illness  none    Current Discharge Risk  lives alone        Discharge Plan     Row Name 03/18/21 0731       Plan    Plan  Initial    Plan Comments  CM spoke with patient and son @ bedside. Patient resides in [place], [facility]. Patient has been staying with son after a surgery in [place]. Patient is independent with ADL's. Patient states she uses a cane as needed for mobility assistance. Patient denies any current home health or outpatient services. Patient's plan is to return home with her son @ discharge. CM following. .    Final Discharge Disposition Code  30 - still a patient        Continued Care and Services - Admitted Since 3/18/2021    Coordination has not been  started for this encounter.         Demographic Summary     Row Name 03/18/21 1226       General Information    Arrived From  home    Referral Source  emergency department    Reason for Consult  discharge planning    Preferred Language  English     Used During This Interaction  no    General Information Comments  PCP: Paris Mejía       Contact Information    Contact Information Comments  Abilio Ochoa - Relation: Son - Home: [phone]        Functional Status     Row Name 03/18/21 1227       Functional Status    Usual Activity Tolerance  moderate    Current Activity Tolerance  fair       Functional Status, IADL    Medications  independent    Meal Preparation  independent    Housekeeping  independent    Laundry  independent    Shopping  independent       Mental Status    General Appearance WDL  WDL       Mental Status Summary    Recent Changes in Mental Status/Cognitive Functioning  no changes       Employment/    Employment Status  retired                Marisela Phillips RN

## 2021-03-18 NOTE — CONSULTS
NEUROSURGERY CONSULTATION    Referring Provider: Dr. Juan Alberto Aguirre    Patient Care Team:  Paris Mejía MD as PCP - General (Family Medicine)    Chief Complaint: Fall with head injury.    History of Present Illness: Ms. 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  About a week ago she underwent a lumbar decompression procedure in [place].  Today she simply felt lightheaded and fell to the floor.  It is unclear whether she completely lost consciousness.  She denies any headache, nausea, vomiting.  She has no chest pain or shortness of breath.  She denies any neck pain.      Review of Systems:  Musculoskeletal and Neurological systems were reviewed and are negative except for:  Musculoskeletal: positive for back pain.    History:  Past Medical History:   Diagnosis Date   • Anxiety    • BMS (burning mouth syndrome) 11/5/2015   • Breast cancer (CMS/HCC)    • Cancer (CMS/HCC)      HX LEFT STAGE 1 INVASIVE DUCTAL CARCINOMA ER AND NM POSITIVE, HER-2/LOYDA NEGATIVE, HAD LUMPECTOMY AND RADIATION APPROX. 10 YEARS AGO   • Cancer (CMS/HCC)     SKIN LEFT ARM; SQUAMOUS CELL   • Carpal tunnel syndrome     RIGHT   • Degenerative disc disease    • Functional murmur    • Gastritis     RESOLVED 03/05/2014   • GERD (gastroesophageal reflux disease)    • Glossalgia 11/05/2015   • Hypertension    • Internal hemorrhoids    • Low back pain    • Osteoarthritis 04/08/2014    HAND   • PNA (pneumonia) 11/5/2015   • Trigger ring finger 03/05/2014   ,   Past Surgical History:   Procedure Laterality Date   • BREAST BIOPSY  2009   • BREAST LUMPECTOMY  07/15/2009    LUMPECTOMY   • COLONOSCOPY  2005   • DILATATION AND CURETTAGE      RESOLVED 06/15/2010   • EYE SURGERY Bilateral 10/15/2012    CATARACT EXTRACTION   • HEMORRHOIDECTOMY     • JOINT REPLACEMENT Left 2010    KNEE   • KNEE ARTHROSCOPY Right 6/13/2017    Procedure: KNEE ARTHROSCOPY WITH PARTIAL MEDIAL AND LATERAL MENISECTOMY AND DEBRIDEMENT  "OF ARTHRITIS.;  Surgeon: Lauren Krishna MD;  Location: [facility];  Service:    • LUMBAR DISCECTOMY N/A 3/10/2021    Procedure: Lumbar 4-5 , L5-S1 laminectomy with metrx;  Surgeon: Charli Haynes MD;  Location: [facility];  Service: Neurosurgery;  Laterality: N/A;   • TOTAL KNEE ARTHROPLASTY Right 5/21/2018    Procedure: RIGHT TOTAL KNEE ARTHROPLASTY;  Surgeon: Kiko Solano MD;  Location: [facility];  Service: Orthopedics   • TOTAL KNEE ARTHROPLASTY Left    ,   Family History   Problem Relation Age of Onset   • COPD Mother    • Stroke Mother    • Aneurysm Mother    • COPD Father    • Malig Hyperthermia Neg Hx    ,   Social History     Tobacco Use   • Smoking status: Never Smoker   • Smokeless tobacco: Never Used   • Tobacco comment: No Caffeine use   Substance Use Topics   • Alcohol use: No   • Drug use: No   , (Not in a hospital admission)   Allergies:  Sulfa antibiotics      Physical Exam:  Vital Signs: Blood pressure 155/68, pulse 71, temperature 97.9 °F (36.6 °C), temperature source Oral, resp. rate 18, height 157.5 cm (62\"), weight 58.1 kg (128 lb), SpO2 97 %, not currently breastfeeding.   HEENT: There is an abrasion or small laceration in the left parieto-occipital region.  There is matting of her hair with blood in it is difficult to determine the extent of it.  MUSCULOSKELETAL:  Neck tenderness to palpation is not observed.   ROM in neck is normal.  There is some ecchymosis on her left forearm this been present since her surgical intervention last week.  NEUROLOGICAL:  Patient is awake and alert.  She is well oriented.  She follows all commands.  Her speech is fluent.  Naming, repetition, comprehension are intact.  Strength is intact in the upper and lower extremities to direct testing.  There is no pronator drift.  Muscle tone is normal throughout.  Sensation is intact to light touch testing throughout.  Deep tendon reflexes are 1+ and symmetrical.  Nury's Sign is negative bilaterally. "   CRANIAL NERVES:  Cranial Nerve II: Visual fields are full to confrontation.  Cranial Nerve III, IV, and VI: PERRLADC. Extraocular movements are intact.  Nystagmus is not present.  Cranial Nerve V: Facial sensation is intact to light touch.  Cranial Nerve VII: Muscles of facial expression demonstate no weakness or asymmetry.  Cranial Nerve VIII: Hearing is intact to finger rub bilaterally.  Cranial Nerve IX and X: Palate elevates symmetrically.  Cranial Nerve XI: Shoulder shrug is intact bilaterally.  Cranial Nerve XII: Tongue is midline without evidence of atrophy or fasciculation.      Data Review:  CT of the brain demonstrates a small area of traumatic subarachnoid hemorrhage in the right posterior frontal region.  There is no mass-effect or shift.  There is some diffuse age-appropriate atrophy.    CT of the cervical spine demonstrates pronounced spondylosis from C3-4 down to C6-7.  There is straightening of the cervical spine.  There is no obvious fracture or subluxation.    Diagnosis:  1.  Small traumatic subarachnoid hemorrhage.  2.  History of recent lumbar decompression in [place].  3.  History of hypertension.    Treatment Recommendations:  The patient is being admitted to the medical service for overnight observation given her syncope or near syncope.  There is no current role for surgical intervention.  I would not obtain further CT imaging of her brain unless she develops severe headache or alteration in her neurologic status.      Charli Allen MD  03/18/21  13:14 EDT

## 2021-03-18 NOTE — TELEPHONE ENCOUNTER
Dr. Allen has left me a message to schedule this patient for an appointment with Marlee and CT head without contrast. Can someone please place this order so I can get it scheduled?

## 2021-03-19 ENCOUNTER — READMISSION MANAGEMENT (OUTPATIENT)
Dept: CALL CENTER | Facility: HOSPITAL | Age: 86
End: 2021-03-19

## 2021-03-19 ENCOUNTER — APPOINTMENT (OUTPATIENT)
Dept: CARDIOLOGY | Facility: HOSPITAL | Age: 86
End: 2021-03-19

## 2021-03-19 VITALS
WEIGHT: 129.19 LBS | SYSTOLIC BLOOD PRESSURE: 111 MMHG | HEART RATE: 66 BPM | RESPIRATION RATE: 16 BRPM | BODY MASS INDEX: 23.77 KG/M2 | OXYGEN SATURATION: 97 % | TEMPERATURE: 97.6 F | DIASTOLIC BLOOD PRESSURE: 57 MMHG | HEIGHT: 62 IN

## 2021-03-19 LAB
ANION GAP SERPL CALCULATED.3IONS-SCNC: 10 MMOL/L (ref 5–15)
BH CV ECHO MEAS - AO MAX PG (FULL): 27.3 MMHG
BH CV ECHO MEAS - AO MAX PG: 46 MMHG
BH CV ECHO MEAS - AO MEAN PG (FULL): 13 MMHG
BH CV ECHO MEAS - AO MEAN PG: 23 MMHG
BH CV ECHO MEAS - AO ROOT AREA (BSA CORRECTED): 1.7
BH CV ECHO MEAS - AO ROOT AREA: 5.7 CM^2
BH CV ECHO MEAS - AO ROOT DIAM: 2.7 CM
BH CV ECHO MEAS - AO V2 MAX: 339 CM/SEC
BH CV ECHO MEAS - AO V2 MEAN: 186.2 CM/SEC
BH CV ECHO MEAS - AO V2 VTI: 65.4 CM
BH CV ECHO MEAS - ASC AORTA: 2.5 CM
BH CV ECHO MEAS - AVA(I,A): 1.4 CM^2
BH CV ECHO MEAS - AVA(I,D): 1.2 CM^2
BH CV ECHO MEAS - AVA(V,A): 1.3 CM^2
BH CV ECHO MEAS - AVA(V,D): 1.3 CM^2
BH CV ECHO MEAS - BSA(HAYCOCK): 1.6 M^2
BH CV ECHO MEAS - BSA: 1.6 M^2
BH CV ECHO MEAS - BZI_BMI: 23.7 KILOGRAMS/M^2
BH CV ECHO MEAS - BZI_METRIC_HEIGHT: 157 CM
BH CV ECHO MEAS - BZI_METRIC_WEIGHT: 58.5 KG
BH CV ECHO MEAS - EDV(CUBED): 85.2 ML
BH CV ECHO MEAS - EDV(MOD-SP2): 93.7 ML
BH CV ECHO MEAS - EDV(MOD-SP4): 95.3 ML
BH CV ECHO MEAS - EDV(TEICH): 87.7 ML
BH CV ECHO MEAS - EF(CUBED): 74.2 %
BH CV ECHO MEAS - EF(MOD-BP): 54.8 %
BH CV ECHO MEAS - EF(MOD-SP2): 52.1 %
BH CV ECHO MEAS - EF(MOD-SP4): 57.9 %
BH CV ECHO MEAS - EF(TEICH): 66.3 %
BH CV ECHO MEAS - ESV(CUBED): 22 ML
BH CV ECHO MEAS - ESV(MOD-SP2): 44.9 ML
BH CV ECHO MEAS - ESV(MOD-SP4): 40.1 ML
BH CV ECHO MEAS - ESV(TEICH): 29.6 ML
BH CV ECHO MEAS - FS: 36.4 %
BH CV ECHO MEAS - IVS/LVPW: 0.91
BH CV ECHO MEAS - IVSD: 0.8 CM
BH CV ECHO MEAS - LA DIMENSION: 3.8 CM
BH CV ECHO MEAS - LA/AO: 1.4
BH CV ECHO MEAS - LAD MAJOR: 5.4 CM
BH CV ECHO MEAS - LAT PEAK E' VEL: 6 CM/SEC
BH CV ECHO MEAS - LATERAL E/E' RATIO: 11.9
BH CV ECHO MEAS - LV DIASTOLIC VOL/BSA (35-75): 60.2 ML/M^2
BH CV ECHO MEAS - LV MASS(C)D: 158.2 GRAMS
BH CV ECHO MEAS - LV MASS(C)DI: 99.9 GRAMS/M^2
BH CV ECHO MEAS - LV MAX PG: 7.7 MMHG
BH CV ECHO MEAS - LV MEAN PG: 4 MMHG
BH CV ECHO MEAS - LV SYSTOLIC VOL/BSA (12-30): 25.3 ML/M^2
BH CV ECHO MEAS - LV V1 MAX: 139 CM/SEC
BH CV ECHO MEAS - LV V1 MEAN: 83.9 CM/SEC
BH CV ECHO MEAS - LV V1 VTI: 32.5 CM
BH CV ECHO MEAS - LVIDD: 4.4 CM
BH CV ECHO MEAS - LVIDS: 2.8 CM
BH CV ECHO MEAS - LVLD AP2: 8.2 CM
BH CV ECHO MEAS - LVLD AP4: 7.7 CM
BH CV ECHO MEAS - LVLS AP2: 6.9 CM
BH CV ECHO MEAS - LVLS AP4: 6.5 CM
BH CV ECHO MEAS - LVOT AREA (M): 2.8 CM^2
BH CV ECHO MEAS - LVOT AREA: 2.8 CM^2
BH CV ECHO MEAS - LVOT DIAM: 1.9 CM
BH CV ECHO MEAS - LVPWD: 0.8 CM
BH CV ECHO MEAS - MED PEAK E' VEL: 6.9 CM/SEC
BH CV ECHO MEAS - MEDIAL E/E' RATIO: 10.4
BH CV ECHO MEAS - MV A MAX VEL: 90.8 CM/SEC
BH CV ECHO MEAS - MV DEC SLOPE: 319 CM/SEC^2
BH CV ECHO MEAS - MV DEC TIME: 0.2 SEC
BH CV ECHO MEAS - MV E MAX VEL: 71.1 CM/SEC
BH CV ECHO MEAS - MV E/A: 0.78
BH CV ECHO MEAS - MV MAX PG: 5.9 MMHG
BH CV ECHO MEAS - MV MEAN PG: 2 MMHG
BH CV ECHO MEAS - MV P1/2T MAX VEL: 122 CM/SEC
BH CV ECHO MEAS - MV P1/2T: 112 MSEC
BH CV ECHO MEAS - MV V2 MAX: 121 CM/SEC
BH CV ECHO MEAS - MV V2 MEAN: 72.8 CM/SEC
BH CV ECHO MEAS - MV V2 VTI: 38.6 CM
BH CV ECHO MEAS - MVA P1/2T LCG: 1.8 CM^2
BH CV ECHO MEAS - MVA(P1/2T): 2 CM^2
BH CV ECHO MEAS - MVA(VTI): 2.4 CM^2
BH CV ECHO MEAS - PA ACC TIME: 0.17 SEC
BH CV ECHO MEAS - PA PR(ACCEL): 3 MMHG
BH CV ECHO MEAS - RAP SYSTOLE: 8 MMHG
BH CV ECHO MEAS - RVSP: 40 MMHG
BH CV ECHO MEAS - SI(AO): 236.4 ML/M^2
BH CV ECHO MEAS - SI(CUBED): 39.9 ML/M^2
BH CV ECHO MEAS - SI(LVOT): 58.2 ML/M^2
BH CV ECHO MEAS - SI(MOD-SP2): 30.8 ML/M^2
BH CV ECHO MEAS - SI(MOD-SP4): 34.9 ML/M^2
BH CV ECHO MEAS - SI(TEICH): 36.7 ML/M^2
BH CV ECHO MEAS - SV(AO): 374.2 ML
BH CV ECHO MEAS - SV(CUBED): 63.2 ML
BH CV ECHO MEAS - SV(LVOT): 92.1 ML
BH CV ECHO MEAS - SV(MOD-SP2): 48.8 ML
BH CV ECHO MEAS - SV(MOD-SP4): 55.2 ML
BH CV ECHO MEAS - SV(TEICH): 58.1 ML
BH CV ECHO MEAS - TAPSE (>1.6): 2.9 CM
BH CV ECHO MEAS - TR MAX PG: 32 MMHG
BH CV ECHO MEAS - TR MAX VEL: 282 CM/SEC
BH CV ECHO MEASUREMENTS AVERAGE E/E' RATIO: 11.02
BH CV VAS BP LEFT ARM: NORMAL MMHG
BH CV XLRA - RV BASE: 3.1 CM
BH CV XLRA - RV LENGTH: 4.3 CM
BH CV XLRA - RV MID: 2.4 CM
BH CV XLRA - TDI S': 11 CM/SEC
BUN SERPL-MCNC: 11 MG/DL (ref 8–23)
BUN/CREAT SERPL: 18 (ref 7–25)
CALCIUM SPEC-SCNC: 8.5 MG/DL (ref 8.6–10.5)
CHLORIDE SERPL-SCNC: 103 MMOL/L (ref 98–107)
CHOLEST SERPL-MCNC: 171 MG/DL (ref 0–200)
CO2 SERPL-SCNC: 24 MMOL/L (ref 22–29)
CREAT SERPL-MCNC: 0.61 MG/DL (ref 0.57–1)
DEPRECATED RDW RBC AUTO: 47.5 FL (ref 37–54)
ERYTHROCYTE [DISTWIDTH] IN BLOOD BY AUTOMATED COUNT: 12.8 % (ref 12.3–15.4)
GFR SERPL CREATININE-BSD FRML MDRD: 93 ML/MIN/1.73
GLUCOSE SERPL-MCNC: 96 MG/DL (ref 65–99)
HBA1C MFR BLD: 5.6 % (ref 4.8–5.6)
HCT VFR BLD AUTO: 45.3 % (ref 34–46.6)
HDLC SERPL-MCNC: 71 MG/DL (ref 40–60)
HGB BLD-MCNC: 13.9 G/DL (ref 12–15.9)
LDLC SERPL CALC-MCNC: 82 MG/DL (ref 0–100)
LDLC/HDLC SERPL: 1.13 {RATIO}
LEFT ATRIUM VOLUME INDEX: 26.2 ML/M^2
LEFT ATRIUM VOLUME: 41.4 ML
LV EF 2D ECHO EST: 55 %
MAXIMAL PREDICTED HEART RATE: 135 BPM
MCH RBC QN AUTO: 30.8 PG (ref 26.6–33)
MCHC RBC AUTO-ENTMCNC: 30.7 G/DL (ref 31.5–35.7)
MCV RBC AUTO: 100.2 FL (ref 79–97)
PLATELET # BLD AUTO: 281 10*3/MM3 (ref 140–450)
PMV BLD AUTO: 9.9 FL (ref 6–12)
POTASSIUM SERPL-SCNC: 4.1 MMOL/L (ref 3.5–5.2)
QT INTERVAL: 446 MS
QTC INTERVAL: 471 MS
RBC # BLD AUTO: 4.52 10*6/MM3 (ref 3.77–5.28)
SODIUM SERPL-SCNC: 137 MMOL/L (ref 136–145)
STRESS TARGET HR: 115 BPM
TRIGL SERPL-MCNC: 99 MG/DL (ref 0–150)
TSH SERPL DL<=0.05 MIU/L-ACNC: 0.77 UIU/ML (ref 0.27–4.2)
VLDLC SERPL-MCNC: 18 MG/DL (ref 5–40)
WBC # BLD AUTO: 5.9 10*3/MM3 (ref 3.4–10.8)

## 2021-03-19 PROCEDURE — 83036 HEMOGLOBIN GLYCOSYLATED A1C: CPT | Performed by: INTERNAL MEDICINE

## 2021-03-19 PROCEDURE — 80048 BASIC METABOLIC PNL TOTAL CA: CPT | Performed by: INTERNAL MEDICINE

## 2021-03-19 PROCEDURE — 97161 PT EVAL LOW COMPLEX 20 MIN: CPT

## 2021-03-19 PROCEDURE — 99204 OFFICE O/P NEW MOD 45 MIN: CPT | Performed by: INTERNAL MEDICINE

## 2021-03-19 PROCEDURE — G0378 HOSPITAL OBSERVATION PER HR: HCPCS

## 2021-03-19 PROCEDURE — 99212 OFFICE O/P EST SF 10 MIN: CPT | Performed by: NEUROLOGICAL SURGERY

## 2021-03-19 PROCEDURE — 99217 PR OBSERVATION CARE DISCHARGE MANAGEMENT: CPT | Performed by: HOSPITALIST

## 2021-03-19 PROCEDURE — 97116 GAIT TRAINING THERAPY: CPT

## 2021-03-19 PROCEDURE — 97165 OT EVAL LOW COMPLEX 30 MIN: CPT

## 2021-03-19 PROCEDURE — 85027 COMPLETE CBC AUTOMATED: CPT | Performed by: INTERNAL MEDICINE

## 2021-03-19 PROCEDURE — 93306 TTE W/DOPPLER COMPLETE: CPT | Performed by: INTERNAL MEDICINE

## 2021-03-19 PROCEDURE — 93306 TTE W/DOPPLER COMPLETE: CPT

## 2021-03-19 PROCEDURE — 97530 THERAPEUTIC ACTIVITIES: CPT

## 2021-03-19 PROCEDURE — 97535 SELF CARE MNGMENT TRAINING: CPT

## 2021-03-19 PROCEDURE — 84443 ASSAY THYROID STIM HORMONE: CPT | Performed by: INTERNAL MEDICINE

## 2021-03-19 PROCEDURE — 80061 LIPID PANEL: CPT | Performed by: INTERNAL MEDICINE

## 2021-03-19 RX ADMIN — POLYETHYLENE GLYCOL 3350 17 G: 17 POWDER, FOR SOLUTION ORAL at 10:23

## 2021-03-19 RX ADMIN — ACETAMINOPHEN 650 MG: 325 TABLET ORAL at 10:23

## 2021-03-19 RX ADMIN — NEBIVOLOL HYDROCHLORIDE 10 MG: 5 TABLET ORAL at 10:23

## 2021-03-19 RX ADMIN — SODIUM CHLORIDE, PRESERVATIVE FREE 10 ML: 5 INJECTION INTRAVENOUS at 10:24

## 2021-03-19 RX ADMIN — CETIRIZINE HYDROCHLORIDE 10 MG: 10 TABLET, FILM COATED ORAL at 10:23

## 2021-03-19 RX ADMIN — LOSARTAN POTASSIUM 100 MG: 50 TABLET, FILM COATED ORAL at 10:23

## 2021-03-19 NOTE — PROGRESS NOTES
[facility] Medicine Services  PROGRESS NOTE    Patient Name: Lanie Ochoa  : 1935  MRN: [id number]    Date of Admission: 3/18/2021  Primary Care Physician: Paris Mejía MD    Subjective   Subjective     CC:  Syncope    HPI:  One noted low BP with standing last PM, but denies dizziness/ligthheadedness/near syncope being here. Denies palpitations. Did not use bathroom or eat prior to incident. Denies palpitations.    Review of Systems   Constitutional: Positive for activity change and fatigue.   Respiratory: Negative.    Cardiovascular: Negative.    Gastrointestinal: Negative.    Genitourinary: Negative.    Musculoskeletal: Negative.    Neurological: Positive for headaches. Negative for dizziness and numbness.   Psychiatric/Behavioral: Negative.        Objective   Objective     Vital Signs:   Temp:  [97.9 °F (36.6 °C)-99.1 °F (37.3 °C)] 98.2 °F (36.8 °C)  Heart Rate:  [63-78] 68  Resp:  [18] 18  BP: (117-176)/() 138/57        Physical Exam:  NAD, alert and oriented x 3  OP clear, MMM  PERRL  Neck supple  No LAD  RRR, systolic murmur noted  CTAB  +BS, ND, NT, soft  No c/c/e  No rashes  SANDERS  Normal affect        Results Reviewed:  Results from last 7 days   Lab Units 21  0621 21  0850   WBC 10*3/mm3 5.90 5.13   HEMOGLOBIN g/dL 13.9 13.4   HEMATOCRIT % 45.3 41.8   PLATELETS 10*3/mm3 281 275     Results from last 7 days   Lab Units 21  0850   SODIUM mmol/L 139   POTASSIUM mmol/L 3.9   CHLORIDE mmol/L 103   CO2 mmol/L 29.0   BUN mg/dL 13   CREATININE mg/dL 0.80   GLUCOSE mg/dL 121*   CALCIUM mg/dL 9.4   ALT (SGPT) U/L 9   AST (SGOT) U/L 12   TROPONIN T ng/mL <0.010     Estimated Creatinine Clearance: 47.6 mL/min (by C-G formula based on SCr of 0.8 mg/dL).    Microbiology Results Abnormal     Procedure Component Value - Date/Time    COVID PRE-OP / PRE-PROCEDURE SCREENING ORDER (NO ISOLATION) - Swab, Nasopharynx [[id number]]  (Normal) Collected: 21 1143      Lab Status: Final result Specimen: Swab from Nasopharynx Updated: 03/18/21 1220    Narrative:      The following orders were created for panel order COVID PRE-OP / PRE-PROCEDURE SCREENING ORDER (NO ISOLATION) - Swab, Nasopharynx.  Procedure                               Abnormality         Status                     ---------                               -----------         ------                     COVID-19, ABBOTT IN-HOUS...[[id number]]  Normal              Final result                 Please view results for these tests on the individual orders.    COVID-19, ABBOTT IN-HOUSE,NASAL Swab (NO TRANSPORT MEDIA) 2 HR TAT - Swab, Nasopharynx [[id number]]  (Normal) Collected: 03/18/21 1145    Lab Status: Final result Specimen: Swab from Nasopharynx Updated: 03/18/21 1220     COVID19 Presumptive Negative    Narrative:      Fact sheet for providers: https://www.fda.gov/media/955273/download     Fact sheet for patients: https://www.fda.gov/media/664116/download    Test performed by PCR.  If inconsistent with clinical signs and symptoms patient should be tested with different authorized molecular test.          Imaging Results (Last 24 Hours)     ** No results found for the last 24 hours. **          Results for orders placed during the hospital encounter of 02/13/20    Adult Transthoracic Echo Complete W/ Cont if Necessary Per Protocol    Interpretation Summary  · Estimated EF = 64%.  · Left ventricular systolic function is normal.  · There is calcification of the aortic valve.  · Mild aortic valve stenosis is present.  · Peak velocity of the flow distal to the aortic valve is 268.0 cm/s.  · Mild mitral valve regurgitation is present  · Mild tricuspid valve regurgitation is present.  · Calculated right ventricular systolic pressure from tricuspid regurgitation is 37 mmHg.      I have reviewed the medications:  Scheduled Meds:cetirizine, 10 mg, Oral, Daily  dilTIAZem CD, 240 mg, Oral, Q PM  losartan, 100 mg, Oral,  Daily  montelukast, 10 mg, Oral, Nightly  nebivolol, 10 mg, Oral, QAM  polyethylene glycol, 17 g, Oral, Daily  sertraline, 25 mg, Oral, Q PM  sodium chloride, 10 mL, Intravenous, Q12H      Continuous Infusions:   PRN Meds:.•  acetaminophen **OR** acetaminophen **OR** acetaminophen  •  HYDROcodone-acetaminophen  •  ondansetron **OR** ondansetron  •  senna-docusate sodium  •  sodium chloride  •  sodium chloride    Assessment/Plan   Assessment & Plan     Active Hospital Problems    Diagnosis  POA   • Syncope [R55]  Yes   • RBBB (right bundle branch block) [I45.10]  Unknown   • Subarachnoid hemorrhage (CMS/HCC) [I60.9]  Unknown   • Spinal stenosis of lumbar region with neurogenic claudication [M48.062]  Yes   • Lumbar spinal stenosis [M48.061]  Yes   • Benign essential hypertension [I10]  Yes   • HLD (hyperlipidemia) [E78.5]  Yes      Resolved Hospital Problems   No resolved problems to display.        Brief Hospital Course to date:  Lanie Ochoa is a 85 y.o. female here with syncope, fall, head trauma    Syncope  --mild orthostasis last night, none with therapy/ambulating this AM  --EKG with RBBB, recent negative stress test 3/9  --known AS, repeat ECHO pending  --likely home today, and wants to follow up with PCP, may warrant outpatient cardiac monitoring and will defer to PCP, pending ECHO  --TSH pending    Fall/SAH  --non-surgical, safe to return home per NSG    HTN  --stable    HL  --statin      DVT Prophylaxis:  SCDS      Disposition: I expect the patient to be discharged today, pending ECHO    CODE STATUS:   Code Status and Medical Interventions:   Ordered at: 03/18/21 1247     Limited Support to NOT Include:    Antiarrhythmic Drugs    Antibiotics    Blood Products    NIPPV (Non-Invasive Positive Pressure Support)     Code Status:    No CPR     Medical Interventions (Level of Support Prior to Arrest):    Limited       Luis Cox MD  03/19/21

## 2021-03-19 NOTE — PROGRESS NOTES
"                  Clinical Nutrition     Reason for Visit:   Identified at risk by screening criteria    Patient Name: Lanie Ochoa  YOB: 1935  MRN: [id number]  Date of Encounter: 03/19/21 08:54 EDT  Admission date: 3/18/2021    Comments: Patient reports unintentional weight loss of ~18 lbs (12.3%) in ~1 month and decreased oral intake (<50% of normal amounts) for > 5 days prior to admission. 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 Patient reports unintentional weight loss in February as she had a 2-3 week period of severe diarrhea. She reports she could not keep anything down and was unable to eat very much. She stated she could tell she was losing weight and family was reporting she looked peaky. She has been eating some of meals this admission, insufficient PO data. Reports appetite/intake has been decreased since date of surgery as she is just not feeling hungry. Reports UBW prior to loss was ~145 lbs.       Anthropometrics     Height: 157.5 cm (62\")  Last filed wt: Weight: 58.6 kg (129 lb 3 oz) (03/18/21 1439)  Weight Method: Bed scale    BMI: BMI (Calculated): 23.6  Normal: 18.5-24.9kg/m2    Ideal Body Weight (IBW) (kg): 50.43  Admission wt: 128 lb  Method obtained: stated weight per charting 3/19    Weight Weight (kg) Weight (lbs) Weight Method VISIT REPORT   1/18/2021 66.951 kg 147 lb 9.6 oz     1/29/2021 62.143 kg 137 lb Stated    2/1/2021 63.005 kg 138 lb 14.4 oz     2/4/2021 62.596 kg 138 lb     2/8/2021 60.328 kg 133 lb     2/11/2021 58.968 kg 130 lb Stated    2/23/2021 58.968 kg 130 lb     2/25/2021 59.693 kg 131 " lb 9.6 oz     3/2/2021 59.693 kg 131 lb 9.6 oz Standing scale    3/8/2021 59.693 kg 131 lb 9.6 oz     3/10/2021 59.2 kg 130 lb 8.2 oz Standing scale    3/18/2021 58.06 kg 128 lb Stated    3/18/2021 58.6 kg 129 lb 3 oz Bed scale    3/19/2021 58.6 kg 129 lb 3 oz         Weight Change   UBW: 145 lbs per patient report   EMR weight of 147 lbs (1/2021)  Weight change: ~18 lbs   % wt change: 12.3%  Time frame of weight loss: 2-3 weeks in February 2021     Labs reviewed     Results from last 7 days   Lab Units 03/18/21  0850   GLUCOSE mg/dL 121*   BUN mg/dL 13   CREATININE mg/dL 0.80   SODIUM mmol/L 139   CHLORIDE mmol/L 103   POTASSIUM mmol/L 3.9   MAGNESIUM mg/dL 2.0   ALT (SGPT) U/L 9     Results from last 7 days   Lab Units 03/18/21  0850   ALBUMIN g/dL 3.80       Results from last 7 days   Lab Units 03/18/21  1805   GLUCOSE mg/dL 113     Lab Results   Lab Value Date/Time    HGBA1C 5.46 09/01/2016 0854    HGBA1C 5.8 (H) 09/24/2015 0919       Medications reviewed   Pertinent: bystolic, miralax      Intake/Output 24 hrs (7:00AM - 6:59 AM)     Intake & Output (last day)       03/18 0701 - 03/19 0700 03/19 0701 - 03/20 0700    IV Piggyback 500     Total Intake(mL/kg) 500 (8.5)     Urine (mL/kg/hr) 600     Total Output 600     Net -100           Urine Unmeasured Occurrence 3 x           Current Nutrition Prescription     PO: Diet Regular  Intake: insuff data    Nutrition Diagnosis     3/19  Problem Malnutrition  (severe, acute)   Etiology Inadequate oral intake in setting of 2-3 week period of GI distress, and recent surgery   Signs/Symptoms Unintentional weight loss of ~18 lbs (12.3%) in ~1 month and decreased oral intake (<50% of normal amounts) for > 5 days       Nutrition Intervention   1.  Follow treatment progress, Care plan reviewed  2.  Advise alternate selection, Interview for preferences   3. Encouraged oral intake  4. Patient meets criteria for severe, acute malnutrition per weight/intake history. RD to complete  NICOLE for MD to review    Goal:   General: Nutrition support treatment  PO: Establish PO      Monitoring/Evaluation:   Per protocol, PO intake, Supplement intake, Weight, GI status, Symptoms, POC/GOC    Mireya Michel RDN, LD  Time Spent: 30 minutes

## 2021-03-19 NOTE — PLAN OF CARE
Goal Outcome Evaluation:  Plan of Care Reviewed With: patient     Outcome Summary: Pt currently supv for supine to sit, sit to stand and toilet txfr. Pt CGA with straight cane for functional mobility. Pt supv for toilet hygeine, grooming sinkside. Pt required cues for log rolling and review of spinal precautions withADLs. Provided long sponge for bathing assist. Pt to return home with son at discharge.

## 2021-03-19 NOTE — THERAPY EVALUATION
Patient Name: Lanie Ochoa  : 1935    MRN: [id number]                              Today's Date: 3/19/2021       Admit Date: 3/18/2021    Visit Dx: No diagnosis found.  Patient Active Problem List   Diagnosis   • Anxiety   • HLD (hyperlipidemia)   • Benign essential hypertension   • Osteopenia   • Avitaminosis D   • History of left knee replacement   • Titubation   • Non-rheumatic mitral regurgitation   • Non-rheumatic tricuspid valve insufficiency   • Chronic pain of right knee   • Venous incompetence   • Dysfunction of both eustachian tubes   • Primary localized osteoarthrosis of right lower leg   • Primary osteoarthritis of right knee   • OA (osteoarthritis) of knee   • Lumbar spinal stenosis   • Spinal stenosis of lumbar region with neurogenic claudication   • Syncope   • RBBB (right bundle branch block)   • Subarachnoid hemorrhage (CMS/HCC)     Past Medical History:   Diagnosis Date   • Anxiety    • BMS (burning mouth syndrome) 2015   • Breast cancer (CMS/HCC)    • Cancer (CMS/HCC)      HX 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 10 YEARS AGO   • Cancer (CMS/HCC)     SKIN LEFT ARM; SQUAMOUS CELL   • Carpal tunnel syndrome     RIGHT   • Degenerative disc disease    • Functional murmur    • Gastritis     RESOLVED 2014   • GERD (gastroesophageal reflux disease)    • Glossalgia 2015   • Hypertension    • Internal hemorrhoids    • Low back pain    • Osteoarthritis 2014    HAND   • PNA (pneumonia) 2015   • Trigger ring finger 2014     Past Surgical History:   Procedure Laterality Date   • BREAST BIOPSY     • BREAST LUMPECTOMY  07/15/2009    LUMPECTOMY   • COLONOSCOPY     • DILATATION AND CURETTAGE      RESOLVED 06/15/2010   • EYE SURGERY Bilateral 10/15/2012    CATARACT EXTRACTION   • HEMORRHOIDECTOMY     • JOINT REPLACEMENT Left     KNEE   • KNEE ARTHROSCOPY Right 2017     Procedure: KNEE ARTHROSCOPY WITH PARTIAL MEDIAL AND LATERAL MENISECTOMY AND DEBRIDEMENT OF ARTHRITIS.;  Surgeon: Lauren Krishna MD;  Location: [facility];  Service:    • LUMBAR DISCECTOMY N/A 3/10/2021    Procedure: Lumbar 4-5 , L5-S1 laminectomy with metrx;  Surgeon: Charli Haynes MD;  Location: [facility] OR;  Service: Neurosurgery;  Laterality: N/A;   • TOTAL KNEE ARTHROPLASTY Right 5/21/2018    Procedure: RIGHT TOTAL KNEE ARTHROPLASTY;  Surgeon: Kiko Solano MD;  Location: [facility] OR;  Service: Orthopedics   • TOTAL KNEE ARTHROPLASTY Left      General Information     Row Name 03/19/21 0958          Physical Therapy Time and Intention    Document Type  evaluation  (Pended)   -RN     Mode of Treatment  individual therapy;physical therapy  (Pended)   -RN     Row Name 03/19/21 0958          General Information    Patient Profile Reviewed  yes  (Pended)   -RN     Existing Precautions/Restrictions  fall;spinal  (Pended)  No sitting; lying or standing only; pt had spinal sx 3/10 in [facility]  -RN     Barriers to Rehab  none identified  (Pended)   -RN     Row Name 03/19/21 0958          Living Environment    Lives With  alone  (Pended)  Pt states she will return home on Sunday and daughter will assist with care temporarily  -RN     Row Name 03/19/21 0958          Home Main Entrance    Number of Stairs, Main Entrance  one  (Pended)   -RN     Stair Railings, Main Entrance  railing on left side (ascending)  (Pended)   -RN     Row Name 03/19/21 0958          Stairs Within Home, Primary    Stairs, Within Home, Primary  none, pt has a one story home in [place]  (Pended)   -RN     Row Name 03/19/21 0958          Cognition    Orientation Status (Cognition)  oriented x 4  (Pended)   -RN     Row Name 03/19/21 0958          Safety Issues, Functional Mobility    Safety Issues Affecting Function (Mobility)  safety precaution awareness;safety precautions follow-through/compliance;impulsivity;positioning of assistive  device;sequencing abilities;problem-solving  (Pended)  Spinal precautions BLT  -RN     Impairments Affecting Function (Mobility)  strength;balance  (Pended)   -RN     Comment, Safety Issues/Impairments (Mobility)  Pt is reluctant with using a RW at home to improve functional mobility until no longer indicated. Pt was educated on importance of using a supportive RW to avoid a future fall or HHA with cane at home. Pt' daughter educated on donning and hand placement on gait belt for safety and need for RW at home.  (Pended)   -RN       User Key  (r) = Recorded By, (t) = Taken By, (c) = Cosigned By    Initials Name Provider Type    RN Luma Hutchins, PT Student PT Student        Mobility     Row Name 03/19/21 0958          Bed Mobility    Bed Mobility  rolling right;scooting/bridging;sidelying-sit-sidelying  (Pended)   -RN     Rolling Right [place] (Bed Mobility)  contact guard;1 person assist;verbal cues  (Pended)   -RN     Scooting/Bridging [place] (Bed Mobility)  standby assist  (Pended)   -RN     Sidelying-Sit-Sidelying [place] (Bed Mobility)  contact guard;verbal cues;1 person assist  (Pended)   -RN     Comment (Bed Mobility)  Log Roll x2, sidelying to sit x2 for bed mobility training and maintaining spinal precauions. Mod VC needed to maintain spinal precautions.  (Pended)   -RN     Row Name 03/19/21 0958          Transfers    Comment (Transfers)  STS x1 from EOB, successful on first attempt.  (Pended)   -RN     Row Name 03/19/21 0958          Sit-Stand Transfer    Sit-Stand [place] (Transfers)  1 person assist;contact guard  (Pended)   -RN     Assistive Device (Sit-Stand Transfers)  cane, straight  (Pended)   -RN     Row Name 03/19/21 0958          Gait/Stairs (Locomotion)    [place] Level (Gait)  contact guard;1 person assist  (Pended)   -RN     Assistive Device (Gait)  cane, straight  (Pended)   -RN     Distance in Feet (Gait)  290'  (Pended)   -RN     Deviations/Abnormal Patterns (Gait)   base of support, narrow;stride length decreased  (Pended)   -RN     Bilateral Gait Deviations  heel strike decreased;forward flexed posture  (Pended)   -RN     [place] Level (Stairs)  not tested  (Pended)   -RN     Comment (Gait/Stairs)  Pt is unstable with gait and had mod difficulty with mod VC to properly advance straight cane. Slight incoordination noted with cane use and difficulty with path navigation. Pt is reluctant but demonstrates increased stability with RW use.  (Pended)   -RN       User Key  (r) = Recorded By, (t) = Taken By, (c) = Cosigned By    Initials Name Provider Type    RN Luma Hutchins, PT Student PT Student        Obj/Interventions     Row Name 03/19/21 0958          Range of Motion Comprehensive    General Range of Motion  bilateral lower extremity ROM WFL  (Pended)   -RN     Row Name 03/19/21 0958          Strength Comprehensive (MMT)    Comment, General Manual Muscle Testing (MMT) Assessment  Not formally tested d/t spinal precautions, BLE grossly 4/5  (Pended)   -RN     Row Name 03/19/21 0958          Motor Skills    Motor Skills  coordination  (Pended)   -RN     Row Name 03/19/21 0958          Balance    Balance Assessment  sitting static balance;sitting dynamic balance;standing static balance;standing dynamic balance  (Pended)   -RN     Static Sitting Balance  WNL  (Pended)   -RN     Dynamic Sitting Balance  WNL  (Pended)   -RN     Static Standing Balance  mild impairment  (Pended)   -RN     Dynamic Standing Balance  mild impairment  (Pended)   -RN     Balance Interventions  standing;supported  (Pended)  orthostatic hypotension assessed  -RN     Comment, Balance  Pt standing for 5 min to assess BP and was provided with external support for stability.  (Pended)   -RN     Row Name 03/19/21 0958          Sensory Assessment (Somatosensory)    Sensory Assessment (Somatosensory)  LE sensation intact;left-sided sensation intact;right-sided sensation intact  (Pended)   -RN       User Key   (r) = Recorded By, (t) = Taken By, (c) = Cosigned By    Initials Name Provider Type    Luma Wesley, PT Student PT Student        Goals/Plan     Row Name 03/19/21 0958          Bed Mobility Goal 1 (PT)    Activity/Assistive Device (Bed Mobility Goal 1, PT)  rolling to left;rolling to right;sidelying to sit/sit to sidelying  (Pended)   -RN     Person Level/Cues Needed (Bed Mobility Goal 1, PT)  modified independence  (Pended)   -RN     Time Frame (Bed Mobility Goal 1, PT)  long term goal (LTG);10 days  (Pended)   -RN     Row Name 03/19/21 0958          Transfer Goal 1 (PT)    Activity/Assistive Device (Transfer Goal 1, PT)  sit-to-stand/stand-to-sit;bed-to-chair/chair-to-bed  (Pended)   -RN     Person Level/Cues Needed (Transfer Goal 1, PT)  modified independence  (Pended)   -RN     Time Frame (Transfer Goal 1, PT)  long term goal (LTG);10 days  (Pended)   -RN     Row Name 03/19/21 0958          Gait Training Goal 1 (PT)    Activity/Assistive Device (Gait Training Goal 1, PT)  gait (walking locomotion);assistive device use;walker, rolling  (Pended)   -RN     Person Level (Gait Training Goal 1, PT)  supervision required  (Pended)   -RN     Distance (Gait Training Goal 1, PT)  350'  (Pended)   -RN     Time Frame (Gait Training Goal 1, PT)  long term goal (LTG);10 days  (Pended)   -RN     Row Name 03/19/21 0958          Stairs Goal 1 (PT)    Activity/Assistive Device (Stairs Goal 1, PT)  ascending stairs;descending stairs;using handrail, left  (Pended)   -RN     Person Level/Cues Needed (Stairs Goal 1, PT)  supervision required  (Pended)   -RN     Number of Stairs (Stairs Goal 1, PT)  1  (Pended)   -RN       User Key  (r) = Recorded By, (t) = Taken By, (c) = Cosigned By    Initials Name Provider Type    Luma Wesley, PT Student PT Student        Clinical Impression     Row Name 03/19/21 0958          Pain    Additional Documentation  Pain Scale: Numbers Pre/Post-Treatment (Group)   (Pended)   -RN     Row Name 03/19/21 0958          Pain Scale: Numbers Pre/Post-Treatment    Pretreatment Pain Rating  3/10  (Pended)   -RN     Posttreatment Pain Rating  3/10  (Pended)   -RN     Pain Location - Orientation  posterior  (Pended)   -RN     Pain Location  head  (Pended)   -RN     Pain Intervention(s)  Repositioned;Ambulation/increased activity  (Pended)   -RN     Row Name 03/19/21 0958          Plan of Care Review    Outcome Summary  Pt is CGA for bed mobility, SBA for scooting, and CGA for STS and gait. Pt amblated 120' with straight cane and 170' with RW. Pt was unsteady with impulsivity noted with fast/unsafe gait speed and difficulty with cane placement as she holds it above the ground requiring mod VC to correct. Pt is more steady with RW but is reluctant with using RW at home as she typically uses a cane for home ambulation. Pt and daughter instructed on RW use or supplemental UE support during ambulation to avoid a fall. D/C recommendation to home with HH and assist.  (Pended)   -RN     Row Name 03/19/21 0958          Therapy Assessment/Plan (PT)    Rehab Potential (PT)  good, to achieve stated therapy goals  (Pended)   -RN     Criteria for Skilled Interventions Met (PT)  yes  (Pended)   -RN     Row Name 03/19/21 0958          Vital Signs    Pre Systolic BP Rehab  142  (Pended)   -RN     Pre Treatment Diastolic BP  61  (Pended)   -RN     Intra Systolic BP Rehab  130  (Pended)   -RN     Intra Treatment Diastolic BP  59  (Pended)  130/59 sitting EOB, 105/67 standing non symptomatic, 119/62 post short distance gait in stading  -RN     Post Systolic BP Rehab  134  (Pended)   -RN     Post Treatment Diastolic BP  65  (Pended)  in standing post long walk standing  -RN     Pretreatment Heart Rate (beats/min)  72  (Pended)   -RN     Intratreatment Heart Rate (beats/min)  68  (Pended)   -RN     Posttreatment Heart Rate (beats/min)  70  (Pended)   -RN     Pre SpO2 (%)  98  (Pended)   -RN     O2 Delivery  Pre Treatment  room air  (Pended)   -RN     Post SpO2 (%)  97  (Pended)   -RN     O2 Delivery Post Treatment  room air  (Pended)   -RN     Pre Patient Position  Supine  (Pended)   -RN     Intra Patient Position  Standing  (Pended)   -RN     Post Patient Position  Supine  (Pended)   -RN     Row Name 03/19/21 0958          Positioning and Restraints    Pre-Treatment Position  in bed  (Pended)   -RN     Post Treatment Position  bed  (Pended)   -RN     In Bed  supine;call light within reach;encouraged to call for assist;exit alarm on;with nsg;with family/caregiver  (Pended)  interdisciplinary communication with CM for AD and D/C recommendation  -RN       User Key  (r) = Recorded By, (t) = Taken By, (c) = Cosigned By    Initials Name Provider Type    Luma Wesley, PT Student PT Student        Outcome Measures     Row Name 03/19/21 0958          How much help from another person do you currently need...    Turning from your back to your side while in flat bed without using bedrails?  3  (Pended)   -RN     Moving from lying on back to sitting on the side of a flat bed without bedrails?  3  (Pended)   -RN     Moving to and from a bed to a chair (including a wheelchair)?  3  (Pended)   -RN     Standing up from a chair using your arms (e.g., wheelchair, bedside chair)?  3  (Pended)   -RN     Climbing 3-5 steps with a railing?  2  (Pended)   -RN     To walk in hospital room?  3  (Pended)   -RN     AM-PAC 6 Clicks Score (PT)  17  (Pended)   -RN     Row Name 03/19/21 0958          Functional Assessment    Outcome Measure Options  AM-PAC 6 Clicks Basic Mobility (PT)  (Pended)   -RN       User Key  (r) = Recorded By, (t) = Taken By, (c) = Cosigned By    Initials Name Provider Type    Luma Wesley, PT Student PT Student        Physical Therapy Education                 Title: PT OT SLP Therapies (In Progress)     Topic: Physical Therapy (Done)     Point: Mobility training (Done)     Learning Progress Summary            Patient Nonacceptance, E, VU,NL,NR by RN at 3/19/2021 0958    Comment: Pt instructed on spinal precautions, AD recommendations (RW), PT POC, and D/C recommendation.                   Point: Body mechanics (Done)     Learning Progress Summary           Patient Nonacceptance, E, VU,NL,NR by RN at 3/19/2021 0958    Comment: Pt instructed on spinal precautions, AD recommendations (RW), PT POC, and D/C recommendation.                   Point: Precautions (Done)     Learning Progress Summary           Patient Nonacceptance, E, VU,NL,NR by RN at 3/19/2021 0958    Comment: Pt instructed on spinal precautions, AD recommendations (RW), PT POC, and D/C recommendation.                               User Key     Initials Effective Dates Name Provider Type Discipline    RN 07/21/20 -  Luma Hutchins, PT Student PT Student PT              PT Recommendation and Plan  Planned Therapy Interventions (PT): (P) balance training, bed mobility training, gait training, home exercise program, motor coordination training, stair training, strengthening, stretching, ROM (range of motion), postural re-education, patient/family education, neuromuscular re-education, transfer training, other (see comments) (spinal precaution)  Outcome Summary: (P) Pt is CGA for bed mobility, SBA for scooting, and CGA for STS and gait. Pt amblated 120' with straight cane and 170' with RW. Pt was unsteady with impulsivity noted with fast/unsafe gait speed and difficulty with cane placement as she holds it above the ground requiring mod VC to correct. Pt is more steady with RW but is reluctant with using RW at home as she typically uses a cane for home ambulation. Pt and daughter instructed on RW use or supplemental UE support during ambulation to avoid a fall. D/C recommendation to home with HH and assist.     Time Calculation:   PT Charges     Row Name 03/19/21 0958             Time Calculation    Start Time  0958  (Pended)   -RN      PT Received On  03/19/21   (Pended)   -RN      PT Goal Re-Cert Due Date  03/29/21  (Pended)   -RN         Timed Charges    15197 - Gait Training Minutes   10  (Pended)   -RN        User Key  (r) = Recorded By, (t) = Taken By, (c) = Cosigned By    Initials Name Provider Type    RN Luma Hutchins, PT Student PT Student        Therapy Charges for Today     Code Description Service Date Service Provider Modifiers Qty    70212425405 HC PT EVAL LOW COMPLEXITY 4 3/19/2021 Luma Hutchins, PT Student GP 1    38020372475 HC GAIT TRAINING EA 15 MIN 3/19/2021 Luma Hutchins, PT Student GP 1          PT G-Codes  Outcome Measure Options: (P) AM-PAC 6 Clicks Basic Mobility (PT)  AM-PAC 6 Clicks Score (PT): (P) 17  AM-PAC 6 Clicks Score (OT): 22    Luma Hutchins, PT Student  3/19/2021

## 2021-03-19 NOTE — THERAPY EVALUATION
Patient Name: Lanie Ochoa  : 1935    MRN: [id number]                              Today's Date: 3/19/2021       Admit Date: 3/18/2021    Visit Dx: No diagnosis found.  Patient Active Problem List   Diagnosis   • Anxiety   • HLD (hyperlipidemia)   • Benign essential hypertension   • Osteopenia   • Avitaminosis D   • History of left knee replacement   • Titubation   • Non-rheumatic mitral regurgitation   • Non-rheumatic tricuspid valve insufficiency   • Chronic pain of right knee   • Venous incompetence   • Dysfunction of both eustachian tubes   • Primary localized osteoarthrosis of right lower leg   • Primary osteoarthritis of right knee   • OA (osteoarthritis) of knee   • Lumbar spinal stenosis   • Spinal stenosis of lumbar region with neurogenic claudication   • Syncope   • RBBB (right bundle branch block)   • Subarachnoid hemorrhage (CMS/HCC)     Past Medical History:   Diagnosis Date   • Anxiety    • BMS (burning mouth syndrome) 2015   • Breast cancer (CMS/HCC)    • Cancer (CMS/HCC)      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 10 YEARS AGO   • Cancer (CMS/HCC)     SKIN LEFT ARM; SQUAMOUS CELL   • Carpal tunnel syndrome     RIGHT   • Degenerative disc disease    • Functional murmur    • Gastritis     RESOLVED 2014   • GERD (gastroesophageal reflux disease)    • Glossalgia 2015   • Hypertension    • Internal hemorrhoids    • Low back pain    • Osteoarthritis 2014    HAND   • PNA (pneumonia) 2015   • Trigger ring finger 2014     Past Surgical History:   Procedure Laterality Date   • BREAST BIOPSY     • BREAST LUMPECTOMY  07/15/2009    LUMPECTOMY   • COLONOSCOPY     • DILATATION AND CURETTAGE      RESOLVED 06/15/2010   • EYE SURGERY Bilateral 10/15/2012    CATARACT EXTRACTION   • HEMORRHOIDECTOMY     • JOINT REPLACEMENT Left     KNEE   • KNEE ARTHROSCOPY Right 2017     Procedure: KNEE ARTHROSCOPY WITH PARTIAL MEDIAL AND LATERAL MENISECTOMY AND DEBRIDEMENT OF ARTHRITIS.;  Surgeon: Lauren Krishna MD;  Location: [facility];  Service:    • LUMBAR DISCECTOMY N/A 3/10/2021    Procedure: Lumbar 4-5 , L5-S1 laminectomy with metrx;  Surgeon: Charli Haynes MD;  Location: [facility] MAIN OR;  Service: Neurosurgery;  Laterality: N/A;   • TOTAL KNEE ARTHROPLASTY Right 5/21/2018    Procedure: RIGHT TOTAL KNEE ARTHROPLASTY;  Surgeon: Kiko Solano MD;  Location: [facility] OR;  Service: Orthopedics   • TOTAL KNEE ARTHROPLASTY Left      General Information     Row Name 03/19/21 0836          OT Time and Intention    Document Type  evaluation  -AN     Mode of Treatment  occupational therapy  -AN     Row Name 03/19/21 08          General Information    Patient Profile Reviewed  yes  -AN     Prior Level of Function  independent:;community mobility;all household mobility;gait;transfer;bed mobility;ADL's;cooking;home management;driving;shopping prior to back sx; s/p lami/disect pt min assist for ADLs, I txfrs, mobility  -AN     Existing Precautions/Restrictions  (S) spinal;fall No sitting; lying or standing only; pt had spinal sx 3/10 in [facility]  -AN     Barriers to Rehab  none identified  -AN     Row Name 03/19/21 0836          Occupational Profile    Occupational History/Life Experiences (Occupational Profile)  pt has another son in [facility] whom is developmentally disabled and lives in a group home; pt lives in [place]  -AN     Row Name 03/19/21 0836          Living Environment    Lives With  alone currently staying with son and DIL in [facility]  -AN     Row Name 03/19/21 0836          Home Main Entrance    Number of Stairs, Main Entrance  none  -AN     Row Name 03/19/21 0836          Stairs Within Home, Primary    Stairs, Within Home, Primary  set of stairs to pt's bedroom at son's home  -AN     Stair Railings, Within Home, Primary  railing on left side (ascending)  -AN     Row Name 03/19/21  0836          Cognition    Orientation Status (Cognition)  oriented x 4  -AN     Row Name 03/19/21 0836          Safety Issues, Functional Mobility    Safety Issues Affecting Function (Mobility)  safety precautions follow-through/compliance review of spinal precautions  -AN     Impairments Affecting Function (Mobility)  strength;balance  -AN       User Key  (r) = Recorded By, (t) = Taken By, (c) = Cosigned By    Initials Name Provider Type    AN Noris Vargas OT Occupational Therapist          Mobility/ADL's     Row Name 03/19/21 0840          Bed Mobility    Bed Mobility  rolling right;sit-supine;supine-sit  -AN     Rolling Right [place] (Bed Mobility)  modified independence  -AN     Supine-Sit [place] (Bed Mobility)  supervision  -AN     Sit-Supine [place] (Bed Mobility)  supervision;verbal cues cues to log roll\  -AN     Assistive Device (Bed Mobility)  bed rails;head of bed elevated  -AN     Row Name 03/19/21 0840          Transfers    Transfers  sit-stand transfer;toilet transfer  -AN     Sit-Stand [place] (Transfers)  supervision  -AN     [place] Level (Toilet Transfer)  supervision  -AN     Assistive Device (Toilet Transfer)  grab bars/safety frame  -AN     Row Name 03/19/21 0840          Sit-Stand Transfer    Assistive Device (Sit-Stand Transfers)  cane, straight  -AN     Row Name 03/19/21 0840          Toilet Transfer    Type (Toilet Transfer)  sit-stand;stand-sit  -AN     Row Name 03/19/21 0840          Functional Mobility    Functional Mobility- Ind. Level  contact guard assist  -AN     Functional Mobility- Device  straight cane  -AN     Functional Mobility-Distance (Feet)  40  -AN     Row Name 03/19/21 0840          Activities of Daily Living    BADL Assessment/Intervention  toileting;grooming;feeding;upper body dressing  -AN     Row Name 03/19/21 0840          Toileting Assessment/Training    [place] Level (Toileting)  standby assist;perform perineal hygiene;adjust/manage  clothing  -AN     Position (Toileting)  unsupported sitting  -AN     Comment (Toileting)  cues not to twist to reach toilet flush  -AN     Row Name 03/19/21 0840          Grooming Assessment/Training    [place] Level (Grooming)  wash face, hands;standby assist  -AN     Position (Grooming)  sink side;unsupported standing  -AN     Row Name 03/19/21 0840          Self-Feeding Assessment/Training    [place] Level (Feeding)  feeding skills;modified independence;prepare tray/open items;minimum assist (75% patient effort)  -AN     Row Name 03/19/21 0840          Upper Body Dressing Assessment/Training    [place] Level (Upper Body Dressing)  doff;don;pull-over garment;minimum assist (75% patient effort)  -AN     Position (Upper Body Dressing)  unsupported standing  -AN       User Key  (r) = Recorded By, (t) = Taken By, (c) = Cosigned By    Initials Name Provider Type    AN Noris Vargas OT Occupational Therapist        Obj/Interventions     Row Name 03/19/21 0842          Sensory Assessment (Somatosensory)    Sensory Assessment (Somatosensory)  sensation intact  -AN     Row Name 03/19/21 0842          Range of Motion Comprehensive    General Range of Motion  no range of motion deficits identified  -AN     Row Name 03/19/21 0842          Strength Comprehensive (MMT)    Comment, General Manual Muscle Testing (MMT) Assessment  not formally tested with spinal precautions, distal WFL, hands grossly 4/5  -AN     Row Name 03/19/21 0842          Balance    Balance Assessment  sitting static balance;sitting dynamic balance;standing static balance;standing dynamic balance  -AN     Static Sitting Balance  WNL  -AN     Dynamic Sitting Balance  WNL  -AN     Static Standing Balance  WFL  -AN     Dynamic Standing Balance  WFL  -AN       User Key  (r) = Recorded By, (t) = Taken By, (c) = Cosigned By    Initials Name Provider Type    AN Noris Vargas OT Occupational Therapist        Goals/Plan     Row Name 03/19/21 0819           Bed Mobility Goal 1 (OT)    Activity/Assistive Device (Bed Mobility Goal 1, OT)  bed mobility activities, all  -AN     [place] Level/Cues Needed (Bed Mobility Goal 1, OT)  modified independence  -AN     Time Frame (Bed Mobility Goal 1, OT)  10 days  -AN     Progress/Outcomes (Bed Mobility Goal 1, OT)  goal ongoing  -AN     Row Name 03/19/21 0845          Transfer Goal 1 (OT)    Activity/Assistive Device (Transfer Goal 1, OT)  transfers, all  -AN     [place] Level/Cues Needed (Transfer Goal 1, OT)  modified independence  -AN     Time Frame (Transfer Goal 1, OT)  10 days  -AN     Progress/Outcome (Transfer Goal 1, OT)  goal ongoing  -AN     Row Name 03/19/21 0845          Bathing Goal 1 (OT)    Activity/Device (Bathing Goal 1, OT)  bathing skills, all;shower chair;long-handled sponge  -AN     [place] Level/Cues Needed (Bathing Goal 1, OT)  modified independence  -AN     Time Frame (Bathing Goal 1, OT)  10 days  -AN     Progress/Outcomes (Bathing Goal 1, OT)  goal ongoing  -AN       User Key  (r) = Recorded By, (t) = Taken By, (c) = Cosigned By    Initials Name Provider Type    Noris Adamson, OT Occupational Therapist        Clinical Impression     Row Name 03/19/21 0842          Pain Assessment    Additional Documentation  Pain Scale: Numbers Pre/Post-Treatment (Group)  -AN     Row Name 03/19/21 0842          Pain Scale: Numbers Pre/Post-Treatment    Pretreatment Pain Rating  3/10  -AN     Posttreatment Pain Rating  3/10  -AN     Pain Location - Orientation  posterior  -AN     Pain Location  head  -AN     Pain Intervention(s)  Repositioned  -AN     Row Name 03/19/21 0842          Plan of Care Review    Plan of Care Reviewed With  patient  -AN     Outcome Summary  Pt currently supv for supine to sit, sit to stand and toilet txfr. Pt CGA with straight cane for functional mobility. Pt supv for toilet hygeine, grooming sinkside. Pt required cues for log rolling and review of spinal precautions  withADLs. Provided long sponge for bathing assist. Pt to return home with son at discharge.  -AN     Row Name 03/19/21 0842          Therapy Assessment/Plan (OT)    Patient/Family Therapy Goal Statement (OT)  agreeable to OT, wants to gohome today  -AN     Rehab Potential (OT)  good, to achieve stated therapy goals  -AN     Criteria for Skilled Therapeutic Interventions Met (OT)  yes;skilled treatment is necessary  -AN     Therapy Frequency (OT)  daily  -AN     Row Name 03/19/21 0842          Therapy Plan Review/Discharge Plan (OT)    Equipment Needs Upon Discharge (OT)  shower chair;bathing equipment  -AN     Anticipated Discharge Disposition (OT)  home with assist  -AN     Row Name 03/19/21 0842          Vital Signs    Pre Systolic BP Rehab  156  -AN     Pre Treatment Diastolic BP  73  -AN     Post Systolic BP Rehab  134  -AN     Post Treatment Diastolic BP  64  -AN     Pre Patient Position  Supine  -AN     Post Patient Position  Standing  -AN     Row Name 03/19/21 0842          Positioning and Restraints    Pre-Treatment Position  in bed  -AN     Post Treatment Position  bed  -AN     In Bed  side lying right;call light within reach;encouraged to call for assist;exit alarm on;notified nsg  -AN       User Key  (r) = Recorded By, (t) = Taken By, (c) = Cosigned By    Initials Name Provider Type    AN Noris Vargas, OT Occupational Therapist        Outcome Measures     Row Name 03/19/21 0845          How much help from another is currently needed...    Putting on and taking off regular lower body clothing?  3  -AN     Bathing (including washing, rinsing, and drying)  3  -AN     Toileting (which includes using toilet bed pan or urinal)  4  -AN     Putting on and taking off regular upper body clothing  4  -AN     Taking care of personal grooming (such as brushing teeth)  4  -AN     Eating meals  4  -AN     AM-PAC 6 Clicks Score (OT)  22  -AN     Row Name 03/19/21 0845          Functional Assessment    Outcome Measure  Options  AM-PAC 6 Clicks Daily Activity (OT)  -AN       User Key  (r) = Recorded By, (t) = Taken By, (c) = Cosigned By    Initials Name Provider Type    Noris Adamson OT Occupational Therapist        Occupational Therapy Education                 Title: PT OT SLP Therapies (In Progress)     Topic: Occupational Therapy (In Progress)     Point: ADL training (Done)     Description:   Instruct learner(s) on proper safety adaptation and remediation techniques during self care or transfers.   Instruct in proper use of assistive devices.              Learning Progress Summary           Patient Acceptance, E, VU,NR by AURELIO at 3/19/2021 0754    Comment: Educated on OT role, POC, reviewed spinal precautions with ADLs.                   Point: Home exercise program (Not Started)     Description:   Instruct learner(s) on appropriate technique for monitoring, assisting and/or progressing therapeutic exercises/activities.              Learner Progress:  Not documented in this visit.          Point: Precautions (Not Started)     Description:   Instruct learner(s) on prescribed precautions during self-care and functional transfers.              Learner Progress:  Not documented in this visit.          Point: Body mechanics (Not Started)     Description:   Instruct learner(s) on proper positioning and spine alignment during self-care, functional mobility activities and/or exercises.              Learner Progress:  Not documented in this visit.                      User Key     Initials Effective Dates Name Provider Type Discipline    AURELIO 06/22/15 -  Noris Vargas OT Occupational Therapist OT              OT Recommendation and Plan  Therapy Frequency (OT): daily  Plan of Care Review  Plan of Care Reviewed With: patient  Outcome Summary: Pt currently supv for supine to sit, sit to stand and toilet txfr. Pt CGA with straight cane for functional mobility. Pt supv for toilet hygeine, grooming sinkside. Pt required cues for log  rolling and review of spinal precautions withADLs. Provided long sponge for bathing assist. Pt to return home with son at discharge.     Time Calculation:   Time Calculation- OT     Row Name 03/19/21 0847             Time Calculation- OT    OT Start Time  0754  -AN      Total Timed Code Minutes- OT  24 minute(s)  -AN      OT Received On  03/19/21  -AN      OT Goal Re-Cert Due Date  03/29/21  -AN        User Key  (r) = Recorded By, (t) = Taken By, (c) = Cosigned By    Initials Name Provider Type    Noris Adamson OT Occupational Therapist        Therapy Charges for Today     Code Description Service Date Service Provider Modifiers Qty    99424033428 HC OT EVAL LOW COMPLEXITY 4 3/19/2021 Noris Vargas OT GO 1    53934101204 HC OT THERAPEUTIC ACT EA 15 MIN 3/19/2021 Noris Vargas OT GO 1    88066425257 HC OT SELF CARE/MGMT/TRAIN EA 15 MIN 3/19/2021 Noris Vargas OT GO 1    05146634586 HC OT THER SUPP EA 15 MIN 3/19/2021 Noris Vargas OT GO 1               Noris Vargas OT  3/19/2021

## 2021-03-19 NOTE — CONSULTS
[facility] Consult Note      Referring Provider: No ref. provider found  Primary Provider:  Paris Mejía MD  Reason for Consultation: Aortic stenosis    Problem list:    1. Aortic stenosis  1. Echo 12/15/2016: EF 60%, mild MR, mild TR  2. Echo 2/13/20: EF 64%, calcification of the aortic valve, mild AS, peak velocity of the flow distal to aortic valve 268.0 cm/s, mild MR, mild TR, RVSP 37 mmHg.   3. Stress MPS 3/9/21: EF > 70%, no ischemia  4. Echo 3/19/21:  EF 60%, moderate AS mean gradient 23 mmHG.  2. Hypertension  3. Hyperlipidemia  4. S/p Lumbar decompression, [place], [place]    History of present illness:  Ms. Ochoa is an 86 y/o female with the above noted pmhx who presented to [facility] ED yesterday with complaints of syncope. She reports standing up, drinking coffee then suddenly passing out for about one minute. This was witnessed by her daughter-in-law. No prodrome. No loss of bowel or bladder. Afterwards, she reports feeling fine. Per family she had no seizure like activity. No post-ictal period. Evaluation in ED notable for a small SAH.  She was admitted overnight by medicine team.  Cardiology has been subsequently consulted for AS..     VS: BP lying-  /60 mmHg and HR 99 bpm, standing /106 mmHg, 110 bpm.     Patient reports being able to swim and bike at normal intensity and duration without chest pain or shortness of breath. She denies PND, orthopnea.     Review of Systems  Pertinent positives are listed in the HPI.  All other systems reviewed are negative.     Social History:  Social History     Socioeconomic History   • Marital status:      Spouse name: Not on file   • Number of children: Not on file   • Years of education: Not on file   • Highest education level: Not on file   Tobacco Use   • Smoking status: Never Smoker   • Smokeless tobacco: Never Used   • Tobacco comment: No Caffeine use   Substance and Sexual Activity   • Alcohol use: No   • Drug use: No   • Sexual  activity: Defer       Family History:  Family History   Problem Relation Age of Onset   • COPD Mother    • Stroke Mother    • Aneurysm Mother    • COPD Father    • Malig Hyperthermia Neg Hx        Current Medications:      Current Facility-Administered Medications:   •  acetaminophen (TYLENOL) tablet 650 mg, 650 mg, Oral, Q4H PRN, 650 mg at 03/19/21 1023 **OR** acetaminophen (TYLENOL) 160 MG/5ML solution 650 mg, 650 mg, Oral, Q4H PRN **OR** acetaminophen (TYLENOL) suppository 650 mg, 650 mg, Rectal, Q4H PRN, Xiao Garcia MD  •  cetirizine (zyrTEC) tablet 10 mg, 10 mg, Oral, Daily, Xiao Garcia MD, 10 mg at 03/19/21 1023  •  dilTIAZem CD (CARDIZEM CD) 24 hr capsule 240 mg, 240 mg, Oral, Q PM, Xiao Garcia MD, 240 mg at 03/18/21 1636  •  HYDROcodone-acetaminophen (NORCO) 5-325 MG per tablet 1 tablet, 1 tablet, Oral, Q4H PRN, Xiao Garcia MD  •  losartan (COZAAR) tablet 100 mg, 100 mg, Oral, Daily, Xiao Garcia MD, 100 mg at 03/19/21 1023  •  montelukast (SINGULAIR) tablet 10 mg, 10 mg, Oral, Nightly, Xiao Garcia MD, 10 mg at 03/18/21 2102  •  nebivolol (BYSTOLIC) tablet 10 mg, 10 mg, Oral, QAM, Xiao Garcia MD, 10 mg at 03/19/21 1023  •  ondansetron (ZOFRAN) tablet 4 mg, 4 mg, Oral, Q6H PRN **OR** ondansetron (ZOFRAN) injection 4 mg, 4 mg, Intravenous, Q6H PRN, Xiao Garcia MD  •  polyethylene glycol (MIRALAX) packet 17 g, 17 g, Oral, Daily, Luis Cox MD, 17 g at 03/19/21 1023  •  sennosides-docusate (PERICOLACE) 8.6-50 MG per tablet 2 tablet, 2 tablet, Oral, BID PRN, Xiao Garcia MD  •  sertraline (ZOLOFT) tablet 25 mg, 25 mg, Oral, Q PM, Xiao Garcia MD, 25 mg at 03/18/21 1637  •  sodium chloride 0.9 % flush 1-10 mL, 1-10 mL, Intravenous, PRN, Xiao Garcia MD  •  sodium chloride 0.9 % flush 10 mL, 10 mL, Intravenous, PRN, Juan Alberto Aguirre,   •  sodium chloride 0.9 % flush 10 mL, 10 mL, Intravenous,  "Q12H, Xiao Garcia MD, 10 mL at 03/19/21 1024    Allergies:  Sulfa antibiotics    Objective     Vital Sign Min/Max for last 24 hours  Temp  Min: 97.6 °F (36.4 °C)  Max: 99.1 °F (37.3 °C)   BP  Min: 111/57  Max: 161/83   Pulse  Min: 63  Max: 74   Resp  Min: 16  Max: 18   SpO2  Min: 97 %  Max: 98 %   No data recorded   Weight  Min: 58.6 kg (129 lb 3 oz)  Max: 58.6 kg (129 lb 3 oz)     Flowsheet Rows      First Filed Value   Admission Height  157.5 cm (62\") Documented at 03/18/2021 0844   Admission Weight  58.1 kg (128 lb) Documented at 03/18/2021 0844          Physical Exam:  GENERAL: This is a well-developed, well-nourished, female who is in no acute distress.   SKIN: Pink and warm without rash or abnormality noted.   HEENT: Head is normocephalic and atraumatic. Pupils are equal and reactive to light bilaterally. Mucous membranes are pink and moist.   NECK: Supple without lymphadenopathy or thyromegaly. There is no jugular venous distention at 30°.  LUNGS: Clear to auscultation bilaterally without wheezing, rhonchi, or rales noted.   CARDIOVASCULAR: The heart has a regular rate with a normal S1 and S2. There is a 2-3/6 CHANTE at RUSB, no gallop, rub, or click appreciated. The PMI is nondisplaced. Carotid upstrokes are 2+ and symmetrical without bruits.   ABDOMEN: Soft and nondistended with positive bowel sounds x4. The patient denies tenderness of palpitation.   MUSCULOSKELETAL: There are no obvious bony abnormalities. Normal range of tenderness to palpation.   NEUROLOGICAL: Nonfocal. Alert and oriented x3.   PERIPHERAL VASCULAR: Femoral pulses are 2+ and symmetrical without bruits. Posterior tibial and dorsalis pedis pulses are 2+ and symmetrical. There is no peripheral edema.   PSYCH: Normal mood and affect.      EKG:  NSR, RBBB  Tele: NSR    Labs:      Lab Results   Component Value Date    WBC 5.90 03/19/2021    HGB 13.9 03/19/2021    HCT 45.3 03/19/2021    .2 (H) 03/19/2021     03/19/2021 "     Lab Results   Component Value Date    GLUCOSE 96 03/19/2021    BUN 11 03/19/2021    CREATININE 0.61 03/19/2021    EGFRIFNONA 93 03/19/2021    EGFRIFAFRI 83 02/01/2021    BCR 18.0 03/19/2021    K 4.1 03/19/2021    CO2 24.0 03/19/2021    CALCIUM 8.5 (L) 03/19/2021    PROTENTOTREF 7.1 02/01/2021    ALBUMIN 3.80 03/18/2021    LABIL2 2.0 02/01/2021    AST 12 03/18/2021    ALT 9 03/18/2021     Lab Results   Component Value Date    TROPONINT <0.010 03/18/2021     Lab Results   Component Value Date    INR 1.2 (H) 04/28/2015    PROTIME 13.7 (H) 04/28/2015     Lab Results   Component Value Date    CHOL 171 03/19/2021    CHLPL 208 (H) 01/28/2020    TRIG 99 03/19/2021    HDL 71 (H) 03/19/2021    LDL 82 03/19/2021        Results Review: I reviewed the patients new clinical results.      ASSESSMENT:    1.  Syncope  a. Patient presented to ED after a syncopal event which results in SAH. No prodrome.   b. Orthostasis noted  c. Preliminary echo concerning for moderate AS.   2. Aortic stenosis  a. Echo 3/19/21: moderate  3. Hypertension  a. Orthostatic this admission  4. Hyperlipidemia, controlled  5. Subarachnoid hemorrhage due to trauma  a. Per primary team    I doubt the AS is source of syncope.  Only moderate.  Doubt orthostasis.  No prodrome  Possible pauses.  She is 86 yo and has RBBB.    PLAN:    Discharge today ok w me.  Needs 30 day monitor with her [place] cardiologist.    I discussed the patients findings and my recommendations with patient and her son    Scribed for Mare Lino MD by CARLITA Fraire, APRN. 3/19/2021  14:15 EDT    I Mare Lino MD personally performed the services described in this documentation as scribed by the above individual in my presence, and it is both accurate and complete.    Mare Lino MD, FACC

## 2021-03-19 NOTE — PROGRESS NOTES
Malnutrition Severity Assessment    Patient Name:  Lanie Ochoa  YOB: 1935  MRN: [id number]  Admit Date:  3/18/2021    Patient meets criteria for : Severe Malnutrition    Comments:  Patient reports unintentional weight loss of ~18 lbs (12.3%) in ~1 month and decreased oral intake (<50% of normal amounts) for > 5 days prior to admission.     MD please review and include in diagnosis list as deemed appropriate    Malnutrition Severity Assessment  Malnutrition Type: Acute Disease or Injury - Related Malnutrition     Malnutrition Type (last 8 hours)      Malnutrition Severity Assessment     Row Name 03/19/21 1430       Malnutrition Severity Assessment    Malnutrition Type  Acute Disease or Injury - Related Malnutrition    Row Name 03/19/21 1430       Insufficient Energy Intake     Insufficient Energy Intake Findings  Severe    Insufficient Energy Intake   < or equal to 50% of est. energy requirement for > or equal to 5d)    Row Name 03/19/21 1430       Unintentional Weight Loss     Unintentional Weight Loss Findings  Severe    Unintentional Weight Loss   Weight loss greater than 5% in one month    Row Name 03/19/21 1430       Criteria Met (Must meet criteria for severity in at least 2 of these categories: M Wasting, Fat Loss, Fluid, Secondary Signs, Wt. Status, Intake)    Patient meets criteria for   Severe Malnutrition          Electronically signed by:  Mireya Michel RDN, LD  03/19/21 14:31 EDT

## 2021-03-19 NOTE — PROGRESS NOTES
Case Management Discharge Note      Final Note: Per discussion in MDR, patient will likely discharge today. MD feels home health for SN and PT would be appropriate. PT has recommended HH. Met with patient and family in the room. Patient will be returning to her home in [place] and would like [facility]. Referral called to Puneet with [facility], and she will f/u with patient to arrange the first visit. CM has requested that the start-of-care begins on Monday, 3/23/21. Patient would like a rolling walker for home and would like to use [facility]. Order entered in Epic. CM has delivered a RW to patient's room. Updated patient and family on the discharge plan, and they agree with the plan. Patient's daughter will be staying with her in [place] while she recuperates. No other discharge needs identified/voiced.         Selected Continued Care - Admitted Since 3/18/2021     Destination    No services have been selected for the patient.              Durable Medical Equipment Coordination complete    Service Provider Selected Services Address Phone Fax Patient Preferred    [facility]  D[facility] Medical Equipment [address] [address] [phone] --          Dialysis/Infusion    No services have been selected for the patient.              Home Medical Care Coordination complete    Service Provider Selected Services Address Phone Fax Patient Preferred    [facility] [place]  Home Health Services [address] [address] [phone] --          Therapy    No services have been selected for the patient.              Community Resources    No services have been selected for the patient.                       Final Discharge Disposition Code: 06 - home with home health care

## 2021-03-19 NOTE — PLAN OF CARE
Goal Outcome Evaluation:        Outcome Summary: (P) Pt is CGA for bed mobility, SBA for scooting, and CGA for STS and gait. Pt amblated 120' with straight cane and 170' with RW. Pt was unsteady with impulsivity noted with fast/unsafe gait speed and difficulty with cane placement as she holds it above the ground requiring mod VC to correct. Pt is more steady with RW but is reluctant with using RW at home as she typically uses a cane for home ambulation. Pt and daughter instructed on RW use or supplemental UE support during ambulation to avoid a fall. D/C recommendation to home with HH and assist.

## 2021-03-19 NOTE — PROGRESS NOTES
"NEUROSURGERY PROGRESS NOTE     LOS: 0 days   Patient Care Team:  Paris Mejía MD as PCP - General (Family Medicine)    Chief Complaint: Fall with head injury.    Interval History:   Patient Complaints: Discomfort about her scalp laceration site.  Patient Denies: Severe headache, nausea, vomiting.    Review of Systems:   Musculoskeletal and Neurological systems were reviewed and are negative except for:  Musculoskeletal: positive for back pain.    Vital Signs: Blood pressure 138/57, pulse 68, temperature 98.2 °F (36.8 °C), temperature source Oral, resp. rate 18, height 157.5 cm (62\"), weight 58.6 kg (129 lb 3 oz), SpO2 98 %, not currently breastfeeding.  Intake/Output:     Intake/Output Summary (Last 24 hours) at 3/19/2021 0614  Last data filed at 3/18/2021 2056  Gross per 24 hour   Intake 500 ml   Output 600 ml   Net -100 ml       Physical Exam:  The patient is awake and alert.  She is well oriented.  Her speech is fluent.  She follows all commands.  There is no pronator drift.  Tone is normal throughout.  Her neck is supple.      Assessment/Plan:  1.  Small right traumatic subarachnoid hemorrhage after fall.  2.  Syncope or near syncope.  3.  Disposition: Home anytime from neurosurgery perspective.  Follow-up with neurosurgery on an as-needed basis.      Charli Allen MD  03/19/21  06:14 EDT      "

## 2021-03-20 NOTE — OUTREACH NOTE
Prep Survey      Responses   Scient[facility] facility patient discharged from?  [place]   Is LACE score < 7 ?  Yes   Emergency Room discharge w/ pulse ox?  No   Eligibility  [facility]   Date of Admission  03/18/21   Date of Discharge  03/19/21   Discharge Disposition  Home or Self Care   Discharge diagnosis  Subarachnoid hemorrhage    Does the patient have one of the following disease processes/diagnoses(primary or secondary)?  Other   Does the patient have Home health ordered?  Yes   What is the Home health agency?   [facility]    Is there a DME ordered?  Yes   What DME was ordered?  [employer]   Prep survey completed?  Yes          Sherin Tan RN

## 2021-03-22 ENCOUNTER — TRANSITIONAL CARE MANAGEMENT TELEPHONE ENCOUNTER (OUTPATIENT)
Dept: CALL CENTER | Facility: HOSPITAL | Age: 86
End: 2021-03-22

## 2021-03-22 NOTE — DISCHARGE SUMMARY
[facility] Medicine Services  DISCHARGE SUMMARY    Patient Name: Lanie Ochoa  : 1935  MRN: [id number]    Date of Admission: 3/18/2021  8:38 AM  Date of Discharge:  3/19/2021  Primary Care Physician: Paris Mejía MD    Consults     Date and Time Order Name Status Description    3/19/2021  1:16 PM Inpatient Cardiology Consult Completed           Hospital Course     Presenting Problem:   Syncope [R55]    Active Hospital Problems    Diagnosis  POA   • Syncope [R55]  Yes   • RBBB (right bundle branch block) [I45.10]  Unknown   • Subarachnoid hemorrhage (CMS/HCC) [I60.9]  Unknown   • Spinal stenosis of lumbar region with neurogenic claudication [M48.062]  Yes   • Lumbar spinal stenosis [M48.061]  Yes   • Benign essential hypertension [I10]  Yes   • HLD (hyperlipidemia) [E78.5]  Yes      Resolved Hospital Problems   No resolved problems to display.          Hospital Course:  Lanie Ochoa is a 85 y.o. female here with syncope and found to have mild orthostasis. EKG demonstrated RBB. ECHO noted AS and cardiology was consulted and her syncope was not felt due to aortic stenosis. She had no prodrome or palpitations. Cardiology felt this warranted outpatient event monitoring and provided a prescription to facilitate this in [place], where the patient will be returning. She had a fall with a small SAH that was not felt to be surgical and needs PCP follow up in 1 week.      Discharge Follow Up Recommendations for outpatient labs/diagnostics:  To follow up in [place] with cardiology for outpatient cardiac monitoring/event monitoring with prescription/orders provided by Dr. Lino      Day of Discharge     Syncope     HPI:  One noted low BP with standing last PM, but denies dizziness/ligthheadedness/near syncope being here. Denies palpitations. Did not use bathroom or eat prior to incident. Denies palpitations.     Review of Systems   Constitutional: Positive for  activity change and fatigue.   Respiratory: Negative.    Cardiovascular: Negative.    Gastrointestinal: Negative.    Genitourinary: Negative.    Musculoskeletal: Negative.    Neurological: Positive for headaches. Negative for dizziness and numbness.   Psychiatric/Behavioral: Negative.                Objective      Objective      Vital Signs:   Temp:  [97.9 °F (36.6 °C)-99.1 °F (37.3 °C)] 98.2 °F (36.8 °C)  Heart Rate:  [63-78] 68  Resp:  [18] 18  BP: (117-176)/() 138/57     Physical Exam:  NAD, alert and oriented x 3  OP clear, MMM  PERRL  Neck supple  No LAD  RRR, systolic murmur noted  CTAB  +BS, ND, NT, soft  No c/c/e  No rashes  SANDERS  Normal affect      Pertinent  and/or Most Recent Results     LAB RESULTS:      Lab 03/19/21  0621 03/18/21  0850   WBC 5.90 5.13   HEMOGLOBIN 13.9 13.4   HEMATOCRIT 45.3 41.8   PLATELETS 281 275   NEUTROS ABS  --  3.30   IMMATURE GRANS (ABS)  --  0.11*   LYMPHS ABS  --  0.90   MONOS ABS  --  0.48   EOS ABS  --  0.30   .2* 96.3         Lab 03/19/21  1113 03/19/21  0621 03/18/21  0850   SODIUM 137  --  139   POTASSIUM 4.1  --  3.9   CHLORIDE 103  --  103   CO2 24.0  --  29.0   ANION GAP 10.0  --  7.0   BUN 11  --  13   CREATININE 0.61  --  0.80   GLUCOSE 96  --  121*   CALCIUM 8.5*  --  9.4   MAGNESIUM  --   --  2.0   HEMOGLOBIN A1C  --  5.60  --    TSH 0.767  --   --          Lab 03/18/21  0850   TOTAL PROTEIN 6.5   ALBUMIN 3.80   GLOBULIN 2.7   ALT (SGPT) 9   AST (SGOT) 12   BILIRUBIN 0.3   ALK PHOS 54         Lab 03/18/21  0850   TROPONIN T <0.010         Lab 03/19/21  1113   CHOLESTEROL 171   LDL CHOL 82   HDL CHOL 71*   TRIGLYCERIDES 99             Brief Urine Lab Results  (Last result in the past 365 days)      Color   Clarity   Blood   Leuk Est   Nitrite   Protein   CREAT   Urine HCG        03/18/21 2059 Yellow Clear Negative Negative Negative Negative             Microbiology Results (last 10 days)     Procedure Component Value - Date/Time    COVID PRE-OP /  PRE-PROCEDURE SCREENING ORDER (NO ISOLATION) - Swab, Nasopharynx [[id number]]  (Normal) Collected: 03/18/21 1145    Lab Status: Final result Specimen: Swab from Nasopharynx Updated: 03/18/21 1220    Narrative:      The following orders were created for panel order COVID PRE-OP / PRE-PROCEDURE SCREENING ORDER (NO ISOLATION) - Swab, Nasopharynx.  Procedure                               Abnormality         Status                     ---------                               -----------         ------                     COVID-19, ABBOTT IN-HOUS...[[id number]]  Normal              Final result                 Please view results for these tests on the individual orders.    COVID-19, ABBOTT IN-HOUSE,NASAL Swab (NO TRANSPORT MEDIA) 2 HR TAT - Swab, Nasopharynx [[id number]]  (Normal) Collected: 03/18/21 1145    Lab Status: Final result Specimen: Swab from Nasopharynx Updated: 03/18/21 1220     COVID19 Presumptive Negative    Narrative:      Fact sheet for providers: https://www.fda.gov/media/514184/download     Fact sheet for patients: https://www.fda.gov/media/800829/download    Test performed by PCR.  If inconsistent with clinical signs and symptoms patient should be tested with different authorized molecular test.          Adult Transthoracic Echo Complete w/ Color, Spectral and Contrast if necessary per protocol    Result Date: 3/19/2021  · Estimated left ventricular EF = 55% Left ventricular systolic function is normal. · Moderate aortic valve stenosis is present. Aortic valve area is 1.2 cm2. · Aortic valve mean pressure gradient is 23 mmHg. · Mild mitral valve regurgitation is present · Mild tricuspid valve regurgitation is present.      XR Spine Lumbar 2 or 3 View    Result Date: 3/18/2021  EXAMINATION: XR SPINE LUMBAR 2 OR 3 VW-  INDICATION: trauma  COMPARISON: NONE  FINDINGS: Advanced multilevel lumbar spondylosis is present, likely most advanced at L4-5 with moderate grade 1 anterolisthesis. Vertebral body heights  are otherwise maintained without definite evidence of acute fracture or traumatic malalignment. There are advanced bilateral hip arthrosis changes, somewhat greater on the right.      Advanced multilevel lumbar spondylosis is present, likely most advanced at L4-5 with moderate grade 1 anterolisthesis. Vertebral body heights are otherwise maintained without definite evidence of acute fracture or traumatic malalignment. There are advanced bilateral hip arthrosis changes, somewhat greater on the right.  This report was finalized on 3/18/2021 10:15 AM by Danyel Ponce.      CT Head Without Contrast    Result Date: 3/18/2021  EXAMINATION: CT CERVICAL SPINE WO CONTRAST-, CT HEAD WO CONTRAST-  INDICATION: trauma  TECHNIQUE: Axial noncontrast CT of the head and cervical spine with multiplanar reconstruction  The radiation dose reduction device was turned on for each scan per the ALARA (As Low as Reasonably Achievable) protocol.  COMPARISON: NONE  FINDINGS: CT head: There is sulcal hyperdensity identified involving the right parietal lobe compatible with small area of traumatic subarachnoid hemorrhage. There is some minimal associated right-sided cerebral edema, otherwise without evidence of global mass effect or midline shift. The basal cisterns are grossly patent. No evidence of acute extra-axial hemorrhage. The ventricles are normal in size and configuration accounting for some mild surrounding volume loss. The orbits are normal and the paranasal sinuses are grossly clear. The calvarium is intact.  Cervical spine: Advanced multilevel cervical spondylosis changes are evident with areas of disc osteophyte complex formation and facet arthropathy. There is some associated straightening of usual cervical lordosis, otherwise without evidence of listhesis or subluxation. The dens is intact and there is no evidence of acute fracture. The paraspinal soft tissues are unremarkable.      Small area of acute traumatic subarachnoid  hemorrhage noted in the right parietal lobe with minimal adjacent cerebral edema. No global mass effect or midline shift. The basal cisterns are patent.  Advanced multilevel cervical spondylosis, otherwise without evidence of acute fracture or traumatic malalignment.   This report was finalized on 3/18/2021 10:08 AM by Danyel Ponce.      CT Cervical Spine Without Contrast    Result Date: 3/18/2021  EXAMINATION: CT CERVICAL SPINE WO CONTRAST-, CT HEAD WO CONTRAST-  INDICATION: trauma  TECHNIQUE: Axial noncontrast CT of the head and cervical spine with multiplanar reconstruction  The radiation dose reduction device was turned on for each scan per the ALARA (As Low as Reasonably Achievable) protocol.  COMPARISON: NONE  FINDINGS: CT head: There is sulcal hyperdensity identified involving the right parietal lobe compatible with small area of traumatic subarachnoid hemorrhage. There is some minimal associated right-sided cerebral edema, otherwise without evidence of global mass effect or midline shift. The basal cisterns are grossly patent. No evidence of acute extra-axial hemorrhage. The ventricles are normal in size and configuration accounting for some mild surrounding volume loss. The orbits are normal and the paranasal sinuses are grossly clear. The calvarium is intact.  Cervical spine: Advanced multilevel cervical spondylosis changes are evident with areas of disc osteophyte complex formation and facet arthropathy. There is some associated straightening of usual cervical lordosis, otherwise without evidence of listhesis or subluxation. The dens is intact and there is no evidence of acute fracture. The paraspinal soft tissues are unremarkable.      Small area of acute traumatic subarachnoid hemorrhage noted in the right parietal lobe with minimal adjacent cerebral edema. No global mass effect or midline shift. The basal cisterns are patent.  Advanced multilevel cervical spondylosis, otherwise without evidence of  acute fracture or traumatic malalignment.   This report was finalized on 3/18/2021 10:08 AM by Danyel Ponce.      XR Chest 1 View    Result Date: 3/18/2021  EXAMINATION: XR CHEST 1 VW-  INDICATION: syncope  COMPARISON: NONE  FINDINGS: There is mild cardiac enlargement and central pulmonary vascular prominence. There is no evidence of overt edema or focal consolidation. No significant pleural effusion or distinct pneumothorax.      Minimal pulmonary vascular engorgement, otherwise without evidence of overt edema or other acute disease in the chest.  This report was finalized on 3/18/2021 10:02 AM by Danyel Ponce.        Results for orders placed during the hospital encounter of 08/06/17    Duplex Venous Lower Extremity - Left    Interpretation Summary  · Normal left lower extremity venous duplex scan.      Results for orders placed during the hospital encounter of 08/06/17    Duplex Venous Lower Extremity - Left    Interpretation Summary  · Normal left lower extremity venous duplex scan.      Results for orders placed during the hospital encounter of 03/18/21    Adult Transthoracic Echo Complete w/ Color, Spectral and Contrast if necessary per protocol    Interpretation Summary  · Estimated left ventricular EF = 55% Left ventricular systolic function is normal.  · Moderate aortic valve stenosis is present. Aortic valve area is 1.2 cm2.  · Aortic valve mean pressure gradient is 23 mmHg.  · Mild mitral valve regurgitation is present  · Mild tricuspid valve regurgitation is present.      Plan for Follow-up of Pending Labs/Results: Reviewed    Discharge Details        Discharge Medications      Changes to Medications      Instructions Start Date   dilTIAZem  MG 24 hr capsule  Commonly known as: CARDIZEM CD  What changed: when to take this   240 mg, Oral, Daily      sertraline 25 MG tablet  Commonly known as: ZOLOFT  What changed:   · when to take this  · additional instructions   25 mg, Oral, Daily,  200001         Continue These Medications      Instructions Start Date   calcium carbonate 600 MG tablet  Commonly known as: OS-NELLI   600 mg, Oral, Daily      cetirizine 10 MG tablet  Commonly known as: zyrTEC   10 mg, Oral, Daily      HYDROcodone-acetaminophen 5-325 MG per tablet  Commonly known as: NORCO   1 tablet, Oral, Every 4 Hours PRN      losartan 100 MG tablet  Commonly known as: COZAAR   100 mg, Oral, Daily      montelukast 10 MG tablet  Commonly known as: Singulair   10 mg, Oral, Nightly      nebivolol 10 MG tablet  Commonly known as: Bystolic   10 mg, Oral, Every Morning      Vitamin D3 10 MCG (400 UNIT) capsule   1 tablet, Oral, Daily             Allergies   Allergen Reactions   • Sulfa Antibiotics Rash         Discharge Disposition:  Home or Self Care    Diet:  Hospital:No active diet order      Activity:  Activity Instructions     Activity as Tolerated            Restrictions or Other Recommendations:         CODE STATUS:    Code Status and Medical Interventions:   Ordered at: 03/18/21 1247     Limited Support to NOT Include:    Antiarrhythmic Drugs    Antibiotics    Blood Products    NIPPV (Non-Invasive Positive Pressure Support)     Code Status:    No CPR     Medical Interventions (Level of Support Prior to Arrest):    Limited       Future Appointments   Date Time Provider Department Center   3/23/2021  3:00 PM Charli Haynes MD [facility]   3/24/2021 10:15 AM Judi Traylor APRN [facility]   3/25/2021 11:30 AM Yara Patino APRN [facility] None   5/3/2021 10:45 AM Emilia Thompson MD [facility]       Additional Instructions for the Follow-ups that You Need to Schedule     Ambulatory Referral to Home Health   As directed      Face to Face Visit Date: 3/19/2021    Follow-up provider for Plan of Care?: I treated the patient in an acute care facility and will not continue treatment after discharge.    Follow-up provider: EMILIA THOMPSON [[id number]]    Reason/Clinical Findings: s/p  hospitalization for syncope/near syncope, SAH; recent back surgery; HTN    Describe mobility limitations that make leaving home difficult: Impaired functional mobility, balance, gait, and endurance    Nursing/Therapeutic Services Requested: Skilled Nursing Physical Therapy    Skilled nursing orders: Medication education Cardiopulmonary assessments Neurovascular assessments    PT orders: Therapeutic exercise Gait Training Transfer training Home safety assessment Strengthening    Weight Bearing Status: As Tolerated    Frequency: 1 Week 1         Discharge Follow-up with PCP   As directed       Currently Documented PCP:    Paris Mejía MD    PCP Phone Number:    [phone]     Follow Up Details: 1 week         Discharge Follow-up with Specified Provider: Cardiology in [place] to get 30 day monitor, Dr. Lino supplied Rx   As directed      To: Cardiology in [place] to get 30 day monitor, Dr. Lino supplied Rx                     Luis Cox MD  03/22/21      Time Spent on Discharge:  I spent 33 minutes on this discharge activity which included: face-to-face encounter with the patient, reviewing the data in the system, coordination of the care with the nursing staff as well as consultants, documentation, and entering orders.

## 2021-03-22 NOTE — PROGRESS NOTES
"Subjective   Patient ID: Lanie Ochoa is a 85 y.o. female is here today for 2 week post op follow-up. Patient had a Lumbar 4-5 , L5-S1 laminectomy with metrx on 03/10/2021.     Today patient is having mild low back soreness. Patient's incision looks healthy, no redness, no discharge and no swelling. Patient had a syncope episode 03.18.2021 and has a new CT head, CT C-Spine.    Patient, Provider, and MA are all wearing a mask in our office today.     History of Present Illness     This patient is doing quite well with regard to her back and her leg.  She has almost no pain at all.  She did have a syncopal episode while standing in her son's house in [place] and was hospitalized at [facility] with a small subdural.    The following portions of the patient's history were reviewed and updated as appropriate: allergies, current medications, past family history, past medical history, past social history, past surgical history and problem list.    Review of Systems   Constitutional: Negative for chills and fever.   HENT: Negative for congestion.    Musculoskeletal: Positive for back pain and gait problem.   Neurological: Positive for weakness and numbness.       I have reviewed the review of systems as documented by my MA.      Objective     Vitals:    03/23/21 1514   BP: 130/79   Cuff Size: Adult   Pulse: 78   Temp: 97.1 °F (36.2 °C)   Weight: 58.6 kg (129 lb 3 oz)   Height: 157.5 cm (62.01\")     Body mass index is 23.62 kg/m².      Physical Exam  Neurological:      Mental Status: She is alert and oriented to person, place, and time.       Neurologic Exam     Mental Status   Oriented to person, place, and time.           Assessment/Plan   Independent Review of Radiographic Studies:      I personally reviewed the images from the following studies.    I reviewed the CT done in [place] on the 18th.  This does show some traumatic subarachnoid hemorrhage over the convexities.    Medical Decision Making:      I " told the patient we will schedule her for a follow-up scan in about a week.  We can also start some lumbar PT on her lower back about that time.  I will check her again in about 6 weeks.     Diagnoses and all orders for this visit:    1. Spinal stenosis of lumbar region with neurogenic claudication (Primary)  -     Ambulatory Referral to Physical Therapy    2. Subarachnoid hemorrhage (CMS/HCC)  -     CT Head Without Contrast; Future      Return in about 6 weeks (around 5/4/2021).

## 2021-03-22 NOTE — OUTREACH NOTE
Call Center TCM Note      Responses   [facility] patient discharged from?  [place]   Does the patient have one of the following disease processes/diagnoses(primary or secondary)?  Other   TCM attempt successful?  No   Unsuccessful attempts  Attempt 1          Nikki Nguyen RN    3/22/2021, 12:45 EDT

## 2021-03-23 ENCOUNTER — TRANSITIONAL CARE MANAGEMENT TELEPHONE ENCOUNTER (OUTPATIENT)
Dept: CALL CENTER | Facility: HOSPITAL | Age: 86
End: 2021-03-23

## 2021-03-23 ENCOUNTER — OFFICE VISIT (OUTPATIENT)
Dept: NEUROSURGERY | Facility: CLINIC | Age: 86
End: 2021-03-23

## 2021-03-23 VITALS
BODY MASS INDEX: 23.77 KG/M2 | HEART RATE: 78 BPM | TEMPERATURE: 97.1 F | HEIGHT: 62 IN | DIASTOLIC BLOOD PRESSURE: 79 MMHG | SYSTOLIC BLOOD PRESSURE: 130 MMHG | WEIGHT: 129.19 LBS

## 2021-03-23 DIAGNOSIS — M48.062 SPINAL STENOSIS OF LUMBAR REGION WITH NEUROGENIC CLAUDICATION: Primary | ICD-10-CM

## 2021-03-23 DIAGNOSIS — R55 SYNCOPE AND COLLAPSE: Primary | ICD-10-CM

## 2021-03-23 DIAGNOSIS — I60.9 SUBARACHNOID HEMORRHAGE (HCC): ICD-10-CM

## 2021-03-23 PROCEDURE — 99024 POSTOP FOLLOW-UP VISIT: CPT | Performed by: NEUROLOGICAL SURGERY

## 2021-03-23 NOTE — OUTREACH NOTE
Call Center TCM Note      Responses   [facility] patient discharged from?  [place]   Does the patient have one of the following disease processes/diagnoses(primary or secondary)?  Other   TCM attempt successful?  No   Unsuccessful attempts  Attempt 2          Sherin Bailon MA    3/23/2021, 15:58 EDT

## 2021-03-24 ENCOUNTER — OFFICE VISIT (OUTPATIENT)
Dept: FAMILY MEDICINE CLINIC | Facility: CLINIC | Age: 86
End: 2021-03-24

## 2021-03-24 ENCOUNTER — TRANSITIONAL CARE MANAGEMENT TELEPHONE ENCOUNTER (OUTPATIENT)
Dept: CALL CENTER | Facility: HOSPITAL | Age: 86
End: 2021-03-24

## 2021-03-24 VITALS
OXYGEN SATURATION: 98 % | WEIGHT: 130.9 LBS | HEART RATE: 58 BPM | DIASTOLIC BLOOD PRESSURE: 75 MMHG | HEIGHT: 62 IN | SYSTOLIC BLOOD PRESSURE: 144 MMHG | TEMPERATURE: 96.8 F | BODY MASS INDEX: 24.09 KG/M2

## 2021-03-24 DIAGNOSIS — Z09 HOSPITAL DISCHARGE FOLLOW-UP: Primary | ICD-10-CM

## 2021-03-24 DIAGNOSIS — R55 SYNCOPE AND COLLAPSE: ICD-10-CM

## 2021-03-24 PROCEDURE — 99495 TRANSJ CARE MGMT MOD F2F 14D: CPT | Performed by: NURSE PRACTITIONER

## 2021-03-24 NOTE — PROGRESS NOTES
Chief Complaint  Hospital Follow Up Visit (f/u syncope and head injury ) and Blepharitis (c/o R eyelid swollen and irritated )    Subjective          Lanie Ochoa presents to [facility]  History of Present Illness   Pt is here for hospital f/u.  Her daughter is with her.    Patient reports that she was staying with her son in [place] recently and had just woken up and was drinking her coffee and suddenly passed out.  She had her face and had on the sink and floor.  She was taken to the hospital evaluated.  She had a 1 day stay.    She had a cardiology evaluation while there.  Her EKG showed right bundle branch block and an echo showed atrial stenosis.  Her syncope and collapse was not thought to be due to the stenosis.  She will be evaluated here in [place] for Holter monitor possibly.  She denies any prodrome or post ictal period.    CT of her head showed a small subarachnoid hemorrhage that was not thought to be surgical.  But should be followed up.    She had recent spinal surgery and has been to her neurosurgeon who has ordered a CT of her head to follow this up tomorrow.    The only thing that has been different prior to this event was that she thinks she may have had an episode of diarrhea prior to this but not immediately prior.  She became constipated from pain medication following spinal surgery and was very concerned about this and taking multiple doses of MiraLAX which resulted in diarrhea.  She is currently a little bit better adjusted and is having more normal stools now.    Sutures have been in 1 week and should be removed in 10 days from event.    Patient currently denies any headache.  Her scalp is little bit tender on the left side where the sutures are.  She denies any dizziness or syncope.  She does not get lightheaded when she stands.  She has no chest pain or heart palpitations.  She denies any blood in her stool or nausea vomiting belly pain.  She has  "no urinary symptoms.  Week and denies any neuropathic symptoms.    Physical therapy was ordered for home but patient is ambulating well and family does not think it is warranted at this time and would like it to be canceled.    Objective   Vital Signs:   /75   Pulse 58   Temp 96.8 °F (36 °C)   Ht 157.5 cm (62\")   Wt 59.4 kg (130 lb 14.4 oz)   SpO2 98%   BMI 23.94 kg/m²     Physical Exam  Vitals and nursing note reviewed.   Constitutional:       General: She is not in acute distress.     Appearance: She is well-developed. She is not ill-appearing or diaphoretic.   HENT:      Head: Normocephalic.      Comments: Sutures intact to left scalp which is seems to be healing well.  Wound is well approximated, mildly tender but no localized swelling noted    She has some bruising on her face seems to be resolving     Right Ear: Tympanic membrane, ear canal and external ear normal.      Left Ear: Tympanic membrane and external ear normal.      Mouth/Throat:      Mouth: Mucous membranes are moist.      Pharynx: No posterior oropharyngeal erythema.   Eyes:      General: No scleral icterus.        Right eye: No discharge.         Left eye: No discharge.      Extraocular Movements: Extraocular movements intact.      Conjunctiva/sclera: Conjunctivae normal.      Pupils: Pupils are equal, round, and reactive to light.   Cardiovascular:      Rate and Rhythm: Normal rate and regular rhythm.      Heart sounds: Murmur heard.     Pulmonary:      Effort: Pulmonary effort is normal.      Breath sounds: Normal breath sounds.   Abdominal:      General: Bowel sounds are normal.      Palpations: Abdomen is soft.      Tenderness: There is no abdominal tenderness.   Musculoskeletal:         General: No deformity.      Comments: Gait smooth and steady   Skin:     General: Skin is warm and dry.   Neurological:      General: No focal deficit present.      Mental Status: She is alert and oriented to person, place, and time. "   Psychiatric:         Mood and Affect: Mood normal.         Behavior: Behavior normal.        Result Review :                 Assessment and Plan    Diagnoses and all orders for this visit:    1. Hospital discharge follow-up (Primary)    2. Syncope and collapse         Her CBC showed a normal hemoglobin.  Her calcium was a little bit low and should be followed up.  Her lipids were good.  She had a negative urinalysis.  Her Covid test was negative.  Chest x-ray was negative.  Venous Doppler was negative.  We reconciled her medications.  She is no longer taking any pain medicine.    She will return next week for suture removal.  She has good support at home.  PT most likely not needed. Has cane for home use with ambulation.      She will f/u with cardiology.     Has CT of head scheduled bu neurosurg.       Follow Up   Return if symptoms worsen or fail to improve.  Patient was given instructions and counseling regarding her condition or for health maintenance advice. Please see specific information pulled into the AVS if appropriate.

## 2021-03-24 NOTE — OUTREACH NOTE
Call Center TCM Note      Responses   [facility] patient discharged from?  [facility]   Does the patient have one of the following disease processes/diagnoses(primary or secondary)?  Other   TCM attempt successful?  No [Patient is being seen in the office currently. This will fulfil the TCM requirement. ]   Unsuccessful attempts  Attempt 3   Revoked Reason  Other          Angel Zurita RN    3/24/2021, 10:26 EDT

## 2021-03-25 ENCOUNTER — OFFICE VISIT (OUTPATIENT)
Dept: CARDIOLOGY | Facility: CLINIC | Age: 86
End: 2021-03-25

## 2021-03-25 ENCOUNTER — TELEPHONE (OUTPATIENT)
Dept: NEUROSURGERY | Facility: CLINIC | Age: 86
End: 2021-03-25

## 2021-03-25 VITALS
HEART RATE: 57 BPM | SYSTOLIC BLOOD PRESSURE: 120 MMHG | WEIGHT: 131 LBS | DIASTOLIC BLOOD PRESSURE: 82 MMHG | BODY MASS INDEX: 24.11 KG/M2 | HEIGHT: 62 IN

## 2021-03-25 DIAGNOSIS — I35.0 AORTIC STENOSIS, MODERATE: ICD-10-CM

## 2021-03-25 DIAGNOSIS — R55 SYNCOPE AND COLLAPSE: Primary | ICD-10-CM

## 2021-03-25 DIAGNOSIS — I45.10 RBBB: ICD-10-CM

## 2021-03-25 DIAGNOSIS — I10 BENIGN ESSENTIAL HYPERTENSION: ICD-10-CM

## 2021-03-25 PROCEDURE — 93000 ELECTROCARDIOGRAM COMPLETE: CPT | Performed by: NURSE PRACTITIONER

## 2021-03-25 PROCEDURE — 99214 OFFICE O/P EST MOD 30 MIN: CPT | Performed by: NURSE PRACTITIONER

## 2021-03-25 RX ORDER — LOSARTAN POTASSIUM 50 MG/1
75 TABLET ORAL DAILY
Qty: 45 TABLET | Refills: 11 | Status: SHIPPED | OUTPATIENT
Start: 2021-03-25 | End: 2021-12-08 | Stop reason: SDUPTHER

## 2021-03-25 NOTE — PROGRESS NOTES
Date of Office Visit: 21  Encounter Provider: AKUA Lou  Place of Service: [facility] CARDIOLOGY  Patient Name: Lanie Ochoa  :1935    Chief Complaint   Patient presents with   • Loss of Consciousness   • Follow-up   :     HPI: Lanie Ochoa is a 85 y.o. female  with history of aortic valve stenosis, orthostatic hypotension, hypertension, mitral regurgitation, right bundle branch block, subarachnoid hemorrhage, and spinal stenoses.  She is followed by Dr. Downs.  I will visit with her for the first time today and have reviewed her medical record.  In 2020 she was noted to have mild aortic valve stenoses.  She had a lumbar spinal surgery and then about 5 days later had a syncopal spell while she was standing in her kitchen not believe drinking coffee.  She suffered a small subarachnoid hemorrhage.  She had been standing just a few minutes and was noted to have mild orthostasis at the hospital.  EKG demonstrated right bundle branch block.  She had a perfusion stress test which was negative for ischemia as well as repeat echo which showed normal left ventricular systolic function, moderate aortic valve stenoses, mild mitral regurgitation and mild tricuspid regurgitation.  She was given IV fluid but no adjustments to her medication.  She is to wear mobile telemetry outpatient.  She presents today for hospital discharge follow-up accompanied by her daughter.  She is due for repeat CT scan on Monday and then has follow-up with neurosurgery after that.  She normally swims for 30 minutes has not done that recently.  She denies palpitation, shortness breath, edema, lightheadedness pain chest pain tightness pressure or fatigue.  She has occasional mild lightheadedness upon standing but usually that is not an issue for her.  She reportedly drinks a lot of water throughout the day.              Allergies   Allergen Reactions   • Sulfa Antibiotics Rash  "          Family and social history reviewed.     ROS  All other systems were reviewed and are negative          Objective:     Vitals:    03/25/21 1132   BP: 120/82   BP Location: Left arm   Patient Position: Sitting   Pulse: 57   Weight: 59.4 kg (131 lb)   Height: 157.5 cm (62\")     Body mass index is 23.96 kg/m².    PHYSICAL EXAM:  Pulmonary:      Effort: Pulmonary effort is normal.   Cardiovascular:      Murmurs: There is a grade 2/6 systolic murmur at the LLSB and ULSB.           ECG 12 Lead    Date/Time: 3/25/2021 12:47 PM  Performed by: Yara Patino APRN  Authorized by: Yara Patino APRN   Comparison: compared with previous ECG   Similar to previous ECG  Rhythm: sinus rhythm  Rate: normal  Conduction: right bundle branch block    Clinical impression: abnormal EKG              Current Outpatient Medications   Medication Sig Dispense Refill   • calcium carbonate (OS-NELLI) 600 MG tablet Take 600 mg by mouth Daily.     • cetirizine (zyrTEC) 10 MG tablet Take 1 tablet by mouth Daily. 90 tablet 3   • Cholecalciferol (VITAMIN D3) 400 UNITS capsule Take 1 tablet by mouth Daily.     • dilTIAZem CD (CARDIZEM CD) 240 MG 24 hr capsule Take 1 capsule by mouth Daily. (Patient taking differently: Take 240 mg by mouth Every Evening.) 90 capsule 1   • montelukast (Singulair) 10 MG tablet Take 1 tablet by mouth Every Night. 90 tablet 1   • nebivolol (Bystolic) 10 MG tablet Take 1 tablet by mouth Every Morning. 90 tablet 3   • sertraline (ZOLOFT) 25 MG tablet Take 1 tablet by mouth Daily. 200001 (Patient taking differently: Take 25 mg by mouth Every Evening.) 90 tablet 1   • losartan (Cozaar) 50 MG tablet Take 1.5 tablets by mouth Daily. 45 tablet 11     No current facility-administered medications for this visit.     Assessment:       Diagnosis Plan   1. Syncope and collapse     2. Benign essential hypertension          Orders Placed This Encounter   Procedures   • ECG 12 Lead     This order was created via procedure " documentation         Plan:       1.  85-year-old female with moderate aortic valve stenoses and preserved left ventricular systolic function.  This is progressed from mild in 1 year.  We will consider repeat echo in 6 months to a year  2.  History of hypertension but recently with mild orthostatic hypotension.  I personally checked her blood pressure sitting which was 114 systolic and decreased to 104 systolic with standing.  I encouraged that she continue adequate fluid hydration however I am going to decrease losartan from 100 mg to 75 mg.  I am going to continue Bystolic and diltiazem at current dosages.  She is going to check her blood pressure at home at least once daily and at least once a week both in sitting and standing position which I demonstrated to her today.  3.  Right bundle branch block  4.  Status post lumbar spinal surgery  5.  Subarachnoid hemorrhage secondary to syncope/fall she has follow-up CT next week and follow-up with neurosurgery to follow  6.  Bradycardia-for now we will continue Bystolic and diltiazem at current dosages.  She will wear a 24-hour Holter overnight which is being placed today    Follow up in 4-6 weeks with Dr. Downs            It has been a pleasure to participate in this patient's care.      Thank you,  AKUA Lou      **I used Dragon to dictate this note:**

## 2021-03-29 ENCOUNTER — HOSPITAL ENCOUNTER (OUTPATIENT)
Dept: CT IMAGING | Facility: HOSPITAL | Age: 86
Discharge: HOME | End: 2021-03-29
Admitting: NEUROLOGICAL SURGERY

## 2021-03-29 ENCOUNTER — HOSPITAL ENCOUNTER (EMERGENCY)
Facility: HOSPITAL | Age: 86
Discharge: HOME | End: 2021-03-29
Attending: EMERGENCY MEDICINE | Admitting: EMERGENCY MEDICINE

## 2021-03-29 VITALS
DIASTOLIC BLOOD PRESSURE: 76 MMHG | HEART RATE: 68 BPM | WEIGHT: 130 LBS | OXYGEN SATURATION: 97 % | SYSTOLIC BLOOD PRESSURE: 151 MMHG | TEMPERATURE: 96.1 F | BODY MASS INDEX: 23.92 KG/M2 | HEIGHT: 62 IN | RESPIRATION RATE: 16 BRPM

## 2021-03-29 DIAGNOSIS — Z48.02 VISIT FOR SUTURE REMOVAL: Primary | ICD-10-CM

## 2021-03-29 DIAGNOSIS — I60.9 SUBARACHNOID HEMORRHAGE (HCC): ICD-10-CM

## 2021-03-29 PROCEDURE — 70450 CT HEAD/BRAIN W/O DYE: CPT

## 2021-03-29 PROCEDURE — 99283 EMERGENCY DEPT VISIT LOW MDM: CPT

## 2021-03-29 NOTE — PROGRESS NOTES
Subjective   Patient ID: Lanie Ochoa is a 85 y.o. female is here today via telephone for follow-up with  New CT Head that was ordered 03.23.2021.    You have chosen to receive care through a telephone visit. Do you consent to use a telephone visit for your medical care today? Yes    We had a telephone visit today.  The patient was at home and I was in the office.  We talked for 5 minutes.    History of Present Illness     The patient says she is feeling pretty good overall.  She has no particular complaints.  She has no headache.    The following portions of the patient's history were reviewed and updated as appropriate: allergies, current medications, past family history, past medical history, past social history, past surgical history and problem list.    Review of Systems   Constitutional: Negative for chills and fever.   HENT: Negative for congestion.    Genitourinary: Negative for difficulty urinating and dysuria.   Musculoskeletal: Positive for back pain and gait problem.   Neurological: Positive for weakness and numbness.       I have reviewed the review of systems as documented by my MA.      Objective     There were no vitals filed for this visit.  There is no height or weight on file to calculate BMI.      Physical Exam  Neurological:      Mental Status: She is alert.       Neurologic Exam        Assessment/Plan   Independent Review of Radiographic Studies:      I personally reviewed the images from the following studies.    Reviewed her CT which was done just recently.  This does not show any evidence of residual bleeding and no chronic subdural.    Medical Decision Making:      I told the patient that from my point of view she can go back to normal activities.  She does not require further follow-up at this point.    Diagnoses and all orders for this visit:    1. Subarachnoid hemorrhage (CMS/[facility]) (Primary)      Return if symptoms worsen or fail to improve.

## 2021-03-30 ENCOUNTER — TELEPHONE (OUTPATIENT)
Dept: CARDIOLOGY | Facility: CLINIC | Age: 86
End: 2021-03-30

## 2021-03-30 ENCOUNTER — OFFICE VISIT (OUTPATIENT)
Dept: NEUROSURGERY | Facility: CLINIC | Age: 86
End: 2021-03-30

## 2021-03-30 DIAGNOSIS — I60.9 SUBARACHNOID HEMORRHAGE (HCC): Primary | ICD-10-CM

## 2021-03-30 PROCEDURE — 99441 PR PHYS/QHP TELEPHONE EVALUATION 5-10 MIN: CPT | Performed by: NEUROLOGICAL SURGERY

## 2021-03-30 NOTE — TELEPHONE ENCOUNTER
Called patient with results. No questions at this time.    Charity Monet RN  [facility]

## 2021-03-31 ENCOUNTER — TELEPHONE (OUTPATIENT)
Dept: NEUROSURGERY | Facility: CLINIC | Age: 86
End: 2021-03-31

## 2021-03-31 DIAGNOSIS — M48.062 SPINAL STENOSIS OF LUMBAR REGION WITH NEUROGENIC CLAUDICATION: Primary | ICD-10-CM

## 2021-03-31 NOTE — TELEPHONE ENCOUNTER
Caller: HIMANSHU    Relationship: [facility]    Best call back number: [phone]    What orders are you requesting (i.e. lab or imaging): PHYSICAL THERAPY    In what timeframe would the patient need to come in: ASAP    Where will you receive your lab/imaging services: [facility]    Additional notes: THEY ARE REQUESTING PHYSICAL THERAPY ORDERS TO BE FAXED TO THEM [phone].      PLEASE ADVISE  THANK YOU

## 2021-04-19 RX ORDER — NEBIVOLOL HYDROCHLORIDE 10 MG/1
TABLET ORAL
Qty: 90 TABLET | Refills: 0 | Status: SHIPPED | OUTPATIENT
Start: 2021-04-19 | End: 2021-07-23

## 2021-05-03 ENCOUNTER — OFFICE VISIT (OUTPATIENT)
Dept: FAMILY MEDICINE CLINIC | Facility: CLINIC | Age: 86
End: 2021-05-03

## 2021-05-03 VITALS
OXYGEN SATURATION: 94 % | SYSTOLIC BLOOD PRESSURE: 138 MMHG | WEIGHT: 137.5 LBS | RESPIRATION RATE: 16 BRPM | TEMPERATURE: 97.3 F | DIASTOLIC BLOOD PRESSURE: 64 MMHG | HEART RATE: 60 BPM | HEIGHT: 62 IN | BODY MASS INDEX: 25.3 KG/M2

## 2021-05-03 DIAGNOSIS — Z78.0 POSTMENOPAUSAL: ICD-10-CM

## 2021-05-03 DIAGNOSIS — Z12.31 ENCOUNTER FOR SCREENING MAMMOGRAM FOR MALIGNANT NEOPLASM OF BREAST: ICD-10-CM

## 2021-05-03 DIAGNOSIS — Z00.00 MEDICARE ANNUAL WELLNESS VISIT, SUBSEQUENT: Primary | ICD-10-CM

## 2021-05-03 PROCEDURE — G0439 PPPS, SUBSEQ VISIT: HCPCS | Performed by: FAMILY MEDICINE

## 2021-05-03 NOTE — PROGRESS NOTES
"Chief Complaint  Back Pain (3 MFU)    Subjective     {Problem List  Visit Diagnosis   Encounters  Notes  Medications  Labs  Result Review Imaging  Media :23}     Lanie Ochoa presents to [facility]  History of Present Illness  She is doing well    Her right hand is hurting again.    BMs are better.     Admitted for syncope and had SDH.  No other falls.   Objective   Vital Signs:   /64 (BP Location: Right arm, Patient Position: Sitting, Cuff Size: Adult)   Pulse 60   Temp 97.3 °F (36.3 °C) (Infrared)   Resp 16   Ht 157.5 cm (62\")   Wt 62.4 kg (137 lb 8 oz)   SpO2 94%   BMI 25.15 kg/m²     Physical Exam   Result Review :{Labs  Result Review  Imaging  Med Tab  Media :23}   {The following data was reviewed by (Optional):74440}  {Ambulatory Labs (Optional):81563}  {Data reviewed (Optional):40888:::1}          Assessment and Plan {CC Problem List  Visit Diagnosis  ROS  Review (Popup)  Health Maintenance  Quality  BestPractice  Medications  SmartSets  SnapShot Encounters  Media :23}   There are no diagnoses linked to this encounter.  {Time Spent (Optional):89756}  Follow Up {Instructions Charge Capture  Follow-up Communications :23}  No follow-ups on file.  Patient was given instructions and counseling regarding her condition or for health maintenance advice. Please see specific information pulled into the AVS if appropriate.       "

## 2021-05-03 NOTE — PROGRESS NOTES
The ABCs of the Annual Wellness Visit  Subsequent Medicare Wellness Visit    Chief Complaint   Patient presents with   • Back Pain     3 MFU       Subjective   History of Present Illness:  Lanie Ochoa is a 85 y.o. female who presents for a Subsequent Medicare Wellness Visit.    HEALTH RISK ASSESSMENT    Recent Hospitalizations:  No hospitalization(s) within the last year.    Current Medical Providers:  Patient Care Team:  Paris Mejía MD as PCP - General (Family Medicine)    Smoking Status:  Social History     Tobacco Use   Smoking Status Never Smoker   Smokeless Tobacco Never Used   Tobacco Comment    No Caffeine use       Alcohol Consumption:  Social History     Substance and Sexual Activity   Alcohol Use No       Depression Screen:   PHQ-2/PHQ-9 Depression Screening 2/1/2021   Little interest or pleasure in doing things 0   Feeling down, depressed, or hopeless 0   Total Score 0       Fall Risk Screen:  STEADI Fall Risk Assessment was completed, and patient is at LOW risk for falls.Assessment completed on:1/18/2021    Health Habits and Functional and Cognitive Screening:  Functional & Cognitive Status 5/3/2021   Do you have difficulty preparing food and eating? No   Do you have difficulty bathing yourself, getting dressed or grooming yourself? No   Do you have difficulty using the toilet? No   Do you have difficulty moving around from place to place? No   Do you have trouble with steps or getting out of a bed or a chair? No   Current Diet Well Balanced Diet   Dental Exam Not up to date   Eye Exam Up to date   Exercise (times per week) 5 times per week   Current Exercises Include Swimming   Current Exercise Activities Include -   Do you need help using the phone?  No   Are you deaf or do you have serious difficulty hearing?  No   Do you need help with transportation? No   Do you need help shopping? No   Do you need help preparing meals?  No   Do you need help with housework?  No   Do you need help with  laundry? No   Do you need help taking your medications? No   Do you need help managing money? No   Do you ever drive or ride in a car without wearing a seat belt? No   Have you felt unusual stress, anger or loneliness in the last month? No   Who do you live with? Alone   If you need help, do you have trouble finding someone available to you? No   Have you been bothered in the last four weeks by sexual problems? No   Do you have difficulty concentrating, remembering or making decisions? No         Does the patient have evidence of cognitive impairment? No    Asprin use counseling:Does not need ASA (and currently is not on it)    Age-appropriate Screening Schedule:  Refer to the list below for future screening recommendations based on patient's age, sex and/or medical conditions. Orders for these recommended tests are listed in the plan section. The patient has been provided with a written plan.    Health Maintenance   Topic Date Due   • DXA SCAN  09/10/2016   • ZOSTER VACCINE (3 of 3) 06/03/2019   • INFLUENZA VACCINE  08/01/2021   • LIPID PANEL  03/19/2022   • MAMMOGRAM  07/15/2022   • TDAP/TD VACCINES (3 - Td) 04/14/2029          The following portions of the patient's history were reviewed and updated as appropriate: allergies, current medications, past family history, past medical history, past social history, past surgical history and problem list.    Outpatient Medications Prior to Visit   Medication Sig Dispense Refill   • Bystolic 10 MG tablet TAKE 1 TABLET BY MOUTH IN THE MORNING  90 tablet 0   • calcium carbonate (OS-NELLI) 600 MG tablet Take 600 mg by mouth Daily.     • cetirizine (zyrTEC) 10 MG tablet Take 1 tablet by mouth Daily. 90 tablet 3   • Cholecalciferol (VITAMIN D3) 400 UNITS capsule Take 1 tablet by mouth Daily.     • dilTIAZem CD (CARDIZEM CD) 240 MG 24 hr capsule Take 1 capsule by mouth Daily. (Patient taking differently: Take 240 mg by mouth Every Evening.) 90 capsule 1   • losartan (Cozaar) 50  MG tablet Take 1.5 tablets by mouth Daily. 45 tablet 11   • montelukast (Singulair) 10 MG tablet Take 1 tablet by mouth Every Night. 90 tablet 1   • sertraline (ZOLOFT) 25 MG tablet Take 1 tablet by mouth Daily. 200001 (Patient taking differently: Take 25 mg by mouth Every Evening.) 90 tablet 1     No facility-administered medications prior to visit.       Patient Active Problem List   Diagnosis   • Anxiety   • HLD (hyperlipidemia)   • Benign essential hypertension   • Osteopenia   • Avitaminosis D   • History of left knee replacement   • Titubation   • Non-rheumatic mitral regurgitation   • Non-rheumatic tricuspid valve insufficiency   • Chronic pain of right knee   • Venous incompetence   • Dysfunction of both eustachian tubes   • Primary localized osteoarthrosis of right lower leg   • Primary osteoarthritis of right knee   • OA (osteoarthritis) of knee   • Lumbar spinal stenosis   • Spinal stenosis of lumbar region with neurogenic claudication   • Syncope   • RBBB (right bundle branch block)   • Subarachnoid hemorrhage (CMS/HCC)       Advanced Care Planning:  ACP discussion was held with the patient during this visit. Patient has an advance directive (not in EMR), copy requested.    Review of Systems   Constitutional: Negative for activity change and fatigue.   Eyes: Negative for visual disturbance.   Respiratory: Negative for shortness of breath.    Cardiovascular: Negative for chest pain, palpitations and leg swelling.   Neurological: Negative for light-headedness and headaches.       Compared to one year ago, the patient feels her physical health is the same.  Compared to one year ago, the patient feels her mental health is the same.    Reviewed chart for potential of high risk medication in the elderly: yes  Reviewed chart for potential of harmful drug interactions in the elderly:yes    Objective         Vitals:    05/03/21 1028   BP: 138/64   BP Location: Right arm   Patient Position: Sitting   Cuff Size:  "Adult   Pulse: 60   Resp: 16   Temp: 97.3 °F (36.3 °C)   TempSrc: Infrared   SpO2: 94%   Weight: 62.4 kg (137 lb 8 oz)   Height: 157.5 cm (62\")   PainSc: 0-No pain       Body mass index is 25.15 kg/m².  Discussed the patient's BMI with her. The BMI is in the acceptable range.    Physical Exam  Vitals reviewed.   Constitutional:       General: She is not in acute distress.  HENT:      Right Ear: Tympanic membrane, ear canal and external ear normal. There is no impacted cerumen.      Left Ear: Tympanic membrane, ear canal and external ear normal. There is no impacted cerumen.   Eyes:      General: No scleral icterus.        Right eye: No discharge.         Left eye: No discharge.      Conjunctiva/sclera: Conjunctivae normal.   Cardiovascular:      Rate and Rhythm: Normal rate and regular rhythm.      Heart sounds: Normal heart sounds. No murmur heard.     Pulmonary:      Effort: Pulmonary effort is normal. No respiratory distress.      Breath sounds: Normal breath sounds. No wheezing.   Musculoskeletal:      Cervical back: Neck supple. No tenderness. No muscular tenderness.      Right lower leg: Edema present.      Left lower leg: Edema present.      Comments: Trace edema over the pretibial surface.    Lymphadenopathy:      Cervical: No cervical adenopathy.   Neurological:      Mental Status: She is oriented to person, place, and time.      Motor: No abnormal muscle tone.      Comments: She climbed the exam table and descended the exam table independently. She did well to stand straight up from her chair without multiple attempts, rocking or delay.    Psychiatric:         Behavior: Behavior normal.         Lab Results   Component Value Date    TRIG 99 03/19/2021    HDL 71 (H) 03/19/2021    LDL 82 03/19/2021    VLDL 18 03/19/2021    HGBA1C 5.60 03/19/2021        Assessment/Plan   Medicare Risks and Personalized Health Plan  CMS Preventative Services Quick Reference  Advance Directive Discussion  Breast Cancer/Mammogram " Screening  Osteoporosis Risk    The above risks/problems have been discussed with the patient.  Pertinent information has been shared with the patient in the After Visit Summary.  Follow up plans and orders are seen below in the Assessment/Plan Section.    Diagnoses and all orders for this visit:    1. Medicare annual wellness visit, subsequent (Primary)    2. Postmenopausal  -     DEXA Bone Density Axial; Future    3. Encounter for screening mammogram for malignant neoplasm of breast  -     Mammo Screening Bilateral With CAD; Future      Follow Up:  No follow-ups on file.     An After Visit Summary and PPPS were given to the patient.       She is due for mammogram this summer. We are going to get DXA. Not one on chart. She prefers to do swimming because of joint pain in her knees so not doing a lot of weight bearing exercise.

## 2021-05-05 ENCOUNTER — OFFICE VISIT (OUTPATIENT)
Dept: CARDIOLOGY | Facility: CLINIC | Age: 86
End: 2021-05-05

## 2021-05-05 VITALS
BODY MASS INDEX: 25.03 KG/M2 | HEIGHT: 62 IN | OXYGEN SATURATION: 97 % | HEART RATE: 60 BPM | SYSTOLIC BLOOD PRESSURE: 118 MMHG | WEIGHT: 136 LBS | DIASTOLIC BLOOD PRESSURE: 70 MMHG | RESPIRATION RATE: 16 BRPM

## 2021-05-05 DIAGNOSIS — I10 BENIGN ESSENTIAL HYPERTENSION: ICD-10-CM

## 2021-05-05 DIAGNOSIS — I45.10 RBBB (RIGHT BUNDLE BRANCH BLOCK): Primary | ICD-10-CM

## 2021-05-05 PROCEDURE — 99213 OFFICE O/P EST LOW 20 MIN: CPT | Performed by: INTERNAL MEDICINE

## 2021-05-11 NOTE — PROGRESS NOTES
"      CARDIOLOGY    Ray Downs MD    ENCOUNTER DATE:  05/05/2021    Lanie Ochoa / 85 y.o. / female        CHIEF COMPLAINT / REASON FOR OFFICE VISIT     Loss of Consciousness (FOLLOW UP)  Hypertension    HISTORY OF PRESENT ILLNESS       HPI  Lanie Ochoa is a 85 y.o. female who presents today for evaluation.  Patient is actually doing well.  No more syncopal episodes.  Blood pressure has been good.  She denies chest pain shortness of breath palpitations lightheadedness swelling or fatigue.      The following portions of the patient's history were reviewed and updated as appropriate: allergies, current medications, past family history, past medical history, past social history, past surgical history and problem list.      VITAL SIGNS     Visit Vitals  /70 (BP Location: Right arm, Patient Position: Sitting, Cuff Size: Adult)   Pulse 60   Resp 16   Ht 157.5 cm (62.01\")   Wt 61.7 kg (136 lb)   LMP  (LMP Unknown)   SpO2 97%   BMI 24.87 kg/m²         Wt Readings from Last 3 Encounters:   05/05/21 61.7 kg (136 lb)   05/03/21 62.4 kg (137 lb 8 oz)   03/29/21 59 kg (130 lb)     Body mass index is 24.87 kg/m².      REVIEW OF SYSTEMS   Review of Systems   All other systems reviewed and are negative.          PHYSICAL EXAMINATION     Constitutional:       Appearance: Not in distress.   Pulmonary:      Effort: Pulmonary effort is normal.      Breath sounds: Normal breath sounds.   Cardiovascular:      Normal rate. Regular rhythm. Normal S1. Normal S2.      Murmurs: There is no murmur.      No gallop. No click. No rub.   Pulses:     Intact distal pulses.   Edema:     Peripheral edema absent.   Neurological:      Mental Status: Alert and oriented to person, place and time.           REVIEWED DATA     Procedures    Cardiac Procedures:  1.     Lipid Panel    Lipid Panel 3/19/21   Total Cholesterol 171   Triglycerides 99   HDL Cholesterol 71 (A)   VLDL Cholesterol 18   LDL Cholesterol  82   LDL/HDL Ratio " 1.13   (A) Abnormal value                ASSESSMENT & PLAN      Diagnosis Plan   1. RBBB (right bundle branch block)     2. Benign essential hypertension           SUMMARY/DISCUSSION  1. Hypertension blood pressures good doing very well at home.  Continue same  2. Right bundle branch block  3. Continue the same follow-up in 1 year sooner if issues.        MEDICATIONS         Discharge Medications          Accurate as of May 5, 2021 11:59 PM. If you have any questions, ask your nurse or doctor.            Changes to Medications      Instructions Start Date   dilTIAZem  MG 24 hr capsule  Commonly known as: CARDIZEM CD  What changed: when to take this   240 mg, Oral, Daily      sertraline 25 MG tablet  Commonly known as: ZOLOFT  What changed:   · when to take this  · additional instructions   25 mg, Oral, Daily, 200001         Continue These Medications      Instructions Start Date   Bystolic 10 MG tablet  Generic drug: nebivolol   TAKE 1 TABLET BY MOUTH IN THE MORNING       calcium carbonate 600 MG tablet  Commonly known as: OS-NELLI   600 mg, Oral, Daily      cetirizine 10 MG tablet  Commonly known as: zyrTEC   10 mg, Oral, Daily      losartan 50 MG tablet  Commonly known as: Cozaar   75 mg, Oral, Daily      montelukast 10 MG tablet  Commonly known as: Singulair   10 mg, Oral, Nightly      Vitamin D3 10 MCG (400 UNIT) capsule   1 tablet, Oral, Daily                 **Dragon Disclaimer:   Much of this encounter note is an electronic transcription/translation of spoken language to printed text. The electronic translation of spoken language may permit erroneous, or at times, nonsensical words or phrases to be inadvertently transcribed. Although I have reviewed the note for such errors, some may still exist.

## 2021-05-13 ENCOUNTER — OFFICE VISIT (OUTPATIENT)
Dept: NEUROSURGERY | Facility: CLINIC | Age: 86
End: 2021-05-13

## 2021-05-13 ENCOUNTER — TELEPHONE (OUTPATIENT)
Dept: NEUROSURGERY | Facility: CLINIC | Age: 86
End: 2021-05-13

## 2021-05-13 VITALS
SYSTOLIC BLOOD PRESSURE: 122 MMHG | BODY MASS INDEX: 25.03 KG/M2 | WEIGHT: 136 LBS | TEMPERATURE: 97.3 F | DIASTOLIC BLOOD PRESSURE: 70 MMHG | HEIGHT: 62 IN

## 2021-05-13 DIAGNOSIS — M48.062 SPINAL STENOSIS OF LUMBAR REGION WITH NEUROGENIC CLAUDICATION: Primary | ICD-10-CM

## 2021-05-13 PROCEDURE — 99024 POSTOP FOLLOW-UP VISIT: CPT | Performed by: NEUROLOGICAL SURGERY

## 2021-07-08 ENCOUNTER — TRANSCRIBE ORDERS (OUTPATIENT)
Dept: ADMINISTRATIVE | Facility: HOSPITAL | Age: 86
End: 2021-07-08

## 2021-07-08 DIAGNOSIS — Z12.31 VISIT FOR SCREENING MAMMOGRAM: Primary | ICD-10-CM

## 2021-07-09 ENCOUNTER — APPOINTMENT (OUTPATIENT)
Dept: BONE DENSITY | Facility: HOSPITAL | Age: 86
End: 2021-07-09

## 2021-07-09 ENCOUNTER — APPOINTMENT (OUTPATIENT)
Dept: MAMMOGRAPHY | Facility: HOSPITAL | Age: 86
End: 2021-07-09

## 2021-07-23 RX ORDER — NEBIVOLOL HYDROCHLORIDE 10 MG/1
TABLET ORAL
Qty: 90 TABLET | Refills: 1 | Status: SHIPPED | OUTPATIENT
Start: 2021-07-23 | End: 2022-01-18

## 2021-07-26 ENCOUNTER — TELEPHONE (OUTPATIENT)
Dept: CARDIOLOGY | Facility: CLINIC | Age: 86
End: 2021-07-26

## 2021-07-26 NOTE — TELEPHONE ENCOUNTER
Dr. Downs,    Ms. Ochoa saw you on 5/5/21. At that time you said her BP was well controlled. Since that time she stopped checking her BP regularly. On Saturday she had a slight HA and checked her BP.  Since she started taking it again, her BP runs 170s/80s in the AM, after her meds, it goes down to the 140s/70s and she states that it goes back up in the evening.  She does take her diltiazem 240mg in the evenings. Her HR is consistently in the 60s.    Normal BP for her is 120-130s/70s. Since the HA on Saturday, she has had no symptoms or complaints. She denies any diet changes or increased stress.            Please advise.    Thank you,  Abigail Posey RN  [facility]  Triage

## 2021-07-26 NOTE — TELEPHONE ENCOUNTER
I called patient.  She thinks she got a salt load on Friday which is going right along with her blood pressure being high.  At this point is back down this afternoon so I would continue to follow her clinically if not improving by Wednesday or Thursday call me back.

## 2021-08-02 RX ORDER — MONTELUKAST SODIUM 10 MG/1
TABLET ORAL
Qty: 90 TABLET | Refills: 3 | Status: SHIPPED | OUTPATIENT
Start: 2021-08-02 | End: 2022-06-26

## 2021-08-04 RX ORDER — DILTIAZEM HYDROCHLORIDE 240 MG/1
240 CAPSULE, COATED, EXTENDED RELEASE ORAL EVERY EVENING
Qty: 90 CAPSULE | Refills: 3 | Status: SHIPPED | OUTPATIENT
Start: 2021-08-04 | End: 2021-12-15

## 2021-08-04 NOTE — TELEPHONE ENCOUNTER
Rx Refill Note  Requested Prescriptions     Pending Prescriptions Disp Refills   • dilTIAZem CD (CARDIZEM CD) 240 MG 24 hr capsule [Pharmacy Med Name: dilTIAZem HCl ER Coated Beads Oral Capsule Extended Release 24 Hour 240 MG] 90 capsule 0     Sig: TAKE 1 CAPSULE BY MOUTH EVERY DAY      Last office visit with prescribing clinician: 5/3/2021      Next office visit with prescribing clinician: 11/4/2021            Trent Jacobo MA  08/04/21, 14:29 EDT

## 2021-09-01 RX ORDER — SERTRALINE HYDROCHLORIDE 25 MG/1
25 TABLET, FILM COATED ORAL EVERY EVENING
Qty: 90 TABLET | Refills: 1 | Status: SHIPPED | OUTPATIENT
Start: 2021-09-01 | End: 2022-02-25 | Stop reason: SDUPTHER

## 2021-09-15 ENCOUNTER — HOSPITAL ENCOUNTER (OUTPATIENT)
Dept: MAMMOGRAPHY | Facility: HOSPITAL | Age: 86
Discharge: HOME | End: 2021-09-15
Admitting: FAMILY MEDICINE

## 2021-09-15 DIAGNOSIS — Z12.31 VISIT FOR SCREENING MAMMOGRAM: ICD-10-CM

## 2021-09-15 PROCEDURE — 77067 SCR MAMMO BI INCL CAD: CPT

## 2021-09-15 PROCEDURE — 77063 BREAST TOMOSYNTHESIS BI: CPT

## 2021-09-23 ENCOUNTER — HOSPITAL ENCOUNTER (OUTPATIENT)
Dept: BONE DENSITY | Facility: HOSPITAL | Age: 86
Discharge: HOME | End: 2021-09-23
Admitting: FAMILY MEDICINE

## 2021-09-23 DIAGNOSIS — Z78.0 POSTMENOPAUSAL: ICD-10-CM

## 2021-09-23 PROCEDURE — 77080 DXA BONE DENSITY AXIAL: CPT

## 2021-11-04 ENCOUNTER — HOSPITAL ENCOUNTER (OUTPATIENT)
Dept: GENERAL RADIOLOGY | Facility: HOSPITAL | Age: 86
Discharge: HOME | End: 2021-11-04

## 2021-11-04 ENCOUNTER — LAB (OUTPATIENT)
Dept: LAB | Facility: HOSPITAL | Age: 86
End: 2021-11-04

## 2021-11-04 ENCOUNTER — OFFICE VISIT (OUTPATIENT)
Dept: FAMILY MEDICINE CLINIC | Facility: CLINIC | Age: 86
End: 2021-11-04

## 2021-11-04 VITALS
HEIGHT: 62 IN | DIASTOLIC BLOOD PRESSURE: 68 MMHG | HEART RATE: 58 BPM | BODY MASS INDEX: 25.01 KG/M2 | SYSTOLIC BLOOD PRESSURE: 144 MMHG | OXYGEN SATURATION: 98 % | RESPIRATION RATE: 16 BRPM | WEIGHT: 135.9 LBS | TEMPERATURE: 96.9 F

## 2021-11-04 DIAGNOSIS — M54.50 ACUTE MIDLINE LOW BACK PAIN, UNSPECIFIED WHETHER SCIATICA PRESENT: Primary | ICD-10-CM

## 2021-11-04 DIAGNOSIS — M54.50 ACUTE MIDLINE LOW BACK PAIN, UNSPECIFIED WHETHER SCIATICA PRESENT: ICD-10-CM

## 2021-11-04 DIAGNOSIS — I10 BENIGN ESSENTIAL HYPERTENSION: ICD-10-CM

## 2021-11-04 PROBLEM — M81.6 LOCALIZED OSTEOPOROSIS WITHOUT CURRENT PATHOLOGICAL FRACTURE: Status: ACTIVE | Noted: 2021-11-04

## 2021-11-04 LAB
ALBUMIN SERPL-MCNC: 4.6 G/DL (ref 3.5–5.2)
ALBUMIN/GLOB SERPL: 1.6 G/DL
ALP SERPL-CCNC: 76 U/L (ref 39–117)
ALT SERPL W P-5'-P-CCNC: 10 U/L (ref 1–33)
ANION GAP SERPL CALCULATED.3IONS-SCNC: 9.2 MMOL/L (ref 5–15)
AST SERPL-CCNC: 14 U/L (ref 1–32)
BASOPHILS # BLD AUTO: 0.03 10*3/MM3 (ref 0–0.2)
BASOPHILS NFR BLD AUTO: 0.6 % (ref 0–1.5)
BILIRUB SERPL-MCNC: 0.4 MG/DL (ref 0–1.2)
BUN SERPL-MCNC: 16 MG/DL (ref 8–23)
BUN/CREAT SERPL: 19.5 (ref 7–25)
CALCIUM SPEC-SCNC: 10 MG/DL (ref 8.6–10.5)
CHLORIDE SERPL-SCNC: 102 MMOL/L (ref 98–107)
CO2 SERPL-SCNC: 28.8 MMOL/L (ref 22–29)
CREAT SERPL-MCNC: 0.82 MG/DL (ref 0.57–1)
DEPRECATED RDW RBC AUTO: 44.6 FL (ref 37–54)
EOSINOPHIL # BLD AUTO: 0.31 10*3/MM3 (ref 0–0.4)
EOSINOPHIL NFR BLD AUTO: 6.6 % (ref 0.3–6.2)
ERYTHROCYTE [DISTWIDTH] IN BLOOD BY AUTOMATED COUNT: 12.8 % (ref 12.3–15.4)
GFR SERPL CREATININE-BSD FRML MDRD: 66 ML/MIN/1.73
GLOBULIN UR ELPH-MCNC: 2.9 GM/DL
GLUCOSE SERPL-MCNC: 107 MG/DL (ref 65–99)
HCT VFR BLD AUTO: 44.3 % (ref 34–46.6)
HGB BLD-MCNC: 14.2 G/DL (ref 12–15.9)
IMM GRANULOCYTES # BLD AUTO: 0.02 10*3/MM3 (ref 0–0.05)
IMM GRANULOCYTES NFR BLD AUTO: 0.4 % (ref 0–0.5)
LYMPHOCYTES # BLD AUTO: 1.07 10*3/MM3 (ref 0.7–3.1)
LYMPHOCYTES NFR BLD AUTO: 22.7 % (ref 19.6–45.3)
MCH RBC QN AUTO: 30.3 PG (ref 26.6–33)
MCHC RBC AUTO-ENTMCNC: 32.1 G/DL (ref 31.5–35.7)
MCV RBC AUTO: 94.7 FL (ref 79–97)
MONOCYTES # BLD AUTO: 0.41 10*3/MM3 (ref 0.1–0.9)
MONOCYTES NFR BLD AUTO: 8.7 % (ref 5–12)
NEUTROPHILS NFR BLD AUTO: 2.87 10*3/MM3 (ref 1.7–7)
NEUTROPHILS NFR BLD AUTO: 61 % (ref 42.7–76)
NRBC BLD AUTO-RTO: 0 /100 WBC (ref 0–0.2)
PLATELET # BLD AUTO: 271 10*3/MM3 (ref 140–450)
PMV BLD AUTO: 10.1 FL (ref 6–12)
POTASSIUM SERPL-SCNC: 4.4 MMOL/L (ref 3.5–5.2)
PROT SERPL-MCNC: 7.5 G/DL (ref 6–8.5)
RBC # BLD AUTO: 4.68 10*6/MM3 (ref 3.77–5.28)
SODIUM SERPL-SCNC: 140 MMOL/L (ref 136–145)
WBC # BLD AUTO: 4.71 10*3/MM3 (ref 3.4–10.8)

## 2021-11-04 PROCEDURE — 36415 COLL VENOUS BLD VENIPUNCTURE: CPT | Performed by: FAMILY MEDICINE

## 2021-11-04 PROCEDURE — 80053 COMPREHEN METABOLIC PANEL: CPT | Performed by: FAMILY MEDICINE

## 2021-11-04 PROCEDURE — 72220 X-RAY EXAM SACRUM TAILBONE: CPT

## 2021-11-04 PROCEDURE — 99214 OFFICE O/P EST MOD 30 MIN: CPT | Performed by: FAMILY MEDICINE

## 2021-11-04 PROCEDURE — 72110 X-RAY EXAM L-2 SPINE 4/>VWS: CPT

## 2021-11-04 PROCEDURE — 85025 COMPLETE CBC W/AUTO DIFF WBC: CPT | Performed by: FAMILY MEDICINE

## 2021-11-04 NOTE — PROGRESS NOTES
"Chief Complaint  Hypertension (6 MFU)    Subjective          Lanie Ochoa presents to [facility]  History of Present Illness  HTN  Her pressure is up a little today.   She is taking and tolerating her bystolic, losartan and diltiazem well.     Her back is hurting again in the middle of the buttock hurting right in the middle of the buttocks.   Says started again a few weeks ago.   Had procedure in March.   Did PT after surgery. Has those exercises but not sure where has not been doing those.   She rides her bike and swims 4-5 times per week. Says feels better with these.   There is nothing she can't do because of the pain. It is affecting her sleeping position on the left but this is the same for quite a while.   She takes tylenol 500 mg AM and PM all the time anyway.   Not sure if it is helping.  She has not had an injury    Osteopenia and osteoporosis.   She took fosamax for about 5 years from 2009 through 2013. She has T score of -3.3 in the left hip and -2 in the right hip.     Objective   Vital Signs:   /68 (BP Location: Right arm, Patient Position: Sitting, Cuff Size: Small Adult)   Pulse 58   Temp 96.9 °F (36.1 °C) (Infrared)   Resp 16   Ht 157.5 cm (62\")   Wt 61.6 kg (135 lb 14.4 oz)   SpO2 98%   BMI 24.86 kg/m²     Physical Exam  Vitals reviewed.   Constitutional:       General: She is not in acute distress.  Eyes:      General: No scleral icterus.     Conjunctiva/sclera: Conjunctivae normal.   Pulmonary:      Effort: Pulmonary effort is normal. No respiratory distress.   Musculoskeletal:      Comments: Pt indicates low down spine in the middle of her buttocks on the midline is tender area.   Neurological:      Mental Status: She is alert and oriented to person, place, and time.   Psychiatric:         Behavior: Behavior normal.        Result Review :                 Assessment and Plan    Diagnoses and all orders for this visit:    1. Acute midline low back " pain, unspecified whether sciatica present (Primary)  -     XR Spine Lumbar Complete 4+VW  -     XR sacrum and coccyx; Future    2. Benign essential hypertension  -     CBC & Differential  -     Comprehensive Metabolic Panel        Follow Up   No follow-ups on file.  Patient was given instructions and counseling regarding her condition or for health maintenance advice. Please see specific information pulled into the AVS if appropriate.     HTN  She is doing well on the medication. We won't make any change right now.   She is having some pain again and this could account for this little elevation. Need to address this.   If we need to we will take the losartan to 100 mg.     Low back pain  She had surgery  For this reason and increase in pain again we will get imaging.   She is doing her PT.   Discussed medication like muscle relaxer. She wants to hold on this at this time.   She is having more pain than she was but nearly like it was when she needed the surgery.   Feels very low in the spine into the buttocks.   She did not have any fall or injury.     Patient gave consent today for a telehealth video visit as following recommendations of our governor and CDC during the COVID-19 pandemic.    Pt is in the office and provider is in quarantine.  10 min was spent in discussion with pt and greater than 50% of that time was spent counseling.

## 2021-11-30 ENCOUNTER — OFFICE VISIT (OUTPATIENT)
Dept: CARDIOLOGY | Facility: CLINIC | Age: 86
End: 2021-11-30

## 2021-11-30 VITALS
SYSTOLIC BLOOD PRESSURE: 150 MMHG | OXYGEN SATURATION: 97 % | DIASTOLIC BLOOD PRESSURE: 80 MMHG | BODY MASS INDEX: 24.51 KG/M2 | HEIGHT: 62 IN | WEIGHT: 133.2 LBS | HEART RATE: 67 BPM

## 2021-11-30 DIAGNOSIS — I34.0 NON-RHEUMATIC MITRAL REGURGITATION: Primary | ICD-10-CM

## 2021-11-30 DIAGNOSIS — I10 BENIGN ESSENTIAL HYPERTENSION: ICD-10-CM

## 2021-11-30 PROCEDURE — 99214 OFFICE O/P EST MOD 30 MIN: CPT | Performed by: INTERNAL MEDICINE

## 2021-12-03 NOTE — PROGRESS NOTES
"      CARDIOLOGY    Ray Downs MD    ENCOUNTER DATE:  11/30/2021    Lanie Ochoa / 86 y.o. / female        CHIEF COMPLAINT / REASON FOR OFFICE VISIT     RBBB (right bundle branch block) (05/05/2021 Follow up)  Hypertension    HISTORY OF PRESENT ILLNESS       HPI  Lanie Ochoa is a 86 y.o. female who presents today for reevaluation.  Clinically patient is doing well with no cardiac complaints.  No chest pain shortness of breath palpitations lightheadedness headaches visual changes.  Patient did say her blood pressures been elevated since 1st part of November.  She did bring in multiple readings.  Lowest was 127/64 highest was 183/81.      The following portions of the patient's history were reviewed and updated as appropriate: allergies, current medications, past family history, past medical history, past social history, past surgical history and problem list.      VITAL SIGNS     Visit Vitals  /80 (BP Location: Left arm)   Pulse 67   Ht 157.5 cm (62\")   Wt 60.4 kg (133 lb 3.2 oz)   LMP  (LMP Unknown)   SpO2 97%   BMI 24.36 kg/m²         Wt Readings from Last 3 Encounters:   11/30/21 60.4 kg (133 lb 3.2 oz)   11/04/21 61.6 kg (135 lb 14.4 oz)   05/13/21 61.7 kg (136 lb)     Body mass index is 24.36 kg/m².      REVIEW OF SYSTEMS   ROS        PHYSICAL EXAMINATION     Vitals reviewed.   Constitutional:       Appearance: Healthy appearance.   Pulmonary:      Effort: Pulmonary effort is normal.   Cardiovascular:      Normal rate. Regular rhythm. Normal S1. Normal S2.      Murmurs: There is a grade 2/6 harsh midsystolic murmur at the URSB, radiating to the neck.      No gallop. No click. No rub.   Pulses:     Intact distal pulses.   Edema:     Peripheral edema absent.   Neurological:      Mental Status: Alert and oriented to person, place and time.           REVIEWED DATA     Procedures    Cardiac Procedures:  1.     Lipid Panel    Lipid Panel 3/19/21   Total Cholesterol 171   Triglycerides 99 "   HDL Cholesterol 71 (A)   VLDL Cholesterol 18   LDL Cholesterol  82   LDL/HDL Ratio 1.13   (A) Abnormal value                ASSESSMENT & PLAN      Diagnosis Plan   1. Non-rheumatic mitral regurgitation     2. Benign essential hypertension           SUMMARY/DISCUSSION  1. Hypertension.  Little bit concerning her blood pressure has been staying elevated.  When talking I think she is getting too much salt intake in foods she is unaware that there is salt.  She is going to watch that closer.  Patient is to get a blood pressure check in 2 weeks.  I want her to bring her blood pressure cuff and get it checked also and bring in her numbers.  2. History of mitral regurgitation last echo was on 3/19/2021 at that time she had mild mitral vegetation.  3. Moderate aortic stenosis estimated valve area of 1.2 cm2.  Mean gradient is 23 mmHg max gradient is 46 mmHg.  4. We will see how her blood pressure does if is not better we will see her back sooner otherwise would routinely see her back in about 3-6 months.        MEDICATIONS         Discharge Medications          Accurate as of November 30, 2021 11:59 PM. If you have any questions, ask your nurse or doctor.            Continue These Medications      Instructions Start Date   Bystolic 10 MG tablet  Generic drug: nebivolol   TAKE 1 TABLET BY MOUTH IN THE MORNING       calcium carbonate 600 MG tablet  Commonly known as: OS-NELLI   600 mg, Oral, Daily      cetirizine 10 MG tablet  Commonly known as: zyrTEC   10 mg, Oral, Daily      dilTIAZem  MG 24 hr capsule  Commonly known as: CARDIZEM CD   240 mg, Oral, Every Evening      losartan 50 MG tablet  Commonly known as: Cozaar   75 mg, Oral, Daily      montelukast 10 MG tablet  Commonly known as: SINGULAIR   TAKE 1 TABLET BY MOUTH EVERY DAY AT NIGHT      sertraline 25 MG tablet  Commonly known as: ZOLOFT   25 mg, Oral, Every Evening      Vitamin D3 10 MCG (400 UNIT) capsule   1 tablet, Oral, Daily                 **Dragon  Disclaimer:   Much of this encounter note is an electronic transcription/translation of spoken language to printed text. The electronic translation of spoken language may permit erroneous, or at times, nonsensical words or phrases to be inadvertently transcribed. Although I have reviewed the note for such errors, some may still exist.

## 2021-12-08 ENCOUNTER — OFFICE VISIT (OUTPATIENT)
Dept: CARDIOLOGY | Facility: CLINIC | Age: 86
End: 2021-12-08

## 2021-12-08 ENCOUNTER — TELEPHONE (OUTPATIENT)
Dept: CARDIOLOGY | Facility: CLINIC | Age: 86
End: 2021-12-08

## 2021-12-08 VITALS
DIASTOLIC BLOOD PRESSURE: 64 MMHG | HEART RATE: 73 BPM | WEIGHT: 133.8 LBS | SYSTOLIC BLOOD PRESSURE: 172 MMHG | BODY MASS INDEX: 25.26 KG/M2 | HEIGHT: 61 IN | OXYGEN SATURATION: 98 %

## 2021-12-08 DIAGNOSIS — I35.0 AORTIC STENOSIS, MODERATE: ICD-10-CM

## 2021-12-08 DIAGNOSIS — I10 BENIGN ESSENTIAL HYPERTENSION: Primary | ICD-10-CM

## 2021-12-08 DIAGNOSIS — I45.10 RBBB (RIGHT BUNDLE BRANCH BLOCK): ICD-10-CM

## 2021-12-08 PROCEDURE — 99214 OFFICE O/P EST MOD 30 MIN: CPT | Performed by: NURSE PRACTITIONER

## 2021-12-08 PROCEDURE — 93000 ELECTROCARDIOGRAM COMPLETE: CPT | Performed by: NURSE PRACTITIONER

## 2021-12-08 RX ORDER — CLONIDINE HYDROCHLORIDE 0.1 MG/1
0.1 TABLET ORAL 2 TIMES DAILY PRN
Qty: 60 TABLET | Refills: 1 | Status: SHIPPED | OUTPATIENT
Start: 2021-12-08 | End: 2022-05-16

## 2021-12-08 RX ORDER — LOSARTAN POTASSIUM 100 MG/1
100 TABLET ORAL DAILY
Qty: 30 TABLET | Refills: 5 | Status: SHIPPED | OUTPATIENT
Start: 2021-12-08 | End: 2022-06-01

## 2021-12-08 NOTE — TELEPHONE ENCOUNTER
Ms. Ochoa called to report a 2-3 day history of elevated BP associated with headache and ringing in her ears. BP yesterday afternoon was 181/86 and this morning, before her am meds, she was 161/71.  She thinks her HR is usually in the 60s.  No other symptoms or complaints.    I verified her cardiac meds on the MAR and she is taking them as prescribed.        She is going out of town soon and requested to be seen today.  She is uncomfortable with med changes without an appointment.      I notified med records.      Thank you,  Abigail Posey RN  [facility]  Triage

## 2021-12-08 NOTE — PROGRESS NOTES
[facility]   [address], [address]  [place], [place], [address]    [phone]  [facility]           OUTPATIENT CLINIC FOLLOW UP NOTE    Patient Care Team:  Patient Care Team:  Paris Mejía MD as PCP - General (Family Medicine)    Subjective:      Chief Complaint   Patient presents with   • Hypertension     w heaedache, ringing in ears and pain under Rt breast       HPI:    Lanie Ochoa is a 86 y.o. female.  Problem list:  1. Essential hypertension  2. Right bundle branch block  3. Hyperlipidemia  4. Valvular heart disease  5. GERD  6. Breast cancer    She presents today for follow-up.  Her primary cardiologist is Dr. Downs whom she last saw on 11/30/2021.    Since the patient was last seen she reports that her blood pressure has remained elevated.  Her blood pressure has consistently been in the 150s to 160s.  She has had an instance in the 180s.  She has been watching her salt intake.  She has been having intermittent headaches and ringing in her ears.  She also had an episode of sharp pain under her right breast when her blood pressure was elevated.  She denies palpitations, shortness of breath, lower extremity edema, lightheadedness, or fatigue.  Has been taking her blood pressure medications as prescribed.        Review of Systems:  Positive for headache, ringing in the ears, back pain, right-sided chest pain  Negative for exertional chest pain, dyspnea with exertion, lower extremity edema, palpitations, syncope.     PFSH:  Patient Active Problem List   Diagnosis   • Anxiety   • HLD (hyperlipidemia)   • Benign essential hypertension   • Osteopenia   • Avitaminosis D   • History of left knee replacement   • Titubation   • Non-rheumatic mitral regurgitation   • Non-rheumatic tricuspid valve insufficiency   • Chronic pain of right knee   • Venous incompetence   • Dysfunction of both eustachian tubes   • Primary localized osteoarthrosis of right lower leg   •  "Primary osteoarthritis of right knee   • OA (osteoarthritis) of knee   • Lumbar spinal stenosis   • Spinal stenosis of lumbar region with neurogenic claudication   • Syncope   • RBBB (right bundle branch block)   • Subarachnoid hemorrhage (HCC)   • Localized osteoporosis without current pathological fracture         Current Outpatient Medications:   •  Bystolic 10 MG tablet, TAKE 1 TABLET BY MOUTH IN THE MORNING , Disp: 90 tablet, Rfl: 1  •  calcium carbonate (OS-NELLI) 600 MG tablet, Take 600 mg by mouth Daily., Disp: , Rfl:   •  cetirizine (zyrTEC) 10 MG tablet, Take 1 tablet by mouth Daily., Disp: 90 tablet, Rfl: 3  •  Cholecalciferol (VITAMIN D3) 400 UNITS capsule, Take 1 tablet by mouth Daily., Disp: , Rfl:   •  dilTIAZem CD (CARDIZEM CD) 240 MG 24 hr capsule, Take 1 capsule by mouth Every Evening., Disp: 90 capsule, Rfl: 3  •  losartan (Cozaar) 100 MG tablet, Take 1 tablet by mouth Daily., Disp: 30 tablet, Rfl: 5  •  montelukast (SINGULAIR) 10 MG tablet, TAKE 1 TABLET BY MOUTH EVERY DAY AT NIGHT, Disp: 90 tablet, Rfl: 3  •  sertraline (ZOLOFT) 25 MG tablet, Take 1 tablet by mouth Every Evening., Disp: 90 tablet, Rfl: 1  •  cloNIDine (Catapres) 0.1 MG tablet, Take 1 tablet by mouth 2 (Two) Times a Day As Needed for High Blood Pressure (systolic BP >150)., Disp: 60 tablet, Rfl: 1    Allergies   Allergen Reactions   • Sulfa Antibiotics Rash        reports that she has never smoked. She has never used smokeless tobacco.      Objective:   Physical exam:  /64 (BP Location: Left arm, Patient Position: Sitting)   Pulse 73   Ht 154.9 cm (61\")   Wt 60.7 kg (133 lb 12.8 oz)   LMP  (LMP Unknown)   SpO2 98%   BMI 25.28 kg/m²   CONSTITUTIONAL: No acute distress  RESPIRATORY: Normal effort. Clear to auscultation bilaterally without wheezing or rales  CARDIOVASCULAR:  Regular rate and rhythm with normal S1 and S2. Without gallop or rub.  CHANTE at RUSB.  Normal radial pulse. There is no lower extremity edema " bilaterally.    Labs:    BUN   Date Value Ref Range Status   11/04/2021 16 8 - 23 mg/dL Final     Creatinine   Date Value Ref Range Status   11/04/2021 0.82 0.57 - 1.00 mg/dL Final     Potassium   Date Value Ref Range Status   11/04/2021 4.4 3.5 - 5.2 mmol/L Final     ALT (SGPT)   Date Value Ref Range Status   11/04/2021 10 1 - 33 U/L Final     AST (SGOT)   Date Value Ref Range Status   11/04/2021 14 1 - 32 U/L Final       Lab Results   Component Value Date    CHOL 171 03/19/2021     Lab Results   Component Value Date    TRIG 99 03/19/2021     Lab Results   Component Value Date    HDL 71 (H) 03/19/2021     Lab Results   Component Value Date    LDL 82 03/19/2021     No components found for: LDLDIRECTC    Diagnostic Data:      ECG 12 Lead    Date/Time: 12/8/2021 3:44 PM  Performed by: Lena Fletcher APRN  Authorized by: Lena Fletcher APRN   Comparison: compared with previous ECG from 3/25/2021  Similar to previous ECG  Rhythm: sinus rhythm  Rate: normal  BPM: 66  Conduction: right bundle branch block  Comments:  ms,  ms            Results for orders placed during the hospital encounter of 03/18/21    Adult Transthoracic Echo Complete w/ Color, Spectral and Contrast if necessary per protocol    Interpretation Summary  · Estimated left ventricular EF = 55% Left ventricular systolic function is normal.  · Moderate aortic valve stenosis is present. Aortic valve area is 1.2 cm2.  · Aortic valve mean pressure gradient is 23 mmHg.  · Mild mitral valve regurgitation is present  · Mild tricuspid valve regurgitation is present.    PET stress test 3/9/2021  · Myocardial perfusion imaging indicates a normal myocardial perfusion study with no evidence of ischemia.  · Left ventricular ejection fraction is hyperdynamic (Calculated EF > 70%). .  · Impressions are consistent with a low risk study.      Assessment and Plan:   Diagnoses and all orders for this visit:    Benign essential hypertension (Primary)  -Blood  pressure significantly elevated upon arrival today at 210/100, however improved to 172/64 upon recheck.  -Patient's BP log at home with blood pressures typically in the 150s to 160s with the highest being 180 systolic.  -Increasing losartan to 100 mg p.o. daily.  Patient to take an additional 25 mg this afternoon since the patient had taken 75 mg this AM.  Repeat BMP next week.  -Clonidine 0.1 mg p.o. twice daily as needed for systolic blood pressure greater than 150 mmHg.  -Patient to keep BP log and note if she has to take clonidine.    Aortic stenosis, moderate  -Was noted to be moderate on 3/2021 TTE.  Was mild in 2020.  -We will continue to monitor clinically for now.  Consider repeat echocardiogram at time of follow-up with her primary cardiologist.    RBBB (right bundle branch block)  -Chronic    - Return in about 1 week (around 12/15/2021).    Electronically signed by AKUA Jara, 12/08/21, 3:51 PM EST.

## 2021-12-15 ENCOUNTER — OFFICE VISIT (OUTPATIENT)
Dept: CARDIOLOGY | Facility: CLINIC | Age: 86
End: 2021-12-15

## 2021-12-15 ENCOUNTER — LAB (OUTPATIENT)
Dept: LAB | Facility: HOSPITAL | Age: 86
End: 2021-12-15

## 2021-12-15 VITALS
HEART RATE: 65 BPM | BODY MASS INDEX: 25.3 KG/M2 | SYSTOLIC BLOOD PRESSURE: 154 MMHG | HEIGHT: 61 IN | DIASTOLIC BLOOD PRESSURE: 76 MMHG | WEIGHT: 134 LBS | OXYGEN SATURATION: 100 %

## 2021-12-15 DIAGNOSIS — I10 BENIGN ESSENTIAL HYPERTENSION: ICD-10-CM

## 2021-12-15 DIAGNOSIS — I10 ESSENTIAL HYPERTENSION: Primary | ICD-10-CM

## 2021-12-15 LAB
ANION GAP SERPL CALCULATED.3IONS-SCNC: 7.4 MMOL/L (ref 5–15)
BUN SERPL-MCNC: 16 MG/DL (ref 8–23)
BUN/CREAT SERPL: 20 (ref 7–25)
CALCIUM SPEC-SCNC: 9.5 MG/DL (ref 8.6–10.5)
CHLORIDE SERPL-SCNC: 101 MMOL/L (ref 98–107)
CO2 SERPL-SCNC: 29.6 MMOL/L (ref 22–29)
CREAT SERPL-MCNC: 0.8 MG/DL (ref 0.57–1)
GFR SERPL CREATININE-BSD FRML MDRD: 68 ML/MIN/1.73
GLUCOSE SERPL-MCNC: 101 MG/DL (ref 65–99)
POTASSIUM SERPL-SCNC: 4.2 MMOL/L (ref 3.5–5.2)
SODIUM SERPL-SCNC: 138 MMOL/L (ref 136–145)

## 2021-12-15 PROCEDURE — 80048 BASIC METABOLIC PNL TOTAL CA: CPT

## 2021-12-15 PROCEDURE — 99213 OFFICE O/P EST LOW 20 MIN: CPT | Performed by: NURSE PRACTITIONER

## 2021-12-15 PROCEDURE — 36415 COLL VENOUS BLD VENIPUNCTURE: CPT

## 2021-12-15 RX ORDER — AMLODIPINE BESYLATE 5 MG/1
5 TABLET ORAL DAILY
Qty: 30 TABLET | Refills: 0 | Status: SHIPPED | OUTPATIENT
Start: 2021-12-15 | End: 2021-12-22 | Stop reason: SDUPTHER

## 2021-12-15 NOTE — PROGRESS NOTES
[facility]   [address], [address]  [place], [place], [address]    [phone]  WWW.[facility] CenterSampling TechnologiesMober[facility]           OUTPATIENT CLINIC FOLLOW UP NOTE    Patient Care Team:  Patient Care Team:  Paris Mejía MD as PCP - General (Family Medicine)    Subjective:      Chief Complaint   Patient presents with   • Hypertension     1 wk f/u        HPI:    Lanie Ochoa is a 86 y.o. female.  Problem list:  1. Essential hypertension  2. Right bundle branch block  3. Hyperlipidemia  4. Valvular heart disease  5. GERD  6. Breast cancer    She presents today for follow-up.  Her primary cardiologist is Dr. Downs whom she last saw on 11/30/2021.    Since the patient increased her losartan last week her blood pressure has been slightly improved, however, it is still significantly increase the afternoons often in the 170s.  Patient did not bring in her BP cuff for comparison.  She denies chest pain, palpitations, lower extremity edema, lightheadedness, or syncope.  Does have rare dyspnea with exertion when swimming.    Review of Systems:  Positive for intermittent dyspnea with exertion  Negative for exertional chest pain, lower extremity edema, palpitations, syncope.     PFSH:  Patient Active Problem List   Diagnosis   • Anxiety   • HLD (hyperlipidemia)   • Benign essential hypertension   • Osteopenia   • Avitaminosis D   • History of left knee replacement   • Titubation   • Non-rheumatic mitral regurgitation   • Non-rheumatic tricuspid valve insufficiency   • Chronic pain of right knee   • Venous incompetence   • Dysfunction of both eustachian tubes   • Primary localized osteoarthrosis of right lower leg   • Primary osteoarthritis of right knee   • OA (osteoarthritis) of knee   • Lumbar spinal stenosis   • Spinal stenosis of lumbar region with neurogenic claudication   • Syncope   • RBBB (right bundle branch block)   • Subarachnoid hemorrhage (HCC)   • Localized osteoporosis without current  "pathological fracture         Current Outpatient Medications:   •  Bystolic 10 MG tablet, TAKE 1 TABLET BY MOUTH IN THE MORNING , Disp: 90 tablet, Rfl: 1  •  calcium carbonate (OS-NELLI) 600 MG tablet, Take 600 mg by mouth Daily., Disp: , Rfl:   •  cetirizine (zyrTEC) 10 MG tablet, Take 1 tablet by mouth Daily., Disp: 90 tablet, Rfl: 3  •  Cholecalciferol (VITAMIN D3) 400 UNITS capsule, Take 1 tablet by mouth Daily., Disp: , Rfl:   •  cloNIDine (Catapres) 0.1 MG tablet, Take 1 tablet by mouth 2 (Two) Times a Day As Needed for High Blood Pressure (systolic BP >150)., Disp: 60 tablet, Rfl: 1  •  losartan (Cozaar) 100 MG tablet, Take 1 tablet by mouth Daily., Disp: 30 tablet, Rfl: 5  •  montelukast (SINGULAIR) 10 MG tablet, TAKE 1 TABLET BY MOUTH EVERY DAY AT NIGHT, Disp: 90 tablet, Rfl: 3  •  sertraline (ZOLOFT) 25 MG tablet, Take 1 tablet by mouth Every Evening., Disp: 90 tablet, Rfl: 1  •  amLODIPine (NORVASC) 5 MG tablet, Take 1 tablet by mouth Daily., Disp: 30 tablet, Rfl: 0    Allergies   Allergen Reactions   • Sulfa Antibiotics Rash        reports that she has never smoked. She has never used smokeless tobacco.      Objective:   Physical exam:  /76 (BP Location: Left arm, Patient Position: Sitting, Cuff Size: Adult)   Pulse 65   Ht 154.9 cm (61\")   Wt 60.8 kg (134 lb)   LMP  (LMP Unknown)   SpO2 100%   BMI 25.32 kg/m²   CONSTITUTIONAL: No acute distress  RESPIRATORY: Normal effort. Clear to auscultation bilaterally without wheezing or rales  CARDIOVASCULAR:  Regular rate and rhythm with normal S1 and S2. Without gallop or rub.  CHANTE at RUSB.  Normal radial pulse.     Labs:    BUN   Date Value Ref Range Status   11/04/2021 16 8 - 23 mg/dL Final     Creatinine   Date Value Ref Range Status   11/04/2021 0.82 0.57 - 1.00 mg/dL Final     Potassium   Date Value Ref Range Status   11/04/2021 4.4 3.5 - 5.2 mmol/L Final     ALT (SGPT)   Date Value Ref Range Status   11/04/2021 10 1 - 33 U/L Final     AST (SGOT) "   Date Value Ref Range Status   11/04/2021 14 1 - 32 U/L Final       Lab Results   Component Value Date    CHOL 171 03/19/2021     Lab Results   Component Value Date    TRIG 99 03/19/2021     Lab Results   Component Value Date    HDL 71 (H) 03/19/2021     Lab Results   Component Value Date    LDL 82 03/19/2021     No components found for: LDLDIRECTC    Diagnostic Data:    Procedures    Results for orders placed during the hospital encounter of 03/18/21    Adult Transthoracic Echo Complete w/ Color, Spectral and Contrast if necessary per protocol    Interpretation Summary  · Estimated left ventricular EF = 55% Left ventricular systolic function is normal.  · Moderate aortic valve stenosis is present. Aortic valve area is 1.2 cm2.  · Aortic valve mean pressure gradient is 23 mmHg.  · Mild mitral valve regurgitation is present  · Mild tricuspid valve regurgitation is present.    PET stress test 3/9/2021  · Myocardial perfusion imaging indicates a normal myocardial perfusion study with no evidence of ischemia.  · Left ventricular ejection fraction is hyperdynamic (Calculated EF > 70%). .  · Impressions are consistent with a low risk study.      Assessment and Plan:   Diagnoses and all orders for this visit:    Benign essential hypertension (Primary)  -Patient's BP log reviewed today.  Typically 140s 150s in the a.m., increases to the 170s in the afternoon.  -Patient has not completed repeat BMP yet.  Instructed to go to lab after visit.  -Continue Bystolic and losartan.  -Discontinuing diltiazem and starting amlodipine 5 mg p.o. daily.  -Continue clonidine 0.1 mg p.o. twice daily as needed.  -Patient wishes to be seen again in office next week for any further antihypertensives adjustments.  Patient to bring blood pressure cuff to this appointment.    Aortic stenosis, moderate  -Was noted to be moderate on 3/2021 TTE.  Was mild in 2020.  -Continue to monitor clinically for now.  Consider repeat TTE at time of follow-up  with primary cardiologist.    RBBB (right bundle branch block)  -Chronic    - Return in about 1 week (around 12/22/2021).    Electronically signed by AKUA Jara, 12/15/21, 1:41 PM EST.

## 2021-12-16 ENCOUNTER — TELEPHONE (OUTPATIENT)
Dept: CARDIOLOGY | Facility: CLINIC | Age: 86
End: 2021-12-16

## 2021-12-16 NOTE — TELEPHONE ENCOUNTER
----- Message from AKUA Jara sent at 12/15/2021  3:59 PM EST -----  Can you let the patient know that her renal function was normal and she is tolerating the higher dose of losartan.

## 2021-12-17 NOTE — TELEPHONE ENCOUNTER
Pt called back. Went over results. She verbalized understanding.    Thank you,    Shania Chao, RN  Triage [facility]

## 2021-12-22 ENCOUNTER — OFFICE VISIT (OUTPATIENT)
Dept: CARDIOLOGY | Facility: CLINIC | Age: 86
End: 2021-12-22

## 2021-12-22 VITALS
DIASTOLIC BLOOD PRESSURE: 78 MMHG | BODY MASS INDEX: 25 KG/M2 | OXYGEN SATURATION: 97 % | HEART RATE: 70 BPM | HEIGHT: 61 IN | SYSTOLIC BLOOD PRESSURE: 144 MMHG | WEIGHT: 132.4 LBS

## 2021-12-22 DIAGNOSIS — I10 ESSENTIAL HYPERTENSION: Primary | ICD-10-CM

## 2021-12-22 DIAGNOSIS — I35.0 AORTIC STENOSIS, MODERATE: ICD-10-CM

## 2021-12-22 DIAGNOSIS — I45.10 RBBB: ICD-10-CM

## 2021-12-22 PROCEDURE — 99213 OFFICE O/P EST LOW 20 MIN: CPT | Performed by: NURSE PRACTITIONER

## 2021-12-22 RX ORDER — AMLODIPINE BESYLATE 5 MG/1
5 TABLET ORAL DAILY
Qty: 90 TABLET | Refills: 1 | Status: SHIPPED | OUTPATIENT
Start: 2021-12-22 | End: 2022-07-05

## 2021-12-22 NOTE — PROGRESS NOTES
[facility]   [address]  [place], [place], [address]    [phone]  WWW.[facility]           OUTPATIENT CLINIC FOLLOW UP NOTE    Patient Care Team:  Patient Care Team:  Paris Mejía MD as PCP - General (Family Medicine)    Subjective:      Chief Complaint   Patient presents with   • Essential hypertension     12/15/2021 Follow up       HPI:    Lanie Ochoa is a 86 y.o. female.  Problem list:  1. Essential hypertension  2. Right bundle branch block  3. Hyperlipidemia  4. Valvular heart disease  5. GERD  6. Breast cancer    She presents today for follow-up.  Her primary cardiologist is Dr. Downs whom she last saw on 11/30/2021.    Since the patient was started on amlodipine and her diltiazem was discontinued she reports that her blood pressure has been significantly improved at home.  It has become elevated in the afternoons before she takes her amlodipine.  She has not had to take clonidine as needed.  She brought her home BP cuff in with her today and it was noted to have a 15 point difference than when taken manually.  She denies chest pain, shortness of breath, palpitations, lower extremity edema, lightheadedness, or syncope.    Review of Systems:  Negative for exertional chest pain, dyspnea with exertion, lower extremity edema, palpitations, syncope.     PFSH:  Patient Active Problem List   Diagnosis   • Anxiety   • HLD (hyperlipidemia)   • Benign essential hypertension   • Osteopenia   • Avitaminosis D   • History of left knee replacement   • Titubation   • Non-rheumatic mitral regurgitation   • Non-rheumatic tricuspid valve insufficiency   • Chronic pain of right knee   • Venous incompetence   • Dysfunction of both eustachian tubes   • Primary localized osteoarthrosis of right lower leg   • Primary osteoarthritis of right knee   • OA (osteoarthritis) of knee   • Lumbar spinal stenosis   • Spinal stenosis of lumbar region with neurogenic claudication  "  • Syncope   • RBBB (right bundle branch block)   • Subarachnoid hemorrhage (HCC)   • Localized osteoporosis without current pathological fracture         Current Outpatient Medications:   •  amLODIPine (NORVASC) 5 MG tablet, Take 1 tablet by mouth Daily., Disp: 90 tablet, Rfl: 1  •  Bystolic 10 MG tablet, TAKE 1 TABLET BY MOUTH IN THE MORNING , Disp: 90 tablet, Rfl: 1  •  calcium carbonate (OS-NELLI) 600 MG tablet, Take 600 mg by mouth Daily., Disp: , Rfl:   •  cetirizine (zyrTEC) 10 MG tablet, Take 1 tablet by mouth Daily., Disp: 90 tablet, Rfl: 3  •  Cholecalciferol (VITAMIN D3) 400 UNITS capsule, Take 1 tablet by mouth Daily., Disp: , Rfl:   •  cloNIDine (Catapres) 0.1 MG tablet, Take 1 tablet by mouth 2 (Two) Times a Day As Needed for High Blood Pressure (systolic BP >150)., Disp: 60 tablet, Rfl: 1  •  losartan (Cozaar) 100 MG tablet, Take 1 tablet by mouth Daily., Disp: 30 tablet, Rfl: 5  •  montelukast (SINGULAIR) 10 MG tablet, TAKE 1 TABLET BY MOUTH EVERY DAY AT NIGHT, Disp: 90 tablet, Rfl: 3  •  sertraline (ZOLOFT) 25 MG tablet, Take 1 tablet by mouth Every Evening., Disp: 90 tablet, Rfl: 1    Allergies   Allergen Reactions   • Sulfa Antibiotics Rash        reports that she has never smoked. She has never used smokeless tobacco.      Objective:   Physical exam:  /78 (BP Location: Left arm)   Pulse 70   Ht 154.9 cm (61\")   Wt 60.1 kg (132 lb 6.4 oz)   LMP  (LMP Unknown)   SpO2 97%   BMI 25.02 kg/m²   CONSTITUTIONAL: No acute distress  RESPIRATORY: Normal effort. Clear to auscultation bilaterally without wheezing or rales  CARDIOVASCULAR:  Regular rate and rhythm with normal S1 and S2. Without gallop or rub.  CHANTE at RUSB.  Normal radial pulse.     Labs:    BUN   Date Value Ref Range Status   12/15/2021 16 8 - 23 mg/dL Final     Creatinine   Date Value Ref Range Status   12/15/2021 0.80 0.57 - 1.00 mg/dL Final     Potassium   Date Value Ref Range Status   12/15/2021 4.2 3.5 - 5.2 mmol/L Final "     ALT (SGPT)   Date Value Ref Range Status   11/04/2021 10 1 - 33 U/L Final     AST (SGOT)   Date Value Ref Range Status   11/04/2021 14 1 - 32 U/L Final       Lab Results   Component Value Date    CHOL 171 03/19/2021     Lab Results   Component Value Date    TRIG 99 03/19/2021     Lab Results   Component Value Date    HDL 71 (H) 03/19/2021     Lab Results   Component Value Date    LDL 82 03/19/2021     No components found for: LDLDIRECTC    Diagnostic Data:    Procedures    Results for orders placed during the hospital encounter of 03/18/21    Adult Transthoracic Echo Complete w/ Color, Spectral and Contrast if necessary per protocol    Interpretation Summary  · Estimated left ventricular EF = 55% Left ventricular systolic function is normal.  · Moderate aortic valve stenosis is present. Aortic valve area is 1.2 cm2.  · Aortic valve mean pressure gradient is 23 mmHg.  · Mild mitral valve regurgitation is present  · Mild tricuspid valve regurgitation is present.    PET stress test 3/9/2021  · Myocardial perfusion imaging indicates a normal myocardial perfusion study with no evidence of ischemia.  · Left ventricular ejection fraction is hyperdynamic (Calculated EF > 70%). .  · Impressions are consistent with a low risk study.      Assessment and Plan:   Diagnoses and all orders for this visit:    Benign essential hypertension (Primary)  -Patient's BP log reviewed today.  Her a.m. blood pressure is typically in the in the 120 to 130s systolic.  Does get elevated into the 150s to 160s in the afternoon before taking amlodipine.  -Patient's BP cuff brought in office today and was noted to be approximately 15 points higher than when taken manually.  Patient notified of this difference.  She does note she recently changed the batteries in her cuff is the [employer] brand.  -Continue Bystolic, losartan, and amlodipine at current dosing.  -Continue clonidine as needed.    Aortic stenosis, moderate  -Was noted to be moderate on  3/2021 TTE.  Was mild in 2020.  -Continue to monitor clinically for now.  Consider repeat TTE at time of follow-up with primary cardiologist.    RBBB (right bundle branch block)  -Chronic    - Return for Next scheduled follow up with Dr. Downs as previously scheduled.    Electronically signed by AKUA Jara, 12/22/21, 10:36 AM EST.

## 2022-01-18 RX ORDER — NEBIVOLOL HYDROCHLORIDE 10 MG/1
TABLET ORAL
Qty: 90 TABLET | Refills: 0 | Status: SHIPPED | OUTPATIENT
Start: 2022-01-18 | End: 2022-04-18

## 2022-01-21 RX ORDER — CETIRIZINE HYDROCHLORIDE 10 MG/1
TABLET ORAL
Qty: 90 TABLET | Refills: 0 | Status: SHIPPED | OUTPATIENT
Start: 2022-01-21 | End: 2022-04-25

## 2022-02-25 RX ORDER — SERTRALINE HYDROCHLORIDE 25 MG/1
TABLET, FILM COATED ORAL
Qty: 90 TABLET | Refills: 1 | Status: SHIPPED | OUTPATIENT
Start: 2022-02-25 | End: 2022-08-25

## 2022-02-25 NOTE — TELEPHONE ENCOUNTER
Rx Refill Note  Requested Prescriptions     Pending Prescriptions Disp Refills   • sertraline (ZOLOFT) 25 MG tablet [Pharmacy Med Name: Sertraline HCl Oral Tablet 25 MG] 90 tablet 0     Sig: TAKE 1 TABLET BY MOUTH IN THE EVENING      Last office visit with prescribing clinician: 11/4/2021      Next office visit with prescribing clinician: 5/16/2022            Samira Duncan LPN  02/25/22, 16:40 EST

## 2022-04-18 RX ORDER — NEBIVOLOL 10 MG/1
TABLET ORAL
Qty: 90 TABLET | Refills: 1 | Status: SHIPPED | OUTPATIENT
Start: 2022-04-18 | End: 2022-10-17

## 2022-04-18 NOTE — TELEPHONE ENCOUNTER
Rx Refill Note  Requested Prescriptions     Pending Prescriptions Disp Refills   • nebivolol (BYSTOLIC) 10 MG tablet [Pharmacy Med Name: Nebivolol HCl Oral Tablet 10 MG] 90 tablet 0     Sig: TAKE 1 TABLET BY MOUTH IN THE MORNING      Last office visit with prescribing clinician: 11/4/2021      Next office visit with prescribing clinician: 5/16/2022            Samira Duncan LPN  04/18/22, 17:00 EDT

## 2022-04-25 RX ORDER — CETIRIZINE HYDROCHLORIDE 10 MG/1
TABLET ORAL
Qty: 90 TABLET | Refills: 3 | Status: SHIPPED | OUTPATIENT
Start: 2022-04-25

## 2022-04-25 NOTE — TELEPHONE ENCOUNTER
Rx Refill Note  Requested Prescriptions     Pending Prescriptions Disp Refills   • cetirizine (zyrTEC) 10 MG tablet [Pharmacy Med Name: Cetirizine HCl Oral Tablet 10 MG] 90 tablet 0     Sig: TAKE 1 TABLET BY MOUTH EVERY DAY      Last office visit with prescribing clinician: 11/4/2021      Next office visit with prescribing clinician: 5/16/2022            Samira Duncan LPN  04/25/22, 13:38 EDT

## 2022-05-05 ENCOUNTER — OFFICE VISIT (OUTPATIENT)
Dept: CARDIOLOGY | Facility: CLINIC | Age: 87
End: 2022-05-05

## 2022-05-05 VITALS
SYSTOLIC BLOOD PRESSURE: 130 MMHG | HEIGHT: 61 IN | HEART RATE: 67 BPM | BODY MASS INDEX: 25.02 KG/M2 | DIASTOLIC BLOOD PRESSURE: 80 MMHG | OXYGEN SATURATION: 98 %

## 2022-05-05 DIAGNOSIS — I10 BENIGN ESSENTIAL HYPERTENSION: ICD-10-CM

## 2022-05-05 DIAGNOSIS — I34.0 NON-RHEUMATIC MITRAL REGURGITATION: Primary | ICD-10-CM

## 2022-05-05 DIAGNOSIS — I35.0 AORTIC STENOSIS, MILD: ICD-10-CM

## 2022-05-05 PROCEDURE — 99213 OFFICE O/P EST LOW 20 MIN: CPT | Performed by: INTERNAL MEDICINE

## 2022-05-05 NOTE — PROGRESS NOTES
"      CARDIOLOGY    Ray Downs MD    ENCOUNTER DATE:  05/05/2022    Lanie Ochoa / 86 y.o. / female        CHIEF COMPLAINT / REASON FOR OFFICE VISIT     Essential hypertension (12/08/2021 Follow up)  Mitral regurgitation    HISTORY OF PRESENT ILLNESS       HPI  Lanie Ochoa is a 86 y.o. female who presents today for reevaluation.  Patient says she feels good.  No complaints of chest pain shortness of breath palpitations lightheadedness or swelling.  She still exercises and does well with this with no complaints.      The following portions of the patient's history were reviewed and updated as appropriate: allergies, current medications, past family history, past medical history, past social history, past surgical history and problem list.      VITAL SIGNS     Visit Vitals  /80 (BP Location: Left arm)   Pulse 67   Ht 154.9 cm (61\")   LMP  (LMP Unknown)   SpO2 98%   BMI 25.02 kg/m²         Wt Readings from Last 3 Encounters:   12/22/21 60.1 kg (132 lb 6.4 oz)   12/15/21 60.8 kg (134 lb)   12/08/21 60.7 kg (133 lb 12.8 oz)     Body mass index is 25.02 kg/m².      REVIEW OF SYSTEMS   Review of Systems   All other systems reviewed and are negative.          PHYSICAL EXAMINATION     Vitals reviewed.   Constitutional:       Appearance: Healthy appearance.   Pulmonary:      Effort: Pulmonary effort is normal.   Cardiovascular:      Normal rate. Regular rhythm. Normal S1. Normal S2.      Murmurs: There is a harsh midsystolic murmur at the URSB.      No gallop. No click. No rub.   Pulses:     Intact distal pulses.   Edema:     Peripheral edema absent.   Neurological:      Mental Status: Alert and oriented to person, place and time.           REVIEWED DATA     Procedures    Cardiac Procedures:  1.   Interpretation Summary    · Estimated left ventricular EF = 55% Left ventricular systolic function is normal.  · Moderate aortic valve stenosis is present. Aortic valve area is 1.2 cm2.  · Aortic valve " mean pressure gradient is 23 mmHg.  · Mild mitral valve regurgitation is present  · Mild tricuspid valve regurgitation is present.            ASSESSMENT & PLAN      Diagnosis Plan   1. Non-rheumatic mitral regurgitation     2. Benign essential hypertension     3. Aortic stenosis, mild           SUMMARY/DISCUSSION  1. Hypertension blood pressures great  2. Mild aortic stenosis asymptomatic  3. Mild mitral gravitation stable  4. Follow-up 1 year sooner if issues.        MEDICATIONS         Discharge Medications          Accurate as of May 5, 2022  1:27 PM. If you have any questions, ask your nurse or doctor.            Continue These Medications      Instructions Start Date   amLODIPine 5 MG tablet  Commonly known as: NORVASC   5 mg, Oral, Daily      calcium carbonate 600 MG tablet  Commonly known as: OS-NELLI   600 mg, Oral, Daily      cetirizine 10 MG tablet  Commonly known as: zyrTEC   TAKE 1 TABLET BY MOUTH EVERY DAY      cloNIDine 0.1 MG tablet  Commonly known as: Catapres   0.1 mg, Oral, 2 Times Daily PRN      losartan 100 MG tablet  Commonly known as: Cozaar   100 mg, Oral, Daily      montelukast 10 MG tablet  Commonly known as: SINGULAIR   TAKE 1 TABLET BY MOUTH EVERY DAY AT NIGHT      nebivolol 10 MG tablet  Commonly known as: BYSTOLIC   TAKE 1 TABLET BY MOUTH IN THE MORNING      sertraline 25 MG tablet  Commonly known as: ZOLOFT   TAKE 1 TABLET BY MOUTH IN THE EVENING      Vitamin D3 10 MCG (400 UNIT) capsule   1 tablet, Oral, Daily                 **Dragon Disclaimer:   Much of this encounter note is an electronic transcription/translation of spoken language to printed text. The electronic translation of spoken language may permit erroneous, or at times, nonsensical words or phrases to be inadvertently transcribed. Although I have reviewed the note for such errors, some may still exist.

## 2022-05-16 ENCOUNTER — OFFICE VISIT (OUTPATIENT)
Dept: FAMILY MEDICINE CLINIC | Facility: CLINIC | Age: 87
End: 2022-05-16

## 2022-05-16 VITALS
TEMPERATURE: 98.3 F | RESPIRATION RATE: 17 BRPM | HEART RATE: 65 BPM | HEIGHT: 61 IN | OXYGEN SATURATION: 97 % | DIASTOLIC BLOOD PRESSURE: 66 MMHG | BODY MASS INDEX: 25.62 KG/M2 | WEIGHT: 135.7 LBS | SYSTOLIC BLOOD PRESSURE: 142 MMHG

## 2022-05-16 DIAGNOSIS — I10 BENIGN ESSENTIAL HYPERTENSION: ICD-10-CM

## 2022-05-16 DIAGNOSIS — Z96.659 STATUS POST KNEE REPLACEMENT, UNSPECIFIED LATERALITY: ICD-10-CM

## 2022-05-16 DIAGNOSIS — E78.5 HYPERLIPIDEMIA, UNSPECIFIED HYPERLIPIDEMIA TYPE: ICD-10-CM

## 2022-05-16 DIAGNOSIS — R73.9 HYPERGLYCEMIA, UNSPECIFIED: ICD-10-CM

## 2022-05-16 DIAGNOSIS — Z87.898 HISTORY OF PREDIABETES: ICD-10-CM

## 2022-05-16 DIAGNOSIS — Z00.00 MEDICARE ANNUAL WELLNESS VISIT, SUBSEQUENT: Primary | ICD-10-CM

## 2022-05-16 PROCEDURE — 1160F RVW MEDS BY RX/DR IN RCRD: CPT | Performed by: FAMILY MEDICINE

## 2022-05-16 PROCEDURE — G0439 PPPS, SUBSEQ VISIT: HCPCS | Performed by: FAMILY MEDICINE

## 2022-05-16 PROCEDURE — 1170F FXNL STATUS ASSESSED: CPT | Performed by: FAMILY MEDICINE

## 2022-05-16 PROCEDURE — 0051A COVID-19 (PFIZER) 12+ YRS: CPT | Performed by: FAMILY MEDICINE

## 2022-05-16 PROCEDURE — 91305 COVID-19 (PFIZER) 12+ YRS: CPT | Performed by: FAMILY MEDICINE

## 2022-05-16 NOTE — PROGRESS NOTES
The ABCs of the Annual Wellness Visit  Subsequent Medicare Wellness Visit    Chief Complaint   Patient presents with   • Medicare Wellness-subsequent      Subjective    History of Present Illness:  Lanie Ochoa is a 87 y.o. female who presents for a Subsequent Medicare Wellness Visit.    The following portions of the patient's history were reviewed and   updated as appropriate: allergies, current medications, past family history, past medical history, past social history, past surgical history and problem list.    Compared to one year ago, the patient feels her physical   health is worse.    Compared to one year ago, the patient feels her mental   health is the same.    Recent Hospitalizations:  She was not admitted to the hospital during the last year.       Current Medical Providers:  Patient Care Team:  Paris Mejía MD as PCP - General (Family Medicine)    Outpatient Medications Prior to Visit   Medication Sig Dispense Refill   • amLODIPine (NORVASC) 5 MG tablet Take 1 tablet by mouth Daily. 90 tablet 1   • calcium carbonate (OS-NELLI) 600 MG tablet Take 600 mg by mouth Daily.     • cetirizine (zyrTEC) 10 MG tablet TAKE 1 TABLET BY MOUTH EVERY DAY 90 tablet 3   • Cholecalciferol (VITAMIN D3) 400 UNITS capsule Take 1 tablet by mouth Daily.     • losartan (Cozaar) 100 MG tablet Take 1 tablet by mouth Daily. 30 tablet 5   • montelukast (SINGULAIR) 10 MG tablet TAKE 1 TABLET BY MOUTH EVERY DAY AT NIGHT 90 tablet 3   • nebivolol (BYSTOLIC) 10 MG tablet TAKE 1 TABLET BY MOUTH IN THE MORNING 90 tablet 1   • sertraline (ZOLOFT) 25 MG tablet TAKE 1 TABLET BY MOUTH IN THE EVENING 90 tablet 1   • cloNIDine (Catapres) 0.1 MG tablet Take 1 tablet by mouth 2 (Two) Times a Day As Needed for High Blood Pressure (systolic BP >150). 60 tablet 1     No facility-administered medications prior to visit.       No opioid medication identified on active medication list. I have reviewed chart for other potential  high risk  "medication/s and harmful drug interactions in the elderly.          Aspirin is not on active medication list.  Aspirin use is not indicated based on review of current medical condition/s. Risk of harm outweighs potential benefits.  .    Patient Active Problem List   Diagnosis   • Anxiety   • HLD (hyperlipidemia)   • Benign essential hypertension   • Osteopenia   • Avitaminosis D   • History of left knee replacement   • Titubation   • Non-rheumatic mitral regurgitation   • Non-rheumatic tricuspid valve insufficiency   • Chronic pain of right knee   • Venous incompetence   • Dysfunction of both eustachian tubes   • Primary localized osteoarthrosis of right lower leg   • Primary osteoarthritis of right knee   • OA (osteoarthritis) of knee   • Lumbar spinal stenosis   • Spinal stenosis of lumbar region with neurogenic claudication   • Syncope   • RBBB (right bundle branch block)   • Subarachnoid hemorrhage (HCC)   • Localized osteoporosis without current pathological fracture   • Aortic stenosis, mild     Advance Care Planning  Advance Directive is not on file.  ACP discussion was held with the patient during this visit. Patient has an advance directive (not in EMR), copy requested.          Objective    Vitals:    05/16/22 1155   BP: 142/66   BP Location: Right arm   Patient Position: Sitting   Cuff Size: Adult   Pulse: 65   Resp: 17   Temp: 98.3 °F (36.8 °C)   TempSrc: Temporal   SpO2: 97%   Weight: 61.6 kg (135 lb 11.2 oz)   Height: 154.9 cm (61\")   PainSc: 0-No pain     BMI Readings from Last 1 Encounters:   05/16/22 25.64 kg/m²   BMI is above normal parameters. Recommendations include: exercise counseling    Does the patient have evidence of cognitive impairment? No    Physical Exam  Vitals reviewed.   Constitutional:       General: She is not in acute distress.     Appearance: Normal appearance. She is not ill-appearing or toxic-appearing.   HENT:      Head: Normocephalic and atraumatic.      Right Ear: Tympanic " membrane, ear canal and external ear normal. There is no impacted cerumen.      Left Ear: Tympanic membrane, ear canal and external ear normal. There is no impacted cerumen.      Nose: Nose normal.      Mouth/Throat:      Mouth: Mucous membranes are moist.      Pharynx: Oropharynx is clear. No oropharyngeal exudate or posterior oropharyngeal erythema.   Eyes:      General: No scleral icterus.        Right eye: No discharge.         Left eye: No discharge.      Conjunctiva/sclera: Conjunctivae normal.   Neck:      Thyroid: No thyromegaly.      Vascular: No carotid bruit.   Cardiovascular:      Rate and Rhythm: Normal rate and regular rhythm.      Heart sounds: Normal heart sounds. No murmur heard.    No friction rub. No gallop.   Pulmonary:      Effort: Pulmonary effort is normal. No respiratory distress.      Breath sounds: Normal breath sounds. No wheezing or rales.   Chest:      Chest wall: No tenderness.   Abdominal:      General: Bowel sounds are normal. There is no distension.      Palpations: Abdomen is soft. There is no mass.      Tenderness: There is no abdominal tenderness. There is no guarding.   Musculoskeletal:         General: No deformity.      Cervical back: Neck supple.      Right lower leg: No edema.      Left lower leg: No edema.   Lymphadenopathy:      Cervical: No cervical adenopathy.   Skin:     Coloration: Skin is not jaundiced or pale.   Neurological:      General: No focal deficit present.      Mental Status: She is alert and oriented to person, place, and time.      Motor: No abnormal muscle tone.      Coordination: Coordination normal.   Psychiatric:         Mood and Affect: Mood normal.         Behavior: Behavior normal.                 HEALTH RISK ASSESSMENT    Smoking Status:  Social History     Tobacco Use   Smoking Status Never Smoker   Smokeless Tobacco Never Used   Tobacco Comment    No Caffeine use     Alcohol Consumption:  Social History     Substance and Sexual Activity   Alcohol  Use No     Fall Risk Screen:    CATHRYNADI Fall Risk Assessment was completed, and patient is at LOW risk for falls.Assessment completed on:5/16/2022    Depression Screening:  PHQ-2/PHQ-9 Depression Screening 5/16/2022   Retired Total Score -   Little Interest or Pleasure in Doing Things 0-->not at all   Feeling Down, Depressed or Hopeless 0-->not at all   PHQ-9: Brief Depression Severity Measure Score 0       Health Habits and Functional and Cognitive Screening:  Functional & Cognitive Status 5/16/2022   Do you have difficulty preparing food and eating? No   Do you have difficulty bathing yourself, getting dressed or grooming yourself? No   Do you have difficulty using the toilet? No   Do you have difficulty moving around from place to place? No   Do you have trouble with steps or getting out of a bed or a chair? No   Current Diet Well Balanced Diet   Dental Exam Not up to date   Eye Exam Up to date   Exercise (times per week) 5 times per week   Current Exercises Include Swimming;Bicycling Outdoors   Current Exercise Activities Include -   Do you need help using the phone?  No   Are you deaf or do you have serious difficulty hearing?  No   Do you need help with transportation? No   Do you need help shopping? No   Do you need help preparing meals?  No   Do you need help with housework?  No   Do you need help with laundry? No   Do you need help taking your medications? No   Do you need help managing money? No   Do you ever drive or ride in a car without wearing a seat belt? No   Have you felt unusual stress, anger or loneliness in the last month? No   Who do you live with? Alone   If you need help, do you have trouble finding someone available to you? No   Have you been bothered in the last four weeks by sexual problems? No   Do you have difficulty concentrating, remembering or making decisions? No       Age-appropriate Screening Schedule:  Refer to the list below for future screening recommendations based on patient's  age, sex and/or medical conditions. Orders for these recommended tests are listed in the plan section. The patient has been provided with a written plan.    Health Maintenance   Topic Date Due   • ZOSTER VACCINE (3 of 3) 06/03/2019   • LIPID PANEL  03/19/2022   • INFLUENZA VACCINE  08/01/2022   • MAMMOGRAM  09/15/2023   • DXA SCAN  09/23/2023   • TDAP/TD VACCINES (3 - Td or Tdap) 04/14/2029              Assessment & Plan   CMS Preventative Services Quick Reference  Risk Factors Identified During Encounter  Chronic Pain   Immunizations Discussed/Encouraged (specific Immunizations; COVID19  The above risks/problems have been discussed with the patient.  Follow up actions/plans if indicated are seen below in the Assessment/Plan Section.  Pertinent information has been shared with the patient in the After Visit Summary.    Diagnoses and all orders for this visit:    1. Medicare annual wellness visit, subsequent (Primary)    2. Status post knee replacement, unspecified laterality  -     Ambulatory Referral to Orthopedic Surgery    3. Benign essential hypertension  -     CBC & Differential  -     Comprehensive Metabolic Panel    4. Hyperlipidemia, unspecified hyperlipidemia type  -     Lipid Panel    5. History of prediabetes  -     Hemoglobin A1c  -     CBC & Differential  -     Comprehensive Metabolic Panel    6. Hyperglycemia, unspecified   -     Hemoglobin A1c        Follow Up:   No follow-ups on file.     An After Visit Summary and PPPS were made available to the patient.             Knee replacements bilaterally.   She says the left one is hard to describe but feels she just does not walk on it as well.   Feeling like this for a few months.   Has happened slowly.   Does not think related to the back. Her right back was hurting.  Says she had her knee replaced in 1421-7106. Concerned that it is not working as well.   Says she just doesn't walk right on it.   She has not fallen.   Swims, rides bike. Feels the knee  bothers her after swimming. Not bad but different. She cannot long a distances on it. Not a mile.

## 2022-05-17 LAB
ALBUMIN SERPL-MCNC: 4.6 G/DL (ref 3.6–4.6)
ALBUMIN/GLOB SERPL: 2.2 {RATIO} (ref 1.2–2.2)
ALP SERPL-CCNC: 71 IU/L (ref 44–121)
ALT SERPL-CCNC: 10 IU/L (ref 0–32)
AST SERPL-CCNC: 17 IU/L (ref 0–40)
BASOPHILS # BLD AUTO: 0 X10E3/UL (ref 0–0.2)
BASOPHILS NFR BLD AUTO: 1 %
BILIRUB SERPL-MCNC: 0.4 MG/DL (ref 0–1.2)
BUN SERPL-MCNC: 15 MG/DL (ref 8–27)
BUN/CREAT SERPL: 18 (ref 12–28)
CALCIUM SERPL-MCNC: 9.8 MG/DL (ref 8.7–10.3)
CHLORIDE SERPL-SCNC: 102 MMOL/L (ref 96–106)
CHOLEST SERPL-MCNC: 209 MG/DL (ref 100–199)
CO2 SERPL-SCNC: 26 MMOL/L (ref 20–29)
CREAT SERPL-MCNC: 0.83 MG/DL (ref 0.57–1)
EGFRCR SERPLBLD CKD-EPI 2021: 68 ML/MIN/1.73
EOSINOPHIL # BLD AUTO: 0.3 X10E3/UL (ref 0–0.4)
EOSINOPHIL NFR BLD AUTO: 6 %
ERYTHROCYTE [DISTWIDTH] IN BLOOD BY AUTOMATED COUNT: 12.6 % (ref 11.7–15.4)
GLOBULIN SER CALC-MCNC: 2.1 G/DL (ref 1.5–4.5)
GLUCOSE SERPL-MCNC: 101 MG/DL (ref 65–99)
HBA1C MFR BLD: 5.6 % (ref 4.8–5.6)
HCT VFR BLD AUTO: 45.9 % (ref 34–46.6)
HDLC SERPL-MCNC: 102 MG/DL
HGB BLD-MCNC: 14.5 G/DL (ref 11.1–15.9)
IMM GRANULOCYTES # BLD AUTO: 0 X10E3/UL (ref 0–0.1)
IMM GRANULOCYTES NFR BLD AUTO: 0 %
LDLC SERPL CALC-MCNC: 93 MG/DL (ref 0–99)
LYMPHOCYTES # BLD AUTO: 0.9 X10E3/UL (ref 0.7–3.1)
LYMPHOCYTES NFR BLD AUTO: 21 %
MCH RBC QN AUTO: 29.1 PG (ref 26.6–33)
MCHC RBC AUTO-ENTMCNC: 31.6 G/DL (ref 31.5–35.7)
MCV RBC AUTO: 92 FL (ref 79–97)
MONOCYTES # BLD AUTO: 0.3 X10E3/UL (ref 0.1–0.9)
MONOCYTES NFR BLD AUTO: 7 %
NEUTROPHILS # BLD AUTO: 3 X10E3/UL (ref 1.4–7)
NEUTROPHILS NFR BLD AUTO: 65 %
PLATELET # BLD AUTO: 265 X10E3/UL (ref 150–450)
POTASSIUM SERPL-SCNC: 5 MMOL/L (ref 3.5–5.2)
PROT SERPL-MCNC: 6.7 G/DL (ref 6–8.5)
RBC # BLD AUTO: 4.98 X10E6/UL (ref 3.77–5.28)
SODIUM SERPL-SCNC: 141 MMOL/L (ref 134–144)
TRIGL SERPL-MCNC: 79 MG/DL (ref 0–149)
VLDLC SERPL CALC-MCNC: 14 MG/DL (ref 5–40)
WBC # BLD AUTO: 4.6 X10E3/UL (ref 3.4–10.8)

## 2022-06-01 RX ORDER — LOSARTAN POTASSIUM 100 MG/1
TABLET ORAL
Qty: 30 TABLET | Refills: 0 | Status: SHIPPED | OUTPATIENT
Start: 2022-06-01 | End: 2022-07-05

## 2022-06-24 NOTE — TELEPHONE ENCOUNTER
Rx Refill Note  Requested Prescriptions     Pending Prescriptions Disp Refills   • montelukast (SINGULAIR) 10 MG tablet [Pharmacy Med Name: Montelukast Sodium Oral Tablet 10 MG] 90 tablet 0     Sig: TAKE 1 TABLET BY MOUTH EVERY DAY AT NIGHT      Last office visit with prescribing clinician: 5/16/2022      Next office visit with prescribing clinician: 11/17/2022            Samira Duncan LPN  06/24/22, 10:37 EDT

## 2022-06-26 RX ORDER — MONTELUKAST SODIUM 10 MG/1
TABLET ORAL
Qty: 90 TABLET | Refills: 3 | Status: SHIPPED | OUTPATIENT
Start: 2022-06-26 | End: 2023-03-28 | Stop reason: SDUPTHER

## 2022-07-05 RX ORDER — AMLODIPINE BESYLATE 5 MG/1
TABLET ORAL
Qty: 90 TABLET | Refills: 2 | Status: SHIPPED | OUTPATIENT
Start: 2022-07-05 | End: 2023-03-28 | Stop reason: SDUPTHER

## 2022-07-05 RX ORDER — LOSARTAN POTASSIUM 100 MG/1
TABLET ORAL
Qty: 30 TABLET | Refills: 2 | Status: SHIPPED | OUTPATIENT
Start: 2022-07-05 | End: 2022-10-06 | Stop reason: SDUPTHER

## 2022-07-19 ENCOUNTER — OFFICE VISIT (OUTPATIENT)
Dept: ORTHOPEDIC SURGERY | Facility: CLINIC | Age: 87
End: 2022-07-19

## 2022-07-19 VITALS — TEMPERATURE: 97.9 F

## 2022-07-19 DIAGNOSIS — R52 PAIN: Primary | ICD-10-CM

## 2022-07-19 PROCEDURE — 99203 OFFICE O/P NEW LOW 30 MIN: CPT | Performed by: ORTHOPAEDIC SURGERY

## 2022-07-19 PROCEDURE — 73562 X-RAY EXAM OF KNEE 3: CPT | Performed by: ORTHOPAEDIC SURGERY

## 2022-07-19 NOTE — PROGRESS NOTES
Patient: Lanie Ochoa  YOB: 1935 87 y.o. female  Medical Record Number: [id number]    Chief Complaints:   Chief Complaint   Patient presents with   • Left Knee - Pain, Establish Care       History of Present Illness:Lanie Ochoa is a 87 y.o. female who presents with complaints of left knee soreness.  She says it is not pain but it does ache.  She states it was replaced at least 10 years ago by Dr. Castle and has done well until recently before the symptoms began.    Allergies:   Allergies   Allergen Reactions   • Sulfa Antibiotics Rash       Medications:   Current Outpatient Medications   Medication Sig Dispense Refill   • amLODIPine (NORVASC) 5 MG tablet TAKE 1 TABLET BY MOUTH EVERY DAY 90 tablet 2   • calcium carbonate (OS-NELLI) 600 MG tablet Take 600 mg by mouth Daily.     • cetirizine (zyrTEC) 10 MG tablet TAKE 1 TABLET BY MOUTH EVERY DAY 90 tablet 3   • Cholecalciferol (VITAMIN D3) 400 UNITS capsule Take 1 tablet by mouth Daily.     • losartan (COZAAR) 100 MG tablet TAKE 1 TABLET BY MOUTH EVERY DAY 30 tablet 2   • montelukast (SINGULAIR) 10 MG tablet TAKE 1 TABLET BY MOUTH EVERY DAY AT NIGHT 90 tablet 3   • nebivolol (BYSTOLIC) 10 MG tablet TAKE 1 TABLET BY MOUTH IN THE MORNING 90 tablet 1   • sertraline (ZOLOFT) 25 MG tablet TAKE 1 TABLET BY MOUTH IN THE EVENING 90 tablet 1     No current facility-administered medications for this visit.         The following portions of the patient's history were reviewed and updated as appropriate: allergies, current medications, past family history, past medical history, past social history, past surgical history and problem list.    Review of Systems:   A 14 point review of systems was performed. All systems negative except pertinent positives/negative listed in HPI above    Physical Exam:   Vitals:    07/19/22 1418   Temp: 97.9 °F (36.6 °C)       General: A and O x 3, ASA, NAD    SCLERA:    Normal    DENTITION:   Normal   Knee:  left    ALIGNMENT:      Neutral  ,   Patella  tracks  Midline without crepitance    GAIT:    Minimally antalgic    SKIN:    Well healed midline incision, no erythema or fluctuance    RANGE OF MOTION:   0  -  120   DEG    STRENGTH:   5  / 5    LIGAMENTS:    No varus / valgus instability.   No  Flexion   instability.       DISTAL PULSES:    Paplable    DISTAL SENSATION :   Intact    LYMPHATICS:     No   lymphadenopathy    OTHER:     No   Effusion      Calf soft / nontender ,   Negative Dewayne's sign              Radiology:  Xrays 3views left knee (ap,lateral, sunrise) were ordered and reviewed for evaluation of knee pain demonstrating [employer] Vanguard cruciate retaining total knee.  There is possibly some mild polyethylene wear on the medial side no evidence of loosening of implants.  There are no previous films or comparison.    Assessment/Plan: Left knee weakness mild soreness could be polyethylene wear she thinks its been 10 years but not sure states it could be more.  I will send her to therapy for quad strengthening if she is has persistent symptoms she will come back and see me.      Kiko Solano MD  7/19/2022

## 2022-08-02 ENCOUNTER — TRANSCRIBE ORDERS (OUTPATIENT)
Dept: ADMINISTRATIVE | Facility: HOSPITAL | Age: 87
End: 2022-08-02

## 2022-08-02 DIAGNOSIS — Z12.31 OTHER SCREENING MAMMOGRAM: Primary | ICD-10-CM

## 2022-08-25 RX ORDER — SERTRALINE HYDROCHLORIDE 25 MG/1
TABLET, FILM COATED ORAL
Qty: 90 TABLET | Refills: 3 | Status: SHIPPED | OUTPATIENT
Start: 2022-08-25 | End: 2023-03-28 | Stop reason: SDUPTHER

## 2022-09-06 ENCOUNTER — TELEPHONE (OUTPATIENT)
Dept: NEUROSURGERY | Facility: CLINIC | Age: 87
End: 2022-09-06

## 2022-09-06 NOTE — TELEPHONE ENCOUNTER
[facility] received a fax that requires your attention. The document has been indexed to the patient’s chart for your review.    Reason for sending: SIGNATURE REQUESTED    Documents Description: EXT MED RECS_[facility]_09/01/22    Name of Sender: HEMA HARRY PT    Date Indexed: 09/01/22    Notes (if needed): SIGNATURE REQUESTED

## 2022-09-12 NOTE — TELEPHONE ENCOUNTER
Rx Refill Note  Requested Prescriptions     Pending Prescriptions Disp Refills   • sertraline (ZOLOFT) 25 MG tablet [Pharmacy Med Name: Sertraline HCl Oral Tablet 25 MG] 90 tablet 0     Sig: TAKE 1 TABLET BY MOUTH IN THE EVENING      Last office visit with prescribing clinician: 5/16/2022      Next office visit with prescribing clinician: 11/17/2022            Samira Duncan LPN  08/25/22, 12:36 EDT  
Gen: No fever, decreased appetite  Eyes: No eye irritation or discharge  ENT: No ear pain, + congestion, + sore throat  Resp: + cough, + trouble breathing  Cardiovascular: No chest pain   Gastroenteric: No vomiting, diarrhea, constipation  :  No change in urine output; no dysuria  MS: No joint or muscle pain  Skin: No rashes  Neuro: No headache  Remainder negative, except as per the HPI

## 2022-09-19 ENCOUNTER — APPOINTMENT (OUTPATIENT)
Dept: MAMMOGRAPHY | Facility: HOSPITAL | Age: 87
End: 2022-09-19

## 2022-09-28 ENCOUNTER — HOSPITAL ENCOUNTER (OUTPATIENT)
Dept: MAMMOGRAPHY | Facility: HOSPITAL | Age: 87
Discharge: HOME | End: 2022-09-28
Admitting: FAMILY MEDICINE

## 2022-09-28 DIAGNOSIS — Z12.31 OTHER SCREENING MAMMOGRAM: ICD-10-CM

## 2022-09-28 PROCEDURE — 77067 SCR MAMMO BI INCL CAD: CPT

## 2022-09-28 PROCEDURE — 77063 BREAST TOMOSYNTHESIS BI: CPT

## 2022-10-06 NOTE — TELEPHONE ENCOUNTER
Rx Refill Note  Requested Prescriptions     Pending Prescriptions Disp Refills   • losartan (COZAAR) 100 MG tablet 30 tablet 2     Sig: Take 1 tablet by mouth Daily.      Last office visit with prescribing clinician: 5/16/2022      Next office visit with prescribing clinician: 11/17/2022       {TIP  Please add Last Relevant Lab Date if appropriate: 05/16/22    Angelina Saravia MA  10/06/22, 14:47 EDT

## 2022-10-06 NOTE — TELEPHONE ENCOUNTER
Caller: Lanie Ochoa    Relationship: Self    Best call back number: [phone]    Requested Prescriptions:   Requested Prescriptions     Pending Prescriptions Disp Refills   • losartan (COZAAR) 100 MG tablet 30 tablet 2     Sig: Take 1 tablet by mouth Daily.        Pharmacy where request should be sent: [id number] [address] - [phone]  - [phone]     Additional details provided by patient: THE PATIENT ONLY HAS 3 TABLETS OF THIS MEDICATION LEFT. PLEASE ADVISE.    Does the patient have less than a 3 day supply:  [x] Yes  [] No    Savannah Cardozo Rep   10/06/22 13:28 EDT

## 2022-10-07 RX ORDER — LOSARTAN POTASSIUM 100 MG/1
100 TABLET ORAL DAILY
Qty: 90 TABLET | Refills: 1 | Status: SHIPPED | OUTPATIENT
Start: 2022-10-07 | End: 2023-03-28 | Stop reason: SDUPTHER

## 2022-10-17 RX ORDER — NEBIVOLOL 10 MG/1
TABLET ORAL
Qty: 90 TABLET | Refills: 3 | Status: SHIPPED | OUTPATIENT
Start: 2022-10-17 | End: 2023-03-28 | Stop reason: SDUPTHER

## 2022-10-17 NOTE — TELEPHONE ENCOUNTER
Rx Refill Note  Requested Prescriptions     Pending Prescriptions Disp Refills   • nebivolol (BYSTOLIC) 10 MG tablet [Pharmacy Med Name: Nebivolol HCl Oral Tablet 10 MG] 90 tablet 0     Sig: TAKE 1 TABLET BY MOUTH IN THE MORNING      Last office visit with prescribing clinician: 5/16/2022      Next office visit with prescribing clinician: 11/17/2022       {TIP  Please add Last Relevant Lab Date if appropriate: 05/16/22    Angelina Saravia MA  10/17/22, 12:49 EDT

## 2022-11-17 ENCOUNTER — OFFICE VISIT (OUTPATIENT)
Dept: FAMILY MEDICINE CLINIC | Facility: CLINIC | Age: 87
End: 2022-11-17

## 2022-11-17 VITALS
BODY MASS INDEX: 25.68 KG/M2 | WEIGHT: 136 LBS | TEMPERATURE: 97.7 F | DIASTOLIC BLOOD PRESSURE: 70 MMHG | OXYGEN SATURATION: 98 % | SYSTOLIC BLOOD PRESSURE: 130 MMHG | HEIGHT: 61 IN | RESPIRATION RATE: 17 BRPM | HEART RATE: 64 BPM

## 2022-11-17 DIAGNOSIS — I10 BENIGN ESSENTIAL HYPERTENSION: Primary | ICD-10-CM

## 2022-11-17 DIAGNOSIS — E78.5 HYPERLIPIDEMIA, UNSPECIFIED HYPERLIPIDEMIA TYPE: ICD-10-CM

## 2022-11-17 PROCEDURE — 99214 OFFICE O/P EST MOD 30 MIN: CPT | Performed by: FAMILY MEDICINE

## 2022-11-17 NOTE — PROGRESS NOTES
"Chief Complaint  Chief Complaint   Patient presents with   • Hyperlipidemia     Pt here for f/u of HLD     Subjective    History of Present Illness        Lanie Ochoa presents to [facility] for   History of Present Illness  Hyperlipidemia  The patient notes she has her cholesterol checked every 6 months. Her cholesterol is elevated and that is due to her HDL being 100 mg/dL. The patient has eaten today she had oatmeal and orange juice for breakfast at 730AM.     Objective   Vital Signs:   Visit Vitals  /70   Pulse 64   Temp 97.7 °F (36.5 °C)   Resp 17   Ht 154.9 cm (61\")   Wt 61.7 kg (136 lb)   LMP  (LMP Unknown)   SpO2 98%   BMI 25.70 kg/m²        Physical Exam  Vitals reviewed.   Constitutional:       Appearance: She is well-developed.   HENT:      Head: Normocephalic and atraumatic.      Nose: Nose normal.   Eyes:      General: Lids are normal.      Conjunctiva/sclera: Conjunctivae normal.      Pupils: Pupils are equal, round, and reactive to light.   Cardiovascular:      Rate and Rhythm: Normal rate and regular rhythm.      Pulses: Normal pulses.      Comments: Heart murmur but stable.  Pulmonary:      Effort: Pulmonary effort is normal. No respiratory distress.      Breath sounds: Normal breath sounds.   Abdominal:      General: Bowel sounds are normal.      Palpations: Abdomen is soft.   Musculoskeletal:         General: Normal range of motion.      Cervical back: Normal range of motion and neck supple.   Skin:     General: Skin is warm and dry.      Capillary Refill: Capillary refill takes less than 2 seconds.   Neurological:      Mental Status: She is alert and oriented to person, place, and time.   Psychiatric:         Behavior: Behavior normal.         Thought Content: Thought content normal.         Judgment: Judgment normal.              Result Review :                      Assessment and Plan      Diagnoses and all orders for this visit:    1. Benign essential " hypertension (Primary)  Comments:  I have ordered blood work for the patient.  Assessment & Plan:  Hypertension is improving with treatment.  Continue current treatment regimen.  Dietary sodium restriction.  Weight loss.  Regular aerobic exercise.  Continue current medications.  Blood pressure will be reassessed at the next regular appointment.    Orders:  -     CBC & Differential  -     Comprehensive Metabolic Panel  -     TSH  -     Lipid Panel With / Chol / HDL Ratio    2. Hyperlipidemia, unspecified hyperlipidemia type  Assessment & Plan:  Lipid abnormalities are unchanged.  Nutritional counseling was provided.  Lipids will be reassessed in 6 months.             Follow Up   No follow-ups on file.  Patient was given instructions and counseling regarding her condition or for health maintenance advice. Please see specific information pulled into the AVS if appropriate.     Transcribed from ambient dictation for Martin Cruz Sr, MD by Rosio Sood.  11/17/22   14:40 EST    Patient or patient representative verbalized consent to the visit recording.  I have personally performed the services described in this document as transcribed by the above individual, and it is both accurate and complete.

## 2022-11-18 LAB
ALBUMIN SERPL-MCNC: 4.2 G/DL (ref 3.5–5.2)
ALBUMIN/GLOB SERPL: 1.6 G/DL
ALP SERPL-CCNC: 95 U/L (ref 39–117)
ALT SERPL-CCNC: 9 U/L (ref 1–33)
AST SERPL-CCNC: 12 U/L (ref 1–32)
BASOPHILS # BLD AUTO: 0.04 10*3/MM3 (ref 0–0.2)
BASOPHILS NFR BLD AUTO: 0.8 % (ref 0–1.5)
BILIRUB SERPL-MCNC: 0.4 MG/DL (ref 0–1.2)
BUN SERPL-MCNC: 15 MG/DL (ref 8–23)
BUN/CREAT SERPL: 16.7 (ref 7–25)
CALCIUM SERPL-MCNC: 10 MG/DL (ref 8.6–10.5)
CHLORIDE SERPL-SCNC: 103 MMOL/L (ref 98–107)
CHOLEST SERPL-MCNC: 201 MG/DL (ref 0–200)
CHOLEST/HDLC SERPL: 1.97 {RATIO}
CO2 SERPL-SCNC: 29.5 MMOL/L (ref 22–29)
CREAT SERPL-MCNC: 0.9 MG/DL (ref 0.57–1)
EGFRCR SERPLBLD CKD-EPI 2021: 62 ML/MIN/1.73
EOSINOPHIL # BLD AUTO: 0.25 10*3/MM3 (ref 0–0.4)
EOSINOPHIL NFR BLD AUTO: 5.3 % (ref 0.3–6.2)
ERYTHROCYTE [DISTWIDTH] IN BLOOD BY AUTOMATED COUNT: 12.7 % (ref 12.3–15.4)
GLOBULIN SER CALC-MCNC: 2.6 GM/DL
GLUCOSE SERPL-MCNC: 99 MG/DL (ref 65–99)
HCT VFR BLD AUTO: 43.4 % (ref 34–46.6)
HDLC SERPL-MCNC: 102 MG/DL (ref 40–60)
HGB BLD-MCNC: 14 G/DL (ref 12–15.9)
IMM GRANULOCYTES # BLD AUTO: 0.03 10*3/MM3 (ref 0–0.05)
IMM GRANULOCYTES NFR BLD AUTO: 0.6 % (ref 0–0.5)
LDLC SERPL CALC-MCNC: 82 MG/DL (ref 0–100)
LYMPHOCYTES # BLD AUTO: 0.84 10*3/MM3 (ref 0.7–3.1)
LYMPHOCYTES NFR BLD AUTO: 17.6 % (ref 19.6–45.3)
MCH RBC QN AUTO: 29.9 PG (ref 26.6–33)
MCHC RBC AUTO-ENTMCNC: 32.3 G/DL (ref 31.5–35.7)
MCV RBC AUTO: 92.5 FL (ref 79–97)
MONOCYTES # BLD AUTO: 0.34 10*3/MM3 (ref 0.1–0.9)
MONOCYTES NFR BLD AUTO: 7.1 % (ref 5–12)
NEUTROPHILS # BLD AUTO: 3.26 10*3/MM3 (ref 1.7–7)
NEUTROPHILS NFR BLD AUTO: 68.6 % (ref 42.7–76)
NRBC BLD AUTO-RTO: 0 /100 WBC (ref 0–0.2)
PLATELET # BLD AUTO: 315 10*3/MM3 (ref 140–450)
POTASSIUM SERPL-SCNC: 4.8 MMOL/L (ref 3.5–5.2)
PROT SERPL-MCNC: 6.8 G/DL (ref 6–8.5)
RBC # BLD AUTO: 4.69 10*6/MM3 (ref 3.77–5.28)
SODIUM SERPL-SCNC: 142 MMOL/L (ref 136–145)
TRIGL SERPL-MCNC: 97 MG/DL (ref 0–150)
TSH SERPL DL<=0.005 MIU/L-ACNC: 1.86 UIU/ML (ref 0.27–4.2)
VLDLC SERPL CALC-MCNC: 17 MG/DL (ref 5–40)
WBC # BLD AUTO: 4.76 10*3/MM3 (ref 3.4–10.8)

## 2023-03-22 NOTE — PROGRESS NOTES
"Chief Complaint  Med Refill (New pt)    Subjective          Lanie presents to [facility] for for a new patient visit. She previously was cared for by another provider in our system, but now will be transferring care to our practice. She has a living will, not on file. HC surrogates are Saúl Recinos (sp?) and Abilio Ochoa.     HTN: On Nebivolol, Losartan, Amlodipine. Took blood pressure at home recently, was 130s/80s at that time, surprised her BP is up today.    Sertraline: Started taking during illness of  and time of other stressors. Tolerating well, would like to continue.     Osteoporosis L hip 9/2021, will be due for repeat in 2023.  Is not currently on medications for osteoporosis. Has been on Fosamax in the past.     She does have moderate aortic stenosis, most recent aortic valve area was 1.2 cm with a mean pressure gradient of 23 mmHg.  She has an upcoming appointment with Dr. Downs in May. No chest pain, dyspnea, pre-syncope.  In fact, [facility]    On Infectious for allergies     She has multiple skin lesions on her scalp, neck, arms, etc.  She has an upcoming appointment with her dermatologist Dr. Almanzar in May.    Objective   Vital Signs:   Vitals:    03/28/23 1419   BP: 152/73   BP Location: Left arm   Patient Position: Sitting   Pulse: 64   Resp: 16   Temp: 97.9 °F (36.6 °C)   TempSrc: Oral   SpO2: 100%   Weight: 61.9 kg (136 lb 6.4 oz)   Height: 154.9 cm (61\")                Physical Exam  Constitutional:       General: She is not in acute distress.     Appearance: Normal appearance. She is not toxic-appearing.      Comments: Appears younger than stated age   HENT:      Head: Normocephalic and atraumatic.      Mouth/Throat:      Mouth: Mucous membranes are moist.   Eyes:      General: No scleral icterus.     Conjunctiva/sclera: Conjunctivae normal.   Cardiovascular:      Rate and Rhythm: Normal rate and regular rhythm.      Heart " sounds: Murmur heard.     No friction rub. No gallop.      Comments: 3 out of 6 crescendo decrescendo murmur  Pulmonary:      Effort: Pulmonary effort is normal. No respiratory distress.      Breath sounds: Normal breath sounds.   Musculoskeletal:         General: No swelling, tenderness or deformity. Normal range of motion.      Cervical back: Normal range of motion and neck supple.      Right lower leg: No edema.      Left lower leg: No edema.   Skin:     General: Skin is warm and dry.      Findings: No lesion or rash.      Comments: Multiple scaly hypopigmented skin lesions on arms, neck, large hyperpigmented raised skin lesion left scalp   Neurological:      General: No focal deficit present.      Mental Status: She is alert and oriented to person, place, and time.   Psychiatric:         Mood and Affect: Mood normal.         Behavior: Behavior normal.         Judgment: Judgment normal.          Result Review :     The following data was reviewed by: Judi Rodriguez MD on 03/28/2023:  Office Visit with Martin Cruz Sr., MD (11/17/2022)           Assessment and Plan    Diagnoses and all orders for this visit:    1. Benign essential hypertension (Primary)  Assessment & Plan:  Blood pressure a bit high today, but still within appropriate range given patient's age.  Continue amlodipine 5 mg, nebivolol 10 mg, losartan 100 mg.  Check BMP today    Orders:  -     Basic metabolic panel    2. Anxiety  Assessment & Plan:  Well-controlled on sertraline, continue.      3. Localized osteoporosis without current pathological fracture  Assessment & Plan:  Has been on bisphosphonates in the past.  Notes recent DEXA scan in September 2021 showed osteoporosis of the left hip.  We discussed possibly starting something today versus waiting until repeat DEXA in September 2023.  Patient prefers the latter.  Continue calcium and vitamin D supplementation    Orders:  -     DEXA Bone Density Axial    4. Skin lesions  Assessment &  Plan:  Patient with multiple skin lesions concerning for actinic keratoses on arms, neck, etc.  She states she has a dermatologist appointment in May.      5. Aortic stenosis, moderate  Assessment & Plan:  Has appointment with Dr. Downs upcoming.  Reviewed prior echo, moderate aortic stenosis, mean pressure gradient of 23 mmHg, aortic valve area 1.2 cm. Currently asymptomatic, no chest pain, no presyncope, no edema, no dyspnea.      6. Need for vaccination  Comments:  Prevnar today.  He is also due for Shingrix # 2, encouraged her to consider getting at local pharmacy    Other orders  -     Pneumococcal Conjugate Vaccine 20-Valent (PCV20)  -     sertraline (ZOLOFT) 25 MG tablet; Take 1 tablet by mouth Every Evening.  Dispense: 90 tablet; Refill: 3  -     nebivolol (BYSTOLIC) 10 MG tablet; Take 1 tablet by mouth Every Morning.  Dispense: 90 tablet; Refill: 2  -     montelukast (SINGULAIR) 10 MG tablet; Take 1 tablet by mouth every night at bedtime.  Dispense: 90 tablet; Refill: 3  -     losartan (COZAAR) 100 MG tablet; Take 1 tablet by mouth Daily.  Dispense: 90 tablet; Refill: 2  -     amLODIPine (NORVASC) 5 MG tablet; Take 1 tablet by mouth Daily.  Dispense: 90 tablet; Refill: 2      Follow Up   Return in about 6 months (around 9/28/2023) for Medicare Wellness and regular appt-30 minutes.     Patient was encouraged to bring her living will to her next appointment so we can have it on file  Patient was given instructions and counseling regarding her condition or for health maintenance advice. Please see specific information pulled into the AVS if appropriate.

## 2023-03-28 ENCOUNTER — OFFICE VISIT (OUTPATIENT)
Dept: FAMILY MEDICINE CLINIC | Facility: CLINIC | Age: 88
End: 2023-03-28
Payer: MEDICARE

## 2023-03-28 VITALS
HEIGHT: 61 IN | HEART RATE: 64 BPM | RESPIRATION RATE: 16 BRPM | OXYGEN SATURATION: 100 % | WEIGHT: 136.4 LBS | DIASTOLIC BLOOD PRESSURE: 73 MMHG | BODY MASS INDEX: 25.75 KG/M2 | TEMPERATURE: 97.9 F | SYSTOLIC BLOOD PRESSURE: 152 MMHG

## 2023-03-28 DIAGNOSIS — L98.9 SKIN LESIONS: ICD-10-CM

## 2023-03-28 DIAGNOSIS — I10 BENIGN ESSENTIAL HYPERTENSION: Primary | ICD-10-CM

## 2023-03-28 DIAGNOSIS — F41.9 ANXIETY: ICD-10-CM

## 2023-03-28 DIAGNOSIS — Z23 NEED FOR VACCINATION: ICD-10-CM

## 2023-03-28 DIAGNOSIS — I35.0 AORTIC STENOSIS, MODERATE: ICD-10-CM

## 2023-03-28 DIAGNOSIS — M81.6 LOCALIZED OSTEOPOROSIS WITHOUT CURRENT PATHOLOGICAL FRACTURE: ICD-10-CM

## 2023-03-28 RX ORDER — NEBIVOLOL 10 MG/1
10 TABLET ORAL EVERY MORNING
Qty: 90 TABLET | Refills: 2 | Status: SHIPPED | OUTPATIENT
Start: 2023-03-28

## 2023-03-28 RX ORDER — SERTRALINE HYDROCHLORIDE 25 MG/1
25 TABLET, FILM COATED ORAL EVERY EVENING
Qty: 90 TABLET | Refills: 3 | Status: SHIPPED | OUTPATIENT
Start: 2023-03-28

## 2023-03-28 RX ORDER — LOSARTAN POTASSIUM 100 MG/1
100 TABLET ORAL DAILY
Qty: 90 TABLET | Refills: 2 | Status: SHIPPED | OUTPATIENT
Start: 2023-03-28

## 2023-03-28 RX ORDER — MONTELUKAST SODIUM 10 MG/1
10 TABLET ORAL
Qty: 90 TABLET | Refills: 3 | Status: SHIPPED | OUTPATIENT
Start: 2023-03-28

## 2023-03-28 RX ORDER — AMLODIPINE BESYLATE 5 MG/1
5 TABLET ORAL DAILY
Qty: 90 TABLET | Refills: 2 | Status: SHIPPED | OUTPATIENT
Start: 2023-03-28

## 2023-03-28 NOTE — ASSESSMENT & PLAN NOTE
Has appointment with Dr. Downs upcoming.  Reviewed prior echo, moderate aortic stenosis, mean pressure gradient of 23 mmHg, aortic valve area 1.2 cm. Currently asymptomatic, no chest pain, no presyncope, no edema, no dyspnea.

## 2023-03-28 NOTE — ASSESSMENT & PLAN NOTE
Has been on bisphosphonates in the past.  Notes recent DEXA scan in September 2021 showed osteoporosis of the left hip.  We discussed possibly starting something today versus waiting until repeat DEXA in September 2023.  Patient prefers the latter.  Continue calcium and vitamin D supplementation

## 2023-03-28 NOTE — ASSESSMENT & PLAN NOTE
Blood pressure a bit high today, but still within appropriate range given patient's age.  Continue amlodipine 5 mg, nebivolol 10 mg, losartan 100 mg.  Check BMP today

## 2023-03-28 NOTE — PATIENT INSTRUCTIONS
It was so nice meeting you today. I look forward to working with you on a plan to get/keep you healthy and happy!    You are due for second shingles vaccine (Shingrix) now. You can get at local pharmacy.     Please bring living will to next appointment.

## 2023-03-28 NOTE — ASSESSMENT & PLAN NOTE
Patient with multiple skin lesions concerning for actinic keratoses on arms, neck, etc.  She states she has a dermatologist appointment in May.

## 2023-03-29 LAB
BUN SERPL-MCNC: 15 MG/DL (ref 8–23)
BUN/CREAT SERPL: 19.5 (ref 7–25)
CALCIUM SERPL-MCNC: 9.6 MG/DL (ref 8.6–10.5)
CHLORIDE SERPL-SCNC: 101 MMOL/L (ref 98–107)
CO2 SERPL-SCNC: 29.1 MMOL/L (ref 22–29)
CREAT SERPL-MCNC: 0.77 MG/DL (ref 0.57–1)
EGFRCR SERPLBLD CKD-EPI 2021: 74.8 ML/MIN/1.73
GLUCOSE SERPL-MCNC: 96 MG/DL (ref 65–99)
POTASSIUM SERPL-SCNC: 4.1 MMOL/L (ref 3.5–5.2)
SODIUM SERPL-SCNC: 139 MMOL/L (ref 136–145)

## 2023-04-17 RX ORDER — CETIRIZINE HYDROCHLORIDE 10 MG/1
TABLET ORAL
Qty: 90 TABLET | Refills: 3 | Status: SHIPPED | OUTPATIENT
Start: 2023-04-17

## 2023-04-17 NOTE — TELEPHONE ENCOUNTER
Rx Refill Note  Requested Prescriptions     Pending Prescriptions Disp Refills   • cetirizine (zyrTEC) 10 MG tablet [Pharmacy Med Name: Cetirizine HCl Oral Tablet 10 MG] 90 tablet 0     Sig: TAKE 1 TABLET BY MOUTH EVERY DAY      Last office visit with prescribing clinician: 3/23  Last telemedicine visit with prescribing clinician: Visit date not found   Next office visit with prescribing clinician: 10/23                        Would you like a call back once the refill request has been completed: [] Yes [] No    If the office needs to give you a call back, can they leave a voicemail: [] Yes [] No    Samira Duncan LPN  04/17/23, 11:52 EDT

## 2023-05-11 ENCOUNTER — OFFICE VISIT (OUTPATIENT)
Dept: CARDIOLOGY | Facility: CLINIC | Age: 88
End: 2023-05-11
Payer: MEDICARE

## 2023-05-11 VITALS
WEIGHT: 136 LBS | OXYGEN SATURATION: 96 % | SYSTOLIC BLOOD PRESSURE: 126 MMHG | BODY MASS INDEX: 25.68 KG/M2 | HEART RATE: 68 BPM | DIASTOLIC BLOOD PRESSURE: 70 MMHG | HEIGHT: 61 IN

## 2023-05-11 DIAGNOSIS — I10 BENIGN ESSENTIAL HYPERTENSION: ICD-10-CM

## 2023-05-11 DIAGNOSIS — I35.0 AORTIC STENOSIS, MODERATE: ICD-10-CM

## 2023-05-11 DIAGNOSIS — I34.0 NON-RHEUMATIC MITRAL REGURGITATION: Primary | ICD-10-CM

## 2023-05-11 PROCEDURE — 99214 OFFICE O/P EST MOD 30 MIN: CPT | Performed by: INTERNAL MEDICINE

## 2023-05-11 PROCEDURE — 93000 ELECTROCARDIOGRAM COMPLETE: CPT | Performed by: INTERNAL MEDICINE

## 2023-05-11 NOTE — PROGRESS NOTES
"      CARDIOLOGY    Ray Downs MD    ENCOUNTER DATE:  05/11/2023    Lanie Ochoa / 87 y.o. / female        CHIEF COMPLAINT / REASON FOR OFFICE VISIT     Non-rheumatic mitral regurgitation (1 yr f/u)  Moderate aortic stenosis  Hypertension    HISTORY OF PRESENT ILLNESS       HPI  Lanie Ochoa is a 87 y.o. female who presents today for reevaluation.  Clinically patient is doing well no chest pain shortness of breath palpitations lightheadedness or swelling.      The following portions of the patient's history were reviewed and updated as appropriate: allergies, current medications, past family history, past medical history, past social history, past surgical history and problem list.      VITAL SIGNS     Visit Vitals  /70 (BP Location: Right arm, Patient Position: Sitting, Cuff Size: Adult)   Pulse 68   Ht 154.9 cm (61\")   Wt 61.7 kg (136 lb)   LMP  (LMP Unknown)   SpO2 96%   BMI 25.70 kg/m²         Wt Readings from Last 3 Encounters:   05/11/23 61.7 kg (136 lb)   03/28/23 61.9 kg (136 lb 6.4 oz)   12/14/22 60.3 kg (133 lb)     Body mass index is 25.7 kg/m².      REVIEW OF SYSTEMS   ROS        PHYSICAL EXAMINATION     Vitals reviewed.   Constitutional:       Appearance: Healthy appearance.   Pulmonary:      Effort: Pulmonary effort is normal.   Cardiovascular:      Normal rate. Regular rhythm. Normal S1. Normal S2.      Murmurs: There is a grade 2/6 harsh midsystolic murmur at the URSB, radiating to the neck.      No gallop. No click. No rub.   Pulses:     Intact distal pulses.   Edema:     Peripheral edema absent.   Neurological:      Mental Status: Alert and oriented to person, place and time.           REVIEWED DATA       ECG 12 Lead    Date/Time: 5/11/2023 3:33 PM  Performed by: Ray Downs MD  Authorized by: Ray Downs MD   Comparison: compared with previous ECG from 12/8/2021  Similar to previous ECG  Rhythm: sinus rhythm    Clinical impression: normal " ECG            Cardiac Procedures:  1.     Lipid Panel        11/17/2022    11:53   Lipid Panel   Total Cholesterol 201     Triglycerides 97     HDL Cholesterol 102     VLDL Cholesterol 17     LDL Cholesterol  82           ASSESSMENT & PLAN      Diagnosis Plan   1. Non-rheumatic mitral regurgitation  Adult Transthoracic Echo Complete W/ Cont if Necessary Per Protocol      2. Aortic stenosis, moderate  Adult Transthoracic Echo Complete W/ Cont if Necessary Per Protocol      3. Benign essential hypertension              SUMMARY/DISCUSSION  1. History of moderate aortic stenosis.  Patient clinically is still doing well with no symptoms.  Her murmur really had not changed.  In light of that I will see her back next year and repeat her echo next year since he has been 3 years.  I did review signs and symptoms for her to look for between now and when she is evaluated next year and should they occur she was told to contact me.  2. Hypertension blood pressures good  3. Mild mitral regurgitation  4. Follow-up 1 year.        MEDICATIONS         Discharge Medications          Accurate as of May 11, 2023  3:32 PM. If you have any questions, ask your nurse or doctor.            Continue These Medications      Instructions Start Date   amLODIPine 5 MG tablet  Commonly known as: NORVASC   5 mg, Oral, Daily      benzonatate 100 MG capsule  Commonly known as: TESSALON   100-200 mg, Oral, 3 Times Daily PRN      calcium carbonate 600 MG tablet  Commonly known as: OS-NELLI   600 mg, Oral, Daily      cetirizine 10 MG tablet  Commonly known as: zyrTEC   TAKE 1 TABLET BY MOUTH EVERY DAY      losartan 100 MG tablet  Commonly known as: COZAAR   100 mg, Oral, Daily      montelukast 10 MG tablet  Commonly known as: SINGULAIR   10 mg, Oral, Every Night at Bedtime      nebivolol 10 MG tablet  Commonly known as: BYSTOLIC   10 mg, Oral, Every Morning      sertraline 25 MG tablet  Commonly known as: ZOLOFT   25 mg, Oral, Every Evening      Vitamin  D3 10 MCG (400 UNIT) capsule   1 tablet, Oral, Daily                 **Dragon Disclaimer:   Much of this encounter note is an electronic transcription/translation of spoken language to printed text. The electronic translation of spoken language may permit erroneous, or at times, nonsensical words or phrases to be inadvertently transcribed. Although I have reviewed the note for such errors, some may still exist.

## 2023-06-13 ENCOUNTER — TELEPHONE (OUTPATIENT)
Dept: CARDIOLOGY | Facility: CLINIC | Age: 88
End: 2023-06-13

## 2023-06-13 NOTE — TELEPHONE ENCOUNTER
Caller: EVA    Relationship: SELF    Best call back number: [phone]    What is the best time to reach you: YES      What was the call regarding: PT NEEDS TO SCHEDULE HER YEARLY FOLLOW UP AND ECHO WITH DR. MOTT.    T SEEMS LIKE ONE WAS SET UP FOR 5/8/2013 AND IT SHOULD HAVE BEEN FOR 2014 ACCORDING TO HER.    Is it okay if the provider responds through MyChart: YES

## 2023-08-14 ENCOUNTER — TRANSCRIBE ORDERS (OUTPATIENT)
Dept: ADMINISTRATIVE | Facility: HOSPITAL | Age: 88
End: 2023-08-14
Payer: MEDICARE

## 2023-08-14 DIAGNOSIS — Z12.31 SCREENING MAMMOGRAM, ENCOUNTER FOR: Primary | ICD-10-CM

## 2023-09-25 ENCOUNTER — HOSPITAL ENCOUNTER (OUTPATIENT)
Dept: BONE DENSITY | Facility: HOSPITAL | Age: 88
Discharge: HOME | End: 2023-09-25
Admitting: INTERNAL MEDICINE
Payer: MEDICARE

## 2023-09-25 PROCEDURE — 77080 DXA BONE DENSITY AXIAL: CPT

## 2023-10-02 ENCOUNTER — HOSPITAL ENCOUNTER (OUTPATIENT)
Dept: MAMMOGRAPHY | Facility: HOSPITAL | Age: 88
Discharge: HOME | End: 2023-10-02
Admitting: NURSE PRACTITIONER
Payer: MEDICARE

## 2023-10-02 DIAGNOSIS — Z12.31 SCREENING MAMMOGRAM, ENCOUNTER FOR: ICD-10-CM

## 2023-10-02 PROCEDURE — 77063 BREAST TOMOSYNTHESIS BI: CPT

## 2023-10-02 PROCEDURE — 77067 SCR MAMMO BI INCL CAD: CPT

## 2023-10-09 ENCOUNTER — OFFICE VISIT (OUTPATIENT)
Dept: FAMILY MEDICINE CLINIC | Facility: CLINIC | Age: 88
End: 2023-10-09
Payer: MEDICARE

## 2023-10-09 VITALS
HEIGHT: 61 IN | OXYGEN SATURATION: 97 % | HEART RATE: 67 BPM | BODY MASS INDEX: 25.3 KG/M2 | TEMPERATURE: 98 F | SYSTOLIC BLOOD PRESSURE: 122 MMHG | DIASTOLIC BLOOD PRESSURE: 64 MMHG | WEIGHT: 134 LBS

## 2023-10-09 DIAGNOSIS — M17.0 PRIMARY OSTEOARTHRITIS OF BOTH KNEES: ICD-10-CM

## 2023-10-09 DIAGNOSIS — I10 BENIGN ESSENTIAL HYPERTENSION: Primary | ICD-10-CM

## 2023-10-09 DIAGNOSIS — E78.2 MIXED HYPERLIPIDEMIA: ICD-10-CM

## 2023-10-09 DIAGNOSIS — F41.9 ANXIETY: ICD-10-CM

## 2023-10-09 DIAGNOSIS — M81.0 AGE-RELATED OSTEOPOROSIS WITHOUT CURRENT PATHOLOGICAL FRACTURE: ICD-10-CM

## 2023-10-09 PROCEDURE — 99214 OFFICE O/P EST MOD 30 MIN: CPT | Performed by: NURSE PRACTITIONER

## 2023-10-09 RX ORDER — ALENDRONATE SODIUM 70 MG/1
70 TABLET ORAL
Qty: 13 TABLET | Refills: 3 | Status: SHIPPED | OUTPATIENT
Start: 2023-10-09

## 2023-10-09 RX ORDER — LOSARTAN POTASSIUM 100 MG/1
100 TABLET ORAL DAILY
Qty: 90 TABLET | Refills: 2 | Status: SHIPPED | OUTPATIENT
Start: 2023-10-09

## 2023-10-09 RX ORDER — NEBIVOLOL 10 MG/1
10 TABLET ORAL EVERY MORNING
Qty: 90 TABLET | Refills: 2 | Status: SHIPPED | OUTPATIENT
Start: 2023-10-09

## 2023-10-09 RX ORDER — AMLODIPINE BESYLATE 5 MG/1
5 TABLET ORAL DAILY
Qty: 90 TABLET | Refills: 2 | Status: SHIPPED | OUTPATIENT
Start: 2023-10-09

## 2023-10-09 NOTE — PROGRESS NOTES
Chief Complaint  Results (DEXA scan results/)    Subjective          Lanie presents to [facility]   as an 88-year-old female for follow-up on hypertension and osteoporosis, to review labs, refill medications.  Overall doing well      History of hypertension.  Has been controlled on current medications.  She denies any increase or changes in headaches no blurred vision no dizziness no flushing no chest pain or pressure.  She denies any medication side effects.  She does need refills on them today    She does have a history of osteoporosis.  She did just have a DEXA scan on 9/25/2023.  T score did show slight improvement.  She has been trying to work on weightbearing exercises and taking her vitamin D and calcium supplements.  She did take Fosamax in the past and is agreeable to restarting that medication.  She has no current dental problems.  She has no uncontrolled GERD.  She knows to take the medication first thing in the morning 30 minutes separate and that she needs to sit upright for 30 minutes afterwards.  She is agreeable.    She has no other acute complaints at today    The following portions of the patient's history were reviewed and updated as appropriate: allergies, current medications, past family history, past medical history, past social history, past surgical history, and problem list      Review of Systems   Constitutional:  Negative for chills, fatigue and fever.   HENT:  Negative for dental problem.    Eyes:  Negative for visual disturbance.   Respiratory:  Negative for cough, shortness of breath and wheezing.    Cardiovascular:  Negative for chest pain, palpitations and leg swelling.   Gastrointestinal:  Negative for abdominal pain, diarrhea, nausea and vomiting.   Neurological:  Negative for dizziness and light-headedness.        Objective   Vital Signs:   Vitals:    10/09/23 1431   BP: 122/64   Pulse: 67   Temp: 98 øF (36.7 øC)   TempSrc: Oral   SpO2: 97%   Weight:  "60.8 kg (134 lb)   Height: 154.9 cm (61\")   PainSc: 0-No pain                  Physical Exam  Vitals reviewed.   Constitutional:       General: She is not in acute distress.  Eyes:      Conjunctiva/sclera: Conjunctivae normal.   Neck:      Thyroid: No thyromegaly.      Vascular: No carotid bruit.   Cardiovascular:      Rate and Rhythm: Normal rate and regular rhythm.      Heart sounds: Normal heart sounds.   Pulmonary:      Effort: Pulmonary effort is normal. No respiratory distress.      Breath sounds: Normal breath sounds. No stridor. No wheezing, rhonchi or rales.   Chest:      Chest wall: No tenderness.   Lymphadenopathy:      Cervical: No cervical adenopathy.   Neurological:      Mental Status: She is alert.   Psychiatric:         Attention and Perception: Attention normal.         Mood and Affect: Mood normal.          Result Review :     The following data was reviewed by: AKUA Mccallum on 10/09/2023:      DEXA Bone Density Axial (09/25/2023 13:05)   FINDINGS: Lumbar T score is -0.1, representing 5% interval increase.  This is probably significantly distorted by advanced degenerative  change.     Left hip density is lowest at the femoral neck where the T score is  -3.0, representing a 7.2% interval increase.      Right hip density is lowest at the femoral neck where the T score is  -2.2,.      IMPRESSION:  Osteoporosis at the left hip. Interval increase in bone  density.      DEXA Bone Density Axial (09/23/2021 13:11)   FINDINGS: Lumbar T score is  -0.6.     Left hip density is lowest at the  femoral neck where the T score is  -3.3.      Right hip density is lowest at the  femoral neck where the T score is  -2.1.      IMPRESSION:  Osteoporosis at the left hip.     Assessment and Plan    Diagnoses and all orders for this visit:    1. Benign essential hypertension (Primary)  Comments:  Controlled continue current management  Monitor blood pressure at home bring log to next visit  Orders:  -     " losartan (COZAAR) 100 MG tablet; Take 1 tablet by mouth Daily.  Dispense: 90 tablet; Refill: 2  -     amLODIPine (NORVASC) 5 MG tablet; Take 1 tablet by mouth Daily.  Dispense: 90 tablet; Refill: 2  -     nebivolol (BYSTOLIC) 10 MG tablet; Take 1 tablet by mouth Every Morning.  Dispense: 90 tablet; Refill: 2  -     Comprehensive Metabolic Panel  -     CBC & Differential  -     Lipid Panel  -     T4, Free  -     TSH  -     Vitamin D,25-Hydroxy    2. Age-related osteoporosis without current pathological fracture  Comments:  Restart Fosamax  Reviewed bone density results-improved slightly  Continue weightbearing exercises as tolerated  Continue vitamin D and calcium supplements  Orders:  -     alendronate (Fosamax) 70 MG tablet; Take 1 tablet by mouth Every 7 (Seven) Days.  Dispense: 13 tablet; Refill: 3  -     Comprehensive Metabolic Panel  -     CBC & Differential  -     Lipid Panel  -     T4, Free  -     TSH  -     Vitamin D,25-Hydroxy    3. Mixed hyperlipidemia  Comments:  Repeat lipid panel with labs  Continue to work on a lower cholesterol diet and exercise as tolerated    4. Primary osteoarthritis of both knees  Comments:  Follow-up with any worsening symptoms or no improvements.  No changes    5. Anxiety  Comments:  Controlled continue current management        Follow Up   Return in about 3 months (around 1/9/2024) for Medicare Wellness, Next scheduled follow up.  Patient was given instructions and counseling regarding her condition or for health maintenance advice. Please see specific information pulled into the AVS if appropriate.

## 2023-11-20 ENCOUNTER — OFFICE VISIT (OUTPATIENT)
Dept: FAMILY MEDICINE CLINIC | Facility: CLINIC | Age: 88
End: 2023-11-20
Payer: MEDICARE

## 2023-11-20 VITALS
DIASTOLIC BLOOD PRESSURE: 68 MMHG | HEART RATE: 67 BPM | TEMPERATURE: 98.2 F | SYSTOLIC BLOOD PRESSURE: 125 MMHG | OXYGEN SATURATION: 99 % | HEIGHT: 61 IN | WEIGHT: 137.4 LBS | BODY MASS INDEX: 25.94 KG/M2

## 2023-11-20 DIAGNOSIS — M81.6 LOCALIZED OSTEOPOROSIS WITHOUT CURRENT PATHOLOGICAL FRACTURE: ICD-10-CM

## 2023-11-20 DIAGNOSIS — I10 BENIGN ESSENTIAL HYPERTENSION: Primary | ICD-10-CM

## 2023-11-20 DIAGNOSIS — F41.9 ANXIETY: ICD-10-CM

## 2023-11-20 DIAGNOSIS — E78.2 MIXED HYPERLIPIDEMIA: ICD-10-CM

## 2023-11-20 PROCEDURE — 99214 OFFICE O/P EST MOD 30 MIN: CPT | Performed by: NURSE PRACTITIONER

## 2023-11-20 RX ORDER — LOSARTAN POTASSIUM 100 MG/1
100 TABLET ORAL DAILY
Qty: 90 TABLET | Refills: 2 | Status: SHIPPED | OUTPATIENT
Start: 2023-11-20

## 2023-11-20 NOTE — PROGRESS NOTES
Chief Complaint  Follow-up (4m follow up)    Shivani Dela Cruz presents to [facility]   as a an 88-year-old female for follow-up on hypertension, hyperlipidemia, anxiety.  Medication refill, order labs.  Overall doing well      History of hypertension.  Has been controlled on current medication.  She denies increase or changes in headaches no blurred vision no dizziness no flushing no chest pain or pressure    She is continue to work on a lower cholesterol diet and try to remain active.    She does have a history of osteoporosis.  She did just have a DEXA scan on 9/25/2023.  T score did show slight improvement.  She has been trying to work on weightbearing exercises and taking her vitamin D and calcium supplements.  She did take Fosamax in the past and is agreeable to restarting that medication.  She has no current dental problems.  She has no uncontrolled GERD.  She knows to take the medication first thing in the morning 30 minutes separate and that she needs to sit upright for 30 minutes afterwards.  She has been doing well on her medication with no side effects plans to continue.    Anxiety seems to be controlled on sertraline 25 mg.  She denies any medication side effects.  She denies any SI or HI.  She feels this medication is working well for her and plans to continue.  She is not currently in counseling.    PHQ-2 Depression Screening  Little interest or pleasure in doing things?  0   Feeling down, depressed, or hopeless?  0   PHQ-2 Total Score  0       She is not fasting today and will return for labs    She has no other acute complaints of    The following portions of the patient's history were reviewed and updated as appropriate: allergies, current medications, past family history, past medical history, past social history, past surgical history, and problem list    Review of Systems   Constitutional:  Negative for chills, fatigue and fever.   Eyes:  Negative for visual  "disturbance.   Respiratory:  Negative for cough, shortness of breath and wheezing.    Cardiovascular:  Negative for chest pain, palpitations and leg swelling.   Gastrointestinal:  Negative for abdominal pain, diarrhea, nausea and vomiting.   Neurological:  Negative for dizziness and light-headedness.   Psychiatric/Behavioral:  Negative for self-injury, sleep disturbance and suicidal ideas. The patient is not nervous/anxious.         Objective   Vital Signs:   Vitals:    11/20/23 1429 11/20/23 1446   BP: 155/68 125/68   Pulse: 67    Temp: 98.2 °F (36.8 °C)    SpO2: 99%    Weight: 62.3 kg (137 lb 6.4 oz)    Height: 154.9 cm (60.98\")    PainSc: 0-No pain                   Physical Exam  Vitals reviewed.   Constitutional:       General: She is not in acute distress.  Eyes:      Conjunctiva/sclera: Conjunctivae normal.   Neck:      Thyroid: No thyromegaly.      Vascular: No carotid bruit.   Cardiovascular:      Rate and Rhythm: Normal rate and regular rhythm.      Heart sounds: Normal heart sounds.   Pulmonary:      Effort: Pulmonary effort is normal. No respiratory distress.      Breath sounds: Normal breath sounds. No stridor. No wheezing, rhonchi or rales.   Chest:      Chest wall: No tenderness.   Lymphadenopathy:      Cervical: No cervical adenopathy.   Neurological:      Mental Status: She is alert.   Psychiatric:         Attention and Perception: Attention normal.         Mood and Affect: Mood normal.          Result Review :     The following data was reviewed by: AKUA Mccallum on 11/20/2023:      Vitamin D,25-Hydroxy (10/09/2023 14:55)  TSH (10/09/2023 14:55)  T4, Free (10/09/2023 14:55)  Lipid Panel (10/09/2023 14:55)  CBC & Differential (10/09/2023 14:55)  Comprehensive Metabolic Panel (10/09/2023 14:55)    Labs pending ordered on last visit     Assessment and Plan    Diagnoses and all orders for this visit:    1. Benign essential hypertension (Primary)  Comments:  Controlled continue current " management  Monitor blood pressure at home bring log to next visit  Orders:  -     losartan (COZAAR) 100 MG tablet; Take 1 tablet by mouth Daily.  Dispense: 90 tablet; Refill: 2    2. Mixed hyperlipidemia  Comments:  Continue to work on lower cholesterol diet and exercise as tolerated.  Repeat lipid panel with labs    3. Anxiety  Comments:  Sertraline working well plans to continue.  Denies any SI or HI    4. Localized osteoporosis without current pathological fracture  Comments:  Continue current management  Report any changes        Follow Up   Return in about 6 months (around 5/20/2024) for Medicare Wellness and schedule lab only appointment.  Patient was given instructions and counseling regarding her condition or for health maintenance advice. Please see specific information pulled into the AVS if appropriate.

## 2023-11-29 LAB
25(OH)D3+25(OH)D2 SERPL-MCNC: 48.1 NG/ML (ref 30–100)
ALBUMIN SERPL-MCNC: 4.5 G/DL (ref 3.5–5.2)
ALBUMIN/GLOB SERPL: 2 G/DL
ALP SERPL-CCNC: 70 U/L (ref 39–117)
ALT SERPL-CCNC: 15 U/L (ref 1–33)
AST SERPL-CCNC: 18 U/L (ref 1–32)
BASOPHILS # BLD AUTO: 0.06 10*3/MM3 (ref 0–0.2)
BASOPHILS NFR BLD AUTO: 1.5 % (ref 0–1.5)
BILIRUB SERPL-MCNC: 0.8 MG/DL (ref 0–1.2)
BUN SERPL-MCNC: 14 MG/DL (ref 8–23)
BUN/CREAT SERPL: 15.6 (ref 7–25)
CALCIUM SERPL-MCNC: 9.9 MG/DL (ref 8.6–10.5)
CHLORIDE SERPL-SCNC: 104 MMOL/L (ref 98–107)
CHOLEST SERPL-MCNC: 190 MG/DL (ref 0–200)
CO2 SERPL-SCNC: 28.8 MMOL/L (ref 22–29)
CREAT SERPL-MCNC: 0.9 MG/DL (ref 0.57–1)
EGFRCR SERPLBLD CKD-EPI 2021: 61.6 ML/MIN/1.73
EOSINOPHIL # BLD AUTO: 0.2 10*3/MM3 (ref 0–0.4)
EOSINOPHIL NFR BLD AUTO: 5.1 % (ref 0.3–6.2)
ERYTHROCYTE [DISTWIDTH] IN BLOOD BY AUTOMATED COUNT: 12.5 % (ref 12.3–15.4)
GLOBULIN SER CALC-MCNC: 2.2 GM/DL
GLUCOSE SERPL-MCNC: 109 MG/DL (ref 65–99)
HCT VFR BLD AUTO: 44.8 % (ref 34–46.6)
HDLC SERPL-MCNC: 107 MG/DL (ref 40–60)
HGB BLD-MCNC: 14.7 G/DL (ref 12–15.9)
IMM GRANULOCYTES # BLD AUTO: 0.01 10*3/MM3 (ref 0–0.05)
IMM GRANULOCYTES NFR BLD AUTO: 0.3 % (ref 0–0.5)
LDLC SERPL CALC-MCNC: 72 MG/DL (ref 0–100)
LYMPHOCYTES # BLD AUTO: 0.83 10*3/MM3 (ref 0.7–3.1)
LYMPHOCYTES NFR BLD AUTO: 21.3 % (ref 19.6–45.3)
MCH RBC QN AUTO: 30.2 PG (ref 26.6–33)
MCHC RBC AUTO-ENTMCNC: 32.8 G/DL (ref 31.5–35.7)
MCV RBC AUTO: 92 FL (ref 79–97)
MONOCYTES # BLD AUTO: 0.3 10*3/MM3 (ref 0.1–0.9)
MONOCYTES NFR BLD AUTO: 7.7 % (ref 5–12)
NEUTROPHILS # BLD AUTO: 2.5 10*3/MM3 (ref 1.7–7)
NEUTROPHILS NFR BLD AUTO: 64.1 % (ref 42.7–76)
NRBC BLD AUTO-RTO: 0 /100 WBC (ref 0–0.2)
PLATELET # BLD AUTO: 273 10*3/MM3 (ref 140–450)
POTASSIUM SERPL-SCNC: 4.6 MMOL/L (ref 3.5–5.2)
PROT SERPL-MCNC: 6.7 G/DL (ref 6–8.5)
RBC # BLD AUTO: 4.87 10*6/MM3 (ref 3.77–5.28)
SODIUM SERPL-SCNC: 142 MMOL/L (ref 136–145)
T4 FREE SERPL-MCNC: 1.3 NG/DL (ref 0.93–1.7)
TRIGL SERPL-MCNC: 56 MG/DL (ref 0–150)
TSH SERPL DL<=0.005 MIU/L-ACNC: 1.58 UIU/ML (ref 0.27–4.2)
VLDLC SERPL CALC-MCNC: 11 MG/DL (ref 5–40)
WBC # BLD AUTO: 3.9 10*3/MM3 (ref 3.4–10.8)

## 2024-01-02 ENCOUNTER — OFFICE VISIT (OUTPATIENT)
Dept: FAMILY MEDICINE CLINIC | Facility: CLINIC | Age: 89
End: 2024-01-02
Payer: MEDICARE

## 2024-01-02 VITALS
DIASTOLIC BLOOD PRESSURE: 76 MMHG | RESPIRATION RATE: 16 BRPM | HEIGHT: 61 IN | TEMPERATURE: 97.1 F | WEIGHT: 138 LBS | SYSTOLIC BLOOD PRESSURE: 160 MMHG | HEART RATE: 61 BPM | BODY MASS INDEX: 26.06 KG/M2 | OXYGEN SATURATION: 100 %

## 2024-01-02 DIAGNOSIS — J02.9 SORE THROAT: ICD-10-CM

## 2024-01-02 DIAGNOSIS — R09.81 SINUS CONGESTION: Primary | ICD-10-CM

## 2024-01-02 DIAGNOSIS — R51.9 NONINTRACTABLE HEADACHE, UNSPECIFIED CHRONICITY PATTERN, UNSPECIFIED HEADACHE TYPE: ICD-10-CM

## 2024-01-02 LAB
EXPIRATION DATE: NORMAL
EXPIRATION DATE: NORMAL
FLUAV AG UPPER RESP QL IA.RAPID: NOT DETECTED
FLUBV AG UPPER RESP QL IA.RAPID: NOT DETECTED
INTERNAL CONTROL: NORMAL
INTERNAL CONTROL: NORMAL
Lab: NORMAL
Lab: NORMAL
S PYO AG THROAT QL: NEGATIVE
SARS-COV-2 AG UPPER RESP QL IA.RAPID: NOT DETECTED

## 2024-01-02 PROCEDURE — 1159F MED LIST DOCD IN RCRD: CPT | Performed by: PHYSICIAN ASSISTANT

## 2024-01-02 PROCEDURE — 99213 OFFICE O/P EST LOW 20 MIN: CPT | Performed by: PHYSICIAN ASSISTANT

## 2024-01-02 PROCEDURE — 87880 STREP A ASSAY W/OPTIC: CPT | Performed by: PHYSICIAN ASSISTANT

## 2024-01-02 PROCEDURE — 87428 SARSCOV & INF VIR A&B AG IA: CPT | Performed by: PHYSICIAN ASSISTANT

## 2024-01-02 PROCEDURE — 1160F RVW MEDS BY RX/DR IN RCRD: CPT | Performed by: PHYSICIAN ASSISTANT

## 2024-01-02 RX ORDER — DEXTROMETHORPHAN HYDROBROMIDE AND PROMETHAZINE HYDROCHLORIDE 15; 6.25 MG/5ML; MG/5ML
5 SYRUP ORAL 4 TIMES DAILY PRN
Qty: 180 ML | Refills: 0 | Status: SHIPPED | OUTPATIENT
Start: 2024-01-02

## 2024-01-02 RX ORDER — AZITHROMYCIN 250 MG/1
TABLET, FILM COATED ORAL
Qty: 6 TABLET | Refills: 1 | Status: SHIPPED | OUTPATIENT
Start: 2024-01-02

## 2024-01-02 NOTE — PROGRESS NOTES
Subjective   Lanie Ochoa is a 88 y.o. female.     Sore Throat   Associated symptoms include headaches and a hoarse voice. Pertinent negatives include no trouble swallowing.   Hoarse  Associated symptoms include chills, headaches and a sore throat. Pertinent negatives include no diaphoresis.   Chills  Associated symptoms include chills, headaches and a sore throat. Pertinent negatives include no diaphoresis.   Headache     Lanie Ochoa 88 y.o. female who presents for evaluation of upper respiratory congestion, sore throat, cough. Symptoms include congestion, post nasal drip, dry cough, hoarseness, and fatigue.  Onset of symptoms was 5 days ago, unchanged since that time. Patient denies shortness of breath, wheezing, fever.   Evaluation to date: none Treatment to date:  Tylenol.     The following portions of the patient's history were reviewed and updated as appropriate: allergies, current medications, past family history, past medical history, past social history, past surgical history, and problem list.      Est CC 42    Lab Results   Component Value Date    GLUCOSE 109 (H) 11/28/2023    BUN 14 11/28/2023    CREATININE 0.90 11/28/2023    EGFRRESULT 61.6 11/28/2023    EGFR >60 05/03/2015    BCR 15.6 11/28/2023    K 4.6 11/28/2023    CO2 28.8 11/28/2023    CALCIUM 9.9 11/28/2023    PROTENTOTREF 6.7 11/28/2023    ALBUMIN 4.5 11/28/2023    BILITOT 0.8 11/28/2023    AST 18 11/28/2023    ALT 15 11/28/2023       Review of Systems   Constitutional:  Positive for chills. Negative for diaphoresis.   HENT:  Positive for hoarse voice and sore throat. Negative for nosebleeds and trouble swallowing.    Eyes:  Negative for blurred vision and visual disturbance.   Respiratory:  Negative for choking.    Gastrointestinal:  Negative for blood in stool.   Allergic/Immunologic: Negative for immunocompromised state.   Neurological:  Negative for facial asymmetry and speech difficulty.   Psychiatric/Behavioral:  Negative for  self-injury and suicidal ideas.        Objective   Physical Exam  Vitals and nursing note reviewed.   Constitutional:       General: She is not in acute distress.     Appearance: She is well-developed. She is ill-appearing. She is not toxic-appearing or diaphoretic.   HENT:      Head: Normocephalic and atraumatic. Hair is normal.      Right Ear: External ear normal. No drainage, swelling or tenderness. Tympanic membrane is retracted.      Left Ear: External ear normal. No drainage, swelling or tenderness. Tympanic membrane is retracted.      Nose: Mucosal edema present.      Mouth/Throat:      Mouth: No oral lesions.      Pharynx: Uvula midline. Posterior oropharyngeal erythema present. No oropharyngeal exudate or uvula swelling.   Eyes:      General: No scleral icterus.        Right eye: No discharge.         Left eye: No discharge.      Conjunctiva/sclera: Conjunctivae normal.      Pupils: Pupils are equal, round, and reactive to light.   Cardiovascular:      Rate and Rhythm: Normal rate and regular rhythm.      Heart sounds: Normal heart sounds. No murmur heard.     No gallop.   Pulmonary:      Effort: No respiratory distress.      Breath sounds: Normal breath sounds. No stridor. No wheezing or rales.   Chest:      Chest wall: No tenderness.   Abdominal:      Palpations: Abdomen is soft.      Tenderness: There is no abdominal tenderness.   Musculoskeletal:      Cervical back: Normal range of motion and neck supple.   Lymphadenopathy:      Cervical: Cervical adenopathy present.   Skin:     General: Skin is warm and dry.      Findings: No rash.   Neurological:      Mental Status: She is alert and oriented to person, place, and time.      Motor: No abnormal muscle tone.   Psychiatric:         Behavior: Behavior normal.         Thought Content: Thought content normal.         Judgment: Judgment normal.           Assessment & Plan   Diagnoses and all orders for this visit:    1. Sinus congestion (Primary)  -     POCT  SARS-CoV-2 Antigen LESTER + Flu  -     POCT rapid strep A    2. Sore throat  -     POCT SARS-CoV-2 Antigen LESTER + Flu  -     POCT rapid strep A    3. Nonintractable headache, unspecified chronicity pattern, unspecified headache type  -     POCT SARS-CoV-2 Antigen LESTER + Flu  -     POCT rapid strep A    APAP PRN  BP up today---she will check bp at home---see Marisela for f/u----  Promethazine DM prn cough

## 2024-01-22 ENCOUNTER — OFFICE VISIT (OUTPATIENT)
Dept: FAMILY MEDICINE CLINIC | Facility: CLINIC | Age: 89
End: 2024-01-22
Payer: MEDICARE

## 2024-01-22 VITALS
SYSTOLIC BLOOD PRESSURE: 150 MMHG | TEMPERATURE: 97.4 F | RESPIRATION RATE: 16 BRPM | WEIGHT: 138 LBS | HEIGHT: 61 IN | BODY MASS INDEX: 26.06 KG/M2 | OXYGEN SATURATION: 99 % | HEART RATE: 68 BPM | DIASTOLIC BLOOD PRESSURE: 70 MMHG

## 2024-01-22 DIAGNOSIS — R05.9 COUGH, UNSPECIFIED TYPE: Primary | ICD-10-CM

## 2024-01-22 DIAGNOSIS — R09.81 SINUS CONGESTION: ICD-10-CM

## 2024-01-22 LAB
EXPIRATION DATE: NORMAL
FLUAV AG UPPER RESP QL IA.RAPID: NOT DETECTED
FLUBV AG UPPER RESP QL IA.RAPID: NOT DETECTED
INTERNAL CONTROL: NORMAL
Lab: NORMAL
SARS-COV-2 AG UPPER RESP QL IA.RAPID: NOT DETECTED

## 2024-01-22 PROCEDURE — 1159F MED LIST DOCD IN RCRD: CPT | Performed by: PHYSICIAN ASSISTANT

## 2024-01-22 PROCEDURE — 87428 SARSCOV & INF VIR A&B AG IA: CPT | Performed by: PHYSICIAN ASSISTANT

## 2024-01-22 PROCEDURE — 1160F RVW MEDS BY RX/DR IN RCRD: CPT | Performed by: PHYSICIAN ASSISTANT

## 2024-01-22 PROCEDURE — 99213 OFFICE O/P EST LOW 20 MIN: CPT | Performed by: PHYSICIAN ASSISTANT

## 2024-01-22 RX ORDER — CEFDINIR 300 MG/1
CAPSULE ORAL
Qty: 20 CAPSULE | Refills: 0 | Status: SHIPPED | OUTPATIENT
Start: 2024-01-22

## 2024-01-22 RX ORDER — BENZONATATE 100 MG/1
CAPSULE ORAL
Qty: 40 CAPSULE | Refills: 0 | Status: SHIPPED | OUTPATIENT
Start: 2024-01-22

## 2024-01-22 NOTE — PROGRESS NOTES
Subjective   Lanie Ochoa is a 88 y.o. female.     Cough  Associated symptoms include postnasal drip, rhinorrhea and a sore throat.      Lanie Ochoa 88 y.o. female who presents for evaluation of upper respiratory congestion, sore throat, cough, fatigue. Symptoms include congestion, sneezing, sore throat, post nasal drip, productive cough, headache, and runny nose.  Onset of symptoms was 5 days ago, gradually worsening since that time. Patient denies shortness of breath, wheezing, some chest tightness.   Evaluation to date: none Treatment to date:  OTC antihistamines. APAP last PM  131/60 home bp    Took Zpak last visit---did resolved URI from 1-2-24 visit  Last week.  ---onset 5 days .  Running, sore throat, cough, stuffy, tired, sneezing, cough--clear drg,  No fever---  Was worse last PM  Lab Results   Component Value Date    GLUCOSE 109 (H) 11/28/2023    BUN 14 11/28/2023    CREATININE 0.90 11/28/2023    EGFRRESULT 61.6 11/28/2023    EGFR >60 05/03/2015    BCR 15.6 11/28/2023    K 4.6 11/28/2023    CO2 28.8 11/28/2023    CALCIUM 9.9 11/28/2023    PROTENTOTREF 6.7 11/28/2023    ALBUMIN 4.5 11/28/2023    BILITOT 0.8 11/28/2023    AST 18 11/28/2023    ALT 15 11/28/2023     Est CC 42  The following portions of the patient's history were reviewed and updated as appropriate: allergies, current medications, past family history, past medical history, past social history, past surgical history, and problem list.    Review of Systems   Constitutional:  Positive for activity change. Negative for diaphoresis.   HENT:  Positive for congestion, postnasal drip, rhinorrhea and sore throat. Negative for nosebleeds and trouble swallowing.    Eyes:  Negative for blurred vision and visual disturbance.   Respiratory:  Positive for cough and chest tightness. Negative for choking.    Gastrointestinal:  Positive for diarrhea. Negative for blood in stool.   Allergic/Immunologic: Negative for immunocompromised state.    Neurological:  Positive for headache. Negative for facial asymmetry and speech difficulty.   Psychiatric/Behavioral:  Negative for self-injury and suicidal ideas.        Objective   Physical Exam  Vitals and nursing note reviewed.   Constitutional:       General: She is not in acute distress.     Appearance: She is well-developed. She is ill-appearing. She is not toxic-appearing.   HENT:      Head: Normocephalic.      Right Ear: External ear normal.      Left Ear: External ear normal.      Nose: Nose normal.      Mouth/Throat:      Pharynx: Oropharynx is clear.   Eyes:      General: No scleral icterus.     Conjunctiva/sclera: Conjunctivae normal.      Pupils: Pupils are equal, round, and reactive to light.   Neck:      Thyroid: No thyromegaly.   Cardiovascular:      Rate and Rhythm: Normal rate and regular rhythm.      Heart sounds: Normal heart sounds. No murmur heard.  Pulmonary:      Effort: Pulmonary effort is normal. No respiratory distress.      Breath sounds: Normal breath sounds.   Musculoskeletal:         General: No deformity. Normal range of motion.      Cervical back: Normal range of motion and neck supple.   Skin:     General: Skin is warm and dry.      Findings: No rash.   Neurological:      General: No focal deficit present.      Mental Status: She is alert and oriented to person, place, and time. Mental status is at baseline.   Psychiatric:         Mood and Affect: Mood normal.         Behavior: Behavior normal.         Thought Content: Thought content normal.         Judgment: Judgment normal.           Assessment & Plan   Diagnoses and all orders for this visit:    1. Cough, unspecified type (Primary)  -     POCT SARS-CoV-2 Antigen LESTER + Flu    2. Sinus congestion  -     POCT SARS-CoV-2 Antigen LESTER + Flu    Systolic blood pressure is elevated is to bit at 150 today home readings have been fantastic patient does not feel well we will have her monitor her blood pressure and follow-up with Marisela  that is her primary care----patient needs to bring her home blood pressure monitor to check for accuracy once he sees Marisela for follow-up  APAP PRN  Will send cefdinir for secondary infection coverage and Tessalon for cough noted to pharmacy estimated creatinine clearance is 42.... Dose of cefdinir at 300 mg is appropriate for kidney function  Make appt Marisela  Call tomorrow and set up appt---bp

## 2024-01-22 NOTE — PATIENT INSTRUCTIONS
Upper Respiratory Infection, Adult  An upper respiratory infection (URI) is a common viral infection of the nose, throat, and upper air passages that lead to the lungs. The most common type of URI is the common cold. URIs usually get better on their own, without medical treatment.  What are the causes?  A URI is caused by a virus. You may catch a virus by:  Breathing in droplets from an infected person's cough or sneeze.  Touching something that has been exposed to the virus (is contaminated) and then touching your mouth, nose, or eyes.  What increases the risk?  You are more likely to get a URI if:  You are very young or very old.  You have close contact with others, such as at work, school, or a health care facility.  You smoke.  You have long-term (chronic) heart or lung disease.  You have a weakened disease-fighting system (immune system).  You have nasal allergies or asthma.  You are experiencing a lot of stress.  You have poor nutrition.  What are the signs or symptoms?  A URI usually involves some of the following symptoms:  Runny or stuffy (congested) nose.  Cough.  Sneezing.  Sore throat.  Headache.  Fatigue.  Fever.  Loss of appetite.  Pain in your forehead, behind your eyes, and over your cheekbones (sinus pain).  Muscle aches.  Redness or irritation of the eyes.  Pressure in the ears or face.  How is this diagnosed?  This condition may be diagnosed based on your medical history and symptoms, and a physical exam. Your health care provider may use a swab to take a mucus sample from your nose (nasal swab). This sample can be tested to determine what virus is causing the illness.  How is this treated?  URIs usually get better on their own within 7-10 days. Medicines cannot cure URIs, but your health care provider may recommend certain medicines to help relieve symptoms, such as:  Over-the-counter cold medicines.  Cough suppressants. Coughing is a type of defense against infection that helps to clear the  respiratory system, so take these medicines only as recommended by your health care provider.  Fever-reducing medicines.  Follow these instructions at home:  Activity  Rest as needed.  If you have a fever, stay home from work or school until your fever is gone or until your health care provider says your URI cannot spread to other people (is no longer contagious). Your health care provider may have you wear a face mask to prevent your infection from spreading.  Relieving symptoms  Gargle with a mixture of salt and water 3-4 times a day or as needed. To make salt water, completely dissolve ½-1 tsp (3-6 g) of salt in 1 cup (237 mL) of warm water.  Use a cool-mist humidifier to add moisture to the air. This can help you breathe more easily.  Eating and drinking    Drink enough fluid to keep your urine pale yellow.  Eat soups and other clear broths.  General instructions    Take over-the-counter and prescription medicines only as told by your health care provider. These include cold medicines, fever reducers, and cough suppressants.  Do not use any products that contain nicotine or tobacco. These products include cigarettes, chewing tobacco, and vaping devices, such as e-cigarettes. If you need help quitting, ask your health care provider.  Stay away from secondhand smoke.  Stay up to date on all immunizations, including the yearly (annual) flu vaccine.  Keep all follow-up visits. This is important.  How to prevent the spread of infection to others  URIs can be contagious. To prevent the infection from spreading:  Wash your hands with soap and water for at least 20 seconds. If soap and water are not available, use hand .  Avoid touching your mouth, face, eyes, or nose.  Cough or sneeze into a tissue or your sleeve or elbow instead of into your hand or into the air.    Contact a health care provider if:  You are getting worse instead of better.  You have a fever or chills.  Your mucus is brown or red.  You have  yellow or brown discharge coming from your nose.  You have pain in your face, especially when you bend forward.  You have swollen neck glands.  You have pain while swallowing.  You have white areas in the back of your throat.  Get help right away if:  You have shortness of breath that gets worse.  You have severe or persistent:  Headache.  Ear pain.  Sinus pain.  Chest pain.  You have chronic lung disease along with any of the following:  Making high-pitched whistling sounds when you breathe, most often when you breathe out (wheezing).  Prolonged cough (more than 14 days).  Coughing up blood.  A change in your usual mucus.  You have a stiff neck.  You have changes in your:  Vision.  Hearing.  Thinking.  Mood.  These symptoms may be an emergency. Get help right away. Call 911.  Do not wait to see if the symptoms will go away.  Do not drive yourself to the hospital.  Summary  An upper respiratory infection (URI) is a common infection of the nose, throat, and upper air passages that lead to the lungs.  A URI is caused by a virus.  URIs usually get better on their own within 7-10 days.  Medicines cannot cure URIs, but your health care provider may recommend certain medicines to help relieve symptoms.  This information is not intended to replace advice given to you by your health care provider. Make sure you discuss any questions you have with your health care provider.  Document Revised: 07/20/2022 Document Reviewed: 07/20/2022  [employer] Patient Education © 2023 [employer]

## 2024-02-05 ENCOUNTER — OFFICE VISIT (OUTPATIENT)
Dept: FAMILY MEDICINE CLINIC | Facility: CLINIC | Age: 89
End: 2024-02-05
Payer: MEDICARE

## 2024-02-05 VITALS
SYSTOLIC BLOOD PRESSURE: 152 MMHG | HEART RATE: 64 BPM | DIASTOLIC BLOOD PRESSURE: 76 MMHG | HEIGHT: 61 IN | WEIGHT: 140 LBS | RESPIRATION RATE: 16 BRPM | OXYGEN SATURATION: 98 % | BODY MASS INDEX: 26.43 KG/M2

## 2024-02-05 DIAGNOSIS — F41.9 ANXIETY: ICD-10-CM

## 2024-02-05 DIAGNOSIS — I10 BENIGN ESSENTIAL HYPERTENSION: ICD-10-CM

## 2024-02-05 DIAGNOSIS — E78.2 MIXED HYPERLIPIDEMIA: Primary | ICD-10-CM

## 2024-02-05 DIAGNOSIS — I35.0 AORTIC STENOSIS, MODERATE: ICD-10-CM

## 2024-02-05 PROCEDURE — 99214 OFFICE O/P EST MOD 30 MIN: CPT | Performed by: NURSE PRACTITIONER

## 2024-02-05 RX ORDER — AMLODIPINE BESYLATE 10 MG/1
10 TABLET ORAL DAILY
Qty: 90 TABLET | Refills: 2 | Status: SHIPPED | OUTPATIENT
Start: 2024-02-05 | End: 2024-05-05

## 2024-02-05 NOTE — PROGRESS NOTES
"Chief Complaint  Hypertension    Subjective          Lanie presents to [facility] as a an 88-year-old female for follow-up on hypertension, hyperlipidemia, anxiety.  To discuss/refill medications/labs.  Overall doing okay.    Hypertension-blood pressure has been elevated.  The last 2 office visits.  She is having occasional headaches.  No blurred vision no dizziness no flushing no chest pain or pressure  Taking her medication as directed with no medication side effects    Hyperlipidemia.  She is continue to work on lower cholesterol diet.    Anxiety-has been controlled on sertraline.  Denies any medication side effects.  Plans to continue current dose.  Not currently counseling.  Denies any SI or HI.    PHQ-2 Depression Screening  Little interest or pleasure in doing things?  0   Feeling down, depressed, or hopeless?  0   PHQ-2 Total Score  0       Last labs completed 11/2023 reviewed in office today      She has no other acute complaints    The following portions of the patient's history were reviewed and updated as appropriate: allergies, current medications, past family history, past medical history, past social history, past surgical history, and problem list    Review of Systems   Constitutional:  Negative for chills, fatigue and fever.   Eyes:  Negative for visual disturbance.   Respiratory:  Negative for cough, shortness of breath and wheezing.    Cardiovascular:  Negative for chest pain, palpitations and leg swelling.   Gastrointestinal:  Negative for abdominal pain, diarrhea, nausea and vomiting.   Neurological:  Negative for dizziness and light-headedness.   Psychiatric/Behavioral:  Negative for self-injury and suicidal ideas. The patient is not nervous/anxious.         Objective   Vital Signs:   Vitals:    02/05/24 1437 02/05/24 1516   BP: 150/60 152/76   Pulse: 64    Resp: 16    SpO2: 98%    Weight: 63.5 kg (140 lb)    Height: 154.9 cm (60.98\")                   Physical " Exam  Vitals reviewed.   Constitutional:       General: She is not in acute distress.  Eyes:      Conjunctiva/sclera: Conjunctivae normal.   Neck:      Thyroid: No thyromegaly.      Vascular: No carotid bruit.   Cardiovascular:      Rate and Rhythm: Normal rate and regular rhythm.      Heart sounds: Normal heart sounds. No murmur heard.  Pulmonary:      Effort: Pulmonary effort is normal. No respiratory distress.      Breath sounds: Normal breath sounds. No stridor. No wheezing, rhonchi or rales.   Chest:      Chest wall: No tenderness.   Lymphadenopathy:      Cervical: No cervical adenopathy.   Neurological:      Mental Status: She is alert and oriented to person, place, and time.   Psychiatric:         Attention and Perception: Attention normal.         Mood and Affect: Mood normal.          Result Review :     The following data was reviewed by: AKUA Mccallum on 02/05/2024:      TSH (11/28/2023 10:14)  T4, Free (11/28/2023 10:14)  Lipid Panel (11/28/2023 10:14)  CBC & Differential (11/28/2023 10:14)  Comprehensive Metabolic Panel (11/28/2023 10:14)   Labs all look normal/stable.  No concerns at this time   Assessment and Plan    Diagnoses and all orders for this visit:    1. Mixed hyperlipidemia (Primary)  Comments:  Repeat lipid panel with labs  Continue to work on lower cholesterol diet and exercise  Orders:  -     Comprehensive Metabolic Panel  -     CBC & Differential  -     Lipid Panel  -     T4, Free  -     TSH    2. Benign essential hypertension  Comments:  Increase amlodipine to 10 mg p.o. daily  Monitor for any blood pressure side effects/swelling  Monitor blood pressure at home bring log to next visit  Orders:  -     amLODIPine (NORVASC) 10 MG tablet; Take 1 tablet by mouth Daily for 90 days.  Dispense: 90 tablet; Refill: 2  -     Comprehensive Metabolic Panel  -     CBC & Differential  -     Lipid Panel  -     T4, Free  -     TSH    3. Aortic stenosis, moderate  Comments:  Keep follow-up with  cardiology as directed  Continue current management  Orders:  -     Comprehensive Metabolic Panel  -     CBC & Differential  -     Lipid Panel  -     T4, Free  -     TSH    4. Anxiety  Comments:  Controlled on sertraline plans to continueRefill medications  Orders:  -     Comprehensive Metabolic Panel  -     CBC & Differential  -     Lipid Panel  -     T4, Free  -     TSH        Follow Up   Return in about 6 weeks (around 3/18/2024) for Follow up for B/p Check.  Patient was given instructions and counseling regarding her condition or for health maintenance advice. Please see specific information pulled into the AVS if appropriate.

## 2024-03-20 ENCOUNTER — OFFICE VISIT (OUTPATIENT)
Dept: FAMILY MEDICINE CLINIC | Facility: CLINIC | Age: 89
End: 2024-03-20
Payer: MEDICARE

## 2024-03-20 VITALS
BODY MASS INDEX: 26.32 KG/M2 | DIASTOLIC BLOOD PRESSURE: 56 MMHG | SYSTOLIC BLOOD PRESSURE: 132 MMHG | OXYGEN SATURATION: 95 % | TEMPERATURE: 98.1 F | HEIGHT: 61 IN | WEIGHT: 139.4 LBS | HEART RATE: 76 BPM

## 2024-03-20 DIAGNOSIS — E78.2 MIXED HYPERLIPIDEMIA: ICD-10-CM

## 2024-03-20 DIAGNOSIS — I10 BENIGN ESSENTIAL HYPERTENSION: Primary | ICD-10-CM

## 2024-03-20 DIAGNOSIS — K59.04 CHRONIC IDIOPATHIC CONSTIPATION: ICD-10-CM

## 2024-03-20 DIAGNOSIS — I35.0 AORTIC STENOSIS, MODERATE: ICD-10-CM

## 2024-03-20 DIAGNOSIS — F41.9 ANXIETY: ICD-10-CM

## 2024-03-20 PROCEDURE — 99214 OFFICE O/P EST MOD 30 MIN: CPT | Performed by: NURSE PRACTITIONER

## 2024-03-20 RX ORDER — DOCUSATE SODIUM 100 MG/1
100 CAPSULE, LIQUID FILLED ORAL 2 TIMES DAILY
Qty: 90 CAPSULE | Refills: 2 | Status: SHIPPED | OUTPATIENT
Start: 2024-03-20

## 2024-03-20 NOTE — ASSESSMENT & PLAN NOTE
Continue to work on lower cholesterol diet and exercise.  Goal is greater than 150 minutes moderate intensity weekly

## 2024-03-20 NOTE — PROGRESS NOTES
Chief Complaint  6week follow up (On bloodpressure)    Subjective        HPI   Lanie presents to [facility] as an 88-year-old female for follow-up, to refill medications, review/order labs.  Overall doing okay    Hypertension seen in the office on 2/5/2024.  Amlodipine was increased to 10 mg p.o. daily.  Blood pressure seems to be well-controlled.  Denies any headaches no blurred vision no dizziness no flushing no chest pain or pressure.  She denies any medication side effects and taking as directed    Hyperlipidemia last lipid panel 11/2023.  She continues to work on low-cholesterol diet and tries to remain active    She has had some chronic constipation-she has been using MiraLAX daily.  She is agreeable to trying this.  So that she can wean off of MiraLAX she has tried to increase her fluid and fiber intake.  States that she is eating well and keeping down fluids    Anxiety is well-controlled on sertraline.  She denies any SI or HI.  Declines any need for changes to medication.  She is not currently counseling.    She has no other acute complaints at today    The following portions of the patient's history were reviewed and updated as appropriate: allergies, current medications, past family history, past medical history, past social history, past surgical history, and problem list        Review of Systems   Constitutional:  Negative for chills, fatigue and fever.   Eyes:  Negative for visual disturbance.   Respiratory:  Negative for cough, shortness of breath, wheezing and stridor.    Cardiovascular:  Negative for chest pain, palpitations and leg swelling.   Gastrointestinal:  Positive for constipation. Negative for abdominal distention, abdominal pain, anal bleeding, blood in stool, diarrhea, nausea, rectal pain and vomiting.   Neurological:  Negative for dizziness.   Psychiatric/Behavioral:  Negative for self-injury, sleep disturbance and suicidal ideas. The patient is not  "nervous/anxious.         Objective   Vital Signs:   Vitals:    03/20/24 1306   BP: 132/56   Pulse: 76   Temp: 98.1 °F (36.7 °C)   SpO2: 95%   Weight: 63.2 kg (139 lb 6.4 oz)   Height: 154.9 cm (60.98\")            5/16/2022    12:00 PM   PHQ-2/PHQ-9 Depression Screening   Little Interest or Pleasure in Doing Things 0-->not at all   Feeling Down, Depressed or Hopeless 0-->not at all   PHQ-9: Brief Depression Severity Measure Score 0                 Physical Exam  Constitutional:       General: She is not in acute distress.     Appearance: Normal appearance. She is normal weight. She is not ill-appearing.   HENT:      Head: Normocephalic.      Nose: Nose normal. No congestion.   Eyes:      Pupils: Pupils are equal, round, and reactive to light.   Cardiovascular:      Rate and Rhythm: Normal rate and regular rhythm.      Pulses: Normal pulses.      Heart sounds: Normal heart sounds. No murmur heard.  Pulmonary:      Effort: Pulmonary effort is normal. No respiratory distress.      Breath sounds: Normal breath sounds. No stridor. No wheezing, rhonchi or rales.   Chest:      Chest wall: No tenderness.   Abdominal:      General: Abdomen is flat. There is no distension.      Palpations: Abdomen is soft. There is no mass.      Tenderness: There is no abdominal tenderness. There is no right CVA tenderness, left CVA tenderness, guarding or rebound.      Hernia: No hernia is present.   Musculoskeletal:      Cervical back: Normal range of motion and neck supple.   Lymphadenopathy:      Cervical: No cervical adenopathy.   Skin:     General: Skin is warm and dry.      Capillary Refill: Capillary refill takes less than 2 seconds.      Findings: No rash.   Neurological:      Mental Status: She is alert and oriented to person, place, and time.   Psychiatric:         Mood and Affect: Mood normal.         Behavior: Behavior normal.         Thought Content: Thought content normal.         Judgment: Judgment normal.          Result " Review :     The following data was reviewed by: AKUA Mccallum on 03/20/2024:  Comprehensive Metabolic Panel (11/28/2023 10:14)  CBC & Differential (11/28/2023 10:14)  Lipid Panel (11/28/2023 10:14)  T4, Free (11/28/2023 10:14)  TSH (11/28/2023 10:14)  Vitamin D,25-Hydroxy (11/28/2023 10:14)    Your lab results are back     Labs all look normal/stable.  No concerns at this time     Assessment and Plan    Assessment & Plan  Benign essential hypertension  Controlled continue current management  Monitor BP at home bring log to next visit    Mixed hyperlipidemia   Continue to work on lower cholesterol diet and exercise.  Goal is greater than 150 minutes moderate intensity weekly     Aortic stenosis, moderate  Keep follow-up with cardiology as directed  Anxiety  Controlled continue current management  Follow-up immediately with any changes  Chronic idiopathic constipation  Add Colace  Continue to work on increasing fluid and fiber intake  Try to wean off of MiraLAX take every other day and then decrease to twice a week  If still having normal bowel movements  Follow-up immediately with any changes     New Medications Ordered This Visit   Medications    docusate sodium (Colace) 100 MG capsule     Sig: Take 1 capsule by mouth 2 (Two) Times a Day.     Dispense:  90 capsule     Refill:  2          Follow Up   Return in about 6 months (around 9/20/2024) for Annual physical.  Patient was given instructions and counseling regarding her condition or for health maintenance advice. Please see specific information pulled into the AVS if appropriate.

## 2024-04-04 ENCOUNTER — TELEPHONE (OUTPATIENT)
Dept: CARDIOLOGY | Facility: CLINIC | Age: 89
End: 2024-04-04

## 2024-04-04 NOTE — TELEPHONE ENCOUNTER
Caller: Lanie Ochoa    Relationship: Self    Best call back number: [phone]    What is the best time to reach you: ANYTIME    Who are you requesting to speak with (clinical staff, provider,  specific staff member): CLINICAL-SCHEDULING   ??        What was the call regarding: PT HAD MISSED CALL FROM OUR OFFICE.  NO  VOICE MESSAGE LEFT    Is it okay if the provider responds through [facility]: NO

## 2024-04-12 RX ORDER — CETIRIZINE HYDROCHLORIDE 10 MG/1
TABLET ORAL
Qty: 90 TABLET | Refills: 0 | OUTPATIENT
Start: 2024-04-12

## 2024-04-18 NOTE — TELEPHONE ENCOUNTER
Caller: Lanie Ochoa    Relationship: Self    Best call back number: [phone]     PATIENT IS CALLING TO CHECK THE STATUS OF THIS REFILL. PLEASE CALL AND ADVISE PATIENT.   Protopic Counseling: Patient may experience a mild burning sensation during topical application. Protopic is not approved in children less than 2 years of age. There have been case reports of hematologic and skin malignancies in patients using topical calcineurin inhibitors although causality is questionable.

## 2024-04-19 RX ORDER — CETIRIZINE HYDROCHLORIDE 10 MG/1
TABLET ORAL
Qty: 90 TABLET | Refills: 0 | Status: SHIPPED | OUTPATIENT
Start: 2024-04-19

## 2024-05-08 ENCOUNTER — OFFICE VISIT (OUTPATIENT)
Dept: CARDIOLOGY | Facility: CLINIC | Age: 89
End: 2024-05-08
Payer: MEDICARE

## 2024-05-08 ENCOUNTER — HOSPITAL ENCOUNTER (OUTPATIENT)
Dept: CARDIOLOGY | Facility: HOSPITAL | Age: 89
Discharge: HOME | End: 2024-05-08
Payer: MEDICARE

## 2024-05-08 VITALS
HEIGHT: 61 IN | HEART RATE: 62 BPM | SYSTOLIC BLOOD PRESSURE: 150 MMHG | OXYGEN SATURATION: 96 % | BODY MASS INDEX: 26.06 KG/M2 | DIASTOLIC BLOOD PRESSURE: 80 MMHG | WEIGHT: 138 LBS

## 2024-05-08 VITALS
HEART RATE: 75 BPM | SYSTOLIC BLOOD PRESSURE: 148 MMHG | WEIGHT: 139 LBS | BODY MASS INDEX: 26.24 KG/M2 | DIASTOLIC BLOOD PRESSURE: 70 MMHG | HEIGHT: 61 IN

## 2024-05-08 DIAGNOSIS — E78.2 MIXED HYPERLIPIDEMIA: ICD-10-CM

## 2024-05-08 DIAGNOSIS — I35.0 AORTIC STENOSIS, MODERATE: Primary | ICD-10-CM

## 2024-05-08 DIAGNOSIS — I35.0 AORTIC STENOSIS, MODERATE: ICD-10-CM

## 2024-05-08 DIAGNOSIS — I10 BENIGN ESSENTIAL HYPERTENSION: ICD-10-CM

## 2024-05-08 DIAGNOSIS — I45.10 RBBB (RIGHT BUNDLE BRANCH BLOCK): ICD-10-CM

## 2024-05-08 DIAGNOSIS — I34.0 NON-RHEUMATIC MITRAL REGURGITATION: ICD-10-CM

## 2024-05-08 LAB
AORTIC ARCH: 2.5 CM
AORTIC DIMENSIONLESS INDEX: 0.4 (DI)
ASCENDING AORTA: 2.8 CM
BH CV ECHO MEAS - ACS: 1.4 CM
BH CV ECHO MEAS - AO MAX PG: 38.7 MMHG
BH CV ECHO MEAS - AO MEAN PG: 21.5 MMHG
BH CV ECHO MEAS - AO ROOT DIAM: 2.4 CM
BH CV ECHO MEAS - AO V2 MAX: 311.2 CM/SEC
BH CV ECHO MEAS - AO V2 VTI: 75.2 CM
BH CV ECHO MEAS - AVA(I,D): 1.11 CM2
BH CV ECHO MEAS - EDV(CUBED): 79.5 ML
BH CV ECHO MEAS - EDV(MOD-SP2): 60 ML
BH CV ECHO MEAS - EDV(MOD-SP4): 62 ML
BH CV ECHO MEAS - EF(MOD-BP): 59.1 %
BH CV ECHO MEAS - EF(MOD-SP2): 56.7 %
BH CV ECHO MEAS - EF(MOD-SP4): 58.1 %
BH CV ECHO MEAS - ESV(CUBED): 15.5 ML
BH CV ECHO MEAS - ESV(MOD-SP2): 26 ML
BH CV ECHO MEAS - ESV(MOD-SP4): 26 ML
BH CV ECHO MEAS - FS: 42 %
BH CV ECHO MEAS - IVS/LVPW: 1 CM
BH CV ECHO MEAS - IVSD: 0.9 CM
BH CV ECHO MEAS - LAT PEAK E' VEL: 6.4 CM/SEC
BH CV ECHO MEAS - LV DIASTOLIC VOL/BSA (35-75): 38.3 CM2
BH CV ECHO MEAS - LV MASS(C)D: 123.3 GRAMS
BH CV ECHO MEAS - LV MAX PG: 5.6 MMHG
BH CV ECHO MEAS - LV MEAN PG: 2.49 MMHG
BH CV ECHO MEAS - LV SYSTOLIC VOL/BSA (12-30): 16.1 CM2
BH CV ECHO MEAS - LV V1 MAX: 118.3 CM/SEC
BH CV ECHO MEAS - LV V1 VTI: 27.6 CM
BH CV ECHO MEAS - LVIDD: 4.3 CM
BH CV ECHO MEAS - LVIDS: 2.49 CM
BH CV ECHO MEAS - LVOT AREA: 3 CM2
BH CV ECHO MEAS - LVOT DIAM: 1.97 CM
BH CV ECHO MEAS - LVPWD: 0.9 CM
BH CV ECHO MEAS - MED PEAK E' VEL: 7.7 CM/SEC
BH CV ECHO MEAS - MR MAX PG: 60.3 MMHG
BH CV ECHO MEAS - MR MAX VEL: 388.2 CM/SEC
BH CV ECHO MEAS - MV A DUR: 0.13 SEC
BH CV ECHO MEAS - MV A MAX VEL: 90.8 CM/SEC
BH CV ECHO MEAS - MV DEC SLOPE: 384.8 CM/SEC2
BH CV ECHO MEAS - MV DEC TIME: 0.19 SEC
BH CV ECHO MEAS - MV E MAX VEL: 85.7 CM/SEC
BH CV ECHO MEAS - MV E/A: 0.94
BH CV ECHO MEAS - MV MAX PG: 4.7 MMHG
BH CV ECHO MEAS - MV MEAN PG: 1.75 MMHG
BH CV ECHO MEAS - MV P1/2T: 77.2 MSEC
BH CV ECHO MEAS - MV V2 VTI: 32.8 CM
BH CV ECHO MEAS - MVA(P1/2T): 2.9 CM2
BH CV ECHO MEAS - MVA(VTI): 2.6 CM2
BH CV ECHO MEAS - PA ACC TIME: 0.14 SEC
BH CV ECHO MEAS - PA V2 MAX: 104.7 CM/SEC
BH CV ECHO MEAS - PULM A REVS DUR: 0.1 SEC
BH CV ECHO MEAS - PULM A REVS VEL: 27 CM/SEC
BH CV ECHO MEAS - PULM DIAS VEL: 47.6 CM/SEC
BH CV ECHO MEAS - PULM S/D: 1.41
BH CV ECHO MEAS - PULM SYS VEL: 67.3 CM/SEC
BH CV ECHO MEAS - QP/QS: 0.64
BH CV ECHO MEAS - RAP SYSTOLE: 3 MMHG
BH CV ECHO MEAS - RV MAX PG: 2.7 MMHG
BH CV ECHO MEAS - RV V1 MAX: 82.3 CM/SEC
BH CV ECHO MEAS - RV V1 VTI: 19.8 CM
BH CV ECHO MEAS - RVOT DIAM: 1.86 CM
BH CV ECHO MEAS - RVSP: 34 MMHG
BH CV ECHO MEAS - SUP REN AO DIAM: 2.2 CM
BH CV ECHO MEAS - SV(LVOT): 83.8 ML
BH CV ECHO MEAS - SV(MOD-SP2): 34 ML
BH CV ECHO MEAS - SV(MOD-SP4): 36 ML
BH CV ECHO MEAS - SV(RVOT): 53.9 ML
BH CV ECHO MEAS - SVI(LVOT): 51.8 ML/M2
BH CV ECHO MEAS - SVI(MOD-SP2): 21 ML/M2
BH CV ECHO MEAS - SVI(MOD-SP4): 22.2 ML/M2
BH CV ECHO MEAS - TR MAX PG: 31.4 MMHG
BH CV ECHO MEAS - TR MAX VEL: 280.2 CM/SEC
BH CV ECHO MEASUREMENTS AVERAGE E/E' RATIO: 12.16
BH CV XLRA - RV BASE: 3.1 CM
BH CV XLRA - RV LENGTH: 5.6 CM
BH CV XLRA - RV MID: 2.5 CM
BH CV XLRA - TDI S': 10.6 CM/SEC
LEFT ATRIUM VOLUME INDEX: 30.9 ML/M2
SINUS: 2.6 CM
STJ: 2.04 CM

## 2024-05-08 PROCEDURE — 1160F RVW MEDS BY RX/DR IN RCRD: CPT | Performed by: NURSE PRACTITIONER

## 2024-05-08 PROCEDURE — 1159F MED LIST DOCD IN RCRD: CPT | Performed by: NURSE PRACTITIONER

## 2024-05-08 PROCEDURE — 93306 TTE W/DOPPLER COMPLETE: CPT

## 2024-05-08 PROCEDURE — 99214 OFFICE O/P EST MOD 30 MIN: CPT | Performed by: NURSE PRACTITIONER

## 2024-05-08 PROCEDURE — 93000 ELECTROCARDIOGRAM COMPLETE: CPT | Performed by: NURSE PRACTITIONER

## 2024-05-22 ENCOUNTER — OFFICE VISIT (OUTPATIENT)
Dept: FAMILY MEDICINE CLINIC | Facility: CLINIC | Age: 89
End: 2024-05-22
Payer: MEDICARE

## 2024-05-22 VITALS
SYSTOLIC BLOOD PRESSURE: 106 MMHG | TEMPERATURE: 98.3 F | DIASTOLIC BLOOD PRESSURE: 50 MMHG | HEIGHT: 61 IN | OXYGEN SATURATION: 97 % | WEIGHT: 136.6 LBS | BODY MASS INDEX: 25.79 KG/M2 | HEART RATE: 68 BPM

## 2024-05-22 DIAGNOSIS — I35.0 AORTIC STENOSIS, MODERATE: ICD-10-CM

## 2024-05-22 DIAGNOSIS — M15.9 OSTEOARTHRITIS OF MULTIPLE JOINTS, UNSPECIFIED OSTEOARTHRITIS TYPE: ICD-10-CM

## 2024-05-22 DIAGNOSIS — I10 BENIGN ESSENTIAL HYPERTENSION: ICD-10-CM

## 2024-05-22 DIAGNOSIS — Z00.00 MEDICARE ANNUAL WELLNESS VISIT, SUBSEQUENT: Primary | ICD-10-CM

## 2024-05-22 DIAGNOSIS — E78.2 MIXED HYPERLIPIDEMIA: ICD-10-CM

## 2024-05-22 DIAGNOSIS — F41.9 ANXIETY: ICD-10-CM

## 2024-05-22 PROCEDURE — 1126F AMNT PAIN NOTED NONE PRSNT: CPT | Performed by: NURSE PRACTITIONER

## 2024-05-22 PROCEDURE — 1170F FXNL STATUS ASSESSED: CPT | Performed by: NURSE PRACTITIONER

## 2024-05-22 PROCEDURE — G0439 PPPS, SUBSEQ VISIT: HCPCS | Performed by: NURSE PRACTITIONER

## 2024-05-22 RX ORDER — SERTRALINE HYDROCHLORIDE 25 MG/1
25 TABLET, FILM COATED ORAL EVERY EVENING
Qty: 90 TABLET | Refills: 0 | Status: SHIPPED | OUTPATIENT
Start: 2024-05-22 | End: 2024-05-22 | Stop reason: SDUPTHER

## 2024-05-22 RX ORDER — AMLODIPINE BESYLATE 10 MG/1
1 TABLET ORAL DAILY
COMMUNITY
Start: 2024-05-19

## 2024-05-22 RX ORDER — NEBIVOLOL 10 MG/1
10 TABLET ORAL EVERY MORNING
Qty: 90 TABLET | Refills: 2 | Status: SHIPPED | OUTPATIENT
Start: 2024-05-22

## 2024-05-22 RX ORDER — SERTRALINE HYDROCHLORIDE 25 MG/1
25 TABLET, FILM COATED ORAL EVERY EVENING
Qty: 90 TABLET | Refills: 2 | Status: SHIPPED | OUTPATIENT
Start: 2024-05-22

## 2024-05-22 NOTE — ASSESSMENT & PLAN NOTE
Lipid abnormalities are stable    Plan:  Continue same medication/s without change.      Discussed medication dosage, use, side effects, and goals of treatment in detail.    Counseled patient on lifestyle modifications to help control hyperlipidemia.     Patient Treatment Goals:   LDL goal is less than 70    Followup in 6 months.  
Controlled continue current management  
Hypertension is stable and controlled  Continue current treatment regimen.  Blood pressure will be reassessed in 6 months.  
Keep follow-up with cardiology as directed  
no diarrhea/no nausea/no vomiting

## 2024-05-22 NOTE — TELEPHONE ENCOUNTER
Caller: Lanie Ochoa    Relationship: Self    Best call back number:[phone]     What is the best time to reach you: ANY, LEAVE MESSAGE IF NEEDED    Who are you requesting to speak with (clinical staff, provider,  specific staff member): PCP OR CLINICAL      What was the call regarding: PATIENT CALLED ASKING ABOUT THE REFILL FOR HER SERTRALINE.  SAID PHARMACY SENT OVER EARLIER IN THE WEEK AND HAS NOT RECEIVED THE APPROVAL TO FILL.

## 2024-05-22 NOTE — PROGRESS NOTES
The ABCs of the Annual Wellness Visit  Subsequent Medicare Wellness Visit    Subjective    Lanie Ochoa is a 89 y.o. female who presents for a Subsequent Medicare Wellness Visit.    The following portions of the patient's history were reviewed and   updated as appropriate: allergies, current medications, past family history, past medical history, past social history, past surgical history, and problem list.    Compared to one year ago, the patient feels her physical   health is the same.    Compared to one year ago, the patient feels her mental   health is the same.    Recent Hospitalizations:  She was not admitted to the hospital during the last year.       Current Medical Providers:  Patient Care Team:  Marisela Harman APRN as PCP - General (Nurse Practitioner)    Cardiologist  Ophthalmologist  Dermatologist    Outpatient Medications Prior to Visit   Medication Sig Dispense Refill    alendronate (Fosamax) 70 MG tablet Take 1 tablet by mouth Every 7 (Seven) Days. 13 tablet 3    amLODIPine (NORVASC) 10 MG tablet Take 1 tablet by mouth Daily.      calcium carbonate (OS-NELLI) 600 MG tablet Take 1 tablet by mouth Daily.      cetirizine (zyrTEC) 10 MG tablet TAKE 1 TABLET BY MOUTH EVERY DAY 90 tablet 0    Cholecalciferol (VITAMIN D3) 400 UNITS capsule Take 1 tablet by mouth Daily.      docusate sodium (Colace) 100 MG capsule Take 1 capsule by mouth 2 (Two) Times a Day. 90 capsule 2    losartan (COZAAR) 100 MG tablet Take 1 tablet by mouth Daily. 90 tablet 2    montelukast (SINGULAIR) 10 MG tablet Take 1 tablet by mouth every night at bedtime. 90 tablet 3    nebivolol (BYSTOLIC) 10 MG tablet Take 1 tablet by mouth Every Morning. 90 tablet 2    sertraline (ZOLOFT) 25 MG tablet TAKE 1 TABLET BY MOUTH IN THE EVENING 90 tablet 0     No facility-administered medications prior to visit.       No opioid medication identified on active medication list. I have reviewed chart for other potential  high risk medication/s and  "harmful drug interactions in the elderly.        Aspirin is not on active medication list.  Aspirin use is not indicated based on review of current medical condition/s. Risk of harm outweighs potential benefits.  .    Patient Active Problem List   Diagnosis    Anxiety    HLD (hyperlipidemia)    Benign essential hypertension    Osteopenia    Avitaminosis D    History of left knee replacement    Titubation    Non-rheumatic mitral regurgitation    Non-rheumatic tricuspid valve insufficiency    Chronic pain of right knee    Venous incompetence    Dysfunction of both eustachian tubes    Primary localized osteoarthrosis of right lower leg    Primary osteoarthritis of right knee    OA (osteoarthritis) of knee    Lumbar spinal stenosis    Spinal stenosis of lumbar region with neurogenic claudication    Syncope    RBBB (right bundle branch block)    Subarachnoid hemorrhage    Localized osteoporosis without current pathological fracture    Skin lesions    Aortic stenosis, moderate     Advance Care Planning   Advance Care Planning     Advance Directive is not on file.  ACP discussion was held with the patient during this visit. Patient does not have an advance directive, information provided.     Objective    Vitals:    05/22/24 1408   BP: 106/50   Pulse: 68   Temp: 98.3 °F (36.8 °C)   SpO2: 97%   Weight: 62 kg (136 lb 9.6 oz)   Height: 154.9 cm (60.98\")     Estimated body mass index is 25.82 kg/m² as calculated from the following:    Height as of this encounter: 154.9 cm (60.98\").    Weight as of this encounter: 62 kg (136 lb 9.6 oz).           Does the patient have evidence of cognitive impairment? No          HEALTH RISK ASSESSMENT    Smoking Status:  Social History     Tobacco Use   Smoking Status Never   Smokeless Tobacco Never   Tobacco Comments    No Caffeine use     Alcohol Consumption:  Social History     Substance and Sexual Activity   Alcohol Use No     Fall Risk Screen:    CATHRYNADI Fall Risk Assessment was completed, " and patient is at LOW risk for falls.Assessment completed on:2024    Depression Screenin/22/2024     2:16 PM   PHQ-2/PHQ-9 Depression Screening   Little Interest or Pleasure in Doing Things 0-->not at all   Feeling Down, Depressed or Hopeless 0-->not at all   PHQ-9: Brief Depression Severity Measure Score 0       Health Habits and Functional and Cognitive Screenin/22/2024     2:17 PM   Functional & Cognitive Status   Do you have difficulty preparing food and eating? No   Do you have difficulty bathing yourself, getting dressed or grooming yourself? No   Do you have difficulty using the toilet? No   Do you have difficulty moving around from place to place? No   Do you have trouble with steps or getting out of a bed or a chair? No   Current Diet Well Balanced Diet   Dental Exam Unknown   Eye Exam Up to date   Exercise (times per week) 5 times per week   Current Exercises Include Swimming;Bicycling Outdoors   Do you need help using the phone?  No   Are you deaf or do you have serious difficulty hearing?  No   Do you need help to go to places out of walking distance? No   Do you need help shopping? No   Do you need help preparing meals?  No   Do you need help with housework?  No   Do you need help with laundry? No   Do you need help taking your medications? No   Do you need help managing money? No   Do you ever drive or ride in a car without wearing a seat belt? No   Have you felt unusual stress, anger or loneliness in the last month? No   Who do you live with? Alone   If you need help, do you have trouble finding someone available to you? No   Have you been bothered in the last four weeks by sexual problems? No   Do you have difficulty concentrating, remembering or making decisions? No       Age-appropriate Screening Schedule:  Refer to the list below for future screening recommendations based on patient's age, sex and/or medical conditions. Orders for these recommended tests are listed in the  plan section. The patient has been provided with a written plan.    Health Maintenance   Topic Date Due    ZOSTER VACCINE (3 of 3) 05/22/2024 (Originally 6/3/2019)    COVID-19 Vaccine (6 - 2023-24 season) 08/11/2024 (Originally 2/7/2024)    BMI FOLLOWUP  07/20/2024    INFLUENZA VACCINE  08/01/2024    LIPID PANEL  11/28/2024    ANNUAL WELLNESS VISIT  05/22/2025    DXA SCAN  09/25/2025    TDAP/TD VACCINES (3 - Td or Tdap) 04/14/2029    RSV Vaccine - Adults  Completed    Pneumococcal Vaccine 65+  Completed                  CMS Preventative Services Quick Reference  Risk Factors Identified During Encounter  Immunizations Discussed/Encouraged: Shingrix  Dental Screening Recommended  Vision Screening Recommended  The above risks/problems have been discussed with the patient.  Pertinent information has been shared with the patient in the After Visit Summary.  An After Visit Summary and PPPS were made available to the patient.    Follow Up:   Next Medicare Wellness visit to be scheduled in 1 year.       Additional E&M Note during same encounter follows:  Patient has multiple medical problems which are significant and separately identifiable that require additional work above and beyond the Medicare Wellness Visit.      Chief Complaint  Medicare Wellness-subsequent    Subjective        HPI  Lanie Ochoa is also being seen today for Medicare wellness subsequent and to follow-up on chronic conditions, refill medications.  Overall doing okay    Hypertension-has been well-controlled on current medication.  She takes her medication as directed and denies any side effects.  No increase or changes in headaches no blurred vision no dizziness no flushing no chest pain or pressure    Hyperlipidemia-last lipid panel 11/2023  Not fasting today will return for labs    Anxiety seems to be well-controlled on sertraline.  She takes medication as directed.  Denies any SI or HI.  She is not currently counseling.  She declines any need  "for changes in medication.    She does see cardiologist.  She does have follow-up appointment scheduled with that specialty.  She does have aortic stenosis with no changes in cardiac symptoms    She does have osteoarthritis and does follow-up with Ortho/hand specialist as needed.  She has had steroid injections and knee replacement in the past.  States that symptoms are worse when she is done prolonged activities or when it is weather related    She has no other acute complaints today      The following portions of the patient's history were reviewed and updated as appropriate: allergies, current medications, past family history, past medical history, past social history, past surgical history, and problem list      Review of Systems   Constitutional:  Negative for chills, fatigue and fever.   Eyes:  Negative for visual disturbance.   Respiratory:  Negative for cough, shortness of breath, wheezing and stridor.    Cardiovascular:  Negative for chest pain, palpitations and leg swelling.   Neurological:  Negative for dizziness.   Psychiatric/Behavioral:  Negative for self-injury, sleep disturbance and suicidal ideas. The patient is not nervous/anxious.        Objective   Vital Signs:  /50   Pulse 68   Temp 98.3 °F (36.8 °C)   Ht 154.9 cm (60.98\")   Wt 62 kg (136 lb 9.6 oz)   SpO2 97%   BMI 25.82 kg/m²     Physical Exam  Vitals reviewed.   Constitutional:       General: She is not in acute distress.  Eyes:      Conjunctiva/sclera: Conjunctivae normal.   Neck:      Thyroid: No thyromegaly.      Vascular: No carotid bruit.   Cardiovascular:      Rate and Rhythm: Normal rate and regular rhythm.      Heart sounds: Normal heart sounds. No murmur heard.  Pulmonary:      Effort: Pulmonary effort is normal. No respiratory distress.      Breath sounds: Normal breath sounds. No stridor. No wheezing, rhonchi or rales.   Chest:      Chest wall: No tenderness.   Lymphadenopathy:      Cervical: No cervical adenopathy. "   Neurological:      Mental Status: She is alert and oriented to person, place, and time.   Psychiatric:         Attention and Perception: Attention normal.         Mood and Affect: Mood normal.          The following data was reviewed by: AKUA Mccallum on 05/22/2024:  Common labs          11/28/2023    10:14   Common Labs   Glucose 109    BUN 14    Creatinine 0.90    Sodium 142    Potassium 4.6    Chloride 104    Calcium 9.9    Total Protein 6.7    Albumin 4.5    Total Bilirubin 0.8    Alkaline Phosphatase 70    AST (SGOT) 18    ALT (SGPT) 15    WBC 3.90    Hemoglobin 14.7    Hematocrit 44.8    Platelets 273    Total Cholesterol 190    Triglycerides 56    HDL Cholesterol 107    LDL Cholesterol  72      CMP          11/28/2023    10:14   CMP   Glucose 109    BUN 14    Creatinine 0.90    Sodium 142    Potassium 4.6    Chloride 104    Calcium 9.9    Total Protein 6.7    Albumin 4.5    Globulin 2.2    Total Bilirubin 0.8    Alkaline Phosphatase 70    AST (SGOT) 18    ALT (SGPT) 15    BUN/Creatinine Ratio 15.6      CBC          11/28/2023    10:14   CBC   WBC 3.90    RBC 4.87    Hemoglobin 14.7    Hematocrit 44.8    MCV 92.0    MCH 30.2    MCHC 32.8    RDW 12.5    Platelets 273      CBC w/diff          11/28/2023    10:14   CBC w/Diff   WBC 3.90    RBC 4.87    Hemoglobin 14.7    Hematocrit 44.8    MCV 92.0    MCH 30.2    MCHC 32.8    RDW 12.5    Platelets 273    Neutrophil Rel % 64.1    Lymphocyte Rel % 21.3    Monocyte Rel % 7.7    Eosinophil Rel % 5.1    Basophil Rel % 1.5      Lipid Panel          11/28/2023    10:14   Lipid Panel   Total Cholesterol 190    Triglycerides 56    HDL Cholesterol 107    VLDL Cholesterol 11    LDL Cholesterol  72      TSH          11/28/2023    10:14   TSH   TSH 1.580                 Assessment and Plan   Assessment & Plan  Medicare annual wellness visit, subsequent      Low sodium diet  Exercise 30 minutes most days of the week  Make sure you get results on any labs or tests we  ordered today  We discussed medications and how to take them as prescribed  Sleep 6-8 hours each night if possible  If you have not signed up for [employer], please activate your code ASAP from your After Visit Summary today     LDL goal <100  LDL goal if heart disease <70  HDL goal >60  Triglyceride goal <150  BP goal =<130/80  Fasting glucose <100    Benign essential hypertension  Hypertension is stable and controlled  Continue current treatment regimen.  Blood pressure will be reassessed in 6 months.  Mixed hyperlipidemia   Lipid abnormalities are stable    Plan:  Continue same medication/s without change.      Discussed medication dosage, use, side effects, and goals of treatment in detail.    Counseled patient on lifestyle modifications to help control hyperlipidemia.     Patient Treatment Goals:   LDL goal is less than 70    Followup in 6 months.  Aortic stenosis, moderate  Keep follow-up with cardiology as directed  Anxiety  Controlled continue current management  Osteoarthritis of multiple joints, unspecified osteoarthritis type  Keep follow-up with specialist as directed     New Medications Ordered This Visit   Medications    nebivolol (BYSTOLIC) 10 MG tablet     Sig: Take 1 tablet by mouth Every Morning.     Dispense:  90 tablet     Refill:  2    sertraline (ZOLOFT) 25 MG tablet     Sig: Take 1 tablet by mouth Every Evening.     Dispense:  90 tablet     Refill:  2               Follow Up   Return in about 6 months (around 11/22/2024) for Follow up with PCP as Directed.  Patient was given instructions and counseling regarding her condition or for health maintenance advice. Please see specific information pulled into the AVS if appropriate.

## 2024-06-07 ENCOUNTER — TELEPHONE (OUTPATIENT)
Dept: FAMILY MEDICINE CLINIC | Facility: CLINIC | Age: 89
End: 2024-06-07
Payer: MEDICARE

## 2024-06-07 NOTE — TELEPHONE ENCOUNTER
Caller: Lanie Ochoa    Relationship: Self    Best call back number:[phone]     What was the call regarding:   RETURNED CALL TO CLINIC

## 2024-06-07 NOTE — TELEPHONE ENCOUNTER
Talked to pt.. nobody charted why they called her.. no vm was left.. but pt stated she doesn't need anything or have any concerns at this time

## 2024-06-17 RX ORDER — MONTELUKAST SODIUM 10 MG/1
10 TABLET ORAL
Qty: 90 TABLET | Refills: 0 | Status: SHIPPED | OUTPATIENT
Start: 2024-06-17

## 2024-06-17 NOTE — TELEPHONE ENCOUNTER
Caller: Lanie Ochoa    Relationship: Self    Best call back number: [phone]    Requested Prescriptions:   Requested Prescriptions     Pending Prescriptions Disp Refills    montelukast (SINGULAIR) 10 MG tablet [Pharmacy Med Name: Montelukast Sodium Oral Tablet 10 MG] 90 tablet 0     Sig: TAKE 1 TABLET BY MOUTH EVERY NIGHT AT BEDTIME        Pharmacy where request should be sent: [id number] - [place], [place] - [address] [address] - [phone] [facility] [phone] FX     Last office visit with prescribing clinician: 3/28/2023   Last telemedicine visit with prescribing clinician: Visit date not found   Next office visit with prescribing clinician: Visit date not found     Additional details provided by patient: STATED THAT THEY HAVE JUST A COUPLE REMAINING ON THE MEDICATION    Does the patient have less than a 3 day supply:  [x] Yes  [] No    Would you like a call back once the refill request has been completed: [] Yes [x] No    If the office needs to give you a call back, can they leave a voicemail: [] Yes [x] No    Savannah Olsen Rep   06/17/24 14:19 EDT

## 2024-07-16 RX ORDER — CETIRIZINE HYDROCHLORIDE 10 MG/1
TABLET ORAL
Qty: 90 TABLET | Refills: 0 | Status: SHIPPED | OUTPATIENT
Start: 2024-07-16

## 2024-07-16 NOTE — TELEPHONE ENCOUNTER
Last ov  5/22/2024 vicki wang.    Return in about 6 months (around 11/22/2024) for Follow up with PCP as Directed.

## 2024-07-16 NOTE — TELEPHONE ENCOUNTER
Caller: Lanie Ochoa    Relationship: Self    Best call back number: [phone]     What is the best time to reach you: ANYTIME    What was the call regarding: PATIENT STATED SHE HAS ABOUT 3 OR 4 TABLETS REMAINING OF THIS MEDICATION.    PLEASE REVIEW

## 2024-08-16 ENCOUNTER — TRANSCRIBE ORDERS (OUTPATIENT)
Dept: ADMINISTRATIVE | Facility: HOSPITAL | Age: 89
End: 2024-08-16
Payer: MEDICARE

## 2024-08-16 DIAGNOSIS — Z12.31 SCREENING MAMMOGRAM FOR BREAST CANCER: Primary | ICD-10-CM

## 2024-09-13 RX ORDER — MONTELUKAST SODIUM 10 MG/1
10 TABLET ORAL
Qty: 90 TABLET | Refills: 0 | Status: SHIPPED | OUTPATIENT
Start: 2024-09-13

## 2024-09-13 NOTE — TELEPHONE ENCOUNTER
Rx Refill Note  Requested Prescriptions     Pending Prescriptions Disp Refills    montelukast (SINGULAIR) 10 MG tablet [Pharmacy Med Name: Montelukast Sodium Oral Tablet 10 MG] 90 tablet 0     Sig: TAKE 1 TABLET BY MOUTH EVERY DAY AT BEDTIME      Last office visit with prescribing clinician: 5/22/2024   Last telemedicine visit with prescribing clinician: Visit date not found   Next office visit with prescribing clinician: 9/23/2024                         Would you like a call back once the refill request has been completed: [] Yes [] No    If the office needs to give you a call back, can they leave a voicemail: [] Yes [] No    Greg Mendoza MA  09/13/24, 10:48 EDT

## 2024-09-23 ENCOUNTER — OFFICE VISIT (OUTPATIENT)
Dept: FAMILY MEDICINE CLINIC | Facility: CLINIC | Age: 89
End: 2024-09-23
Payer: MEDICARE

## 2024-09-23 VITALS
SYSTOLIC BLOOD PRESSURE: 110 MMHG | WEIGHT: 136.2 LBS | TEMPERATURE: 97.9 F | OXYGEN SATURATION: 99 % | DIASTOLIC BLOOD PRESSURE: 52 MMHG | HEART RATE: 68 BPM | BODY MASS INDEX: 25.75 KG/M2

## 2024-09-23 DIAGNOSIS — I10 BENIGN ESSENTIAL HYPERTENSION: Primary | ICD-10-CM

## 2024-09-23 DIAGNOSIS — R53.83 OTHER FATIGUE: ICD-10-CM

## 2024-09-23 DIAGNOSIS — G89.29 CHRONIC PAIN OF LEFT KNEE: ICD-10-CM

## 2024-09-23 DIAGNOSIS — E78.2 MIXED HYPERLIPIDEMIA: ICD-10-CM

## 2024-09-23 DIAGNOSIS — F41.9 ANXIETY: ICD-10-CM

## 2024-09-23 DIAGNOSIS — Z96.652 S/P TKR (TOTAL KNEE REPLACEMENT), LEFT: ICD-10-CM

## 2024-09-23 DIAGNOSIS — E55.9 VITAMIN D DEFICIENCY: ICD-10-CM

## 2024-09-23 DIAGNOSIS — R73.09 ELEVATED GLUCOSE LEVEL: ICD-10-CM

## 2024-09-23 DIAGNOSIS — M25.562 CHRONIC PAIN OF LEFT KNEE: ICD-10-CM

## 2024-09-23 PROCEDURE — 99214 OFFICE O/P EST MOD 30 MIN: CPT | Performed by: NURSE PRACTITIONER

## 2024-09-23 PROCEDURE — 1159F MED LIST DOCD IN RCRD: CPT | Performed by: NURSE PRACTITIONER

## 2024-09-23 PROCEDURE — 1126F AMNT PAIN NOTED NONE PRSNT: CPT | Performed by: NURSE PRACTITIONER

## 2024-09-23 PROCEDURE — 1160F RVW MEDS BY RX/DR IN RCRD: CPT | Performed by: NURSE PRACTITIONER

## 2024-09-23 RX ORDER — CETIRIZINE HYDROCHLORIDE 10 MG/1
10 TABLET ORAL DAILY
Qty: 90 TABLET | Refills: 2 | Status: SHIPPED | OUTPATIENT
Start: 2024-09-23

## 2024-09-23 RX ORDER — MONTELUKAST SODIUM 10 MG/1
10 TABLET ORAL
Qty: 90 TABLET | Refills: 2 | Status: SHIPPED | OUTPATIENT
Start: 2024-09-23

## 2024-09-23 RX ORDER — LOSARTAN POTASSIUM 100 MG/1
100 TABLET ORAL DAILY
Qty: 90 TABLET | Refills: 2 | Status: SHIPPED | OUTPATIENT
Start: 2024-09-23

## 2024-09-23 RX ORDER — AMLODIPINE BESYLATE 10 MG/1
10 TABLET ORAL DAILY
Qty: 90 TABLET | Refills: 2 | Status: SHIPPED | OUTPATIENT
Start: 2024-09-23

## 2024-09-24 LAB
25(OH)D3+25(OH)D2 SERPL-MCNC: 43.2 NG/ML (ref 30–100)
ALBUMIN SERPL-MCNC: 4.1 G/DL (ref 3.7–4.7)
ALP SERPL-CCNC: 66 IU/L (ref 44–121)
ALT SERPL-CCNC: 12 IU/L (ref 0–32)
AST SERPL-CCNC: 19 IU/L (ref 0–40)
BASOPHILS # BLD AUTO: 0.1 X10E3/UL (ref 0–0.2)
BASOPHILS NFR BLD AUTO: 1 %
BILIRUB SERPL-MCNC: 0.3 MG/DL (ref 0–1.2)
BUN SERPL-MCNC: 13 MG/DL (ref 8–27)
BUN/CREAT SERPL: 14 (ref 12–28)
CALCIUM SERPL-MCNC: 9.2 MG/DL (ref 8.7–10.3)
CHLORIDE SERPL-SCNC: 103 MMOL/L (ref 96–106)
CO2 SERPL-SCNC: 22 MMOL/L (ref 20–29)
CREAT SERPL-MCNC: 0.9 MG/DL (ref 0.57–1)
EGFRCR SERPLBLD CKD-EPI 2021: 61 ML/MIN/1.73
EOSINOPHIL # BLD AUTO: 0.3 X10E3/UL (ref 0–0.4)
EOSINOPHIL NFR BLD AUTO: 5 %
ERYTHROCYTE [DISTWIDTH] IN BLOOD BY AUTOMATED COUNT: 12.5 % (ref 11.7–15.4)
FOLATE SERPL-MCNC: 12.9 NG/ML
GLOBULIN SER CALC-MCNC: 2.2 G/DL (ref 1.5–4.5)
GLUCOSE SERPL-MCNC: 99 MG/DL (ref 70–99)
HBA1C MFR BLD: 5.6 % (ref 4.8–5.6)
HCT VFR BLD AUTO: 42.1 % (ref 34–46.6)
HGB BLD-MCNC: 13.7 G/DL (ref 11.1–15.9)
IMM GRANULOCYTES # BLD AUTO: 0 X10E3/UL (ref 0–0.1)
IMM GRANULOCYTES NFR BLD AUTO: 1 %
LYMPHOCYTES # BLD AUTO: 1 X10E3/UL (ref 0.7–3.1)
LYMPHOCYTES NFR BLD AUTO: 17 %
MCH RBC QN AUTO: 30.9 PG (ref 26.6–33)
MCHC RBC AUTO-ENTMCNC: 32.5 G/DL (ref 31.5–35.7)
MCV RBC AUTO: 95 FL (ref 79–97)
MONOCYTES # BLD AUTO: 0.4 X10E3/UL (ref 0.1–0.9)
MONOCYTES NFR BLD AUTO: 7 %
NEUTROPHILS # BLD AUTO: 4 X10E3/UL (ref 1.4–7)
NEUTROPHILS NFR BLD AUTO: 69 %
PLATELET # BLD AUTO: 309 X10E3/UL (ref 150–450)
POTASSIUM SERPL-SCNC: 4.2 MMOL/L (ref 3.5–5.2)
PROT SERPL-MCNC: 6.3 G/DL (ref 6–8.5)
RBC # BLD AUTO: 4.44 X10E6/UL (ref 3.77–5.28)
SODIUM SERPL-SCNC: 139 MMOL/L (ref 134–144)
T4 FREE SERPL-MCNC: 1.3 NG/DL (ref 0.82–1.77)
TSH SERPL DL<=0.005 MIU/L-ACNC: 1.42 UIU/ML (ref 0.45–4.5)
VIT B12 SERPL-MCNC: 305 PG/ML (ref 232–1245)
WBC # BLD AUTO: 5.8 X10E3/UL (ref 3.4–10.8)

## 2024-10-03 ENCOUNTER — HOSPITAL ENCOUNTER (OUTPATIENT)
Dept: MAMMOGRAPHY | Facility: HOSPITAL | Age: 89
Discharge: HOME | End: 2024-10-03
Admitting: NURSE PRACTITIONER
Payer: MEDICARE

## 2024-10-03 DIAGNOSIS — Z12.31 SCREENING MAMMOGRAM FOR BREAST CANCER: ICD-10-CM

## 2024-10-03 PROCEDURE — 77067 SCR MAMMO BI INCL CAD: CPT

## 2024-10-03 PROCEDURE — 77063 BREAST TOMOSYNTHESIS BI: CPT

## 2024-10-22 ENCOUNTER — OFFICE VISIT (OUTPATIENT)
Dept: ORTHOPEDIC SURGERY | Facility: CLINIC | Age: 89
End: 2024-10-22
Payer: MEDICARE

## 2024-10-22 VITALS — BODY MASS INDEX: 25.57 KG/M2 | HEIGHT: 61 IN | WEIGHT: 135.4 LBS | TEMPERATURE: 98 F

## 2024-10-22 DIAGNOSIS — M25.362 KNEE INSTABILITY, LEFT: ICD-10-CM

## 2024-10-22 DIAGNOSIS — Z96.652 TOTAL KNEE REPLACEMENT STATUS, LEFT: ICD-10-CM

## 2024-10-22 DIAGNOSIS — R52 PAIN: Primary | ICD-10-CM

## 2024-10-22 PROCEDURE — 99213 OFFICE O/P EST LOW 20 MIN: CPT | Performed by: NURSE PRACTITIONER

## 2024-10-22 NOTE — PROGRESS NOTES
Patient: Lanie Ochoa  YOB: 1935 89 y.o. female  Medical Record Number: [id number]    Chief Complaints:   Chief Complaint   Patient presents with    Left Knee - Initial Evaluation       History of Present Illness:Lanie Ochoa is a 89 y.o. female who presents with complaints of left knee instability.  The patient had previous left total knee replacement done by Dr. Castle in 2010 she reports over the last year or 2 she has had increased knee instability, initially when she starts walking it is not bad but if she tries walking for any extended period of time she has weakness in her left knee also some increasing lower back pain.  She denies any specific radicular symptoms, no numbness or tingling.  She does have a history of back surgery with Dr. Naylor several years ago    Allergies:   Allergies   Allergen Reactions    Sulfa Antibiotics Rash       Medications:   Current Outpatient Medications   Medication Sig Dispense Refill    amLODIPine (NORVASC) 10 MG tablet Take 1 tablet by mouth Daily. 90 tablet 2    calcium carbonate (OS-NELLI) 600 MG tablet Take 1 tablet by mouth Daily.      cetirizine (zyrTEC) 10 MG tablet Take 1 tablet by mouth Daily. 90 tablet 2    Cholecalciferol (VITAMIN D3) 400 UNITS capsule Take 1 tablet by mouth Daily.      docusate sodium (Colace) 100 MG capsule Take 1 capsule by mouth 2 (Two) Times a Day. 90 capsule 2    losartan (COZAAR) 100 MG tablet Take 1 tablet by mouth Daily. 90 tablet 2    montelukast (SINGULAIR) 10 MG tablet Take 1 tablet by mouth every night at bedtime. 90 tablet 2    nebivolol (BYSTOLIC) 10 MG tablet Take 1 tablet by mouth Every Morning. 90 tablet 2    sertraline (ZOLOFT) 25 MG tablet Take 1 tablet by mouth Every Evening. 90 tablet 2    alendronate (Fosamax) 70 MG tablet Take 1 tablet by mouth Every 7 (Seven) Days. (Patient not taking: Reported on 10/22/2024) 13 tablet 3     No current facility-administered medications for this visit.         The  "following portions of the patient's history were reviewed and updated as appropriate: allergies, current medications, past family history, past medical history, past social history, past surgical history and problem list.    Review of Systems:   14 point review of systems was performed. All systems negative except pertinent positives/negatives listed in HPI above    Physical Exam:   Vitals:    10/22/24 1328   Temp: 98 °F (36.7 °C)   Weight: 61.4 kg (135 lb 6.4 oz)   Height: 154.9 cm (61\")   PainSc: 0-No pain   PainLoc: Knee       General: A and O x 3, ASA, NAD    Skin clear no unusual lesions noted  Left knee the patient has 120 degrees flexion neutral in extension she does have some laxity noted, calf soft and nontender      Radiology:  Xrays 3views (ap,lateral, sunrise) left knee were ordered and reviewed today secondary to instability and show a previously replaced left knee she does have some oversizing noted about the femoral component as well as some signs of polyethylene wear do not appreciate any lucencies.  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

## 2024-11-25 ENCOUNTER — TREATMENT (OUTPATIENT)
Age: 89
End: 2024-11-25
Payer: MEDICARE

## 2024-11-25 DIAGNOSIS — G89.29 CHRONIC PAIN OF LEFT KNEE: Primary | ICD-10-CM

## 2024-11-25 DIAGNOSIS — M25.562 CHRONIC PAIN OF LEFT KNEE: Primary | ICD-10-CM

## 2024-11-25 PROCEDURE — 97110 THERAPEUTIC EXERCISES: CPT | Performed by: PHYSICAL THERAPIST

## 2024-11-25 PROCEDURE — 97535 SELF CARE MNGMENT TRAINING: CPT | Performed by: PHYSICAL THERAPIST

## 2024-11-25 PROCEDURE — 97162 PT EVAL MOD COMPLEX 30 MIN: CPT | Performed by: PHYSICAL THERAPIST

## 2024-11-25 NOTE — PATIENT INSTRUCTIONS
Access Code: [email or web address]  URL: [email or web address]  Date: 11/25/2024  Prepared by: Yessenia Coley    Exercises  - Supine Bridge  - 1 x daily - 4 x weekly - 1 sets - 10 reps  - Supine Active Straight Leg Raise  - 1 x daily - 4 x weekly - 2 sets - 10 reps  - Sidelying Hip Abduction  - 1 x daily - 4 x weekly - 2 sets - 10 reps  - Supine Hip Adduction Isometric with Ball  - 1 x daily - 4 x weekly - 2 sets - 10 reps - 3 s hold  - Supine Quadricep Sets  - 1 x daily - 4 x weekly - 2 sets - 10 reps - 3 s hold  - Seated Long Arc Quad  - 1 x daily - 4 x weekly - 2 sets - 10 reps

## 2024-11-25 NOTE — PROGRESS NOTES
Physical Therapy Initial Evaluation and Plan of Care  [address]  [facility] [address]  P: [phone]  F: [phone]    Patient: Lanie Ochoa   : 1935  Diagnosis/ICD-10 Code:  Chronic pain of left knee [M25.562, G89.29]  Referring practitioner: AKUA Hung  Date of Initial Visit: 2024  Today's Date: 2024  Patient seen for 1 session         Visit Diagnoses:    ICD-10-CM ICD-9-CM   1. Chronic pain of left knee  M25.562 719.46    G89.29 338.29         Subjective Questionnaire: WOMAC:       Subjective Evaluation    History of Present Illness  Mechanism of injury: Pt is an 88 y/o female who presents to PT with c/o L knee pain that began about a year ago. Pt reports she had a total knee replacement about 15 years ago. Pt reports that she noticed in the last year her pain increasing, and prior to this she had no pain and was pleased with her knee. Pt reports she wants to avoid any further surgery. She reports she feels more weakness and instability in her knee. Pt reports it gives out on her when she walks, but she has not fallen.       Patient Occupation: Retired Pain  Current pain ratin  At worst pain ratin  Quality: dull ache  Relieving factors: change in position and rest  Aggravating factors: stairs, standing, prolonged positioning, ambulation and squatting  Progression: worsening    Social Support  Lives in: multiple-level home  Lives with: alone    Treatments  Current treatment: physical therapy  Patient Goals  Patient goals for therapy: increased strength, independence with ADLs/IADLs, increased motion and decreased pain         Past Medical History:   Diagnosis Date    Anxiety     Aortic stenosis, mild 2022    BMS (burning mouth syndrome) 2015    Breast cancer      Lt breast    Cancer      HX LEFT STAGE 1 INVASIVE DUCTAL CARCINOMA ER AND [place] POSITIVE, HER-2/LOYDA NEGATIVE, HAD LUMPECTOMY AND RADIATION APPROX. 10 YEARS AGO     Cancer     SKIN LEFT ARM; SQUAMOUS CELL    Carpal tunnel syndrome     RIGHT    Degenerative disc disease     Functional murmur     Gastritis     RESOLVED 03/05/2014    GERD (gastroesophageal reflux disease)     Glossalgia 11/05/2015    Hx of radiation therapy     Hypertension     Internal hemorrhoids     Low back pain     Osteoarthritis 04/08/2014    HAND    PNA (pneumonia) 11/05/2015    Trigger ring finger 03/05/2014     Past Surgical History:   Procedure Laterality Date    BREAST BIOPSY Left 2009    BREAST LUMPECTOMY Left 07/15/2009    LUMPECTOMY    COLONOSCOPY  2005    DILATATION AND CURETTAGE      RESOLVED 06/15/2010    EYE SURGERY Bilateral 10/15/2012    CATARACT EXTRACTION    HEMORRHOIDECTOMY      JOINT REPLACEMENT Left 2010    KNEE    KNEE ARTHROSCOPY Right 6/13/2017    Procedure: KNEE ARTHROSCOPY WITH PARTIAL MEDIAL AND LATERAL MENISECTOMY AND DEBRIDEMENT OF ARTHRITIS.;  Surgeon: Lauren Krishna MD;  Location: [facility];  Service:     LUMBAR DISCECTOMY N/A 3/10/2021    Procedure: Lumbar 4-5 , L5-S1 laminectomy with metrx;  Surgeon: Charli Haynes MD;  Location: [facility];  Service: Neurosurgery;  Laterality: N/A;    TOTAL KNEE ARTHROPLASTY Right 5/21/2018    Procedure: RIGHT TOTAL KNEE ARTHROPLASTY;  Surgeon: Kiko Solano MD;  Location: [facility];  Service: Orthopedics    TOTAL KNEE ARTHROPLASTY Left        Objective          Neurological Testing     Sensation     Knee   Left Knee   Intact: Light touch    Right Knee   Intact: light touch     Active Range of Motion   Left Knee   Flexion: 115 degrees   Extension: 0 degrees     Right Knee   Flexion: 120 degrees   Extension: 0 degrees     Passive Range of Motion   Left Knee   Flexion: 119 degrees   Extension: 0 degrees     Patellar Mobility   Left Knee Patellar tendons within functional limits include the medial and inferior.     Right Knee Patellar tendons within functional limits include the medial and inferior.     Strength/Myotome  Testing     Left Hip   Planes of Motion   Flexion: 4+  Extension: 4  Abduction: 2+  Adduction: 5    Right Hip   Planes of Motion   Flexion: 4+  Extension: 4  Abduction: 3  Adduction: 5    Left Knee   Flexion: 4  Extension: 3+  Quadriceps contraction: fair    Right Knee   Flexion: 4  Extension: 4-  Quadriceps contraction: good    Ambulation   Weight-Bearing Status   Weight-Bearing Status (Left): full weight bearing   Weight-Bearing Status (Right): full weight-bearing    Assistive device used: none    Ambulation: Level Surfaces   Ambulation without assistive device: independent    Ambulation: Stairs   Ascend stairs: independent  Pattern: non-reciprocal  Railings: one rail  Descend stairs: independent  Pattern: non-reciprocal  Railings: one rail    Observational Gait   Gait: within functional limits   Increased right stance time and left swing time. Decreased walking speed, stride length, left stance time and right swing time.           Assessment & Plan       Assessment  Impairments: abnormal gait, abnormal muscle firing, abnormal or restricted ROM, activity intolerance, impaired balance, impaired physical strength, lacks appropriate home exercise program, pain with function and weight-bearing intolerance   Functional limitations: lifting, walking, pushing, uncomfortable because of pain, sitting, standing, stooping and unable to perform repetitive tasks   Assessment details: Pt is an active, 89 y/o female who presents to physical therapy with signs and symptoms consistent with chronic L knee pain. She has a hx of a L TKA ~15 yrs ago and also has had a R TKA. Pt swims several times a week and rides an adult tricycle outside except in the winter. Pt has the most c/o pain and instability when walking and using stairs. Pt has decreased L knee flexion AROM, however, passively she has full mobility. In addition, pt has L LE strength deficits which limits her ability to perform her ADLs/IADLs pain free. Pt has pain and  "difficulty performing stairs, walking more than one block, and prolonged standing. Pt reports frequent L knee buckling. Pt denies a hx of falls. Pt ambulates without an AD and does not want to use an AD at this time, but states \"she will think about it\". Pt educated on how a cane can help support her if ambulating prolonged distances. Pt also educated on home exercise program. Pt's case is stable in nature and she has multiple co morbidities and personal factors that may affect care. Pt would benefit from skilled physical therapy in order to address the above impairments and activity limitations outlined in this initial evaluation, in order to decrease pain, improve functional mobility, and improve strength to prevent knee buckling and improve safety with ambulation.     Barriers to therapy: personal factors: lives alone, age  Prognosis: good    Goals  Plan Goals: STG 2-4 weeks    1. Decrease WOMAC score by 10 points to show improvement in overall functional activities  2. Increase knee flexion ROM to 120 degrees of AROM on left in order to be able to improve knee bend  3. Pt will be independent in home exercise program  4. Increase MMT for knee to 4+/5 or greater in order to be able to improve strength in stairs/gait    LTG 4-8 weeks    1. Decrease WOMAc score by 13 points or more from IE to show improvement in overall functional activities  2. Increase MMT for knee to 5/5 in order to be able to improve strength during gait/stairs  3. Pt will be able to ascend/descend a flight of stairs pain free one step at a time without knee buckling and using hand rail   4. Pt will be able to ambulate 1.0-2.0 mph for 5 minutes in order to be able to walk a block at her patio home without knee buckling     Plan  Therapy options: will be seen for skilled therapy services  Planned modality interventions: cryotherapy, TENS and thermotherapy (hydrocollator packs)  Planned therapy interventions: IADL retraining, joint mobilization, " home exercise program, gait training, functional ROM exercises, flexibility, ADL retraining, balance/weight-bearing training, manual therapy, motor coordination training, neuromuscular re-education, strengthening, stretching, therapeutic activities and transfer training  Frequency: 1x week  Duration in weeks: 8  Treatment plan discussed with: patient  Plan details: 1-2 x a week for 8 weeks            Timed:         Manual Therapy:    0     mins  85153;     Therapeutic Exercise:    17     mins  70402;     Neuromuscular Sandra:    0    mins  67606;    Therapeutic Activity:     0     mins  41550;     Gait Trainin     mins  45139;     Ultrasound:     0     mins  69743;    Ionto                               0    mins   06353  Self Care                       8     mins   98882  Canalith Repos    0     mins 68988      Un-Timed:  Electrical Stimulation:    0     mins  25507 (MC );  Dry Needling     0     mins self-pay  Traction     0     mins 89340        Timed Treatment:   25   mins   Total Treatment:     55   mins          PT: Yessenia Coley PT     License Number: [id number]  Electronically signed by Yessenia Coley PT, 24, 2:39 PM EST    Certification Period: 2024 thru 2025  I certify that the therapy services are furnished while this patient is under my care.  The services outlined above are required by this patient, and will be reviewed every 90 days.         Physician Signature:__________________________________________________    PHYSICIAN: Sada Sinclair APRN  NPI: [id number]                                            Please sign and return via fax to [phone] Thank you, [facility] Physical Therapy.

## 2024-11-26 ENCOUNTER — OFFICE VISIT (OUTPATIENT)
Dept: FAMILY MEDICINE CLINIC | Facility: CLINIC | Age: 89
End: 2024-11-26
Payer: MEDICARE

## 2024-11-26 VITALS
BODY MASS INDEX: 25.64 KG/M2 | OXYGEN SATURATION: 98 % | HEART RATE: 58 BPM | SYSTOLIC BLOOD PRESSURE: 108 MMHG | TEMPERATURE: 96.5 F | HEIGHT: 61 IN | DIASTOLIC BLOOD PRESSURE: 60 MMHG | WEIGHT: 135.8 LBS

## 2024-11-26 DIAGNOSIS — E78.2 MIXED HYPERLIPIDEMIA: ICD-10-CM

## 2024-11-26 DIAGNOSIS — G56.03 BILATERAL CARPAL TUNNEL SYNDROME: ICD-10-CM

## 2024-11-26 DIAGNOSIS — F41.9 ANXIETY: ICD-10-CM

## 2024-11-26 DIAGNOSIS — R53.83 OTHER FATIGUE: ICD-10-CM

## 2024-11-26 DIAGNOSIS — I10 BENIGN ESSENTIAL HYPERTENSION: Primary | ICD-10-CM

## 2024-11-26 PROCEDURE — 99214 OFFICE O/P EST MOD 30 MIN: CPT | Performed by: NURSE PRACTITIONER

## 2024-11-26 PROCEDURE — 1126F AMNT PAIN NOTED NONE PRSNT: CPT | Performed by: NURSE PRACTITIONER

## 2024-11-26 RX ORDER — NEBIVOLOL 10 MG/1
10 TABLET ORAL EVERY MORNING
Qty: 90 TABLET | Refills: 2 | Status: SHIPPED | OUTPATIENT
Start: 2024-11-26

## 2024-11-26 RX ORDER — DOCUSATE SODIUM 100 MG/1
100 CAPSULE, LIQUID FILLED ORAL 2 TIMES DAILY
Qty: 90 CAPSULE | Refills: 2 | Status: SHIPPED | OUTPATIENT
Start: 2024-11-26

## 2024-11-26 RX ORDER — SERTRALINE HYDROCHLORIDE 25 MG/1
25 TABLET, FILM COATED ORAL EVERY EVENING
Qty: 90 TABLET | Refills: 2 | Status: SHIPPED | OUTPATIENT
Start: 2024-11-26

## 2024-11-26 RX ORDER — MONTELUKAST SODIUM 10 MG/1
10 TABLET ORAL
Qty: 90 TABLET | Refills: 2 | Status: SHIPPED | OUTPATIENT
Start: 2024-11-26

## 2024-11-26 NOTE — ASSESSMENT & PLAN NOTE
Hypertension is stable and controlled  Continue current treatment regimen.  Blood pressure will be reassessed in 6 months.    Orders:    nebivolol (BYSTOLIC) 10 MG tablet; Take 1 tablet by mouth Every Morning.

## 2024-11-26 NOTE — PROGRESS NOTES
Chief Complaint  Follow-up, Hypertension, Anxiety, and Carpal Tunnel (Would like referral to a closer specialist)    Subjective        HPI   Lanie presents to [facility] as an 89 year old female to follow up on chronic conditions, to refill medications, obtain referral and to review labs.  Overall doing well    Hypertension-has been well-controlled on current medication.  She takes losartan and amlodipine as directed.  Denies any medication side effects.  No increase or changes in headaches no blurry vision no dizziness no flushing no chest pain or pressure     Anxiety/depression-seems to be well-controlled on sertraline.  She takes her medication as directed without any side effects.  She denies any SI or HI.  She is not currently in counseling.     Hyperlipidemia-she continues to work on a lower cholesterol diet and tries to remain active        She does have some increased fatigue.  States that she does stay very busy and does do a lot of activities.    Reviewed all labs today-  all stable     Denies any polys.  No dysuria no fever or any other cold-like symptoms     She does have seasonal allergies.  Takes montelukast and Zyrtec.  Feels they control her symptoms well and plans to continue      she has been being treated by hand specialist at [place].  She is having a hard time driving that far and is looking to switch to a provider closer to this area for continued care.  Referral placed today.       She has no other acute complaints today     The following portions of the patient's history were reviewed and updated as appropriate: allergies, current medications, past family history, past medical history, past social history, past surgical history, and problem list      Review of Systems   Constitutional:  Negative for chills, fatigue and fever.   Eyes:  Negative for visual disturbance.   Respiratory:  Negative for cough, shortness of breath, wheezing and stridor.   "  Cardiovascular:  Negative for chest pain, palpitations and leg swelling.   Gastrointestinal:  Negative for abdominal pain, diarrhea, nausea and vomiting.   Endocrine: Negative for polydipsia, polyphagia and polyuria.   Neurological:  Negative for dizziness.   Psychiatric/Behavioral:  Negative for self-injury, sleep disturbance and suicidal ideas. The patient is not nervous/anxious.         Objective   Vital Signs:   Vitals:    11/26/24 1454   BP: 108/60   Pulse: 58   Temp: 96.5 °F (35.8 °C)   SpO2: 98%   Weight: 61.6 kg (135 lb 12.8 oz)   Height: 154.9 cm (60.98\")   PainSc: 0-No pain            5/22/2024     2:16 PM   PHQ-2/PHQ-9 Depression Screening   Retired Little Interest or Pleasure in Doing Things 0-->not at all   Retired Feeling Down, Depressed or Hopeless 0-->not at all   Retired PHQ-9: Brief Depression Severity Measure Score 0                 Physical Exam  Vitals reviewed.   Constitutional:       General: She is not in acute distress.  Eyes:      Conjunctiva/sclera: Conjunctivae normal.   Neck:      Thyroid: No thyromegaly.      Vascular: No carotid bruit.   Cardiovascular:      Rate and Rhythm: Normal rate and regular rhythm.      Heart sounds: Normal heart sounds. No murmur heard.  Pulmonary:      Effort: Pulmonary effort is normal. No respiratory distress.      Breath sounds: Normal breath sounds. No stridor. No wheezing, rhonchi or rales.   Chest:      Chest wall: No tenderness.   Neurological:      Mental Status: She is alert and oriented to person, place, and time.   Psychiatric:         Attention and Perception: Attention normal.         Mood and Affect: Mood normal.          Result Review :     The following data was reviewed by: AKUA Mccallum on 11/26/2024:      Hemoglobin A1c (09/23/2024 13:23)  Folate (09/23/2024 13:23)  Vitamin B12 (09/23/2024 13:23)  Vitamin D,25-Hydroxy (09/23/2024 13:23)  T4, Free (09/23/2024 13:23)  TSH (09/23/2024 13:23)  CBC & Differential (09/23/2024 " 13:23)  Comprehensive Metabolic Panel (09/23/2024 13:23)   I have reviewed all lab results which are normal or stable.   Assessment and Plan       Benign essential hypertension  Hypertension is stable and controlled  Continue current treatment regimen.  Blood pressure will be reassessed in 6 months.    Orders:    nebivolol (BYSTOLIC) 10 MG tablet; Take 1 tablet by mouth Every Morning.    Mixed hyperlipidemia   Lipid abnormalities are stable    Plan:  Continue same medication/s without change.      Discussed medication dosage, use, side effects, and goals of treatment in detail.    Counseled patient on lifestyle modifications to help control hyperlipidemia.     Patient Treatment Goals:   LDL goal is less than 70    Followup in 6 months.         Anxiety  Stable-  continue Zoloft working well       Bilateral carpal tunnel syndrome  Referral to hand placed-  requesting closer location-  having trouble driving to [place]    Orders:    Ambulatory Referral to Hand Surgery    Other fatigue  Labs stable             Follow Up   Return in about 6 months (around 5/26/2025) for Medicare Wellness, Next scheduled follow up.  Patient was given instructions and counseling regarding her condition or for health maintenance advice. Please see specific information pulled into the AVS if appropriate.

## 2024-12-05 ENCOUNTER — TREATMENT (OUTPATIENT)
Age: 89
End: 2024-12-05
Payer: MEDICARE

## 2024-12-05 DIAGNOSIS — M25.562 CHRONIC PAIN OF LEFT KNEE: Primary | ICD-10-CM

## 2024-12-05 DIAGNOSIS — G89.29 CHRONIC PAIN OF LEFT KNEE: Primary | ICD-10-CM

## 2024-12-05 PROCEDURE — 97110 THERAPEUTIC EXERCISES: CPT | Performed by: PHYSICAL THERAPIST

## 2024-12-05 PROCEDURE — 97530 THERAPEUTIC ACTIVITIES: CPT | Performed by: PHYSICAL THERAPIST

## 2024-12-05 NOTE — PROGRESS NOTES
"Physical Therapy Daily Treatment Note  [address]  [address]  [facility] [address]  P: [phone]  F: [phone]    Patient: Lanie Ochoa   : 1935  Referring practitioner: AKUA Hung  Date of Initial Visit: Type: THERAPY  Noted: 2024  Today's Date: 2024  Patient seen for 2 sessions       Visit Diagnoses:    ICD-10-CM ICD-9-CM   1. Chronic pain of left knee  M25.562 719.46    G89.29 338.29       Lanie Ochoa reports:     Subjective   Pt reports \"my knee feels ok today\" \"its cold outside\".   Objective   See Exercise, Manual, and Modality Logs for complete treatment.       Assessment/Plan  Pt tolerated therapeutic interventions well without adverse reactions. Reviewed home exercise program and she demonstrates good understanding. Tactile cueing distal quad during LAQ with two pound weight to increase quad activation and hold at end range ext, cues 25% of the time, but able to do this pain free. No instances of knee buckling during standing exercises. Continue per POC.    Timed:         Manual Therapy:    0     mins  80870;     Therapeutic Exercise:    31     mins  35684;     Neuromuscular Sandra:    0    mins  12336;    Therapeutic Activity:     9     mins  04805;     Gait Trainin     mins  92661;     Ultrasound:     0     mins  74728;    Ionto                               0    mins   72830  Self Care                       0     mins   98415  Canalith Repos    0     mins 25222      Un-Timed:  Electrical Stimulation:    0     mins  41781 ( );  Dry Needling     0     mins self-pay  Traction     0     mins 59287      Timed Treatment:   40   mins   Total Treatment:     40   mins    Yessenia Coley PT  [place] License: [id number]                  "

## 2024-12-19 ENCOUNTER — TREATMENT (OUTPATIENT)
Age: 89
End: 2024-12-19
Payer: MEDICARE

## 2024-12-19 DIAGNOSIS — M25.562 CHRONIC PAIN OF LEFT KNEE: Primary | ICD-10-CM

## 2024-12-19 DIAGNOSIS — G89.29 CHRONIC PAIN OF LEFT KNEE: Primary | ICD-10-CM

## 2024-12-19 NOTE — PROGRESS NOTES
"Physical Therapy Daily Treatment Note  [address]  [facility] [address]  P: [phone]  F: [phone]    Patient: Lanie Ochoa   : 1935  Referring practitioner: AKUA Hung  Date of Initial Visit: Type: THERAPY  Noted: 2024  Today's Date: 2024  Patient seen for 3 sessions       Visit Diagnoses:    ICD-10-CM ICD-9-CM   1. Chronic pain of left knee  M25.562 719.46    G89.29 338.29       Lanie Ochoa reports:     Subjective   Pt reports that a week ago she was trying to sit in a chair and she missed it and skinned up the front of her R knee. Pt reports she went to urgent care and they bandaged it. Pt reports it is doing good. Pt reports that her left knee has been \"pretty good\".  Objective   See Exercise, Manual, and Modality Logs for complete treatment.       Assessment/Plan  Did well with one lb weight progression for supine straight leg raise. Able to lift leg without quad lag with added weight. Needs tactile cueing at glute med on L side to improve gluteal activation and proper hip stacked position to prevent compensations from other muscles. Also benefits from t/c at glutes during supine bridging to ensure proper activation. Continues to benefit from PT to improve strengthening and functional mobility. Continues to perform her HEP appropriately. Continue per POC.    Timed:         Manual Therapy:    0     mins  28862;     Therapeutic Exercise:    30     mins  56836;     Neuromuscular Sandra:    0    mins  50033;    Therapeutic Activity:     10     mins  80718;     Gait Trainin     mins  45050;     Ultrasound:     0     mins  44624;    Ionto                               0    mins   56018  Self Care                       0     mins   16289  Canalith Repos    0     mins 70704      Un-Timed:  Electrical Stimulation:    0     mins  03404 (MC );  Dry Needling     0     mins self-pay  Traction     0     mins 28772      Timed Treatment:   " 40   mins   Total Treatment:     40   mins    Yessenia Coley, PT  [place] License: [id number]

## 2024-12-30 ENCOUNTER — TREATMENT (OUTPATIENT)
Age: 89
End: 2024-12-30
Payer: MEDICARE

## 2024-12-30 DIAGNOSIS — M25.562 CHRONIC PAIN OF LEFT KNEE: Primary | ICD-10-CM

## 2024-12-30 DIAGNOSIS — G89.29 CHRONIC PAIN OF LEFT KNEE: Primary | ICD-10-CM

## 2024-12-30 PROCEDURE — 97110 THERAPEUTIC EXERCISES: CPT | Performed by: PHYSICAL THERAPIST

## 2024-12-30 PROCEDURE — 97530 THERAPEUTIC ACTIVITIES: CPT | Performed by: PHYSICAL THERAPIST

## 2024-12-30 NOTE — PROGRESS NOTES
"Re-Assessment / Progress Note  [address]  [address]  [facility] [address]  P: [phone]  F: [phone]  Patient: Lanie Ochoa   : 1935  Diagnosis/ICD-10 Code:  Chronic pain of left knee [M25.562, G89.29]  Referring practitioner: AKUA Hung  Date of Initial Visit: Episode Type: THERAPY  Noted: 2024    Today's Date: 2024  Patient seen for 4 sessions.    Visit Diagnoses:    ICD-10-CM ICD-9-CM   1. Chronic pain of left knee  M25.562 719.46    G89.29 338.29       Subjective:   Lanie Ochoa reports:     Subjective Questionnaire: WOMAC:   Clinical Progress: progressing  Home Program Compliance: Yes  Treatment has included: therapeutic exercise, neuromuscular re-education, manual therapy, therapeutic activity, and gait training    Subjective   Pt reports \"sometimes it doesn't hurt at all and it just depends, some time it gets to a 4/10\". Pt reports her knee feels \"about the same\"  Current pain ratin  At worst pain ratin  Objective   Active Range of Motion   Left Knee   Flexion: 116 degrees   Extension: 0 degrees      Right Knee   Flexion: 120 degrees   Extension: 0 degrees      Passive Range of Motion   Left Knee   Flexion: 119 degrees   Extension: 0 degrees      Strength/Myotome Testing      Left Hip   Planes of Motion   Flexion: 4+  Extension: 4  Abduction: 2+  Adduction: 5     Right Hip   Planes of Motion   Flexion: 4+  Extension: 4  Abduction: 3  Adduction: 5     Left Knee   Flexion: 4+  Extension: 4+  Quadriceps contraction: good     Right Knee   Flexion: 4+  Extension: 4+  Quadriceps contraction: good     Ambulation   Weight-Bearing Status   Weight-Bearing Status (Left): full weight bearing   Weight-Bearing Status (Right): full weight-bearing    Assistive device used: none     Ambulation: Level Surfaces   Ambulation without assistive device: independent     Ambulation: Stairs   Ascend stairs: independent  Pattern: non-reciprocal  Railings: one " rail  Descend stairs: independent  Pattern: non-reciprocal  Railings: one rail     Observational Gait   Gait: within functional limits   Increased right stance time and left swing time. Decreased walking speed, stride length, left stance time and right swing time.          Assessment/Plan  Pt is an 88 y/o female who has been attending PT for 4 visits. She has been progressing towards short and long term goals in PT. However, she reports her pain range in her left knee is about the same since starting PT (0 to 4 out of 10). Pt has made improvements in L knee strength and can tolerate resistance during manual muscle testing much better than at initial evaluation. She still has strength deficits in her lower extremities and still experiences discomfort and stiffness with left knee flexion activities. Pt has difficulty ascending and descending stairs and ambulating without increased left knee pain. She benefits from continued skilled PT in order to continue to address her strength and ROM impairments in order to improve ADL/IADL performance. She remains compliant with PT. Continue per POC.  Progress toward previous goals: Partially Met    Goals  Plan Goals: STG 2-4 weeks     1. Decrease WOMAC score by 10 points to show improvement in overall functional activities NOT MET  2. Increase knee flexion ROM to 120 degrees of AROM on left in order to be able to improve knee bend progressing  3. Pt will be independent in home exercise program MET  4. Increase MMT for knee to 4+/5 or greater in order to be able to improve strength in stairs/gait MET     LTG 4-8 weeks     1. Decrease WOMAc score by 13 points or more from IE to show improvement in overall functional activities NOT MET  2. Increase MMT for knee to 5/5 in order to be able to improve strength during gait/stairs progressing  3. Pt will be able to ascend/descend a flight of stairs pain free one step at a time without knee buckling and using hand rail progressing  4. Pt  will be able to ambulate 1.0-2.0 mph for 5 minutes in order to be able to walk a block at her patio home without knee buckling NOT MET       Recommendations: Continue as planned  Timeframe: 2 months  Prognosis to achieve goals: good    PT Signature: Yessenia Coley, PT  [place] Lic. # [id number]      Based upon review of the patient's progress and continued therapy plan, it is my medical opinion that Lanie Ochoa should continue physical therapy treatment at [facility]  [address]  [facility] [address] [phone] ; Fax Number [phone]    Signature: __________________________________  Sada Sinclair APRN    Manual Therapy:     0     mins  01379;  Therapeutic Exercise:     31     mins  90082;     Neuromuscular Sandra:     0    mins  01070;    Therapeutic Activity:      10     mins  07862;     Gait Trainin     mins  90474;     Ultrasound:      0     mins  98982;    Electrical Stimulation:     0     mins  77761 ( );  Dry Needling      0     mins self-pay  Traction      0     mins 47159  Canalith Repositioning    0     mins 08429      Timed Treatment:   41   mins   Total Treatment:     41   mins

## 2025-01-13 ENCOUNTER — TREATMENT (OUTPATIENT)
Age: OVER 89
End: 2025-01-13
Payer: MEDICARE

## 2025-01-13 DIAGNOSIS — M25.562 CHRONIC PAIN OF LEFT KNEE: Primary | ICD-10-CM

## 2025-01-13 DIAGNOSIS — G89.29 CHRONIC PAIN OF LEFT KNEE: Primary | ICD-10-CM

## 2025-01-13 PROCEDURE — 97110 THERAPEUTIC EXERCISES: CPT | Performed by: PHYSICAL THERAPIST

## 2025-01-13 PROCEDURE — 97530 THERAPEUTIC ACTIVITIES: CPT | Performed by: PHYSICAL THERAPIST

## 2025-01-13 NOTE — PROGRESS NOTES
"Physical Therapy Daily Treatment Note  [address]  [facility] [address]  P: [phone]  F: [phone]    Patient: Lanie Ochoa   : 1935  Referring practitioner: AKUA Hung  Date of Initial Visit: Type: THERAPY  Noted: 2024  Today's Date: 2025  Patient seen for 5 sessions       Visit Diagnoses:    ICD-10-CM ICD-9-CM   1. Chronic pain of left knee  M25.562 719.46    G89.29 338.29       Lanie Ochoa reports:     Subjective   Pt reports \"sometimes my knee feels ok and sometimes it feels about the same\"  Objective   See Exercise, Manual, and Modality Logs for complete treatment.   Gait: pt ambulates in/out of clinic safely and independently     Added: leg press DL  Progressed: standing hip abd/ext ylw loop  Assessment/Plan  Pt did well with hip strengthening progression with added resistance to standing hip abduction and extension. No cueing required for form with this progression. Benefits from continued hip strengthening to help support her knee joint during tranfers, standing, and walking. Did very well with leg press, one verbal cueing to promote a 'slow and controlled movement' on the return of knee flexion on leg press. No reports of knee pain during this added intervention. Pt has MD appt tomorrow. Continue per POC.    Timed:         Manual Therapy:    0     mins  37738;     Therapeutic Exercise:    30     mins  99031;     Neuromuscular Sandra:    0    mins  63195;    Therapeutic Activity:     10     mins  07388;     Gait Trainin     mins  58292;     Ultrasound:     0     mins  40615;    Ionto                               0    mins   85573  Self Care                       0     mins   41842  Canalith Repos    0     mins 55657      Un-Timed:  Electrical Stimulation:    0     mins  91622 (MC );  Dry Needling     0     mins self-pay  Traction     0     mins 41465      Timed Treatment:   40   mins   Total Treatment:     40   " mins    Yessenia Coley, PT  [place] License: [id number]

## 2025-01-14 ENCOUNTER — OFFICE VISIT (OUTPATIENT)
Dept: ORTHOPEDIC SURGERY | Facility: CLINIC | Age: OVER 89
End: 2025-01-14
Payer: MEDICARE

## 2025-01-14 VITALS — WEIGHT: 140.1 LBS | HEIGHT: 61 IN | TEMPERATURE: 97.3 F | BODY MASS INDEX: 26.45 KG/M2

## 2025-01-14 DIAGNOSIS — Z96.652 STATUS POST LEFT KNEE REPLACEMENT: Primary | ICD-10-CM

## 2025-01-14 NOTE — PROGRESS NOTES
"Patient: Lanie Ochoa  YOB: 1935 89 y.o. female  Medical Record Number: [id number]    Chief Complaints:   Chief Complaint   Patient presents with    Left Knee - Initial Evaluation, Pain       History of Present Illness:Lanie Ochoa is a 89 y.o. female who presents for follow-up of  left knee feeling uncomfortable-  replaced by Dr Castle.     Allergies:   Allergies   Allergen Reactions    Sulfa Antibiotics Rash       Medications:   Current Outpatient Medications   Medication Sig Dispense Refill    alendronate (Fosamax) 70 MG tablet Take 1 tablet by mouth Every 7 (Seven) Days. 13 tablet 3    amLODIPine (NORVASC) 10 MG tablet Take 1 tablet by mouth Daily. 90 tablet 2    calcium carbonate (OS-NELLI) 600 MG tablet Take 1 tablet by mouth Daily.      cetirizine (zyrTEC) 10 MG tablet Take 1 tablet by mouth Daily. 90 tablet 2    Cholecalciferol (VITAMIN D3) 400 UNITS capsule Take 1 tablet by mouth Daily.      docusate sodium (Colace) 100 MG capsule Take 1 capsule by mouth 2 (Two) Times a Day. 90 capsule 2    losartan (COZAAR) 100 MG tablet Take 1 tablet by mouth Daily. 90 tablet 2    montelukast (SINGULAIR) 10 MG tablet Take 1 tablet by mouth every night at bedtime. 90 tablet 2    nebivolol (BYSTOLIC) 10 MG tablet Take 1 tablet by mouth Every Morning. 90 tablet 2    sertraline (ZOLOFT) 25 MG tablet Take 1 tablet by mouth Every Evening. 90 tablet 2     No current facility-administered medications for this visit.         The following portions of the patient's history were reviewed and updated as appropriate: allergies, current medications, past family history, past medical history, past social history, past surgical history and problem list.    Review of Systems:   Pertinent positives/negative listed in HPI above    Physical Exam:   Vitals:    01/14/25 1245   Temp: 97.3 °F (36.3 °C)   TempSrc: Temporal   Weight: 63.5 kg (140 lb 1.6 oz)   Height: 154.9 cm (61\")   PainSc:   3   PainLoc: Knee "       General: A and O x 3, ASA, NAD      Knee Exam List: Knee:  left    ALIGNMENT:     Neutral  ,   Patella  tracks  Midline without crepitance    GAIT:    Nonantalgic    SKIN:    Well healed midline incision, no erythema or fluctuance    RANGE OF MOTION:   0  -  120   DEG    STRENGTH:   5  / 5    LIGAMENTS:    No varus / valgus instability.   Mild anterior posterior flexion   laxity at 90 degrees.       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  I will see her back as needed if she continues to progress with symptoms      There are no diagnoses linked to this encounter.    Kiko Solano MD  1/14/2025

## 2025-01-17 ENCOUNTER — PATIENT ROUNDING (BHMG ONLY) (OUTPATIENT)
Dept: ORTHOPEDIC SURGERY | Facility: CLINIC | Age: OVER 89
End: 2025-01-17
Payer: MEDICARE

## 2025-01-17 NOTE — PROGRESS NOTES
January 17, 2025      A [employer] Message has been sent to the patient for PATIENT ROUNDING with [facility]

## 2025-01-20 ENCOUNTER — TREATMENT (OUTPATIENT)
Age: OVER 89
End: 2025-01-20
Payer: MEDICARE

## 2025-01-20 DIAGNOSIS — M25.562 CHRONIC PAIN OF LEFT KNEE: Primary | ICD-10-CM

## 2025-01-20 DIAGNOSIS — G89.29 CHRONIC PAIN OF LEFT KNEE: Primary | ICD-10-CM

## 2025-01-20 PROCEDURE — 97530 THERAPEUTIC ACTIVITIES: CPT | Performed by: PHYSICAL THERAPIST

## 2025-01-20 PROCEDURE — 97110 THERAPEUTIC EXERCISES: CPT | Performed by: PHYSICAL THERAPIST

## 2025-01-20 NOTE — PROGRESS NOTES
"Physical Therapy Daily Treatment Note  [address]  [address]  [facility] [address]  P: [phone]  F: [phone]    Patient: Lanie Ochoa   : 1935  Referring practitioner: AKUA Hung  Date of Initial Visit: Type: THERAPY  Noted: 2024  Today's Date: 2025  Patient seen for 6 sessions       Visit Diagnoses:    ICD-10-CM ICD-9-CM   1. Chronic pain of left knee  M25.562 719.46    G89.29 338.29       Lanie Ochoa reports:     Subjective   Pt reports that the doctor does not want to do any further surgery. Pt reports that \"I can walk on it, and it is doing ok\" \"I can walk a block, but I sure can't walk a mile\"  Objective   See Exercise, Manual, and Modality Logs for complete treatment.       Assessment/Plan  Excellent job today with eccentric control of quads on leg press machine, no cueing required for controlled movement. Pt does benefit from one verbal cue to extend hips to prevent fwd flexion posture in standing when in parallel bars, does well with cue and can maintain this during standing hamstring curls. Pt has one more visit and she would like next visit to be her last visit, pt states after session \"I can do my exercises at home\". Plan- reassess goals next visit, update home exercise program. Prepare for discharge from formal PT.     Timed:         Manual Therapy:    0     mins  03546;     Therapeutic Exercise:    30     mins  47970;     Neuromuscular Sandra:    0    mins  32567;    Therapeutic Activity:     10     mins  08703;     Gait Trainin     mins  73147;     Ultrasound:     0     mins  66919;    Ionto                               0    mins   77065  Self Care                       0     mins   60852  Canalith Repos    0     mins 06355      Un-Timed:  Electrical Stimulation:    0     mins  75750 (MC );  Dry Needling     0     mins self-pay  Traction     0     mins 17948      Timed Treatment:   40   mins   Total Treatment:     40   " mins    Yessenia Coley, PT  [place] License: [id number]                   [id number] Exp Problem Focused - Mod. Complex

## 2025-01-27 ENCOUNTER — TREATMENT (OUTPATIENT)
Age: OVER 89
End: 2025-01-27
Payer: MEDICARE

## 2025-01-27 DIAGNOSIS — G89.29 CHRONIC PAIN OF LEFT KNEE: Primary | ICD-10-CM

## 2025-01-27 DIAGNOSIS — M25.562 CHRONIC PAIN OF LEFT KNEE: Primary | ICD-10-CM

## 2025-01-27 PROCEDURE — 97110 THERAPEUTIC EXERCISES: CPT | Performed by: PHYSICAL THERAPIST

## 2025-01-27 PROCEDURE — 97530 THERAPEUTIC ACTIVITIES: CPT | Performed by: PHYSICAL THERAPIST

## 2025-01-27 NOTE — PROGRESS NOTES
"Re-Assessment / Progress Note / Discharge Summary  [address]  [address]  [facility] [address]  P: [phone]  F: [phone]  Patient: Lanie Ochoa   : 1935  Diagnosis/ICD-10 Code:  Chronic pain of left knee [M25.562, G89.29]  Referring practitioner: AKUA Hung  Date of Initial Visit: Episode Type: THERAPY  Noted: 2024    Today's Date: 2025  Patient seen for 7 sessions.    Visit Diagnoses:    ICD-10-CM ICD-9-CM   1. Chronic pain of left knee  M25.562 719.46    G89.29 338.29       Subjective:   Lanie Ochoa reports:     Subjective Questionnaire: WOMAC:   Home Program Compliance: Yes  Treatment has included: therapeutic exercise, neuromuscular re-education, manual therapy, therapeutic activity, and gait training    Subjective   Pt reports that her knee feels \"ok\". \"Sometimes my knee doesn't bother me, and sometimes it does\". Pt reports that she wants to do her exercises on her own at home and will come back if it worsens. \"I am going to try and start going to the pool again\".   Objective   Active Range of Motion   Left Knee   Flexion: 120 degrees   Extension: 0 degrees      Right Knee   Flexion: 120 degrees   Extension: 0 degrees         Strength/Myotome Testing      Left Hip   Planes of Motion   Flexion: 4+  Extension: 4  Abduction: 3  Adduction: 5     Right Hip   Planes of Motion   Flexion: 4+  Extension: 4  Abduction: 3  Adduction: 5     Left Knee   Flexion: 5  Extension: 5  Quadriceps contraction: good     Right Knee   Flexion: 5  Extension: 5  Quadriceps contraction: good     Assessment/Plan  Pt is an 88 y/o female who has been attending physical therapy for ~2 months. Pt has made some improvements in her lower extremity strength and knee improvement since initial evaluation. However, she still has c/o pain with stairs, but can safely navigate using a hand rail without knee buckling one step at a time. She has improved her knee strength to 5/5 during " manual muscle testing as well and now has 120 degrees of knee bend which she did not have at her first visit. Pt would like to do a home exercise program only and issued an updated HEP after today's visit. Pt instructed to continue to perform home exercise program to maintain progress made in therapy. She voices that she will do this and she demonstrates good understanding of HEP. Pt is self-discharging from formal PT at this time and she will follow up in the future if needed or if her pain worsens. Due to good compliance with her home exercises, discharge prognosis is good.       Goals  Plan Goals: STG 2-4 weeks     1. Decrease WOMAC score by 10 points to show improvement in overall functional activities NOT MET  2. Increase knee flexion ROM to 120 degrees of AROM on left in order to be able to improve knee bend MET  3. Pt will be independent in home exercise program MET  4. Increase MMT for knee to 4+/5 or greater in order to be able to improve strength in stairs/gait MET     LTG 4-8 weeks     1. Decrease WOMAC score by 13 points or more from IE to show improvement in overall functional activities NOT MET  2. Increase MMT for knee to 5/5 in order to be able to improve strength during gait/stairs MET  3. Pt will be able to ascend/descend a flight of stairs pain free one step at a time without knee buckling and using hand rail progressing  4. Pt will be able to ambulate 1.0-2.0 mph for 5 minutes in order to be able to walk a block at her patio home without knee buckling progressing           Recommendations: Discharge  Timeframe:  today      PT Signature: Yessenia Coley, PT  [place] Lic. # [id number]      [facility]  [address]  [facility] [address] [phone] ; Fax Number [phone]    Signature: __________________________________  Sada Sinclair APRN    Manual Therapy:     0     mins  50333;  Therapeutic Exercise:     29     mins  18138;     Neuromuscular Sandra:     0     mins  52966;    Therapeutic Activity:      10     mins  25249;     Gait Trainin     mins  60128;     Ultrasound:      0     mins  60813;    Electrical Stimulation:     0     mins  50819 ( );  Dry Needling      0     mins self-pay  Traction      0     mins 41335  Canalith Repositioning    0     mins 33267      Timed Treatment:   39   mins   Total Treatment:     39   mins

## 2025-05-12 ENCOUNTER — OFFICE VISIT (OUTPATIENT)
Dept: CARDIOLOGY | Age: OVER 89
End: 2025-05-12
Payer: MEDICARE

## 2025-05-12 VITALS
SYSTOLIC BLOOD PRESSURE: 138 MMHG | DIASTOLIC BLOOD PRESSURE: 84 MMHG | WEIGHT: 137 LBS | BODY MASS INDEX: 25.86 KG/M2 | HEIGHT: 61 IN | HEART RATE: 65 BPM

## 2025-05-12 DIAGNOSIS — I10 BENIGN ESSENTIAL HYPERTENSION: ICD-10-CM

## 2025-05-12 DIAGNOSIS — I35.0 AORTIC STENOSIS, MODERATE: Primary | ICD-10-CM

## 2025-05-12 PROCEDURE — 93000 ELECTROCARDIOGRAM COMPLETE: CPT | Performed by: INTERNAL MEDICINE

## 2025-05-12 PROCEDURE — 99214 OFFICE O/P EST MOD 30 MIN: CPT | Performed by: INTERNAL MEDICINE

## 2025-05-12 NOTE — PROGRESS NOTES
"      CARDIOLOGY    Ray Downs MD    ENCOUNTER DATE:  05/12/2025    Lanie Ochoa / 89 y.o. / female        CHIEF COMPLAINT / REASON FOR OFFICE VISIT     Aortic stenosis, moderate (05/08/2024 Follow up)  Hypertension    HISTORY OF PRESENT ILLNESS       HPI  Lanie Ochoa is a 89 y.o. female who presents today for reevaluation.  Overall she is actually doing relatively well.  She says she is slow down a bit but she is still swinging but she is just not going as far as she used to do.  She remains asymptomatic with this and feels good overall.  Clinically patient looks great.      The following portions of the patient's history were reviewed and updated as appropriate: allergies, current medications, past family history, past medical history, past social history, past surgical history and problem list.      VITAL SIGNS     Visit Vitals  /84 (BP Location: Left arm)   Pulse 65   Ht 154.9 cm (61\")   Wt 62.1 kg (137 lb)   LMP  (LMP Unknown)   BMI 25.89 kg/m²         Wt Readings from Last 3 Encounters:   05/12/25 62.1 kg (137 lb)   01/14/25 63.5 kg (140 lb 1.6 oz)   12/11/24 61.2 kg (135 lb)     Body mass index is 25.89 kg/m².      REVIEW OF SYSTEMS   ROS        PHYSICAL EXAMINATION     Vitals reviewed.   Constitutional:       Appearance: Healthy appearance.   Pulmonary:      Effort: Pulmonary effort is normal.   Cardiovascular:      PMI at left midclavicular line. Normal rate. Regular rhythm. Normal S1. Normal S2.       Murmurs: There is a grade 2/6 systolic murmur.      No gallop.  No click. No rub.   Pulses:     Intact distal pulses.   Edema:     Peripheral edema absent.   Neurological:      Mental Status: Alert and oriented to person, place and time.           REVIEWED DATA       ECG 12 Lead    Date/Time: 5/12/2025 3:42 PM  Performed by: Ray Downs MD    Authorized by: Ray Downs MD  Comparison: compared with previous ECG from 5/8/2024  Similar to previous ECG  Rhythm: sinus " rhythm  Conduction: right bundle branch block    Clinical impression: abnormal EKG          Cardiac Procedures:            ASSESSMENT & PLAN      Diagnosis Plan   1. Aortic stenosis, moderate  Adult Transthoracic Echo Complete W/ Cont if Necessary Per Protocol      2. Benign essential hypertension              SUMMARY/DISCUSSION  Hypertension blood pressure is great  Moderate aortic stenosis.  Patient still has a murmur she seems to be asymptomatic.  Will get a repeat her echocardiogram next year sooner course if she develops any symptoms.  History of right bundle branch block unchanged  Follow-up 1 year sooner if issues.        MEDICATIONS         Discharge Medications            Accurate as of May 12, 2025  3:41 PM. If you have any questions, ask your nurse or doctor.                Continue These Medications        Instructions Start Date   alendronate 70 MG tablet  Commonly known as: Fosamax   70 mg, Oral, Every 7 Days      amLODIPine 10 MG tablet  Commonly known as: NORVASC   10 mg, Oral, Daily      calcium carbonate 600 MG tablet  Commonly known as: OS-NELLI   600 mg, Daily      cetirizine 10 MG tablet  Commonly known as: zyrTEC   10 mg, Oral, Daily      docusate sodium 100 MG capsule  Commonly known as: Colace   100 mg, Oral, 2 Times Daily      losartan 100 MG tablet  Commonly known as: COZAAR   100 mg, Oral, Daily      montelukast 10 MG tablet  Commonly known as: SINGULAIR   10 mg, Oral, Every Night at Bedtime      nebivolol 10 MG tablet  Commonly known as: BYSTOLIC   10 mg, Oral, Every Morning      sertraline 25 MG tablet  Commonly known as: ZOLOFT   25 mg, Oral, Every Evening      Vitamin D3 10 MCG (400 UNIT) capsule   1 tablet, Daily                   **Dragon Disclaimer:   Much of this encounter note is an electronic transcription/translation of spoken language to printed text. The electronic translation of spoken language may permit erroneous, or at times, nonsensical words or phrases to be inadvertently  transcribed. Although I have reviewed the note for such errors, some may still exist.

## 2025-05-29 ENCOUNTER — OFFICE VISIT (OUTPATIENT)
Dept: FAMILY MEDICINE CLINIC | Facility: CLINIC | Age: OVER 89
End: 2025-05-29
Payer: MEDICARE

## 2025-05-29 ENCOUNTER — TELEPHONE (OUTPATIENT)
Dept: FAMILY MEDICINE CLINIC | Facility: CLINIC | Age: OVER 89
End: 2025-05-29

## 2025-05-29 VITALS
HEIGHT: 61 IN | TEMPERATURE: 98.6 F | DIASTOLIC BLOOD PRESSURE: 60 MMHG | BODY MASS INDEX: 26.17 KG/M2 | SYSTOLIC BLOOD PRESSURE: 116 MMHG | OXYGEN SATURATION: 97 % | HEART RATE: 71 BPM | WEIGHT: 138.6 LBS

## 2025-05-29 DIAGNOSIS — E78.2 MIXED HYPERLIPIDEMIA: ICD-10-CM

## 2025-05-29 DIAGNOSIS — M79.641 BILATERAL HAND PAIN: ICD-10-CM

## 2025-05-29 DIAGNOSIS — F41.9 ANXIETY: ICD-10-CM

## 2025-05-29 DIAGNOSIS — G56.03 BILATERAL CARPAL TUNNEL SYNDROME: ICD-10-CM

## 2025-05-29 DIAGNOSIS — M79.642 BILATERAL HAND PAIN: ICD-10-CM

## 2025-05-29 DIAGNOSIS — I35.0 AORTIC STENOSIS, MODERATE: ICD-10-CM

## 2025-05-29 DIAGNOSIS — Z00.00 MEDICARE ANNUAL WELLNESS VISIT, SUBSEQUENT: Primary | ICD-10-CM

## 2025-05-29 DIAGNOSIS — I10 BENIGN ESSENTIAL HYPERTENSION: ICD-10-CM

## 2025-05-29 RX ORDER — AMLODIPINE BESYLATE 10 MG/1
10 TABLET ORAL DAILY
Qty: 90 TABLET | Refills: 2 | Status: SHIPPED | OUTPATIENT
Start: 2025-05-29

## 2025-05-29 RX ORDER — LOSARTAN POTASSIUM 100 MG/1
100 TABLET ORAL DAILY
Qty: 90 TABLET | Refills: 2 | Status: SHIPPED | OUTPATIENT
Start: 2025-05-29

## 2025-05-29 RX ORDER — MONTELUKAST SODIUM 10 MG/1
10 TABLET ORAL
Qty: 90 TABLET | Refills: 2 | Status: SHIPPED | OUTPATIENT
Start: 2025-05-29

## 2025-05-29 RX ORDER — SERTRALINE HYDROCHLORIDE 25 MG/1
25 TABLET, FILM COATED ORAL EVERY EVENING
Qty: 90 TABLET | Refills: 2 | Status: SHIPPED | OUTPATIENT
Start: 2025-05-29

## 2025-05-29 RX ORDER — NEBIVOLOL 10 MG/1
10 TABLET ORAL EVERY MORNING
Qty: 90 TABLET | Refills: 2 | Status: SHIPPED | OUTPATIENT
Start: 2025-05-29

## 2025-05-29 NOTE — PROGRESS NOTES
The ABCs of the Annual Wellness Visit  Subsequent Medicare Wellness Visit    Subjective    Lanie Ochoa is a 89 y.o. female who presents for a Subsequent Medicare Wellness Visit.    The following portions of the patient's history were reviewed and   updated as appropriate: allergies, current medications, past family history, past medical history, past social history, past surgical history, and problem list.    Compared to one year ago, the patient feels her physical   health is worse.    Compared to one year ago, the patient feels her mental   health is the same.    Recent Hospitalizations:  She was not admitted to the hospital during the last year.       Current Medical Providers:  Patient Care Team:  Marisela Harman APRN as PCP - General (Nurse Practitioner)    Outpatient Medications Prior to Visit   Medication Sig Dispense Refill   • alendronate (Fosamax) 70 MG tablet Take 1 tablet by mouth Every 7 (Seven) Days. 13 tablet 3   • calcium carbonate (OS-NELLI) 600 MG tablet Take 1 tablet by mouth Daily.     • cetirizine (zyrTEC) 10 MG tablet Take 1 tablet by mouth Daily. 90 tablet 2   • Cholecalciferol (VITAMIN D3) 400 UNITS capsule Take 1 tablet by mouth Daily.     • docusate sodium (Colace) 100 MG capsule Take 1 capsule by mouth 2 (Two) Times a Day. 90 capsule 2   • amLODIPine (NORVASC) 10 MG tablet Take 1 tablet by mouth Daily. 90 tablet 2   • losartan (COZAAR) 100 MG tablet Take 1 tablet by mouth Daily. 90 tablet 2   • montelukast (SINGULAIR) 10 MG tablet Take 1 tablet by mouth every night at bedtime. 90 tablet 2   • nebivolol (BYSTOLIC) 10 MG tablet Take 1 tablet by mouth Every Morning. 90 tablet 2   • sertraline (ZOLOFT) 25 MG tablet Take 1 tablet by mouth Every Evening. 90 tablet 2     No facility-administered medications prior to visit.       No opioid medication identified on active medication list. I have reviewed chart for other potential  high risk medication/s and harmful drug interactions in the  "elderly.        Aspirin is not on active medication list.  Aspirin use is not indicated based on review of current medical condition/s. Risk of harm outweighs potential benefits.  .    Patient Active Problem List   Diagnosis   • Anxiety   • HLD (hyperlipidemia)   • Benign essential hypertension   • Osteopenia   • Avitaminosis D   • History of left knee replacement   • Titubation   • Non-rheumatic mitral regurgitation   • Non-rheumatic tricuspid valve insufficiency   • Chronic pain of right knee   • Venous incompetence   • Dysfunction of both eustachian tubes   • Primary localized osteoarthrosis of right lower leg   • Primary osteoarthritis of right knee   • OA (osteoarthritis) of knee   • Lumbar spinal stenosis   • Spinal stenosis of lumbar region with neurogenic claudication   • Syncope   • RBBB (right bundle branch block)   • Subarachnoid hemorrhage   • Localized osteoporosis without current pathological fracture   • Skin lesions   • Aortic stenosis, moderate     Advance Care Planning   Advance Care Planning     Advance Directive is not on file.  ACP discussion was held with the patient during this visit. Patient does not have an advance directive, information provided.     Objective    Vitals:    05/29/25 1352   BP: 116/60   Pulse: 71   Temp: 98.6 °F (37 °C)   TempSrc: Temporal   SpO2: 97%   Weight: 62.9 kg (138 lb 9.6 oz)   Height: 154.9 cm (61\")   PainSc: 2    PainLoc: Hand     Estimated body mass index is 26.19 kg/m² as calculated from the following:    Height as of this encounter: 154.9 cm (61\").    Weight as of this encounter: 62.9 kg (138 lb 9.6 oz).           Does the patient have evidence of cognitive impairment? Yes          HEALTH RISK ASSESSMENT    Smoking Status:  Social History     Tobacco Use   Smoking Status Never   Smokeless Tobacco Never   Tobacco Comments    No Caffeine use     Alcohol Consumption:  Social History     Substance and Sexual Activity   Alcohol Use No     Fall Risk Screen:    STEADI " Fall Risk Assessment was completed, and patient is at LOW risk for falls.Assessment completed on:2025    Depression Screenin/29/2025     1:47 PM   PHQ-2/PHQ-9 Depression Screening   Little interest or pleasure in doing things Not at all   Feeling down, depressed, or hopeless Not at all   How difficult have these problems made it for you to do your work, take care of things at home, or get along with other people? Not difficult at all       Health Habits and Functional and Cognitive Screenin/29/2025     1:47 PM   Functional & Cognitive Status   Do you have difficulty preparing food and eating? No   Do you have difficulty bathing yourself, getting dressed or grooming yourself? No   Do you have difficulty using the toilet? No   Do you have difficulty moving around from place to place? No   Do you have trouble with steps or getting out of a bed or a chair? No   Current Diet Well Balanced Diet   Dental Exam Up to date   Eye Exam Up to date   Exercise (times per week) 3 times per week   Current Exercises Include Swimming   Do you need help using the phone?  No   Are you deaf or do you have serious difficulty hearing?  No   Do you need help to go to places out of walking distance? No   Do you need help shopping? No   Do you need help preparing meals?  No   Do you need help with housework?  No   Do you need help with laundry? No   Do you need help taking your medications? No   Do you need help managing money? No   Do you ever drive or ride in a car without wearing a seat belt? No   Have you felt unusual stress, anger or loneliness in the last month? No   Who do you live with? Alone   If you need help, do you have trouble finding someone available to you? No   Have you been bothered in the last four weeks by sexual problems? No   Do you have difficulty concentrating, remembering or making decisions? No       Age-appropriate Screening Schedule:  Refer to the list below for future screening  recommendations based on patient's age, sex and/or medical conditions. Orders for these recommended tests are listed in the plan section. The patient has been provided with a written plan.    Health Maintenance   Topic Date Due   • LIPID PANEL  11/28/2024   • ZOSTER VACCINE (3 of 3) 11/25/2025 (Originally 6/3/2019)   • COVID-19 Vaccine (7 - 2024-25 season) 11/29/2025 (Originally 4/14/2025)   • INFLUENZA VACCINE  07/01/2025   • DXA SCAN  09/25/2025   • ANNUAL WELLNESS VISIT  05/29/2026   • TDAP/TD VACCINES (3 - Td or Tdap) 04/14/2029   • RSV Vaccine - Adults  Completed   • Pneumococcal Vaccine 50+  Completed   • MAMMOGRAM  Discontinued                  CMS Preventative Services Quick Reference  Risk Factors Identified During Encounter  Immunizations Discussed/Encouraged: Influenza  Dental Screening Recommended  Vision Screening Recommended  The above risks/problems have been discussed with the patient.  Pertinent information has been shared with the patient in the After Visit Summary.  An After Visit Summary and PPPS were made available to the patient.    Follow Up:   Next Medicare Wellness visit to be scheduled in 1 year.       Additional E&M Note during same encounter follows:  Patient has multiple medical problems which are significant and separately identifiable that require additional work above and beyond the Medicare Wellness Visit.      Chief Complaint  Medicare Wellness-subsequent    Subjective        HPI  Lanie Escobarki is also being seen today for subsequent Medicare wellness visit and to follow-up on chronic conditions, refill medications, review labs.  Overall doing well    Hypertension-has been well-controlled on current medication.  She takes losartan and amlodipine as directed.  Denies any medication side effects.  No increase or changes in headaches no blurry vision no dizziness no flushing no chest pain or pressure     Anxiety/depression-seems to be well-controlled on sertraline.  She takes her  medication as directed without any side effects.  She denies any SI or HI.  She is not currently in counseling.     Hyperlipidemia-she continues to work on a lower cholesterol diet and tries to remain active  .     She does have some continued fatigue.  States that she does stay very busy and does do a lot of activities.    Reviewed all labs today-  all stable  Denies any polys.  No dysuria no fever or any other cold-like symptoms     She does have seasonal allergies.  Takes montelukast and Zyrtec.  Feels they control her symptoms well and plans to continue       She is having a hard time driving related to pain, tremor and numbness and tingling, She has been being treated by hand specialist at [place]. -that far and is looking to switch to a provider closer to this area for continued care.  She was referred on last office visit to [facility].  She states that she did not feel comfortable in that office and would like referral to another specialist-     She has no other acute complaints today.      Last labs reviewed 9/2024 all normal stable        The following portions of the patient's history were reviewed and updated as appropriate: allergies, current medications, past family history, past medical history, past social history, past surgical history, and problem list     Review of Systems   Constitutional:  Positive for fatigue. Negative for chills and fever.   Eyes:  Negative for visual disturbance.   Respiratory:  Negative for cough, shortness of breath and wheezing.    Cardiovascular:  Negative for chest pain and leg swelling.   Gastrointestinal:  Negative for abdominal pain, diarrhea, nausea and vomiting.   Endocrine: Negative for cold intolerance, heat intolerance, polydipsia, polyphagia and polyuria.   Genitourinary:  Negative for dysuria.   Musculoskeletal:  Positive for arthralgias.   Neurological:  Positive for tremors and numbness. Negative for dizziness.   Hematological:  Negative for adenopathy. Does  "not bruise/bleed easily.   Psychiatric/Behavioral:  Negative for self-injury, sleep disturbance and suicidal ideas. The patient is not nervous/anxious.        Objective   Vital Signs:  /60   Pulse 71   Temp 98.6 °F (37 °C) (Temporal)   Ht 154.9 cm (61\")   Wt 62.9 kg (138 lb 9.6 oz)   SpO2 97%   BMI 26.19 kg/m²     Physical Exam  Vitals reviewed.   Constitutional:       General: She is not in acute distress.  Eyes:      Conjunctiva/sclera: Conjunctivae normal.   Neck:      Thyroid: No thyromegaly.      Vascular: No carotid bruit.   Cardiovascular:      Rate and Rhythm: Normal rate and regular rhythm.      Heart sounds: Normal heart sounds. No murmur heard.  Pulmonary:      Effort: Pulmonary effort is normal. No respiratory distress.      Breath sounds: Normal breath sounds. No stridor. No wheezing, rhonchi or rales.   Chest:      Chest wall: No tenderness.   Abdominal:      General: Abdomen is flat. Bowel sounds are normal. There is no distension.      Palpations: Abdomen is soft. There is no mass.      Tenderness: There is no abdominal tenderness. There is no right CVA tenderness, left CVA tenderness, guarding or rebound.      Hernia: No hernia is present.   Neurological:      Mental Status: She is alert and oriented to person, place, and time. Mental status is at baseline.      Gait: Gait normal.      Comments: tremors noted   Psychiatric:         Attention and Perception: Attention normal.         Mood and Affect: Mood normal.          The following data was reviewed by: AKUA Mccallum on 05/29/2025:  Common labs          9/23/2024    13:23   Common Labs   Glucose 99    BUN 13    Creatinine 0.90    Sodium 139    Potassium 4.2    Chloride 103    Calcium 9.2    Albumin 4.1    Total Bilirubin 0.3    Alkaline Phosphatase 66    AST (SGOT) 19    ALT (SGPT) 12    WBC 5.8    Hemoglobin 13.7    Hematocrit 42.1    Platelets 309    Hemoglobin A1C 5.6      CMP          9/23/2024    13:23   CMP   Glucose 99 "    BUN 13    Creatinine 0.90    EGFR 61    Sodium 139    Potassium 4.2    Chloride 103    Calcium 9.2    Total Protein 6.3    Albumin 4.1    Globulin 2.2    Total Bilirubin 0.3    Alkaline Phosphatase 66    AST (SGOT) 19    ALT (SGPT) 12    BUN/Creatinine Ratio 14      CBC          9/23/2024    13:23   CBC   WBC 5.8    RBC 4.44    Hemoglobin 13.7    Hematocrit 42.1    MCV 95    MCH 30.9    MCHC 32.5    RDW 12.5    Platelets 309      CBC w/diff          9/23/2024    13:23   CBC w/Diff   WBC 5.8    RBC 4.44    Hemoglobin 13.7    Hematocrit 42.1    MCV 95    MCH 30.9    MCHC 32.5    RDW 12.5    Platelets 309    Neutrophil Rel % 69    Lymphocyte Rel % 17    Monocyte Rel % 7    Eosinophil Rel % 5    Basophil Rel % 1        TSH          9/23/2024    13:23   TSH   TSH 1.420      Electrolytes          9/23/2024    13:23   Electrolytes   Sodium 139    Potassium 4.2    Chloride 103    Calcium 9.2      Renal Profile          9/23/2024    13:23   Renal Profile   BUN 13    Creatinine 0.90                 Assessment and Plan   Assessment & Plan  Medicare annual wellness visit, subsequent  Healthy well-balanced diet  Exercise 30 minutes most days of the week  Make sure you get results on any labs or tests we ordered today  We discussed medications and how to take them as prescribed  Sleep 6-8 hours each night if possible  If you have not signed up for [employer], please activate your code ASAP from your After Visit Summary today     LDL goal <100  LDL goal if heart disease <70  HDL goal >60  Triglyceride goal <150  BP goal =<130/80  Fasting glucose <100         Bilateral carpal tunnel syndrome  Request referral to a different office for hand specialist  Did not feel comfortable at [facility]  Has been seen at [place] but feels that is too long to drive with her symptoms  Orders:  •  Ambulatory Referral to Hand Surgery    Bilateral hand pain  Request referral to a different office for hand specialist  Did not feel comfortable at [facility]  Has been seen at [place] but feels that is too long to drive with her symptoms  Orders:  •  Ambulatory Referral to Hand Surgery    Benign essential hypertension  Hypertension is stable and controlled  Continue current treatment regimen.  Blood pressure will be reassessed in 6 months.    Orders:  •  losartan (COZAAR) 100 MG tablet; Take 1 tablet by mouth Daily.  •  nebivolol (BYSTOLIC) 10 MG tablet; Take 1 tablet by mouth Every Morning.    Aortic stenosis, moderate  Keep follow-up with cardiology as directed continue current management       Mixed hyperlipidemia   Lipid abnormalities are stable    Plan:  Continue same medication/s without change.      Discussed medication dosage, use, side effects, and goals of treatment in detail.    Counseled patient on lifestyle modifications to help control hyperlipidemia.     Patient Treatment Goals:   LDL goal is under 100    Followup in 6 months.         Anxiety  Continue sertraline working well                 Follow Up   Return in about 6 months (around 11/29/2025) for Next scheduled follow up.  Patient was given instructions and counseling regarding her condition or for health maintenance advice. Please see specific information pulled into the AVS if appropriate.

## 2025-05-29 NOTE — ASSESSMENT & PLAN NOTE
Hypertension is stable and controlled  Continue current treatment regimen.  Blood pressure will be reassessed in 6 months.    Orders:    losartan (COZAAR) 100 MG tablet; Take 1 tablet by mouth Daily.    nebivolol (BYSTOLIC) 10 MG tablet; Take 1 tablet by mouth Every Morning.

## 2025-06-12 ENCOUNTER — TELEPHONE (OUTPATIENT)
Dept: FAMILY MEDICINE CLINIC | Facility: CLINIC | Age: OVER 89
End: 2025-06-12
Payer: MEDICARE

## 2025-06-12 NOTE — TELEPHONE ENCOUNTER
Patient called into the office stating that her provider placed a referral for her to see a hand surgeon. Patient wanted to confirm if her provider gave her the correct contact information for the provider. Patient was given clarification regarding the contact information. Patient stated that she would reach out to their office to schedule.

## 2025-07-11 RX ORDER — CETIRIZINE HYDROCHLORIDE 10 MG/1
10 TABLET ORAL DAILY
Qty: 90 TABLET | Refills: 0 | Status: SHIPPED | OUTPATIENT
Start: 2025-07-11

## 2025-07-15 RX ORDER — CETIRIZINE HYDROCHLORIDE 10 MG/1
10 TABLET ORAL DAILY
Qty: 90 TABLET | Refills: 0 | OUTPATIENT
Start: 2025-07-15

## 2025-08-08 ENCOUNTER — TELEPHONE (OUTPATIENT)
Dept: CARDIOLOGY | Age: OVER 89
End: 2025-08-08
Payer: MEDICARE

## 2025-08-08 DIAGNOSIS — I35.0 AORTIC STENOSIS, MODERATE: Primary | ICD-10-CM

## 2025-08-19 ENCOUNTER — HOSPITAL ENCOUNTER (OUTPATIENT)
Dept: CARDIOLOGY | Facility: HOSPITAL | Age: OVER 89
Discharge: HOME | End: 2025-08-19
Admitting: INTERNAL MEDICINE
Payer: MEDICARE

## 2025-08-19 VITALS
BODY MASS INDEX: 26.06 KG/M2 | DIASTOLIC BLOOD PRESSURE: 56 MMHG | SYSTOLIC BLOOD PRESSURE: 120 MMHG | WEIGHT: 138 LBS | HEIGHT: 61 IN

## 2025-08-19 DIAGNOSIS — I35.0 AORTIC STENOSIS, MODERATE: ICD-10-CM

## 2025-08-19 LAB
AORTIC DIMENSIONLESS INDEX: 0.29 (DI)
ASCENDING AORTA: 3 CM
AV MEAN PRESS GRAD SYS DOP V1V2: 20.3 MMHG
AV VMAX SYS DOP: 313 CM/SEC
BH CV ECHO LEFT ATRIAL STRAIN: 21.2 %
BH CV ECHO LEFT VENTRICLE GLOBAL LONGITUDINAL STRAIN: -20.9 %
BH CV ECHO MEAS - AO MAX PG: 39.2 MMHG
BH CV ECHO MEAS - AO V2 VTI: 74.8 CM
BH CV ECHO MEAS - AVA(I,D): 0.79 CM2
BH CV ECHO MEAS - EDV(CUBED): 110.6 ML
BH CV ECHO MEAS - EDV(MOD-SP2): 59 ML
BH CV ECHO MEAS - EDV(MOD-SP4): 70 ML
BH CV ECHO MEAS - EF(MOD-SP2): 67.8 %
BH CV ECHO MEAS - EF(MOD-SP4): 68.6 %
BH CV ECHO MEAS - ESV(CUBED): 35.2 ML
BH CV ECHO MEAS - ESV(MOD-SP2): 19 ML
BH CV ECHO MEAS - ESV(MOD-SP4): 22 ML
BH CV ECHO MEAS - FS: 31.7 %
BH CV ECHO MEAS - IVS/LVPW: 0.7 CM
BH CV ECHO MEAS - IVSD: 0.7 CM
BH CV ECHO MEAS - LAT PEAK E' VEL: 4.3 CM/SEC
BH CV ECHO MEAS - LV DIASTOLIC VOL/BSA (35-75): 43.4 CM2
BH CV ECHO MEAS - LV MASS(C)D: 137.1 GRAMS
BH CV ECHO MEAS - LV MAX PG: 3.4 MMHG
BH CV ECHO MEAS - LV MEAN PG: 2 MMHG
BH CV ECHO MEAS - LV SYSTOLIC VOL/BSA (12-30): 13.6 CM2
BH CV ECHO MEAS - LV V1 MAX: 92.5 CM/SEC
BH CV ECHO MEAS - LV V1 VTI: 21.8 CM
BH CV ECHO MEAS - LVIDD: 4.8 CM
BH CV ECHO MEAS - LVIDS: 3.3 CM
BH CV ECHO MEAS - LVOT AREA: 2.7 CM2
BH CV ECHO MEAS - LVOT DIAM: 1.86 CM
BH CV ECHO MEAS - LVPWD: 1 CM
BH CV ECHO MEAS - MED PEAK E' VEL: 5 CM/SEC
BH CV ECHO MEAS - MV A DUR: 0.13 SEC
BH CV ECHO MEAS - MV A MAX VEL: 103 CM/SEC
BH CV ECHO MEAS - MV DEC SLOPE: 459.3 CM/SEC2
BH CV ECHO MEAS - MV DEC TIME: 0.24 SEC
BH CV ECHO MEAS - MV E MAX VEL: 88.8 CM/SEC
BH CV ECHO MEAS - MV E/A: 0.86
BH CV ECHO MEAS - MV MAX PG: 5.3 MMHG
BH CV ECHO MEAS - MV MEAN PG: 2.07 MMHG
BH CV ECHO MEAS - MV P1/2T: 72.7 MSEC
BH CV ECHO MEAS - MV V2 VTI: 37.1 CM
BH CV ECHO MEAS - MVA(P1/2T): 3 CM2
BH CV ECHO MEAS - MVA(VTI): 1.6 CM2
BH CV ECHO MEAS - PA ACC TIME: 0.16 SEC
BH CV ECHO MEAS - PA V2 MAX: 101.1 CM/SEC
BH CV ECHO MEAS - PULM A REVS DUR: 0.11 SEC
BH CV ECHO MEAS - PULM A REVS VEL: 23.3 CM/SEC
BH CV ECHO MEAS - PULM DIAS VEL: 44.1 CM/SEC
BH CV ECHO MEAS - PULM S/D: 1.26
BH CV ECHO MEAS - PULM SYS VEL: 55.7 CM/SEC
BH CV ECHO MEAS - RV MAX PG: 1.09 MMHG
BH CV ECHO MEAS - RV V1 MAX: 52.3 CM/SEC
BH CV ECHO MEAS - RV V1 VTI: 12.3 CM
BH CV ECHO MEAS - SV(LVOT): 59.4 ML
BH CV ECHO MEAS - SV(MOD-SP2): 40 ML
BH CV ECHO MEAS - SV(MOD-SP4): 48 ML
BH CV ECHO MEAS - SVI(LVOT): 36.8 ML/M2
BH CV ECHO MEAS - SVI(MOD-SP2): 24.8 ML/M2
BH CV ECHO MEAS - SVI(MOD-SP4): 29.7 ML/M2
BH CV ECHO MEAS - TAPSE (>1.6): 1.93 CM
BH CV ECHO MEAS - TR MAX PG: 25.8 MMHG
BH CV ECHO MEAS - TR MAX VEL: 253.8 CM/SEC
BH CV ECHO MEASUREMENTS AVERAGE E/E' RATIO: 19.1
BH CV XLRA - TDI S': 8.4 CM/SEC
LEFT ATRIUM VOLUME INDEX: 27.7 ML/M2
LV EF BIPLANE MOD: 67.6 %
SINUS: 2.3 CM
STJ: 1.84 CM

## 2025-08-19 PROCEDURE — 93306 TTE W/DOPPLER COMPLETE: CPT

## 2025-08-19 PROCEDURE — 93356 MYOCRD STRAIN IMG SPCKL TRCK: CPT

## 2025-08-22 ENCOUNTER — TRANSCRIBE ORDERS (OUTPATIENT)
Dept: ADMINISTRATIVE | Facility: HOSPITAL | Age: OVER 89
End: 2025-08-22
Payer: MEDICARE

## 2025-08-22 DIAGNOSIS — Z12.31 VISIT FOR SCREENING MAMMOGRAM: Primary | ICD-10-CM

## (undated) DEVICE — BNDG ELAS ELITE V/CLOSE 4IN 5YD LF STRL

## (undated) DEVICE — CODMAN® SURGICAL PATTIES 3/4" X 3/4" (1.91CM X 1.91CM): Brand: CODMAN®

## (undated) DEVICE — NDL SPINE 22G 31/2IN BLK

## (undated) DEVICE — SPNG GZ WOVN 4X4IN 12PLY 10/BX STRL

## (undated) DEVICE — BLD DISSCT COOL CUT SJ CRVD 4MM 13CM

## (undated) DEVICE — DRSNG WND GZ CURAD OIL EMULSION 3X8IN LF STRL 1PK

## (undated) DEVICE — MEDI-VAC YANKAUER SUCTION HANDLE W/BULBOUS TIP: Brand: CARDINAL HEALTH

## (undated) DEVICE — APPL DURAPREP IODOPHOR APL 26ML

## (undated) DEVICE — GLV SURG SENSICARE GREEN W/ALOE PF LF 7 STRL

## (undated) DEVICE — SMOKE EVACUATION TUBING WITH 7/8 IN TO 1/4 IN REDUCER: Brand: BUFFALO FILTER

## (undated) DEVICE — BNDG ELAS ELITE V/CLOSE 6IN 5YD LF STRL

## (undated) DEVICE — PK KN TOTL 40

## (undated) DEVICE — GLV SURG SENSICARE MICRO PF LF 7 STRL

## (undated) DEVICE — SOL NACL 0.9PCT 100ML SGL

## (undated) DEVICE — 3M™ IOBAN™ 2 ANTIMICROBIAL INCISE DRAPE 6640EZ: Brand: IOBAN™ 2

## (undated) DEVICE — DUAL CUT SAGITTAL BLADE

## (undated) DEVICE — UNDYED BRAIDED (POLYGLACTIN 910), SYNTHETIC ABSORBABLE SUTURE: Brand: COATED VICRYL

## (undated) DEVICE — GLV SURG BIOGEL LTX PF 7

## (undated) DEVICE — MAT FLR ABSORBENT LG 4FT 10 2.5FT

## (undated) DEVICE — SOL ISO/ALC RUB 70PCT 4OZ

## (undated) DEVICE — PREMIUM WET SKIN PREP TRAY: Brand: MEDLINE INDUSTRIES, INC.

## (undated) DEVICE — SUT ETHLN 3/0 PS1 18IN 1663H

## (undated) DEVICE — CMT BONE PALACOS R HI/VISC 1X40: Type: IMPLANTABLE DEVICE | Status: NON-FUNCTIONAL

## (undated) DEVICE — GLV SURG SENSICARE GREEN W/ALOE PF LF 6.5 STRL

## (undated) DEVICE — STPLR SKIN VISISTAT WD 35CT

## (undated) DEVICE — ANTIBACTERIAL UNDYED BRAIDED (POLYGLACTIN 910), SYNTHETIC ABSORBABLE SUTURE: Brand: COATED VICRYL

## (undated) DEVICE — NEEDLE, QUINCKE, 20GX3.5": Brand: MEDLINE

## (undated) DEVICE — INTENDED FOR TISSUE SEPARATION, AND OTHER PROCEDURES THAT REQUIRE A SHARP SURGICAL BLADE TO PUNCTURE OR CUT.: Brand: BARD-PARKER ® STAINLESS STEEL BLADES

## (undated) DEVICE — GLV SURG SENSICARE W/ALOE PF LF 7.5 STRL

## (undated) DEVICE — DRP MICROSCOPE 4 BINOCULAR CV 54X150IN

## (undated) DEVICE — TBG PUMP ARTHSCP MAIN AR6400 16FT

## (undated) DEVICE — SUT VIC 0 CT1 36IN J946H

## (undated) DEVICE — UNDERCAST PADDING: Brand: DEROYAL

## (undated) DEVICE — PENCL ES MEGADINE EZ/CLEAN BUTN W/HOLSTR 10FT

## (undated) DEVICE — 3M™ STERI-STRIP™ ANTIMICROBIAL SKIN CLOSURES 1/2 INCH X 4 INCHES 50/CARTON 4 CARTONS/CASE A1847: Brand: 3M™ STERI-STRIP™

## (undated) DEVICE — GLV SURG BIOGEL LTX PF 6 1/2

## (undated) DEVICE — ABL APOLLO RF M/PRT 90D

## (undated) DEVICE — SKIN PREP TRAY W/CHG: Brand: MEDLINE INDUSTRIES, INC.

## (undated) DEVICE — CONN TBG Y 5 IN 1 LF STRL

## (undated) DEVICE — TOOL MR8-15BA50T MR8 15CM BAL SYMTRI 5MM: Brand: MIDAS REX MR8

## (undated) DEVICE — ADHS LIQ MASTISOL 2/3ML

## (undated) DEVICE — DISPOSABLE TOURNIQUET CUFF SINGLE BLADDER, SINGLE PORT AND QUICK CONNECT CONNECTOR: Brand: COLOR CUFF

## (undated) DEVICE — APPL CHLORAPREP HI/LITE 26ML ORNG

## (undated) DEVICE — PREP SOL POVIDONE/IODINE BT 4OZ

## (undated) DEVICE — SUT VIC 1 CT1 36IN J947H

## (undated) DEVICE — PK NEURO SPINE 40

## (undated) DEVICE — DRSNG WND GZ CURAD OIL EMULSION 3X3IN STRL

## (undated) DEVICE — PK ARTHSCP 40

## (undated) DEVICE — PATIENT RETURN ELECTRODE, SINGLE-USE, CONTACT QUALITY MONITORING, ADULT, WITH 9FT CORD, FOR PATIENTS WEIGING OVER 33LBS. (15KG): Brand: MEGADYNE

## (undated) DEVICE — ENCORE® LATEX ORTHO SIZE 7.5, STERILE LATEX POWDER-FREE SURGICAL GLOVE: Brand: ENCORE

## (undated) DEVICE — DRSNG ADAPTIC 3X8

## (undated) DEVICE — GOWN,SIRUS,NON REINFRCD,LARGE,SET IN SL: Brand: MEDLINE

## (undated) DEVICE — GLV SURG SENSICARE W/ALOE PF LF 8 STRL

## (undated) DEVICE — Device

## (undated) DEVICE — SYR CONTRL LUERLOK 10CC